# Patient Record
Sex: FEMALE | Race: WHITE | NOT HISPANIC OR LATINO | Employment: OTHER | URBAN - METROPOLITAN AREA
[De-identification: names, ages, dates, MRNs, and addresses within clinical notes are randomized per-mention and may not be internally consistent; named-entity substitution may affect disease eponyms.]

---

## 2017-01-05 ENCOUNTER — TRANSFERRED RECORDS (OUTPATIENT)
Dept: HEALTH INFORMATION MANAGEMENT | Facility: CLINIC | Age: 67
End: 2017-01-05

## 2017-01-17 ENCOUNTER — TELEPHONE (OUTPATIENT)
Dept: OBGYN | Facility: CLINIC | Age: 67
End: 2017-01-17

## 2017-01-17 DIAGNOSIS — Z79.890 HORMONE REPLACEMENT THERAPY (HRT): Primary | ICD-10-CM

## 2017-01-17 RX ORDER — ESTRADIOL 1 MG/1
TABLET ORAL
Qty: 90 TABLET | Refills: 0 | Status: SHIPPED | OUTPATIENT
Start: 2017-01-17 | End: 2017-06-06

## 2017-01-17 NOTE — TELEPHONE ENCOUNTER
Estradiol       Last Written Prescription Date:  2/18/16  Last Fill Quantity: 90,   # refills: 3  Last Office Visit with [facility] primary care provider:  2/18/16  Future Office visit: 4/11/17    Pt due for annual, annual appt scheduled. 3 month supply sent for insurance purposes. Informed pt Rx sent.

## 2017-01-17 NOTE — TELEPHONE ENCOUNTER
Reason for call: Medication   If this is a refill request, has the caller requested the refill from the pharmacy already? No  Will the patient be using a [facility]? No  Name of the pharmacy and phone number for the current request: [facility] in [place] on [address]    Name of the medication requested:Estradial     Phone Number Pt can be reached at: [phone]    Can we leave a detailed message on this number? YES

## 2017-01-19 ENCOUNTER — DOCUMENTATION ONLY (OUTPATIENT)
Dept: OTHER | Facility: CLINIC | Age: 67
End: 2017-01-19

## 2017-01-19 DIAGNOSIS — Z71.89 ADVANCE CARE PLANNING: Primary | Chronic | ICD-10-CM

## 2017-01-23 ENCOUNTER — TELEPHONE (OUTPATIENT)
Dept: OBGYN | Facility: CLINIC | Age: 67
End: 2017-01-23

## 2017-01-23 NOTE — TELEPHONE ENCOUNTER
rec'd letter from [facility] stating the estradiol is no longer being covered, I left a detailed message stating if she wants to continue she will be cash pay for this medication.

## 2017-01-24 ENCOUNTER — TRANSFERRED RECORDS (OUTPATIENT)
Dept: HEALTH INFORMATION MANAGEMENT | Facility: CLINIC | Age: 67
End: 2017-01-24

## 2017-02-06 ENCOUNTER — TRANSFERRED RECORDS (OUTPATIENT)
Dept: HEALTH INFORMATION MANAGEMENT | Facility: CLINIC | Age: 67
End: 2017-02-06

## 2017-04-07 ENCOUNTER — TRANSFERRED RECORDS (OUTPATIENT)
Dept: HEALTH INFORMATION MANAGEMENT | Facility: CLINIC | Age: 67
End: 2017-04-07

## 2017-04-25 ENCOUNTER — TRANSFERRED RECORDS (OUTPATIENT)
Dept: HEALTH INFORMATION MANAGEMENT | Facility: CLINIC | Age: 67
End: 2017-04-25

## 2017-05-16 ENCOUNTER — TELEPHONE (OUTPATIENT)
Dept: FAMILY MEDICINE | Facility: CLINIC | Age: 67
End: 2017-05-16

## 2017-05-16 NOTE — TELEPHONE ENCOUNTER
Left message for pt to return call to clinic - Please [facility] for triage nurse if she returns call back.  PHQ completed on pre assessment survey for OV scheduled 5/19/17.  Nanci Duggan RN

## 2017-05-16 NOTE — TELEPHONE ENCOUNTER
"Called pt on below.    Pt reports \"Things are worse because of my circumstances, there's a whole lot going on. There are times when I think 'I wish I wasn't dealing with this and it was all gone' not that I would do anything.  I would never do anything, and I don't have a plan. I used to be a , and I think it's important to be as honest as possible on these tests.   Plus I'm getting only 4 hours of sleep a night.\"  \"I scheduled this appt this week because I have to do something about something\"  Pt states feels safe at home and in environment, and has no active suicidal thoughts, or plans. Pt plans to see PCP on 5/19, and Neuro on 5/31, and agrees to call and speak with nurse if any worsening thoughts, increase in active suicidal ideation.  Pt was very appreciative of call, and thankful, and was laughing/making jokes at the end of this call.    Nanci Duggan RN    "

## 2017-05-16 NOTE — TELEPHONE ENCOUNTER
PHq-9 response did indicate she has has thoughts of death and suicidal risk.  Can we follow up with her by phone and assess if she might need urgent psychiatric care?    Josue Mejia MD

## 2017-05-16 NOTE — TELEPHONE ENCOUNTER
PHQ-9 SCORE 2/18/2016 5/16/2017   Total Score MyChart - 19 (Moderately severe depression)   Total Score 6 -

## 2017-05-19 ENCOUNTER — OFFICE VISIT (OUTPATIENT)
Dept: FAMILY MEDICINE | Facility: CLINIC | Age: 67
End: 2017-05-19
Payer: COMMERCIAL

## 2017-05-19 VITALS
WEIGHT: 123 LBS | OXYGEN SATURATION: 100 % | TEMPERATURE: 97.7 F | DIASTOLIC BLOOD PRESSURE: 82 MMHG | SYSTOLIC BLOOD PRESSURE: 124 MMHG | HEART RATE: 72 BPM | BODY MASS INDEX: 21 KG/M2 | HEIGHT: 64 IN

## 2017-05-19 DIAGNOSIS — Z11.59 NEED FOR HEPATITIS C SCREENING TEST: ICD-10-CM

## 2017-05-19 DIAGNOSIS — G25.81 RESTLESS LEG SYNDROME: ICD-10-CM

## 2017-05-19 DIAGNOSIS — M32.9 SYSTEMIC LUPUS ERYTHEMATOSUS (H): ICD-10-CM

## 2017-05-19 DIAGNOSIS — G25.81 RESTLESS LEGS SYNDROME: ICD-10-CM

## 2017-05-19 DIAGNOSIS — Z12.31 VISIT FOR SCREENING MAMMOGRAM: ICD-10-CM

## 2017-05-19 DIAGNOSIS — G47.00 INSOMNIA, UNSPECIFIED TYPE: ICD-10-CM

## 2017-05-19 DIAGNOSIS — Z00.00 ROUTINE GENERAL MEDICAL EXAMINATION AT A HEALTH CARE FACILITY: Primary | ICD-10-CM

## 2017-05-19 LAB
ALBUMIN SERPL-MCNC: 3.7 G/DL (ref 3.4–5)
ALP SERPL-CCNC: 65 U/L (ref 40–150)
ALT SERPL W P-5'-P-CCNC: 17 U/L (ref 0–50)
ANION GAP SERPL CALCULATED.3IONS-SCNC: 8 MMOL/L (ref 3–14)
AST SERPL W P-5'-P-CCNC: 12 U/L (ref 0–45)
BASOPHILS # BLD AUTO: 0 10E9/L (ref 0–0.2)
BASOPHILS NFR BLD AUTO: 0.2 %
BILIRUB SERPL-MCNC: 0.3 MG/DL (ref 0.2–1.3)
BUN SERPL-MCNC: 19 MG/DL (ref 7–30)
CALCIUM SERPL-MCNC: 8.8 MG/DL (ref 8.5–10.1)
CHLORIDE SERPL-SCNC: 104 MMOL/L (ref 94–109)
CHOLEST SERPL-MCNC: 277 MG/DL
CO2 SERPL-SCNC: 27 MMOL/L (ref 20–32)
CREAT SERPL-MCNC: 0.89 MG/DL (ref 0.52–1.04)
DIFFERENTIAL METHOD BLD: ABNORMAL
EOSINOPHIL # BLD AUTO: 0 10E9/L (ref 0–0.7)
EOSINOPHIL NFR BLD AUTO: 0.3 %
ERYTHROCYTE [DISTWIDTH] IN BLOOD BY AUTOMATED COUNT: 15.4 % (ref 10–15)
GFR SERPL CREATININE-BSD FRML MDRD: 63 ML/MIN/1.7M2
GLUCOSE SERPL-MCNC: 108 MG/DL (ref 70–99)
HCT VFR BLD AUTO: 39.6 % (ref 35–47)
HCV AB SERPL QL IA: NORMAL
HDLC SERPL-MCNC: 115 MG/DL
HGB BLD-MCNC: 12.5 G/DL (ref 11.7–15.7)
LDLC SERPL CALC-MCNC: 127 MG/DL
LYMPHOCYTES # BLD AUTO: 1 10E9/L (ref 0.8–5.3)
LYMPHOCYTES NFR BLD AUTO: 8 %
MCH RBC QN AUTO: 28.6 PG (ref 26.5–33)
MCHC RBC AUTO-ENTMCNC: 31.6 G/DL (ref 31.5–36.5)
MCV RBC AUTO: 91 FL (ref 78–100)
MONOCYTES # BLD AUTO: 0.7 10E9/L (ref 0–1.3)
MONOCYTES NFR BLD AUTO: 5.8 %
NEUTROPHILS # BLD AUTO: 10.6 10E9/L (ref 1.6–8.3)
NEUTROPHILS NFR BLD AUTO: 85.7 %
NONHDLC SERPL-MCNC: 162 MG/DL
PLATELET # BLD AUTO: 315 10E9/L (ref 150–450)
POTASSIUM SERPL-SCNC: 4.2 MMOL/L (ref 3.4–5.3)
PROT SERPL-MCNC: 7.1 G/DL (ref 6.8–8.8)
RBC # BLD AUTO: 4.37 10E12/L (ref 3.8–5.2)
SODIUM SERPL-SCNC: 139 MMOL/L (ref 133–144)
TRIGL SERPL-MCNC: 177 MG/DL
TSH SERPL DL<=0.005 MIU/L-ACNC: 0.8 MU/L (ref 0.4–4)
WBC # BLD AUTO: 12.3 10E9/L (ref 4–11)

## 2017-05-19 PROCEDURE — 86803 HEPATITIS C AB TEST: CPT | Performed by: INTERNAL MEDICINE

## 2017-05-19 PROCEDURE — 80053 COMPREHEN METABOLIC PANEL: CPT | Performed by: INTERNAL MEDICINE

## 2017-05-19 PROCEDURE — 85025 COMPLETE CBC W/AUTO DIFF WBC: CPT | Performed by: INTERNAL MEDICINE

## 2017-05-19 PROCEDURE — 36415 COLL VENOUS BLD VENIPUNCTURE: CPT | Performed by: INTERNAL MEDICINE

## 2017-05-19 PROCEDURE — 99397 PER PM REEVAL EST PAT 65+ YR: CPT | Performed by: INTERNAL MEDICINE

## 2017-05-19 PROCEDURE — 84443 ASSAY THYROID STIM HORMONE: CPT | Performed by: INTERNAL MEDICINE

## 2017-05-19 PROCEDURE — 99213 OFFICE O/P EST LOW 20 MIN: CPT | Mod: 25 | Performed by: INTERNAL MEDICINE

## 2017-05-19 PROCEDURE — 80061 LIPID PANEL: CPT | Performed by: INTERNAL MEDICINE

## 2017-05-19 RX ORDER — ZOLPIDEM TARTRATE 5 MG/1
5 TABLET ORAL
Qty: 7 TABLET | Refills: 0 | Status: ON HOLD | OUTPATIENT
Start: 2017-05-19 | End: 2018-05-18

## 2017-05-19 RX ORDER — PRAMIPEXOLE DIHYDROCHLORIDE 0.5 MG/1
1.5 TABLET ORAL AT BEDTIME
Status: ON HOLD | COMMUNITY
End: 2018-05-18

## 2017-05-19 RX ORDER — PREDNISONE 5 MG/1
TABLET ORAL
COMMUNITY

## 2017-05-19 RX ORDER — MELOXICAM 15 MG/1
7.5 TABLET ORAL DAILY
COMMUNITY
End: 2020-04-13

## 2017-05-19 RX ORDER — POLYETHYLENE GLYCOL 3350 17 G/17G
1 POWDER, FOR SOLUTION ORAL DAILY
Status: ON HOLD | COMMUNITY
End: 2018-05-18

## 2017-05-19 NOTE — PATIENT INSTRUCTIONS
(Z00.00) Routine general medical examination at a health care facility  (primary encounter diagnosis)  Comment: For routine exam, we will draw labs as ordered, cholesterol, diabetes mellitus check, liver function, renal function, mammogram upcoming.  We will also update vaccination history  Plan: Hepatitis C Screen Reflex to HCV RNA Quant and         Genotype, COMPREHENSIVE METABOLIC PANEL, Lipid         panel reflex to direct LDL, CBC with platelets         and differential            (Z12.31) Visit for screening mammogram  Comment:  [facility] (also performs diagnostic mammogram, ultrasound and biopsy) [phone].    Plan: MA SCREENING DIGITAL BILAT - Future  (s+30)            (Z11.59) Need for hepatitis C screening test  Comment:   Plan:     (G25.81) Restless leg syndrome  Comment: Continue mirapex  Plan: pramipexole (MIRAPEX) 0.5 MG tablet            (G25.81) Restless legs syndrome  Comment: As above - also refer for sleep managment  Plan: SLEEP EVALUATION & MANAGEMENT REFERRAL - ADULT            (M32.9) Systemic lupus erythematosus (H)  Comment: Continue follow up in rheumatology  Plan:            (G47.00) Insomnia, unspecified type  Comment: We will continue trazodone and mirapex and I would recommend a consultation with a sleep specialist and in the interim we will add a low dose of ambien - start with 2.5 mg - to be taken if you wake up and can't fall asleep.  Note that this will possibly make you very sleepy the following day and should not be taken on days/nights in which you anticipate that you will need to be driving or have any high level responsibilities.   Plan: SLEEP EVALUATION & MANAGEMENT REFERRAL - ADULT         zolpidem (AMBIEN) 5 MG tablet

## 2017-05-19 NOTE — MR AVS SNAPSHOT
After Visit Summary   5/19/2017    Yessenia Martines    MRN: [id number]           Patient Information     Date Of Birth          1950        Visit Information        Provider Department      5/19/2017 8:30 AM Josue Mejia MD [facility]        Today's Diagnoses     Routine general medical examination at a health care facility    -  1    Visit for screening mammogram        Need for hepatitis C screening test        Restless leg syndrome        Restless legs syndrome        Systemic lupus erythematosus (H)        Insomnia, unspecified type          Care Instructions    (Z00.00) Routine general medical examination at a health care facility  (primary encounter diagnosis)  Comment: For routine exam, we will draw labs as ordered, cholesterol, diabetes mellitus check, liver function, renal function, mammogram upcoming.  We will also update vaccination history  Plan: Hepatitis C Screen Reflex to HCV RNA Quant and         Genotype, COMPREHENSIVE METABOLIC PANEL, Lipid         panel reflex to direct LDL, CBC with platelets         and differential            (Z12.31) Visit for screening mammogram  Comment:  [facility] (also performs diagnostic mammogram, ultrasound and biopsy) [phone].    Plan: MA SCREENING DIGITAL BILAT - Future  (s+30)            (Z11.59) Need for hepatitis C screening test  Comment:   Plan:     (G25.81) Restless leg syndrome  Comment: Continue mirapex  Plan: pramipexole (MIRAPEX) 0.5 MG tablet            (G25.81) Restless legs syndrome  Comment: As above - also refer for sleep managment  Plan: SLEEP EVALUATION & MANAGEMENT REFERRAL - ADULT            (M32.9) Systemic lupus erythematosus (H)  Comment: Continue follow up in rheumatology  Plan:            (G47.00) Insomnia, unspecified type  Comment: We will continue trazodone and mirapex and I would recommend a consultation with a sleep specialist and in the interim we will add a low  dose of ambien - start with 2.5 mg - to be taken if you wake up and can't fall asleep.  Note that this will possibly make you very sleepy the following day and should not be taken on days/nights in which you anticipate that you will need to be driving or have any high level responsibilities.   Plan: SLEEP EVALUATION & MANAGEMENT REFERRAL - ADULT         zolpidem (AMBIEN) 5 MG tablet            Follow-ups after your visit        Additional Services     SLEEP EVALUATION & MANAGEMENT REFERRAL - ADULT       Please be aware that coverage of these services is subject to the terms and limitations of your health insurance plan.  Call member services at your health plan with any benefit or coverage questions.      Please bring the following to your appointment:    >>   List of current medications   >>   This referral request   >>   Any documents/labs given to you for this referral    Seun Faith)   [phone] (Age 18 and up)                  Your next 10 appointments already scheduled     Jun 06, 2017  8:00 AM CDT   Office Visit with Nicola Ramirez MD   [facility]    [address]  [address]  [place] [id number] [phone]           Bring a current list of meds and any records pertaining to this visit.  For Physicals, please bring immunization records and any forms needing to be filled out.  Please arrive 10 minutes early to complete paperwork.            Jun 06, 2017  8:30 AM CDT   MA SCREENING DIGITAL BILATERAL with WEMA1   [facility]    [address], [address]  [place] [id number] [phone]           Do not use any powder, lotion or deodorant under your arms or on your breast. If you do, we will ask you to remove it before your exam.  Wear comfortable, two-piece clothing.  If you have any allergies, tell your care team.  Bring any previous mammograms from  "other facilities or have them mailed to the breast center.              Future tests that were ordered for you today     Open Future Orders        Priority Expected Expires Ordered    MA SCREENING DIGITAL BILAT - Future  (s+30) Routine  5/19/2018 5/19/2017    SLEEP EVALUATION & MANAGEMENT REFERRAL - ADULT Routine  5/19/2018 5/19/2017            Who to contact     If you have questions or need follow up information about today's clinic visit or your schedule please contact [facility] directly at [phone].  Normal or non-critical lab and imaging results will be communicated to you by [employer], letter or phone within 4 business days after the clinic has received the results. If you do not hear from us within 7 days, please contact the clinic through [employer] or phone. If you have a critical or abnormal lab result, we will notify you by phone as soon as possible.  Submit refill requests through [employer] or call your pharmacy and they will forward the refill request to us. Please allow 3 business days for your refill to be completed.          Additional Information About Your Visit        [employer] Information     [employer] gives you secure access to your electronic health record. If you see a primary care provider, you can also send messages to your care team and make appointments. If you have questions, please call your primary care clinic.  If you do not have a primary care provider, please call [phone] and they will assist you.        Care EveryWhere ID     This is your Care EveryWhere ID. This could be used by other organizations to access your [facility] medical records  [id number]        Your Vitals Were     Pulse Temperature Height Pulse Oximetry Breastfeeding? BMI (Body Mass Index)    72 97.7  F (36.5  C) (Oral) 5' 4\" (1.626 m) 100% No 21.11 kg/m2       Blood Pressure from Last 3 Encounters:   05/19/17 124/82   12/09/16 103/63   11/18/16 123/77    Weight from Last 3 Encounters:   05/19/17 123 lb " (55.8 kg)   12/09/16 114 lb (51.7 kg)   11/18/16 114 lb (51.7 kg)              We Performed the Following     CBC with platelets and differential     COMPREHENSIVE METABOLIC PANEL     Hepatitis C Screen Reflex to HCV RNA Quant and Genotype     Lipid panel reflex to direct LDL     TSH with free T4 reflex          Today's Medication Changes          These changes are accurate as of: 5/19/17  9:22 AM.  If you have any questions, ask your nurse or doctor.               Start taking these medicines.        Dose/Directions    zolpidem 5 MG tablet   Commonly known as:  AMBIEN   Used for:  Insomnia, unspecified type   Started by:  Josue Mejia MD        Dose:  5 mg   Take 1 tablet (5 mg) by mouth nightly as needed for sleep   Quantity:  7 tablet   Refills:  0         These medicines have changed or have updated prescriptions.        Dose/Directions    meloxicam 15 MG tablet   Commonly known as:  MOBIC   This may have changed:  how much to take   Changed by:  Josue Mejia MD        Dose:  7.5 mg   Take 0.5 tablets (7.5 mg) by mouth daily   Refills:  0       MIRAPEX 0.5 MG tablet   This may have changed:  how much to take   Used for:  Restless leg syndrome   Generic drug:  pramipexole   Changed by:  Josue Mejia MD        Dose:  1.5 mg   Take 3 tablets (1.5 mg) by mouth At Bedtime   Refills:  0       predniSONE 5 MG tablet   Commonly known as:  DELTASONE   This may have changed:    - medication strength  - how much to take  - how to take this  - additional instructions   Changed by:  Josue Mejia MD        Taking 5 mg and 1 mg tablet x2 to get a total 7 mg daily   Refills:  0         Stop taking these medicines if you haven't already. Please contact your care team if you have questions.     ACETAMINOPHEN PO   Stopped by:  Josue Mejia MD           LINZESS 145 MCG capsule   Generic drug:  linaclotide   Stopped by:  Josue Mejia MD           oxyCODONE 10 MG  12 hr tablet   Commonly known as:  OxyCONTIN   Stopped by:  Josue Mejia MD           oxyCODONE 5 MG IR tablet   Commonly known as:  ROXICODONE   Stopped by:  Josue Mejia MD           OXYCODONE HCL PO   Stopped by:  Josue Mejia MD                Where to get your medicines      Some of these will need a paper prescription and others can be bought over the counter.  Ask your nurse if you have questions.     Bring a paper prescription for each of these medications     zolpidem 5 MG tablet                Primary Care Provider Office Phone # Fax #    Josue Mejia [phone]       [facility] [address]  [facility] [address]        Thank you!     Thank you for choosing [facility]  for your care. Our goal is always to provide you with excellent care. Hearing back from our patients is one way we can continue to improve our services. Please take a few minutes to complete the written survey that you may receive in the mail after your visit with us. Thank you!             Your Updated Medication List - Protect others around you: Learn how to safely use, store and throw away your medicines at www.disposemymeds.org.          This list is accurate as of: 5/19/17  9:22 AM.  Always use your most recent med list.                   Brand Name Dispense Instructions for use    ARICEPT PO      Take 10 mg by mouth At Bedtime       calcium 600/vitamin D 600-400 MG-UNIT per tablet   Generic drug:  calcium-vitamin D      Take 1 tablet by mouth 2 times daily       CELLCEPT tablet      Take 1 tablet by mouth 2 times daily       CYMBALTA PO      Take 60 mg by mouth 2 times daily       estradiol 1 MG tablet    ESTRACE    90 tablet    TAKE 1 BY MOUTH DAILY       gabapentin 800 MG tablet    NEURONTIN     2 times daily       leucovorin 5 MG tablet    WELLCOVORIN     Take 1 tablet (5 mg) by mouth daily       meloxicam 15 MG tablet    MOBIC     Take  0.5 tablets (7.5 mg) by mouth daily       methotrexate 2.5 MG tablet CHEMO      Take 2 tablets by mouth once a week       MIRALAX powder   Generic drug:  polyethylene glycol      Take 17 g (1 capful) by mouth daily       MIRAPEX 0.5 MG tablet   Generic drug:  pramipexole      Take 3 tablets (1.5 mg) by mouth At Bedtime       NAMENDA PO      Take 10 mg by mouth 2 times daily       omeprazole 40 MG capsule    priLOSEC    90 capsule    TAKE 1 BY MOUTH DAILY       predniSONE 5 MG tablet    DELTASONE     Taking 5 mg and 1 mg tablet x2 to get a total 7 mg daily       ranitidine HCl 300 MG Caps      Take 300 mg by mouth every evening       traZODone 100 MG tablet    DESYREL    180 tablet    TAKE 2 BY MOUTH AT BEDTIME       Vitamin D3 2000 UNITS Tabs      Take 2,000 Int'l Units by mouth daily       VOLTAREN 1 % Gel topical gel   Generic drug:  diclofenac      Place 2 g onto the skin 4 times daily       zolpidem 5 MG tablet    AMBIEN    7 tablet    Take 1 tablet (5 mg) by mouth nightly as needed for sleep

## 2017-05-19 NOTE — PROGRESS NOTES
SUBJECTIVE:                                                            Yessenia Martines is a 67 year old female who presents for Preventive Visit.  Are you in the first 12 months of your Medicare coverage?  No    Physical   Annual:     Getting at least 3 servings of Calcium per day::  Yes    Bi-annual eye exam::  Yes    Dental care twice a year::  Yes    Sleep apnea or symptoms of sleep apnea::  Daytime drowsiness    Diet::  Regular (no restrictions)    Frequency of exercise::  2-3 days/week    Duration of exercise::  30-45 minutes    Taking medications regularly::  Yes    Medication side effects::  Muscle aches and Lightheadedness    Additional concerns today::  YES      COGNITIVE SCREEN  1) Repeat 3 items (Banana, Sunrise, Chair)    2) Clock draw: NORMAL  3) 3 item recall: Recalls 3 objects  Results: 3 items recalled: COGNITIVE IMPAIRMENT LESS LIKELY    Mini-CogTM Copyright S Delia. Licensed by the author for use in [facility] [employer]; reprinted with permission (natalie@[facility]). All rights reserved.        Reviewed and updated as needed this visit by clinical staff         Reviewed and updated as needed this visit by Provider        Social History   Substance Use Topics     Smoking status: Never Smoker     Smokeless tobacco: Never Used     Alcohol use 0.0 oz/week     0 Standard drinks or equivalent per week      Comment: 1 wine/day       patient does nto drink        Today's PHQ-2 Score:   PHQ-2 ( 1999 Pfizer) 5/16/2017   Q1: Little interest or pleasure in doing things -   Q2: Feeling down, depressed or hopeless -   PHQ-2 Score -   Little interest or pleasure in doing things Nearly every day   Feeling down, depressed or hopeless Nearly every day   PHQ-2 Score 6       Do you feel safe in your environment - Yes    Do you have a Health Care Directive?: Yes: Advance Directive has been received and scanned.    Current providers sharing in care for this patient include:   Patient Care Team:  Josue Mejia  MD Alvarado as PCP - General (Internal Medicine)      Hearing impairment: No    Ability to successfully perform activities of daily living: Yes, no assistance needed     Fall risk:  Fallen 2 or more times in the past year?: No  Any fall with injury in the past year?: No    Home safety:       The following health maintenance items are reviewed in Epic and correct as of today:  Health Maintenance   Topic Date Due     HEPATITIS C SCREENING  02/11/1968     CMP Q6 MOS (NO INBASKET)  10/03/2016     MAMMO SCREEN Q2 YR (SYSTEM ASSIGNED)  02/17/2017     FALL RISK ASSESSMENT  02/18/2017     LIPID MONITORING Q1 YEAR( NO INBASKET)  04/19/2017     INFLUENZA VACCINE (SYSTEM ASSIGNED)  09/01/2017     CBC Q1 YR (NO INBASKET)  11/18/2017     PNEUMO 5YR BOOST DUE AFTER AGE 65 (NO IB MSG) (2) 01/21/2020     PNEUMOCOCCAL (2 of 2 - PPSV23) 07/22/2020     TETANUS IMMUNIZATION (SYSTEM ASSIGNED)  11/07/2021     ADVANCE DIRECTIVE PLANNING Q5 YRS (NO INBASKET)  01/19/2022     COLONOSCOPY Q10 YR INBASKET MESSAGE  10/12/2025     DEXA SCAN SCREENING (SYSTEM ASSIGNED)  Completed     Insomnia   Yessenia Martines has not been getting much sleep.  Insomnia has been going on for the past 8-12 months.  She used to sleep fine.  She recognizes that she is working hard.  Depression   Has had concerns for depression due to family health concerns and her own health concerns.  She has seen a therapist in the past and did not feel that this was helpful.  She does have support from friends and Shinto.     ROS:  Constitutional, HEENT, cardiovascular, pulmonary, GI, , musculoskeletal (still with nerve pain in the left leg tingling), neuro, skin, endocrine and psych systems are negative, except as otherwise noted.    Problem list, Medication list, Allergies, and Medical/Social/Surgical histories reviewed in [facility] and updated as appropriate.  OBJECTIVE:                                                            /82 (BP Location: Right arm, Patient  "Position: Chair, Cuff Size: Adult Regular)  Pulse 72  Temp 97.7  F (36.5  C) (Oral)  Ht 5' 4\" (1.626 m)  Wt 123 lb (55.8 kg)  SpO2 100%  Breastfeeding? No  BMI 21.11 kg/m2 Estimated body mass index is 21.11 kg/(m^2) as calculated from the following:    Height as of this encounter: 5' 4\" (1.626 m).    Weight as of this encounter: 123 lb (55.8 kg).  EXAM:   GENERAL: healthy, alert and no distress  EYES: Eyes grossly normal to inspection, PERRL and conjunctivae and sclerae normal  HENT: ear canals and TM's normal, nose and mouth without ulcers or lesions  NECK: no adenopathy, no asymmetry, masses, or scars and thyroid normal to palpation  RESP: lungs clear to auscultation - no rales, rhonchi or wheezes  BREAST: deferred  CV: regular rate and rhythm, normal S1 S2, no S3 or S4, no murmur, click or rub, no peripheral edema and peripheral pulses strong  ABDOMEN: soft, nontender, no hepatosplenomegaly, no masses and bowel sounds normal  MS: no gross musculoskeletal defects noted, no edema  SKIN: no suspicious lesions or rashes  NEURO: normal strength in bilateral lower extremities, gait stable  PSYCH: mentation appears normal, affect normal/bright    ASSESSMENT / PLAN:                                                              Patient Instructions   (Z00.00) Routine general medical examination at a health care facility  (primary encounter diagnosis)  Comment: For routine exam, we will draw labs as ordered, cholesterol, diabetes mellitus check, liver function, renal function, mammogram upcoming.  We will also update vaccination history  Plan: Hepatitis C Screen Reflex to HCV RNA Quant and         Genotype, COMPREHENSIVE METABOLIC PANEL, Lipid         panel reflex to direct LDL, CBC with platelets         and differential            (Z12.31) Visit for screening mammogram  Comment:  [facility] (also performs diagnostic mammogram, ultrasound and biopsy) [phone].    Plan: MA SCREENING DIGITAL " "BILAT - Future  (s+30)            (Z11.59) Need for hepatitis C screening test  Comment:   Plan:     (G25.81) Restless leg syndrome  Comment: Continue mirapex  Plan: pramipexole (MIRAPEX) 0.5 MG tablet            (G25.81) Restless legs syndrome  Comment: As above - also refer for sleep managment  Plan: SLEEP EVALUATION & MANAGEMENT REFERRAL - ADULT            (M32.9) Systemic lupus erythematosus (H)  Comment: Continue follow up in rheumatology  Plan:            (G47.00) Insomnia, unspecified type  Comment: We will continue trazodone and mirapex and I would recommend a consultation with a sleep specialist and in the interim we will add a low dose of ambien - start with 2.5 mg - to be taken if you wake up and can't fall asleep.  Note that this will possibly make you very sleepy the following day and should not be taken on days/nights in which you anticipate that you will need to be driving or have any high level responsibilities.   Plan: SLEEP EVALUATION & MANAGEMENT REFERRAL - ADULT         zolpidem (AMBIEN) 5 MG tablet      End of Life Planning:  Patient currently has an advanced directiveYes    COUNSELING:  Reviewed preventive health counseling, as reflected in patient instructions        Estimated body mass index is 19.57 kg/(m^2) as calculated from the following:    Height as of 12/9/16: 5' 4\" (1.626 m).    Weight as of 12/9/16: 114 lb (51.7 kg).     reports that she has never smoked. She has never used smokeless tobacco.      Appropriate preventive services were discussed with this patient, including applicable screening as appropriate for cardiovascular disease, diabetes, osteopenia/osteoporosis, and glaucoma.  As appropriate for age/gender, discussed screening for colorectal cancer, prostate cancer, breast cancer, and cervical cancer. Checklist reviewing preventive services available has been given to the patient.    Reviewed patients plan of care and provided an AVS. The Basic Care Plan (routine screening as " documented in Health Maintenance) for Yessenia meets the Care Plan requirement. This Care Plan has been established and reviewed with the Patient.    Counseling Resources:  ATP IV Guidelines  Pooled Cohorts Equation Calculator  Breast Cancer Risk Calculator  FRAX Risk Assessment  ICSI Preventive Guidelines  Dietary Guidelines for Americans, 2010  USDA's MyPlate  ASA Prophylaxis  Lung CA Screening    Josue Mejia MD, MD  [facility]

## 2017-05-19 NOTE — NURSING NOTE
"Chief Complaint   Patient presents with     Wellness Visit     Sleep Problem       Initial /82 (BP Location: Right arm, Patient Position: Chair, Cuff Size: Adult Regular)  Pulse 72  Temp 97.7  F (36.5  C) (Oral)  Ht 5' 4\" (1.626 m)  Wt 123 lb (55.8 kg)  SpO2 100%  Breastfeeding? No  BMI 21.11 kg/m2 Estimated body mass index is 21.11 kg/(m^2) as calculated from the following:    Height as of this encounter: 5' 4\" (1.626 m).    Weight as of this encounter: 123 lb (55.8 kg).  Medication Reconciliation: complete     Adriano Dowell, CONCEPCION     "

## 2017-05-21 NOTE — PROGRESS NOTES
Awais Casas,    I had the opportunity to review your recent labs and a summary of your labs reads as follows:    Your complete blood counts show no sign of anemia, normal platelet and slight elevation of white blood cell count and neutrophils which may be emargination reaction from taking prednisone  Your comprehensive metabolic panel showed normal renal function, normal liver function, and slightly elevated fasting blood glucose indicating no evidence of diabetes mellitus.  Your fasting lipid panel show  - normal HDL (good) cholesterol -as your goal is greater than 40  - low LDL (bad) cholesterol as your goal is less than 130  - stable triglyceride levels  Your thyroid function is stable on your current dose of levothyroxine       Sincerely,  Josue Mejia MD

## 2017-05-31 ENCOUNTER — TRANSFERRED RECORDS (OUTPATIENT)
Dept: HEALTH INFORMATION MANAGEMENT | Facility: CLINIC | Age: 67
End: 2017-05-31

## 2017-06-06 ENCOUNTER — RADIANT APPOINTMENT (OUTPATIENT)
Dept: MAMMOGRAPHY | Facility: CLINIC | Age: 67
End: 2017-06-06
Attending: OBSTETRICS & GYNECOLOGY
Payer: COMMERCIAL

## 2017-06-06 ENCOUNTER — OFFICE VISIT (OUTPATIENT)
Dept: OBGYN | Facility: CLINIC | Age: 67
End: 2017-06-06
Attending: OBSTETRICS & GYNECOLOGY
Payer: COMMERCIAL

## 2017-06-06 VITALS
DIASTOLIC BLOOD PRESSURE: 70 MMHG | HEIGHT: 64 IN | BODY MASS INDEX: 21.82 KG/M2 | WEIGHT: 127.8 LBS | HEART RATE: 72 BPM | SYSTOLIC BLOOD PRESSURE: 100 MMHG

## 2017-06-06 DIAGNOSIS — Z79.890 HORMONE REPLACEMENT THERAPY (HRT): ICD-10-CM

## 2017-06-06 DIAGNOSIS — Z12.31 VISIT FOR SCREENING MAMMOGRAM: ICD-10-CM

## 2017-06-06 DIAGNOSIS — Z01.419 ENCOUNTER FOR GYNECOLOGICAL EXAMINATION WITHOUT ABNORMAL FINDING: Primary | ICD-10-CM

## 2017-06-06 PROCEDURE — 99397 PER PM REEVAL EST PAT 65+ YR: CPT | Performed by: OBSTETRICS & GYNECOLOGY

## 2017-06-06 PROCEDURE — G0202 SCR MAMMO BI INCL CAD: HCPCS | Mod: TC

## 2017-06-06 RX ORDER — ESTRADIOL 1 MG/1
TABLET ORAL
Qty: 90 TABLET | Refills: 3 | Status: SHIPPED | OUTPATIENT
Start: 2017-06-06 | End: 2017-09-19

## 2017-06-06 ASSESSMENT — ANXIETY QUESTIONNAIRES
GAD7 TOTAL SCORE: 3
6. BECOMING EASILY ANNOYED OR IRRITABLE: NOT AT ALL
1. FEELING NERVOUS, ANXIOUS, OR ON EDGE: NOT AT ALL
2. NOT BEING ABLE TO STOP OR CONTROL WORRYING: NOT AT ALL
7. FEELING AFRAID AS IF SOMETHING AWFUL MIGHT HAPPEN: NOT AT ALL
5. BEING SO RESTLESS THAT IT IS HARD TO SIT STILL: NOT AT ALL
3. WORRYING TOO MUCH ABOUT DIFFERENT THINGS: NOT AT ALL

## 2017-06-06 ASSESSMENT — PATIENT HEALTH QUESTIONNAIRE - PHQ9: 5. POOR APPETITE OR OVEREATING: NEARLY EVERY DAY

## 2017-06-06 NOTE — MR AVS SNAPSHOT
After Visit Summary   6/6/2017    Yessenia Martines    MRN: [id number]           Patient Information     Date Of Birth          1950        Visit Information        Provider Department      6/6/2017 8:00 AM Nicola Ramirez MD [facility]        Today's Diagnoses     Encounter for gynecological examination without abnormal finding    -  1    Hormone replacement therapy (HRT)           Follow-ups after your visit        Your next 10 appointments already scheduled     Jun 06, 2017  8:30 AM CDT   MA SCREENING DIGITAL BILATERAL with WEMA1   [facility] ([facility])    [address], [address]  [place] [facility] [place] [id number] [phone]           Do not use any powder, lotion or deodorant under your arms or on your breast. If you do, we will ask you to remove it before your exam.  Wear comfortable, two-piece clothing.  If you have any allergies, tell your care team.  Bring any previous mammograms from other facilities or have them mailed to the breast center.            Jun 08, 2017 10:30 AM CDT   New Sleep Patient with Perfecto Desouza MD   [facility] ([facility])    [address]  [address]  [place] [id number] [phone]              Who to contact     If you have questions or need follow up information about today's clinic visit or your schedule please contact [facility] [place] directly at [phone].  Normal or non-critical lab and imaging results will be communicated to you by MyChart, letter or phone within 4 business days after the clinic has received the results. If you do not hear from us within 7 days, please contact the clinic through MyChart or phone. If you have a critical or abnormal lab result, we will notify you by phone as soon as possible.  Submit refill requests through [employer] or call your pharmacy and they will forward the refill request to us.  "Please allow 3 business days for your refill to be completed.          Additional Information About Your Visit        MyChart Information     [employer] gives you secure access to your electronic health record. If you see a primary care provider, you can also send messages to your care team and make appointments. If you have questions, please call your primary care clinic.  If you do not have a primary care provider, please call [phone] and they will assist you.        Care EveryWhere ID     This is your Care EveryWhere ID. This could be used by other organizations to access your [facility] medical records  [id number]        Your Vitals Were     Pulse Height BMI (Body Mass Index)             72 5' 4\" (1.626 m) 21.94 kg/m2          Blood Pressure from Last 3 Encounters:   06/06/17 100/70   05/19/17 124/82   12/09/16 103/63    Weight from Last 3 Encounters:   06/06/17 127 lb 12.8 oz (58 kg)   05/19/17 123 lb (55.8 kg)   12/09/16 114 lb (51.7 kg)              Today, you had the following     No orders found for display         Where to get your medicines      These medications were sent to [facility] - [address] [address]     Phone:  [phone]     estradiol 1 MG tablet          Primary Care Provider Office Phone # Fax #    Josue Mejia [phone]       [facility] [address]  [facility] [address]        Thank you!     Thank you for choosing [facility] [place]  for your care. Our goal is always to provide you with excellent care. Hearing back from our patients is one way we can continue to improve our services. Please take a few minutes to complete the written survey that you may receive in the mail after your visit with us. Thank you!             Your Updated Medication List - Protect others around you: Learn how to safely use, store and throw away your medicines at " www.disposemymeds.org.          This list is accurate as of: 6/6/17  8:29 AM.  Always use your most recent med list.                   Brand Name Dispense Instructions for use    ARICEPT PO      Take 10 mg by mouth At Bedtime       calcium 600/vitamin D 600-400 MG-UNIT per tablet   Generic drug:  calcium-vitamin D      Take 1 tablet by mouth 2 times daily       CELLCEPT tablet      Take 1 tablet by mouth 2 times daily       CYMBALTA PO      Take 60 mg by mouth 2 times daily       estradiol 1 MG tablet    ESTRACE    90 tablet    TAKE 1 BY MOUTH DAILY       gabapentin 800 MG tablet    NEURONTIN     2 times daily       leucovorin 5 MG tablet    WELLCOVORIN     Take 1 tablet (5 mg) by mouth daily       meloxicam 15 MG tablet    MOBIC     Take 0.5 tablets (7.5 mg) by mouth daily       methotrexate 2.5 MG tablet CHEMO      Take 2 tablets by mouth once a week       MIRALAX powder   Generic drug:  polyethylene glycol      Take 17 g (1 capful) by mouth daily       MIRAPEX 0.5 MG tablet   Generic drug:  pramipexole      Take 3 tablets (1.5 mg) by mouth At Bedtime       NAMENDA PO      Take 10 mg by mouth 2 times daily       omeprazole 40 MG capsule    priLOSEC    90 capsule    TAKE 1 BY MOUTH DAILY       predniSONE 5 MG tablet    DELTASONE     Taking 5 mg and 1 mg tablet x2 to get a total 7 mg daily       ranitidine HCl 300 MG Caps      Take 300 mg by mouth every evening       traZODone 100 MG tablet    DESYREL    180 tablet    TAKE 2 BY MOUTH AT BEDTIME       Vitamin D3 2000 UNITS Tabs      Take 2,000 Int'l Units by mouth daily       VOLTAREN 1 % Gel topical gel   Generic drug:  diclofenac      Place 2 g onto the skin 4 times daily       zolpidem 5 MG tablet    AMBIEN    7 tablet    Take 1 tablet (5 mg) by mouth nightly as needed for sleep

## 2017-06-06 NOTE — PROGRESS NOTES
Yessenia is a 67 year old  female who presents for annual exam.     Besides routine health maintenance,  she would like to discuss refill on estradiol.    Do you have a Health Care Directive?: Yes: Patient states has Advance Directive and will bring in a copy to clinic.    Fall risk:   Fallen 2 or more times in the past year?: No  Any fall with injury in the past year?: No    HPI:  The patient's PCP is Josue Mejia MD.    Still taking ERT with no problems. Feels good.  Wondering about continuing.    GYNECOLOGIC HISTORY:  No LMP recorded. Patient has had a hysterectomy..   reports that she has never smoked. She has never used smokeless tobacco.    Patient is sexually active.  STD testing offered?  Declined  Last PHQ-9 score on record=   PHQ-9 SCORE 2017   Total Score MyChart -   Total Score 0     Last GAD7 score on record=   CHE-7 SCORE 2016   Total Score 0 3     Alcohol Score = 2    HEALTH MAINTENANCE:  Cholesterol: 17   Total= 277, Triglycerides=177, LBC=718, ZKH=462, SZJ=788, TSH=0.80 -has done with pcp  Last Mammo: 2/17/15, Result: normal, Next Mammo: today   Pap: (No results found for: PAP ) patient had hysterectomy  DEXA:  16  Colonoscopy:  10/12/15, Result:  normal, Next Colonoscopy: due in     Health maintenance updated:  yes    HISTORY:  Obstetric History       T4      L4     SAB0   TAB0   Ectopic0   Multiple0   Live Births0       # Outcome Date GA Lbr Francis/2nd Weight Sex Delivery Anes PTL Lv   5 Term            4 Term            3 Term            2 Term            1 SAB                 Patient Active Problem List   Diagnosis     Systemic lupus erythematosus (H)     CARDIOVASCULAR SCREENING; LDL GOAL LESS THAN 130     Restless legs syndrome     Peripheral neuropathy, idiopathic     Osteoporosis     Mechanical low back pain     Hemorrhage of cyst of native kidney     Menopausal osteoporosis     Lupus (systemic lupus erythematosus) (H)     MCI  (mild cognitive impairment)     Dysphonia     Globus sensation     Abnormal CT scan, lung     Somatic dysfunction of lumbar region     Bilateral low back pain with sciatica, sciatica laterality unspecified, unspecified chronicity     Somatic dysfunction of sacral region     Sacral pain     Thoracic segment dysfunction     Advance care planning     Past Surgical History:   Procedure Laterality Date     BLEPHAROPLASTY BILATERAL        SECTION      x3     CHOLECYSTECTOMY  2010    feels better. No pathology on gallbladder     EYE SURGERY      Raised eyelids.     HC UGI ENDOSCOPY, SIMPLE EXAM  14     HYSTERECTOMY       KNEE SURGERY       REPAIR TENDON FINGER(S)  2011    Procedure:REPAIR TENDON FINGER(S); RIGHT INDEX FINGER FLEXOR DIGITORUM PROFUNDUS REPAIR, RADIAL DIGITAL NERVE REPAIR ; Surgeon:BRIANA ANTONY; Location:[facility]     SALPINGO-OOPHORECTOMY BILATERAL       SHOULDER SURGERY       TONSILLECTOMY      Child      Social History   Substance Use Topics     Smoking status: Never Smoker     Smokeless tobacco: Never Used     Alcohol use 0.0 oz/week     0 Standard drinks or equivalent per week      Comment: 1 wine/day      Problem (# of Occurrences) Relation (Name,Age of Onset)    C.A.D. (1) Father: cabg, carotid stenosis    Colon Cancer (2) Maternal Grandmother, Maternal Aunt    Hyperlipidemia (3) Father, Sister, Brother    Hypertension (3) Father, Brother, Sister    Neurologic Disorder (1) Mother: ALS? Parkinsons?    OSTEOPOROSIS (3) Mother, Sister, Maternal Aunt            Current Outpatient Prescriptions   Medication Sig     meloxicam (MOBIC) 15 MG tablet Take 0.5 tablets (7.5 mg) by mouth daily     predniSONE (DELTASONE) 5 MG tablet Taking 5 mg and 1 mg tablet x2 to get a total 7 mg daily     pramipexole (MIRAPEX) 0.5 MG tablet Take 3 tablets (1.5 mg) by mouth At Bedtime     polyethylene glycol (MIRALAX) powder Take 17 g (1 capful) by mouth daily     zolpidem (AMBIEN) 5 MG tablet  "Take 1 tablet (5 mg) by mouth nightly as needed for sleep     estradiol (ESTRACE) 1 MG tablet TAKE 1 BY MOUTH DAILY     omeprazole (PRILOSEC) 40 MG capsule TAKE 1 BY MOUTH DAILY     traZODone (DESYREL) 100 MG tablet TAKE 2 BY MOUTH AT BEDTIME     ranitidine HCl 300 MG CAPS Take 300 mg by mouth every evening     diclofenac (VOLTAREN) 1 % GEL Place 2 g onto the skin 4 times daily     gabapentin (NEURONTIN) 800 MG tablet 2 times daily      Donepezil HCl (ARICEPT PO) Take 10 mg by mouth At Bedtime     Memantine HCl (NAMENDA PO) Take 10 mg by mouth 2 times daily     DULoxetine HCl (CYMBALTA PO) Take 60 mg by mouth 2 times daily      leucovorin (WELLCOVORIN) 5 MG tablet Take 1 tablet (5 mg) by mouth daily     methotrexate 2.5 MG tablet Take 2 tablets by mouth once a week     mycophenolate (CELLCEPT) 500 MG tablet Take 1 tablet by mouth 2 times daily     calcium-vitamin D (CALCIUM 600/VITAMIN D) 600-400 MG-UNIT per tablet Take 1 tablet by mouth 2 times daily     Cholecalciferol (VITAMIN D3) 2000 UNITS TABS Take 2,000 Int'l Units by mouth daily     No current facility-administered medications for this visit.        Allergies   Allergen Reactions     Augmentin      C Diff colitis       Past medical, surgical, social and family history were reviewed and updated in EPIC.    ROS:   12 point review of systems negative other than symptoms noted below.  Constitutional: Weight Gain  Musculoskeletal: Joint Pain    EXAM:  /70  Pulse 72  Ht 5' 4\" (1.626 m)  Wt 127 lb 12.8 oz (58 kg)  BMI 21.94 kg/m2   BMI: Body mass index is 21.94 kg/(m^2).    EXAM:  Constitutional: Appearance: Well nourished, well developed alert, in no acute distress  Neck:  Lymph Nodes:  No lymphadenopathy present    Thyroid:  Gland size normal, nontender, no nodules or masses present  on palpation  Chest:  Respiratory Effort:  Breathing unlabored  Cardiovascular:Heart    Auscultation:  Regular rate, normal rhythm, no murmurs present  Breasts: Inspection " of Breasts:  No lymphadenopathy present    Palpation of Breasts and Axillae:  No masses present on palpation, no  breast tenderness    Axillary Lymph Nodes:  No lymphadenopathy present  Gastrointestinal:  Abdominal Examination:  Abdomen nontender to palpation, tone normal without     rigidity or guarding, no masses present, umbilicus without lesions    Liver and speen:  No hepatomegaly present, liver nontender to palpation    Hernias:  No hernias present  Lymphatic: Lymph Nodes:  No other lymphadenopathy present  Skin:  General Inspection:  No rashes present, no lesions present, no areas of  discoloration.    Genitalia and Groin:  No rashes present, no lesions present, no areas of  discoloration, no masses present  Neurologic/Psychiatric:    Mental Status:  Oriented X3     Pelvic Exam:  External Genitalia:     Normal appearance for age, no discharge present, no tenderness present, no inflammatory lesions present, color normal  Vagina:     Normal vaginal vault without central or paravaginal defects, no discharge present, no inflammatory lesions present, no masses present  Bladder:     Nontender to palpation  Urethra:   Urethral Body:  Urethra palpation normal, urethra structural support normal   Urethral Meatus:  No erythema or lesions present  Cervix:     Surgically absent  Uterus:     Surgically absent  Adnexa:     Surgically absent  Perineum:     Perineum within normal limits, no evidence of trauma, no rashes or skin lesions present  Anus:     Anus within normal limits, no hemorrhoids present  Inguinal Lymph Nodes:     No lymphadenopathy present    COUNSELING:   Reviewed preventive health counseling, as reflected in patient instructions       Regular exercise    BMI:  Body mass index is 21.94 kg/(m^2).     reports that she has never smoked. She has never used smokeless tobacco.      ASSESSMENT:  67 year old female with satisfactory annual exam.    ICD-10-CM    1. Encounter for gynecological examination without  abnormal finding Z01.419    2. Hormone replacement therapy (HRT) Z79.890 estradiol (ESTRACE) 1 MG tablet       PLAN:  Discussed [facility] results for both arms of study. ERT was done on Premarin not Estradiol.  Discussed decreased CAD starting in her 50's. Unknown breast cancer risk on Estradiol.  May try to wean down this year to see if need ERT.  Discussed future Vaginal Estrogen if needed.    Nicola Ramirez MD

## 2017-06-07 ASSESSMENT — PATIENT HEALTH QUESTIONNAIRE - PHQ9: SUM OF ALL RESPONSES TO PHQ QUESTIONS 1-9: 0

## 2017-06-07 ASSESSMENT — ANXIETY QUESTIONNAIRES: GAD7 TOTAL SCORE: 3

## 2017-06-08 ENCOUNTER — OFFICE VISIT (OUTPATIENT)
Dept: SLEEP MEDICINE | Facility: CLINIC | Age: 67
End: 2017-06-08
Attending: INTERNAL MEDICINE
Payer: COMMERCIAL

## 2017-06-08 VITALS
HEART RATE: 67 BPM | RESPIRATION RATE: 18 BRPM | SYSTOLIC BLOOD PRESSURE: 126 MMHG | HEIGHT: 64 IN | TEMPERATURE: 98.1 F | DIASTOLIC BLOOD PRESSURE: 83 MMHG | BODY MASS INDEX: 21.85 KG/M2 | WEIGHT: 128 LBS | OXYGEN SATURATION: 97 %

## 2017-06-08 DIAGNOSIS — G25.81 RESTLESS LEGS SYNDROME: ICD-10-CM

## 2017-06-08 DIAGNOSIS — R06.83 SNORING: Primary | ICD-10-CM

## 2017-06-08 DIAGNOSIS — G47.19 EXCESSIVE DAYTIME SLEEPINESS: ICD-10-CM

## 2017-06-08 DIAGNOSIS — G47.00 INSOMNIA, UNSPECIFIED TYPE: ICD-10-CM

## 2017-06-08 PROCEDURE — 99204 OFFICE O/P NEW MOD 45 MIN: CPT | Performed by: INTERNAL MEDICINE

## 2017-06-08 NOTE — NURSING NOTE
"Chief Complaint   Patient presents with     Sleep Problem     Minimal sleep, multiple wake ups - new       Initial /83  Pulse 67  Temp 98.1  F (36.7  C) (Oral)  Resp 18  Ht 1.626 m (5' 4\")  Wt 58.1 kg (128 lb)  SpO2 97%  BMI 21.97 kg/m2 Estimated body mass index is 21.97 kg/(m^2) as calculated from the following:    Height as of this encounter: 1.626 m (5' 4\").    Weight as of this encounter: 58.1 kg (128 lb).  Medication Reconciliation: complete     Neck 31cm  ESS 16    [facility] [place]  [facility] - Specialist      "

## 2017-06-08 NOTE — MR AVS SNAPSHOT
After Visit Summary   6/8/2017    Yessenia Martines    MRN: [id number]           Patient Information     Date Of Birth          1950        Visit Information        Provider Department      6/8/2017 10:30 AM Perfecto Desouza MD [facility]        Today's Diagnoses     Snoring    -  1    Restless legs syndrome        Insomnia, unspecified type        Excessive daytime sleepiness          Care Instructions      Your BMI is Body mass index is 21.97 kg/(m^2).  Weight management is a personal decision.  If you are interested in exploring weight loss strategies, the following discussion covers the approaches that may be successful. Body mass index (BMI) is one way to tell whether you are at a healthy weight, overweight, or obese. It measures your weight in relation to your height.  A BMI of 18.5 to 24.9 is in the healthy range. A person with a BMI of 25 to 29.9 is considered overweight, and someone with a BMI of 30 or greater is considered obese. More than two-thirds of American adults are considered overweight or obese.  Being overweight or obese increases the risk for further weight gain. Excess weight may lead to heart disease and diabetes.  Creating and following plans for healthy eating and physical activity may help you improve your health.  Weight control is part of healthy lifestyle and includes exercise, emotional health, and healthy eating habits. Careful eating habits lifelong are the mainstay of weight control. Though there are significant health benefits from weight loss, long-term weight loss with diet alone may be very difficult to achieve- studies show long-term success with dietary management in less than 10% of people. Attaining a healthy weight may be especially difficult to achieve in those with severe obesity. In some cases, medications, devices and surgical management might be considered.  What can you do?  If you are overweight or obese and are interested in  methods for weight loss, you should discuss this with your provider.     Consider reducing daily calorie intake by 500 calories.     Keep a food journal.     Avoiding skipping meals, consider cutting portions instead.    Diet combined with exercise helps maintain muscle while optimizing fat loss. Strength training is particularly important for building and maintaining muscle mass. Exercise helps reduce stress, increase energy, and improves fitness. Increasing exercise without diet control, however, may not burn enough calories to loose weight.       Start walking three days a week 10-20 minutes at a time    Work towards walking thirty minutes five days a week     Eventually, increase the speed of your walking for 1-2 minutes at time    In addition, we recommend that you review healthy lifestyles and methods for weight loss available through the National Institutes of Health patient information sites:  http://win.niddk.nih.gov/publications/index.htm    And look into health and wellness programs that may be available through your health insurance provider, employer, local community center, or sunshine club.    Weight management plan Patient was referred to their PCP to discuss a diet and exercise plan.              Follow-ups after your visit        Additional Services     SLEEP PSYCHOLOGY REFERRAL       Referral Urgency: Next available    Dr. Alvarado Cervantes is at the following clinics on the following days:  [facility] - [address] [place], [place]        Thursday and Friday (PLEASE CALL [phone] to schedule an appointment).  [facility] [address] [place], [place]       Wednesdays   (PLEASE CALL [phone] to schedule an appointment).     Please be aware that coverage of these services is subject to the terms and limitations of your health insurance plan. Call member services at your health plan with any benefit or coverage questions.     Please bring the following to your  appointment:  >> List of current medications   >> This referral request   >> Any documents/labs given to you for this referral                  Your next 10 appointments already scheduled     Jun 22, 2017  8:30 PM CDT   PSG Split with BED 6 SH SLEEP   [facility])    [address]  [facility] [address]   [phone]            Jul 12, 2017  2:30 PM CDT   Return Sleep Patient with Perfecto Desouza MD   [facility])    [address]  [facility] [address]   [phone]            Aug 01, 2017  8:00 AM CDT   New Sleep Patient with Alvarado Cervantes PsyD   [facility])    [address] [place] [address]   [phone]              Future tests that were ordered for you today     Open Future Orders        Priority Expected Expires Ordered    Comprehensive Sleep Study Routine  12/5/2017 6/8/2017            Who to contact     If you have questions or need follow up information about today's clinic visit or your schedule please contact [facility] directly at [phone].  Normal or non-critical lab and imaging results will be communicated to you by Circalithart, letter or phone within 4 business days after the clinic has received the results. If you do not hear from us within 7 days, please contact the clinic through Circalithart or phone. If you have a critical or abnormal lab result, we will notify you by phone as soon as possible.  Submit refill requests through YouFolio or call your pharmacy and they will forward the refill request to us. Please allow 3 business days for your refill to be completed.          Additional Information About Your Visit        YouFolio Information     YouFolio gives you secure access to your electronic health record. If you see a primary care provider, you can also send messages to your care team and make  "appointments. If you have questions, please call your primary care clinic.  If you do not have a primary care provider, please call [phone] and they will assist you.        Care EveryWhere ID     This is your Care EveryWhere ID. This could be used by other organizations to access your [place] medical records  [id number]        Your Vitals Were     Pulse Temperature Respirations Height Pulse Oximetry BMI (Body Mass Index)    67 98.1  F (36.7  C) (Oral) 18 1.626 m (5' 4\") 97% 21.97 kg/m2       Blood Pressure from Last 3 Encounters:   06/08/17 126/83   06/06/17 100/70   05/19/17 124/82    Weight from Last 3 Encounters:   06/08/17 58.1 kg (128 lb)   06/06/17 58 kg (127 lb 12.8 oz)   05/19/17 55.8 kg (123 lb)              We Performed the Following     SLEEP EVALUATION & MANAGEMENT REFERRAL - ADULT     SLEEP PSYCHOLOGY REFERRAL        Primary Care Provider Office Phone # Fax #    Josue Mejia [phone]       [facility] [address]  [facility] [address]        Thank you!     Thank you for choosing [facility]  for your care. Our goal is always to provide you with excellent care. Hearing back from our patients is one way we can continue to improve our services. Please take a few minutes to complete the written survey that you may receive in the mail after your visit with us. Thank you!             Your Updated Medication List - Protect others around you: Learn how to safely use, store and throw away your medicines at www.disposemymeds.org.          This list is accurate as of: 6/8/17 11:26 AM.  Always use your most recent med list.                   Brand Name Dispense Instructions for use    ARICEPT PO      Take 10 mg by mouth At Bedtime       calcium 600/vitamin D 600-400 MG-UNIT per tablet   Generic drug:  calcium-vitamin D      Take 1 tablet by mouth 2 times daily       CELLCEPT tablet      Take 1 tablet by mouth 2 times daily       CYMBALTA PO "      Take 60 mg by mouth 2 times daily       estradiol 1 MG tablet    ESTRACE    90 tablet    TAKE 1 BY MOUTH DAILY       gabapentin 800 MG tablet    NEURONTIN     2 times daily       leucovorin 5 MG tablet    WELLCOVORIN     Take 1 tablet (5 mg) by mouth daily       meloxicam 15 MG tablet    MOBIC     Take 0.5 tablets (7.5 mg) by mouth daily       methotrexate 2.5 MG tablet CHEMO      Take 2 tablets by mouth once a week       MIRALAX powder   Generic drug:  polyethylene glycol      Take 17 g (1 capful) by mouth daily       MIRAPEX 0.5 MG tablet   Generic drug:  pramipexole      Take 3 tablets (1.5 mg) by mouth At Bedtime       NAMENDA PO      Take 10 mg by mouth 2 times daily       omeprazole 40 MG capsule    priLOSEC    90 capsule    TAKE 1 BY MOUTH DAILY       predniSONE 5 MG tablet    DELTASONE     Taking 5 mg and 1 mg tablet x2 to get a total 7 mg daily       ranitidine HCl 300 MG Caps      Take 300 mg by mouth every evening       traZODone 100 MG tablet    DESYREL    180 tablet    TAKE 2 BY MOUTH AT BEDTIME       Vitamin D3 2000 UNITS Tabs      Take 2,000 Int'l Units by mouth daily       VOLTAREN 1 % Gel topical gel   Generic drug:  diclofenac      Place 2 g onto the skin 4 times daily       zolpidem 5 MG tablet    AMBIEN    7 tablet    Take 1 tablet (5 mg) by mouth nightly as needed for sleep

## 2017-06-09 NOTE — PROGRESS NOTES
SLEEP EVALUATION        DATE OF SERVICE:  06/08/2017        REQUESTING PHYSICIAN:  Josue Mejia MD      REASON FOR CONSULTATION:  Evaluation of insomnia.      CHIEF COMPLAINT:  Inability to stay asleep, nonrestorative sleep.        HISTORY OF PRESENT ILLNESS:  Ms. Yessenia Martines is a 67-year-old female who presents with a chief complaint of difficulty staying asleep and having disrupted, nonrestorative sleep.  The patient reports worsening of her sleep difficulty over the last 1 year. She has had poor sleep for at least 12 years.  The patient has SLE and is on methotrexate and prednisone.  She was prescribed trazodone approximately 12 years ago for better sleep.  She is currently on a dose of 200 mg daily.  With the recent worsening of her insomnia, she has been prescribed zolpidem at a dose of 5 mg at night.  She is also on gabapentin at a dose of 1600 mg daily.      The patient reports multiple psychosocial stresses.  Her  is wheelchair bound.  She has a busy day running errands, but reports having significant sleepiness and tiredness.  She rates her [place] Sleepiness Scale Score at 16/24.  She has previously drifted off her sahara while driving.  She limits her driving to short distances.  She denies any previous history of motor vehicle accidents.  She goes to bed somewhere between 9:00 to 10:00 p.m.  It takes her 30 minutes to fall asleep after taking her trazodone and Ambien.  She wakes up multiple times throughout the night, sometimes every hour.  She reports that her  is a restless sleeper and talks in his sleep.  These are additional reasons for her difficulty in sleep initiation and maintenance.  She reports having an active mind and significant frustration with her inability to stay asleep.  She is usually awakened by 3:00 a.m. and will have difficulty returning to sleep.  She will usually leave the bed at this time and will engage in household chores like cleaning the dishes.      The  patient is reported to have frequent snoring.  The intensity of her snoring varies.  Some nights this is loud, especially when she sleeps on her back.  She sleeps both on her back as well as her sides.  She has occasionally awakened with gasping episodes in her sleep.  She has no knowledge of witnessed apneas.      The patient has a history of restless legs.  She is currently prescribed pramipexole at a dose of 1.5 mg daily.  Her restless leg symptoms start usually at around 7:00 p.m.  She has adequate relief with the use of the dopaminergic agent.  There has been no significant adverse effects.      There is no history of dream enactment behavior or other parasomnias.      PAST MEDICAL HISTORY:   1.  SLE.   2.  Osteoarthritis.   3.  Mild cognitive impairment.   4.  Peripheral neuropathy.      FAMILY HISTORY:  No family history of sleep disorders.      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  There is no history of smoking.  She drinks 1-2 glasses of wine every evening.  She drinks 2-3 cups of coffee daily.      REVIEW OF SYSTEMS:  A 10-point review of systems was negative except for weight gain, sore throat, postnasal drip, headaches, dry cough, nausea, joint pain and swollen joints.      PHYSICAL EXAMINATION:   CONSTITUTIONAL:  Awake, alert, cooperative, in no apparent distress.   EYES:  No icterus, normal conjunctivae, PERRL.   Head: Normocephalic. No masses, lesions, tenderness or abnormalities  Neck: Neck supple. No adenopathy. Thyroid symmetric, normal size,  ENT: ENT exam normal, no neck nodes or sinus tenderness Oropharynx is Mosqueda class III with narrow diameters.   Gastrointestinal: negative  Musculoskeletal: extremities normal- no gross deformities noted, gait normal and normal muscle tone  CARDIOVASCULAR:  Regular S1 and S2.   PULMONARY:  Chest symmetric.  Lungs clear bilaterally.   NEUROLOGIC:  Alert and oriented x3, no focal neurological deficit.  Cranial  nerves are grossly normal.   PSYCHIATRIC:  Mood is euthymic with a congruent affect.  Attention and concentration are normal.      IMPRESSION AND PLAN:     1.  Rule out obstructive sleep apnea.   2.  Chronic psychophysiologic insomnia.   3.  Restless legs syndrome.      Sleep disordered breathing is a possibility for this patient who has snoring, gasping episodes, nonrestorative sleep and daytime sleepiness.  I recommend that we proceed with an overnight sleep study for evaluation of sleep apnea.      The patient has chronic insomnia perpetrated by ongoing psychosocial stressors, conditioned hypervigilance and pain issues.  The patient is currently on 2 hypnotic agents including trazodone 200 mg and zolpidem 5 mg.  She is on other sedating medications including gabapentin at a dose of 800 mg twice a day.  The patient reports having significant daytime sleepiness which has affected her driving.  I have advised her regarding the dangers of drowsy driving and advised her to refrain from driving until her daytime sleepiness and sleep issues are evaluated and improved.  Her sedating medications will need to be reevaluated in the context of her daytime sleepiness.      She has adequate symptom control of restless legs with the current dose of the dopaminergic agent.      I also advised a meeting with our behavioral sleep specialist, Dr. Cervantes for further work on insomnia management through cognitive behavioral therapy.      TREATMENT PLAN:   1.  Split night polysomnogram for assessment of sleep apnea.   2.  Referral to Dr. Cervantes, behavioral sleep specialist.   3.  Follow up after sleep study for further recommendations.      I spent a total of 45 minutes with this patient, with more than 50% spent in counseling.         OVIDIO BREWSTER MD             D: 2017 16:08   T: 2017 08:47   MT: [email or web address]      Name:     ADE RICHMOND   MRN:      [id number]        Account:      [id number]   :      1950            Visit Date:   06/08/2017      Document: [id number]       cc: Josue Mejia MD

## 2017-06-12 ENCOUNTER — TELEPHONE (OUTPATIENT)
Dept: OBGYN | Facility: CLINIC | Age: 67
End: 2017-06-12

## 2017-06-12 NOTE — TELEPHONE ENCOUNTER
Spoke with [facility] [employer]. Completed PA question set that was needed before mail order could be delivered. Approved 5/13/17-6/12/18. Patient will be notified by [employer].

## 2017-06-12 NOTE — TELEPHONE ENCOUNTER
CASE NUMBER [id number]    [facility] Needs additional information for prior authorization.  Patient is Medicare and evelin can only be open so long. It is scheduled to close tomorrow if no information given.

## 2017-07-31 ENCOUNTER — TRANSFERRED RECORDS (OUTPATIENT)
Dept: HEALTH INFORMATION MANAGEMENT | Facility: CLINIC | Age: 67
End: 2017-07-31

## 2017-08-11 DIAGNOSIS — K21.9 GASTROESOPHAGEAL REFLUX DISEASE WITHOUT ESOPHAGITIS: ICD-10-CM

## 2017-08-11 RX ORDER — TRAZODONE HYDROCHLORIDE 100 MG/1
TABLET ORAL
Qty: 180 TABLET | Refills: 1 | Status: SHIPPED | OUTPATIENT
Start: 2017-08-11 | End: 2018-01-04

## 2017-08-11 NOTE — TELEPHONE ENCOUNTER
Prescription approved per [facility] [place].  Jenifer Rowley RN

## 2017-09-19 ENCOUNTER — OFFICE VISIT (OUTPATIENT)
Dept: FAMILY MEDICINE | Facility: CLINIC | Age: 67
End: 2017-09-19
Payer: COMMERCIAL

## 2017-09-19 VITALS
DIASTOLIC BLOOD PRESSURE: 67 MMHG | WEIGHT: 126 LBS | BODY MASS INDEX: 21.51 KG/M2 | HEIGHT: 64 IN | TEMPERATURE: 97.4 F | SYSTOLIC BLOOD PRESSURE: 106 MMHG | OXYGEN SATURATION: 98 % | HEART RATE: 64 BPM

## 2017-09-19 DIAGNOSIS — R50.9 FEVER, UNSPECIFIED: Primary | ICD-10-CM

## 2017-09-19 DIAGNOSIS — M54.50 ACUTE LOW BACK PAIN WITHOUT SCIATICA, UNSPECIFIED BACK PAIN LATERALITY: ICD-10-CM

## 2017-09-19 DIAGNOSIS — M32.9 SYSTEMIC LUPUS ERYTHEMATOSUS, UNSPECIFIED SLE TYPE, UNSPECIFIED ORGAN INVOLVEMENT STATUS (H): ICD-10-CM

## 2017-09-19 LAB
ALBUMIN UR-MCNC: NEGATIVE MG/DL
APPEARANCE UR: CLEAR
BILIRUB UR QL STRIP: NEGATIVE
COLOR UR AUTO: YELLOW
GLUCOSE UR STRIP-MCNC: NEGATIVE MG/DL
HGB UR QL STRIP: ABNORMAL
KETONES UR STRIP-MCNC: NEGATIVE MG/DL
LEUKOCYTE ESTERASE UR QL STRIP: ABNORMAL
NITRATE UR QL: NEGATIVE
NON-SQ EPI CELLS #/AREA URNS LPF: NORMAL /LPF
PH UR STRIP: 7 PH (ref 5–7)
RBC #/AREA URNS AUTO: NORMAL /HPF
SOURCE: ABNORMAL
SP GR UR STRIP: 1.01 (ref 1–1.03)
UROBILINOGEN UR STRIP-ACNC: 0.2 EU/DL (ref 0.2–1)
WBC #/AREA URNS AUTO: NORMAL /HPF

## 2017-09-19 PROCEDURE — 81001 URINALYSIS AUTO W/SCOPE: CPT | Performed by: NURSE PRACTITIONER

## 2017-09-19 PROCEDURE — 99213 OFFICE O/P EST LOW 20 MIN: CPT | Performed by: NURSE PRACTITIONER

## 2017-09-19 ASSESSMENT — ENCOUNTER SYMPTOMS
SORE THROAT: 0
DIZZINESS: 0
NAUSEA: 0
CONSTIPATION: 0
HEADACHES: 0
COUGH: 0
CHILLS: 0
DYSURIA: 0
VOMITING: 0
HEMATURIA: 0
ABDOMINAL PAIN: 0
FREQUENCY: 0
TINGLING: 0
SHORTNESS OF BREATH: 0
FLANK PAIN: 0
FEVER: 1

## 2017-09-19 NOTE — MR AVS SNAPSHOT
After Visit Summary   9/19/2017    Yessenia Martines    MRN: [id number]           Patient Information     Date Of Birth          1950        Visit Information        Provider Department      9/19/2017 2:30 PM Chelsea Castro APRN CNP [facility]        Today's Diagnoses     Fever, unspecified    -  1    Acute low back pain without sciatica, unspecified back pain laterality        Systemic lupus erythematosus, unspecified SLE type, unspecified organ involvement status (H)           Follow-ups after your visit        Who to contact     If you have questions or need follow up information about today's clinic visit or your schedule please contact [facility] directly at [phone].  Normal or non-critical lab and imaging results will be communicated to you by IT'SUGARhart, letter or phone within 4 business days after the clinic has received the results. If you do not hear from us within 7 days, please contact the clinic through IT'SUGARhart or phone. If you have a critical or abnormal lab result, we will notify you by phone as soon as possible.  Submit refill requests through Vputi or call your pharmacy and they will forward the refill request to us. Please allow 3 business days for your refill to be completed.          Additional Information About Your Visit        MyChart Information     Vputi gives you secure access to your electronic health record. If you see a primary care provider, you can also send messages to your care team and make appointments. If you have questions, please call your primary care clinic.  If you do not have a primary care provider, please call [phone] and they will assist you.        Care EveryWhere ID     This is your Care EveryWhere ID. This could be used by other organizations to access your [place] medical records  [id number]        Your Vitals Were     Pulse Temperature Height Pulse Oximetry Breastfeeding? BMI (Body Mass Index)    64  "97.4  F (36.3  C) (Oral) 5' 4\" (1.626 m) 98% No 21.63 kg/m2       Blood Pressure from Last 3 Encounters:   09/19/17 106/67   06/08/17 126/83   06/06/17 100/70    Weight from Last 3 Encounters:   09/19/17 126 lb (57.2 kg)   06/08/17 128 lb (58.1 kg)   06/06/17 127 lb 12.8 oz (58 kg)              We Performed the Following     UA reflex to Microscopic and Culture     Urine Microscopic        Primary Care Provider Office Phone # Fax #    Josue Mejia [phone] [address] [address]        Equal Access to Services     RATNA TATE : Hadii luis abdio Sotu, waaxda luqadaha, qaybta kaalmada adeegyada, dipak barajas . So [facility] [phone].    ATENCIÓN: Si habla español, tiene a armendariz disposición servicios gratuitos de asistencia lingüística. Llame al [phone].    We comply with applicable federal civil rights laws and [place] laws. We do not discriminate on the basis of race, color, national origin, age, disability sex, sexual orientation or gender identity.            Thank you!     Thank you for choosing [facility]  for your care. Our goal is always to provide you with excellent care. Hearing back from our patients is one way we can continue to improve our services. Please take a few minutes to complete the written survey that you may receive in the mail after your visit with us. Thank you!             Your Updated Medication List - Protect others around you: Learn how to safely use, store and throw away your medicines at www.disposemymeds.org.          This list is accurate as of: 9/19/17 11:59 PM.  Always use your most recent med list.                   Brand Name Dispense Instructions for use Diagnosis    ARICEPT PO      Take 10 mg by mouth At Bedtime        calcium 600/vitamin D 600-400 MG-UNIT per tablet   Generic drug:  calcium-vitamin D      Take 1 tablet by mouth 2 times daily        CELLCEPT tablet      500 " mg in AM then 250 mg in PM        CYMBALTA PO      Take 60 mg by mouth 2 times daily        gabapentin 800 MG tablet    NEURONTIN     2 times daily        leucovorin 5 MG tablet    WELLCOVORIN     Take 1 tablet (5 mg) by mouth daily        meloxicam 15 MG tablet    MOBIC     Take 0.5 tablets (7.5 mg) by mouth daily        methotrexate 2.5 MG tablet CHEMO      Take 2 tablets by mouth once a week        MIRALAX powder   Generic drug:  polyethylene glycol      Take 17 g (1 capful) by mouth daily        MIRAPEX 0.5 MG tablet   Generic drug:  pramipexole      Take 3 tablets (1.5 mg) by mouth At Bedtime    Restless leg syndrome       NAMENDA PO      Take 10 mg by mouth 2 times daily        omeprazole 40 MG capsule    priLOSEC    90 capsule    TAKE 1 BY MOUTH DAILY    Primary insomnia       predniSONE 5 MG tablet    DELTASONE     Taking 5 mg and 1 mg tablet x2 to get a total 7 mg daily        ranitidine HCl 300 MG Caps      Take 300 mg by mouth every evening        traZODone 100 MG tablet    DESYREL    180 tablet    TAKE 2 TABLETS AT BEDTIME    Gastroesophageal reflux disease without esophagitis       Vitamin D3 2000 UNITS Tabs      Take 2,000 Int'l Units by mouth daily        VOLTAREN 1 % Gel topical gel   Generic drug:  diclofenac      Place 2 g onto the skin 4 times daily        zolpidem 5 MG tablet    AMBIEN    7 tablet    Take 1 tablet (5 mg) by mouth nightly as needed for sleep    Insomnia, unspecified type

## 2017-09-19 NOTE — PROGRESS NOTES
HPI      SUBJECTIVE:   Yessenia Martines is a 67 year old female who presents to clinic today for the following health issues:    Chief Complaint   Patient presents with     Back Pain     low back pain and fever.  Rheumatologist suggested we check urine        Comes in today with joint pain, fatigue, low grade fevers for two weeks, joint pain and swelling is worse today  Has a history of lupus and her cellcept was reduced 6 weeks ago, stated she has not had a flare in 8 years  Back pain the last few days, has been taking care of grandkids and thinks that is why the back pain  No pain with urination, more urinary urgency over the past two weeks  Loose stools which are normal for her  No vaginal symptoms  Rheumatology sent her here to rule out urinary tract infection. If this is neg, they will likely treat this as a flare    Past Medical History:   Diagnosis Date     Allergic rhinitis      Allergic rhinitis due to pollen      Arthritis      Decreased libido      Depressive disorder      H pylori ulcer      Hoarseness      Immunosuppression (H)      Lupus (systemic lupus erythematosus) (H)     diagnosed 12 yrs ago     MCI (mild cognitive impairment)      Menopausal osteoporosis 2015     S/P cholecystectomy      S/P TIESHA-BSO      Past Surgical History:   Procedure Laterality Date     BLEPHAROPLASTY BILATERAL        SECTION      x3     CHOLECYSTECTOMY      feels better. No pathology on gallbladder     EYE SURGERY      Raised eyelids.     [facility] UGI ENDOSCOPY, SIMPLE EXAM  14     HYSTERECTOMY       KNEE SURGERY       REPAIR TENDON FINGER(S)  2011    Procedure:REPAIR TENDON FINGER(S); RIGHT INDEX FINGER FLEXOR DIGITORUM PROFUNDUS REPAIR, RADIAL DIGITAL NERVE REPAIR ; Surgeon:BRIANA ANTONY; Location:[facility]     SALPINGO-OOPHORECTOMY BILATERAL       SHOULDER SURGERY       TONSILLECTOMY      Child     Social History   Substance Use Topics     Smoking status: Never Smoker      Smokeless tobacco: Never Used     Alcohol use 0.0 oz/week     0 Standard drinks or equivalent per week      Comment: 1 wine/day     Current Outpatient Prescriptions   Medication Sig Dispense Refill     traZODone (DESYREL) 100 MG tablet TAKE 2 TABLETS AT BEDTIME 180 tablet 1     meloxicam (MOBIC) 15 MG tablet Take 0.5 tablets (7.5 mg) by mouth daily       predniSONE (DELTASONE) 5 MG tablet Taking 5 mg and 1 mg tablet x2 to get a total 7 mg daily       pramipexole (MIRAPEX) 0.5 MG tablet Take 3 tablets (1.5 mg) by mouth At Bedtime       polyethylene glycol (MIRALAX) powder Take 17 g (1 capful) by mouth daily       zolpidem (AMBIEN) 5 MG tablet Take 1 tablet (5 mg) by mouth nightly as needed for sleep 7 tablet 0     omeprazole (PRILOSEC) 40 MG capsule TAKE 1 BY MOUTH DAILY 90 capsule 3     ranitidine HCl 300 MG CAPS Take 300 mg by mouth every evening       diclofenac (VOLTAREN) 1 % GEL Place 2 g onto the skin 4 times daily       gabapentin (NEURONTIN) 800 MG tablet 2 times daily   1     Donepezil HCl (ARICEPT PO) Take 10 mg by mouth At Bedtime       Memantine HCl (NAMENDA PO) Take 10 mg by mouth 2 times daily       DULoxetine HCl (CYMBALTA PO) Take 60 mg by mouth 2 times daily        leucovorin (WELLCOVORIN) 5 MG tablet Take 1 tablet (5 mg) by mouth daily       methotrexate 2.5 MG tablet Take 2 tablets by mouth once a week       mycophenolate (CELLCEPT) 500 MG tablet 500 mg in AM then 250 mg in PM       calcium-vitamin D (CALCIUM 600/VITAMIN D) 600-400 MG-UNIT per tablet Take 1 tablet by mouth 2 times daily       Cholecalciferol (VITAMIN D3) 2000 UNITS TABS Take 2,000 Int'l Units by mouth daily       Allergies   Allergen Reactions     Augmentin      C Diff colitis       Reviewed and updated as needed this visit by clinical staff and provider        Review of Systems   Constitutional: Positive for fever and malaise/fatigue. Negative for chills.   HENT: Negative for congestion and sore throat.    Respiratory: Negative  "for cough and shortness of breath.    Cardiovascular: Negative for chest pain.   Gastrointestinal: Negative for abdominal pain, constipation, nausea and vomiting.   Genitourinary: Positive for urgency. Negative for dysuria, flank pain, frequency and hematuria.   Musculoskeletal: Back pain: Low back pain.   Neurological: Negative for dizziness, tingling and headaches.   All other systems reviewed and are negative.        /67 (BP Location: Right arm, Patient Position: Chair, Cuff Size: Adult Regular)  Pulse 64  Temp 97.4  F (36.3  C) (Oral)  Ht 5' 4\" (1.626 m)  Wt 126 lb (57.2 kg)  SpO2 98%  Breastfeeding? No  BMI 21.63 kg/m2    Physical Exam   Constitutional: She is well-developed, well-nourished, and in no distress. Vital signs are normal.   HENT:   Head: Normocephalic.   Eyes: Conjunctivae are normal.   Pulmonary/Chest: Effort normal.   Musculoskeletal: Normal range of motion.   Neurological: She is alert.   Skin: Skin is warm and dry.   Psychiatric: Mood, affect and judgment normal.   Nursing note and vitals reviewed.        Assessment and Plan:       ICD-10-CM    1. Fever, unspecified R50.9    2. Acute low back pain without sciatica, unspecified back pain laterality M54.5    3. Systemic lupus erythematosus, unspecified SLE type, unspecified organ involvement status (H) M32.9        UA negative, will await culture  Use heat and tylenol for back pain  Follow up with rheumatology to discuss medication changes  Return to clinic if symptoms are worse or not improving      Mechelle Fine RN NP Student    Chelsea Castro APRN, CNP  [facility]    "

## 2017-10-04 ENCOUNTER — OFFICE VISIT (OUTPATIENT)
Dept: FAMILY MEDICINE | Facility: CLINIC | Age: 67
End: 2017-10-04
Payer: COMMERCIAL

## 2017-10-04 VITALS
BODY MASS INDEX: 21.85 KG/M2 | WEIGHT: 128 LBS | DIASTOLIC BLOOD PRESSURE: 76 MMHG | TEMPERATURE: 97.4 F | HEIGHT: 64 IN | HEART RATE: 67 BPM | OXYGEN SATURATION: 97 % | RESPIRATION RATE: 16 BRPM | SYSTOLIC BLOOD PRESSURE: 116 MMHG

## 2017-10-04 DIAGNOSIS — H81.10 BENIGN PAROXYSMAL POSITIONAL VERTIGO, UNSPECIFIED LATERALITY: Primary | ICD-10-CM

## 2017-10-04 PROCEDURE — 99213 OFFICE O/P EST LOW 20 MIN: CPT | Performed by: NURSE PRACTITIONER

## 2017-10-04 ASSESSMENT — ENCOUNTER SYMPTOMS
DIZZINESS: 1
VOMITING: 1
ABDOMINAL PAIN: 1
FEVER: 0
NAUSEA: 0
HEADACHES: 0
CHILLS: 0

## 2017-10-04 NOTE — MR AVS SNAPSHOT
"              After Visit Summary   10/4/2017    Yessenia Martines    MRN: [id number]           Patient Information     Date Of Birth          1950        Visit Information        Provider Department      10/4/2017 3:30 PM Chelsea Castro APRN [facility]        Today's Diagnoses     Benign paroxysmal positional vertigo, unspecified laterality    -  1      Care Instructions    Try meclizine 12.5-25 mg 3 times a day for dizziness  Drink plenty of fluids  Get in contact with physical therapy          Follow-ups after your visit        Additional Services     PHYSICAL THERAPY REFERRAL       *This therapy referral will be filtered to a centralized scheduling office at [facility] and the patient will receive a call to schedule an appointment at a [facility] location most convenient for them. *     [facility] provides Physical Therapy evaluation and treatment and many specialty services across the [place] system.  If requesting a specialty program, please choose from the list below.    If you have not heard from the scheduling office within 2 business days, please call [phone] for all locations, with the exception of [place], please call [phone].  Treatment: Evaluation & Treatment  Special Instructions/Modalities:   Special Programs: Balance/Vestibular    Please be aware that coverage of these services is subject to the terms and limitations of your health insurance plan.  Call member services at your health plan with any benefit or coverage questions.      **Note to Provider:  If you are referring outside of [place] for the therapy appointment, please list the name of the location in the \"special instructions\" above, print the referral and give to the patient to schedule the appointment.                  Who to contact     If you have questions or need follow up information about today's clinic visit or your schedule please contact [place] " "[facility] directly at [phone].  Normal or non-critical lab and imaging results will be communicated to you by MyChart, letter or phone within 4 business days after the clinic has received the results. If you do not hear from us within 7 days, please contact the clinic through [facility] or phone. If you have a critical or abnormal lab result, we will notify you by phone as soon as possible.  Submit refill requests through [facility] or call your pharmacy and they will forward the refill request to us. Please allow 3 business days for your refill to be completed.          Additional Information About Your Visit        [employer] Exchange Information     [facility] gives you secure access to your electronic health record. If you see a primary care provider, you can also send messages to your care team and make appointments. If you have questions, please call your primary care clinic.  If you do not have a primary care provider, please call [phone] and they will assist you.        Care EveryWhere ID     This is your Care EveryWhere ID. This could be used by other organizations to access your [place] medical records  [id number]        Your Vitals Were     Pulse Temperature Respirations Height Pulse Oximetry BMI (Body Mass Index)    67 97.4  F (36.3  C) (Oral) 16 5' 4\" (1.626 m) 97% 21.97 kg/m2       Blood Pressure from Last 3 Encounters:   10/04/17 116/76   09/19/17 106/67   06/08/17 126/83    Weight from Last 3 Encounters:   10/04/17 128 lb (58.1 kg)   09/19/17 126 lb (57.2 kg)   06/08/17 128 lb (58.1 kg)              We Performed the Following     PHYSICAL THERAPY REFERRAL        Primary Care Provider Office Phone # Fax #    Josue Mejia [phone] [address] [facility] [address]  [place] [address]        Equal Access to Services     LAI TATE AH: Hadii aad ku hadasho Sodanyali, waaxda luqadaha, qaybta kaalmada adeegyada, dipak knox. So [facility] " [phone].    ATENCIÓN: Si ahmet nicholas, tiene a armendariz disposición servicios gratuitos de asistencia lingüística. Rolo blackwell [phone].    We comply with applicable federal civil rights laws and [place] laws. We do not discriminate on the basis of race, color, national origin, age, disability, sex, sexual orientation, or gender identity.            Thank you!     Thank you for choosing [facility]  for your care. Our goal is always to provide you with excellent care. Hearing back from our patients is one way we can continue to improve our services. Please take a few minutes to complete the written survey that you may receive in the mail after your visit with us. Thank you!             Your Updated Medication List - Protect others around you: Learn how to safely use, store and throw away your medicines at www.disposemymeds.org.          This list is accurate as of: 10/4/17  3:35 PM.  Always use your most recent med list.                   Brand Name Dispense Instructions for use Diagnosis    ARICEPT PO      Take 10 mg by mouth At Bedtime        calcium 600/vitamin D 600-400 MG-UNIT per tablet   Generic drug:  calcium-vitamin D      Take 1 tablet by mouth 2 times daily        CELLCEPT 500 MG tablet      500 mg in AM then 250 mg in PM        CYMBALTA PO      Take 60 mg by mouth 2 times daily        gabapentin 800 MG tablet    NEURONTIN     2 times daily        leucovorin 5 MG tablet    WELLCOVORIN     Take 1 tablet (5 mg) by mouth daily        meloxicam 15 MG tablet    MOBIC     Take 0.5 tablets (7.5 mg) by mouth daily        methotrexate 2.5 MG tablet CHEMO      Take 2 tablets by mouth once a week        MIRALAX powder   Generic drug:  polyethylene glycol      Take 17 g (1 capful) by mouth daily        MIRAPEX 0.5 MG tablet   Generic drug:  pramipexole      Take 3 tablets (1.5 mg) by mouth At Bedtime    Restless leg syndrome       NAMENDA PO      Take 10 mg by mouth 2 times daily        omeprazole 40 MG  capsule    priLOSEC    90 capsule    TAKE 1 BY MOUTH DAILY    Primary insomnia       predniSONE 5 MG tablet    DELTASONE     Taking 5 mg and 1 mg tablet x2 to get a total 7 mg daily        ranitidine HCl 300 MG Caps      Take 300 mg by mouth every evening        traZODone 100 MG tablet    DESYREL    180 tablet    TAKE 2 TABLETS AT BEDTIME    Gastroesophageal reflux disease without esophagitis       Vitamin D3 2000 UNITS Tabs      Take 2,000 Int'l Units by mouth daily        VOLTAREN 1 % Gel topical gel   Generic drug:  diclofenac      Place 2 g onto the skin 4 times daily        zolpidem 5 MG tablet    AMBIEN    7 tablet    Take 1 tablet (5 mg) by mouth nightly as needed for sleep    Insomnia, unspecified type

## 2017-10-04 NOTE — PROGRESS NOTES
HPI      SUBJECTIVE:   Yessenia Martines is a 67 year old female who presents to clinic today for the following health issues:      Dizziness  -Has been [facility] in past    Duration: 1 week    Description   Feeling faint:  YES  Feeling like the surroundings are moving: no   Loss of consciousness or falls: YES-yesterday-Fell on knees     Intensity:  moderate    Accompanying signs and symptoms:   Nausea/vomitting: YES- nausea  Palpitations: no   Weakness in arms or legs: no   Vision or speech changes: no   Ringing in ears (Tinnitus): no   Hearing loss related to dizziness: no   Other (fevers/chills/sweating/dyspnea): no     History (similar episodes/head trauma/previous evaluation/recent bleeding): None    Precipitating or alleviating factors (new meds/chemicals): None  Worse with activity/head movement: no     Therapies tried and outcome: None      Comes in today with complaints that the room is spinning  Fell this morning out of the shower  Gets dizziness a couple times a year and this is similar  unbalanced  A lot of stress recently. 2 weeks ago water damage in the house, is moving out today  Virus with fever 2 weeks ago      Past Medical History:   Diagnosis Date     Allergic rhinitis      Allergic rhinitis due to pollen      Arthritis      Decreased libido      Depressive disorder      H pylori ulcer      Hoarseness      Immunosuppression (H)      Lupus (systemic lupus erythematosus) (H)     diagnosed 12 yrs ago     MCI (mild cognitive impairment)      Menopausal osteoporosis 2015     S/P cholecystectomy      S/P TIESHA-BSO      Past Surgical History:   Procedure Laterality Date     BLEPHAROPLASTY BILATERAL        SECTION      x3     CHOLECYSTECTOMY  2010    feels better. No pathology on gallbladder     EYE SURGERY      Raised eyelids.      UGI ENDOSCOPY, SIMPLE EXAM  14     HYSTERECTOMY       KNEE SURGERY       REPAIR TENDON FINGER(S)  2011     Procedure:REPAIR TENDON FINGER(S); RIGHT INDEX FINGER FLEXOR DIGITORUM PROFUNDUS REPAIR, RADIAL DIGITAL NERVE REPAIR ; Surgeon:BRIANA ANTONY; Location[facility]     SALPINGO-OOPHORECTOMY BILATERAL       SHOULDER SURGERY       TONSILLECTOMY      Child     Social History   Substance Use Topics     Smoking status: Never Smoker     Smokeless tobacco: Never Used     Alcohol use 0.0 oz/week     0 Standard drinks or equivalent per week      Comment: 1 wine/day     Current Outpatient Prescriptions   Medication Sig Dispense Refill     traZODone (DESYREL) 100 MG tablet TAKE 2 TABLETS AT BEDTIME 180 tablet 1     meloxicam (MOBIC) 15 MG tablet Take 0.5 tablets (7.5 mg) by mouth daily       predniSONE (DELTASONE) 5 MG tablet Taking 5 mg and 1 mg tablet x2 to get a total 7 mg daily       pramipexole (MIRAPEX) 0.5 MG tablet Take 3 tablets (1.5 mg) by mouth At Bedtime       polyethylene glycol (MIRALAX) powder Take 17 g (1 capful) by mouth daily       zolpidem (AMBIEN) 5 MG tablet Take 1 tablet (5 mg) by mouth nightly as needed for sleep 7 tablet 0     omeprazole (PRILOSEC) 40 MG capsule TAKE 1 BY MOUTH DAILY 90 capsule 3     ranitidine HCl 300 MG CAPS Take 300 mg by mouth every evening       diclofenac (VOLTAREN) 1 % GEL Place 2 g onto the skin 4 times daily       gabapentin (NEURONTIN) 800 MG tablet 2 times daily   1     Donepezil HCl (ARICEPT PO) Take 10 mg by mouth At Bedtime       Memantine HCl (NAMENDA PO) Take 10 mg by mouth 2 times daily       DULoxetine HCl (CYMBALTA PO) Take 60 mg by mouth 2 times daily        leucovorin (WELLCOVORIN) 5 MG tablet Take 1 tablet (5 mg) by mouth daily       methotrexate 2.5 MG tablet Take 2 tablets by mouth once a week       mycophenolate (CELLCEPT) 500 MG tablet 500 mg in AM then 250 mg in PM       calcium-vitamin D (CALCIUM 600/VITAMIN D) 600-400 MG-UNIT per tablet Take 1 tablet by mouth 2 times daily       Cholecalciferol (VITAMIN D3) 2000 UNITS TABS Take 2,000 Int'l Units by  "mouth daily       Allergies   Allergen Reactions     Augmentin      C Diff colitis       Reviewed and updated as needed this visit by clinical staff and provider      Review of Systems   Constitutional: Negative for chills and fever.   Gastrointestinal: Positive for abdominal pain and vomiting. Negative for nausea.   Neurological: Positive for dizziness. Negative for headaches.   Psychiatric/Behavioral:        Increased stress   All other systems reviewed and are negative.        /76 (BP Location: Right arm, Patient Position: Chair, Cuff Size: Adult Regular)  Pulse 67  Temp 97.4  F (36.3  C) (Oral)  Resp 16  Ht 5' 4\" (1.626 m)  Wt 128 lb (58.1 kg)  SpO2 97%  BMI 21.97 kg/m2      Physical Exam   Constitutional: She is well-developed, well-nourished, and in no distress. Vital signs are normal.   HENT:   Head: Normocephalic.   Eyes: Conjunctivae and EOM are normal.   Neck: Normal range of motion.   Cardiovascular: Normal rate, regular rhythm and normal heart sounds.    Pulmonary/Chest: Effort normal.   Musculoskeletal: Normal range of motion.   Neurological: She is alert.   Skin: Skin is warm and dry.   Psychiatric: Mood, affect and judgment normal.   Nursing note and vitals reviewed.      Assessment and Plan:       ICD-10-CM    1. Benign paroxysmal positional vertigo, unspecified laterality H81.10 PHYSICAL THERAPY REFERRAL       Recurrent vertigo symptoms. Has had thorough workup in the past without positive findings    This could be postviral vertigo considering recent illness   Try meclizine  Referral to PT  Call or return to clinic if symptoms are worse or do not improve      Mechelle Fine RN NP Student    Chelsea Castro APRN, CNP  [facility]        "

## 2017-10-04 NOTE — NURSING NOTE
"Chief Complaint   Patient presents with     Dizziness       Initial /76 (BP Location: Right arm, Patient Position: Chair, Cuff Size: Adult Regular)  Pulse 67  Temp 97.4  F (36.3  C) (Oral)  Resp 16  Ht 5' 4\" (1.626 m)  Wt 128 lb (58.1 kg)  SpO2 97%  BMI 21.97 kg/m2 Estimated body mass index is 21.97 kg/(m^2) as calculated from the following:    Height as of this encounter: 5' 4\" (1.626 m).    Weight as of this encounter: 128 lb (58.1 kg).  Medication Reconciliation: complete   Kely Menchaca CMA (AAMA)      "

## 2017-10-04 NOTE — PATIENT INSTRUCTIONS
Try meclizine 12.5-25 mg 3 times a day for dizziness  Drink plenty of fluids  Get in contact with physical therapy

## 2017-10-06 ENCOUNTER — TRANSFERRED RECORDS (OUTPATIENT)
Dept: HEALTH INFORMATION MANAGEMENT | Facility: CLINIC | Age: 67
End: 2017-10-06

## 2017-10-24 ENCOUNTER — TRANSFERRED RECORDS (OUTPATIENT)
Dept: HEALTH INFORMATION MANAGEMENT | Facility: CLINIC | Age: 67
End: 2017-10-24

## 2017-11-02 ENCOUNTER — TRANSFERRED RECORDS (OUTPATIENT)
Dept: HEALTH INFORMATION MANAGEMENT | Facility: CLINIC | Age: 67
End: 2017-11-02

## 2017-11-09 ENCOUNTER — TRANSFERRED RECORDS (OUTPATIENT)
Dept: HEALTH INFORMATION MANAGEMENT | Facility: CLINIC | Age: 67
End: 2017-11-09

## 2017-11-17 ENCOUNTER — TRANSFERRED RECORDS (OUTPATIENT)
Dept: HEALTH INFORMATION MANAGEMENT | Facility: CLINIC | Age: 67
End: 2017-11-17

## 2017-12-13 ENCOUNTER — TRANSFERRED RECORDS (OUTPATIENT)
Dept: HEALTH INFORMATION MANAGEMENT | Facility: CLINIC | Age: 67
End: 2017-12-13

## 2017-12-15 DIAGNOSIS — F51.01 PRIMARY INSOMNIA: ICD-10-CM

## 2017-12-15 RX ORDER — OMEPRAZOLE 40 MG/1
CAPSULE, DELAYED RELEASE ORAL
Qty: 90 CAPSULE | Refills: 3 | Status: SHIPPED | OUTPATIENT
Start: 2017-12-15 | End: 2019-02-04

## 2017-12-15 NOTE — TELEPHONE ENCOUNTER
Routing refill request to provider for review/approval because:  Failed protocol d/t diagnosis of Osteoporosis     Please review and authorize if appropriate,     Thank you,   Claudia MEDRANO RN       Requested Prescriptions   Pending Prescriptions Disp Refills     omeprazole (PRILOSEC) 40 MG capsule [Pharmacy Med Name: OMEPRAZOLE CAPS 40MG] 90 capsule 3     Sig: TAKE 1 CAPSULE DAILY    PPI Protocol Failed    12/15/2017  9:46 AM       Failed - No diagnosis of osteoporosis on record       Passed - Not on Clopidogrel (unless Pantoprazole ordered)       Passed - Recent or future visit with authorizing provider's specialty    Patient had office visit in the last year or has a visit in the next 30 days with authorizing provider.  See chart review.              Passed - Patient is age 18 or older       Passed - No active pregnacy on record       Passed - No positive pregnancy test in past 12 months

## 2017-12-18 ENCOUNTER — OFFICE VISIT (OUTPATIENT)
Dept: FAMILY MEDICINE | Facility: CLINIC | Age: 67
End: 2017-12-18
Payer: COMMERCIAL

## 2017-12-18 VITALS
WEIGHT: 129.7 LBS | TEMPERATURE: 98 F | HEIGHT: 64 IN | BODY MASS INDEX: 22.14 KG/M2 | HEART RATE: 72 BPM | OXYGEN SATURATION: 98 % | SYSTOLIC BLOOD PRESSURE: 116 MMHG | DIASTOLIC BLOOD PRESSURE: 72 MMHG

## 2017-12-18 DIAGNOSIS — R82.90 NONSPECIFIC FINDING ON EXAMINATION OF URINE: ICD-10-CM

## 2017-12-18 DIAGNOSIS — R25.2 CRAMP OF LIMB: ICD-10-CM

## 2017-12-18 DIAGNOSIS — R73.01 IFG (IMPAIRED FASTING GLUCOSE): ICD-10-CM

## 2017-12-18 DIAGNOSIS — M32.9 SYSTEMIC LUPUS ERYTHEMATOSUS, UNSPECIFIED SLE TYPE, UNSPECIFIED ORGAN INVOLVEMENT STATUS (H): Primary | ICD-10-CM

## 2017-12-18 LAB
ALBUMIN UR-MCNC: NEGATIVE MG/DL
APPEARANCE UR: CLEAR
BACTERIA #/AREA URNS HPF: ABNORMAL /HPF
BASOPHILS # BLD AUTO: 0 10E9/L (ref 0–0.2)
BASOPHILS NFR BLD AUTO: 0.2 %
BILIRUB UR QL STRIP: NEGATIVE
COLOR UR AUTO: YELLOW
DIFFERENTIAL METHOD BLD: NORMAL
EOSINOPHIL # BLD AUTO: 0 10E9/L (ref 0–0.7)
EOSINOPHIL NFR BLD AUTO: 0.2 %
ERYTHROCYTE [DISTWIDTH] IN BLOOD BY AUTOMATED COUNT: 14.5 % (ref 10–15)
GLUCOSE UR STRIP-MCNC: NEGATIVE MG/DL
HBA1C MFR BLD: 5.6 % (ref 4.3–6)
HCT VFR BLD AUTO: 38.3 % (ref 35–47)
HGB BLD-MCNC: 12.2 G/DL (ref 11.7–15.7)
HGB UR QL STRIP: ABNORMAL
KETONES UR STRIP-MCNC: NEGATIVE MG/DL
LEUKOCYTE ESTERASE UR QL STRIP: ABNORMAL
LYMPHOCYTES # BLD AUTO: 1.2 10E9/L (ref 0.8–5.3)
LYMPHOCYTES NFR BLD AUTO: 13.7 %
MCH RBC QN AUTO: 28.3 PG (ref 26.5–33)
MCHC RBC AUTO-ENTMCNC: 31.9 G/DL (ref 31.5–36.5)
MCV RBC AUTO: 89 FL (ref 78–100)
MONOCYTES # BLD AUTO: 0.6 10E9/L (ref 0–1.3)
MONOCYTES NFR BLD AUTO: 6.7 %
NEUTROPHILS # BLD AUTO: 7 10E9/L (ref 1.6–8.3)
NEUTROPHILS NFR BLD AUTO: 79.2 %
NITRATE UR QL: NEGATIVE
NON-SQ EPI CELLS #/AREA URNS LPF: ABNORMAL /LPF
PH UR STRIP: 7 PH (ref 5–7)
PLATELET # BLD AUTO: 312 10E9/L (ref 150–450)
RBC # BLD AUTO: 4.31 10E12/L (ref 3.8–5.2)
RBC #/AREA URNS AUTO: ABNORMAL /HPF
SOURCE: ABNORMAL
SP GR UR STRIP: 1.01 (ref 1–1.03)
UROBILINOGEN UR STRIP-ACNC: 0.2 EU/DL (ref 0.2–1)
WBC # BLD AUTO: 8.8 10E9/L (ref 4–11)
WBC #/AREA URNS AUTO: ABNORMAL /HPF

## 2017-12-18 PROCEDURE — 36415 COLL VENOUS BLD VENIPUNCTURE: CPT | Performed by: INTERNAL MEDICINE

## 2017-12-18 PROCEDURE — 87086 URINE CULTURE/COLONY COUNT: CPT | Performed by: INTERNAL MEDICINE

## 2017-12-18 PROCEDURE — 99214 OFFICE O/P EST MOD 30 MIN: CPT | Performed by: INTERNAL MEDICINE

## 2017-12-18 PROCEDURE — 81001 URINALYSIS AUTO W/SCOPE: CPT | Performed by: INTERNAL MEDICINE

## 2017-12-18 PROCEDURE — 80048 BASIC METABOLIC PNL TOTAL CA: CPT | Performed by: INTERNAL MEDICINE

## 2017-12-18 PROCEDURE — 85025 COMPLETE CBC W/AUTO DIFF WBC: CPT | Performed by: INTERNAL MEDICINE

## 2017-12-18 PROCEDURE — 83036 HEMOGLOBIN GLYCOSYLATED A1C: CPT | Performed by: INTERNAL MEDICINE

## 2017-12-18 PROCEDURE — 83735 ASSAY OF MAGNESIUM: CPT | Performed by: INTERNAL MEDICINE

## 2017-12-18 RX ORDER — CARBIDOPA AND LEVODOPA 25; 100 MG/1; MG/1
1-2 TABLET, EXTENDED RELEASE ORAL AT BEDTIME
Status: ON HOLD | COMMUNITY
End: 2018-05-18

## 2017-12-18 NOTE — NURSING NOTE
"Chief Complaint   Patient presents with     Fatigue      started at beginning of December, extreme fatigue and muscle pains, thought maybe it might be a Lupus flare-up       Initial /72  Pulse 72  Temp 98  F (36.7  C) (Oral)  Ht 5' 4\" (1.626 m)  Wt 129 lb 11.2 oz (58.8 kg)  SpO2 98%  Breastfeeding? No  BMI 22.26 kg/m2 Estimated body mass index is 22.26 kg/(m^2) as calculated from the following:    Height as of this encounter: 5' 4\" (1.626 m).    Weight as of this encounter: 129 lb 11.2 oz (58.8 kg).  Medication Reconciliation: complete     Adriano Dowell CMA     "

## 2017-12-18 NOTE — PROGRESS NOTES
"[facility]  CLINIC PROGRESS NOTE    Subjective:  Fatigue   Yessenia Martines did have an acute onset of extreme fatigue and seemed to occur right after thanksgiving.  She hda muscle aches that were severe.  She did increase her prednisone dose from 7 up to 12 mg for 1 week and then tapered back to 7 mg.  She had no urinary tract infection and no symptoms of diarrhea or constipation.  She may have had a slight tickle in her throat.  No shortness of breath, no cough.  She did have a headache.     Currently she has occasional headache and some joint pains off and on.  Fatigue has returned, but not back to level as it had been two weeks ago.  She is drinking    She also has had cramping in her hands and legs.  She notes that she is drinking a higher than usual amount of water.      Past medical history, medications, allergies, social history, family history reviewed and updated in EPIC as of 12/18/2017 .    ROS  CONSTITUTIONAL: + fatigue, no unexpected change in weight  SKIN: no worrisome rashes, no worrisome moles, no worrisome lesions  EYES: no acute vision problems or changes  ENT: no ear problems, no mouth problems, no throat problems  RESP: no significant cough, no shortness of breath  CV: no chest pain, no palpitations, no new or worsening peripheral edema  GI: no nausea, no vomiting, no constipation, no diarrhea  : no frequency, no dysuria, no hematuria  MS: Diffuse joint pain including hands wrists and knees  PSYCHIATRIC: no changes in mood or affect      Objective:  Vitals  /72  Pulse 72  Temp 98  F (36.7  C) (Oral)  Ht 5' 4\" (1.626 m)  Wt 129 lb 11.2 oz (58.8 kg)  SpO2 98%  Breastfeeding? No  BMI 22.26 kg/m2  GEN: Alert Oriented x3 NAD  HEENT: Atraumatic, normocephalic, neck supple, no thyromegaly, negative cervical adenopathy  TM: TM bilaterally pearly and grey with normal light reflex  CV: RRR no murmurs or rubs  PULM: CTA no wheezes or crackles  ABD: Soft, nontender " nondistended, no hepatosplenomegally  SKIN: No visible skin lesion or ulcerations  EXT: 2+ dorsal pedis pulses, no edema bilateral lower extremities  NEURO: Gait and station deferred, No focal neurologic deficits  PSYCH: Mood good, affect mood congruent    No images are attached to the encounter.    Results for orders placed or performed in visit on 12/18/17 (from the past 24 hour(s))   CBC with platelets and differential   Result Value Ref Range    WBC 8.8 4.0 - 11.0 10e9/L    RBC Count 4.31 3.8 - 5.2 10e12/L    Hemoglobin 12.2 11.7 - 15.7 g/dL    Hematocrit 38.3 35.0 - 47.0 %    MCV 89 78 - 100 fl    MCH 28.3 26.5 - 33.0 pg    MCHC 31.9 31.5 - 36.5 g/dL    RDW 14.5 10.0 - 15.0 %    Platelet Count 312 150 - 450 10e9/L    Diff Method Automated Method     % Neutrophils 79.2 %    % Lymphocytes 13.7 %    % Monocytes 6.7 %    % Eosinophils 0.2 %    % Basophils 0.2 %    Absolute Neutrophil 7.0 1.6 - 8.3 10e9/L    Absolute Lymphocytes 1.2 0.8 - 5.3 10e9/L    Absolute Monocytes 0.6 0.0 - 1.3 10e9/L    Absolute Eosinophils 0.0 0.0 - 0.7 10e9/L    Absolute Basophils 0.0 0.0 - 0.2 10e9/L   Hemoglobin A1c   Result Value Ref Range    Hemoglobin A1C 5.6 4.3 - 6.0 %   UA reflex to Microscopic and Culture   Result Value Ref Range    Color Urine Yellow     Appearance Urine Clear     Glucose Urine Negative NEG^Negative mg/dL    Bilirubin Urine Negative NEG^Negative    Ketones Urine Negative NEG^Negative mg/dL    Specific Gravity Urine 1.015 1.003 - 1.035    Blood Urine Trace (A) NEG^Negative    pH Urine 7.0 5.0 - 7.0 pH    Protein Albumin Urine Negative NEG^Negative mg/dL    Urobilinogen Urine 0.2 0.2 - 1.0 EU/dL    Nitrite Urine Negative NEG^Negative    Leukocyte Esterase Urine Small (A) NEG^Negative    Source Midstream Urine    Urine Microscopic   Result Value Ref Range    WBC Urine 2-5 (A) OTO2^O - 2 /HPF    RBC Urine O - 2 OTO2^O - 2 /HPF    Squamous Epithelial /LPF Urine Few FEW^Few /LPF    Bacteria Urine Few (A) NEG^Negative  /HPF       Assessment/Plan:  Patient Instructions   (M32.9) Systemic lupus erythematosus, unspecified SLE type, unspecified organ involvement status (H)  (primary encounter diagnosis)  Comment: We will check labs today including complete blood counts, basic metabolic panel and urinalysis.  Pending results we will plan to follow up in rheumatology in February  Plan: Basic metabolic panel, CBC with platelets and         differential, UA reflex to Microscopic and         Culture            (R25.2) Cramp of limb  Comment: noted that we will check electrolytes today.  Consider B-complex treatment - over the counter  Plan: Basic metabolic panel, CBC with platelets and         differential, UA reflex to Microscopic and         Culture, Magnesium        \    (R73.01) IFG (impaired fasting glucose)  Comment: check hemoglobin A1c today  Plan: Hemoglobin A1c, Magnesium               Follow up pending improvement    Disclaimer: This note consists of symbols derived from keyboarding, dictation and/or voice recognition software. As a result, there may be errors in the script that have gone undetected. Please consider this when interpreting information found in this chart.    Josue Mejia MD  [phone]

## 2017-12-18 NOTE — PATIENT INSTRUCTIONS
(M32.9) Systemic lupus erythematosus, unspecified SLE type, unspecified organ involvement status (H)  (primary encounter diagnosis)  Comment: We will check labs today including complete blood counts, basic metabolic panel and urinalysis.  Pending results we will plan to follow up in rheumatology in February  Plan: Basic metabolic panel, CBC with platelets and         differential, UA reflex to Microscopic and         Culture            (R25.2) Cramp of limb  Comment: noted that we will check electrolytes today.  Consider B-complex treatment - over the counter  Plan: Basic metabolic panel, CBC with platelets and         differential, UA reflex to Microscopic and         Culture, Magnesium        \    (R73.01) IFG (impaired fasting glucose)  Comment: check hemoglobin A1c today  Plan: Hemoglobin A1c, Magnesium

## 2017-12-18 NOTE — MR AVS SNAPSHOT
After Visit Summary   12/18/2017    Yessenia Martines    MRN: [id number]           Patient Information     Date Of Birth          1950        Visit Information        Provider Department      12/18/2017 3:30 PM Josue Mejia MD [facility]        Today's Diagnoses     Systemic lupus erythematosus, unspecified SLE type, unspecified organ involvement status (H)    -  1    Cramp of limb        IFG (impaired fasting glucose)          Care Instructions    (M32.9) Systemic lupus erythematosus, unspecified SLE type, unspecified organ involvement status (H)  (primary encounter diagnosis)  Comment: We will check labs today including complete blood counts, basic metabolic panel and urinalysis.  Pending results we will plan to follow up in rheumatology in February  Plan: Basic metabolic panel, CBC with platelets and         differential, UA reflex to Microscopic and         Culture            (R25.2) Cramp of limb  Comment: noted that we will check electrolytes today.  Consider B-complex treatment - over the counter  Plan: Basic metabolic panel, CBC with platelets and         differential, UA reflex to Microscopic and         Culture, Magnesium        \    (R73.01) IFG (impaired fasting glucose)  Comment: check hemoglobin A1c today  Plan: Hemoglobin A1c, Magnesium                     Follow-ups after your visit        Who to contact     If you have questions or need follow up information about today's clinic visit or your schedule please contact [facility] directly at [phone].  Normal or non-critical lab and imaging results will be communicated to you by MyChart, letter or phone within 4 business days after the clinic has received the results. If you do not hear from us within 7 days, please contact the clinic through MyChart or phone. If you have a critical or abnormal lab result, we will notify you by phone as soon as possible.  Submit refill requests through  "[employer] or call your pharmacy and they will forward the refill request to us. Please allow 3 business days for your refill to be completed.          Additional Information About Your Visit        MyChart Information     [employer] gives you secure access to your electronic health record. If you see a primary care provider, you can also send messages to your care team and make appointments. If you have questions, please call your primary care clinic.  If you do not have a primary care provider, please call [phone] and they will assist you.        Care EveryWhere ID     This is your Care EveryWhere ID. This could be used by other organizations to access your [place] medical records  [id number]        Your Vitals Were     Pulse Temperature Height Pulse Oximetry Breastfeeding? BMI (Body Mass Index)    72 98  F (36.7  C) (Oral) 5' 4\" (1.626 m) 98% No 22.26 kg/m2       Blood Pressure from Last 3 Encounters:   12/18/17 116/72   10/04/17 116/76   09/19/17 106/67    Weight from Last 3 Encounters:   12/18/17 129 lb 11.2 oz (58.8 kg)   10/04/17 128 lb (58.1 kg)   09/19/17 126 lb (57.2 kg)              We Performed the Following     Basic metabolic panel     CBC with platelets and differential     Hemoglobin A1c     Magnesium     UA reflex to Microscopic and Culture          Today's Medication Changes          These changes are accurate as of: 12/18/17  4:04 PM.  If you have any questions, ask your nurse or doctor.               Stop taking these medicines if you haven't already. Please contact your care team if you have questions.     ARICEPT PO                    Primary Care Provider Office Phone # Fax #    Josue Mejia [phone] [address] [facility] [address]  [place] [address]        Equal Access to Services     RATNA TATE : Linwood Maldonado, huy [place], dipak gonzalez. So [facility] [phone].    ATENCIÓN: Si habla " español, tiene a armendariz disposición servicios gratuitos de asistencia lingüística. Rolo blackwell [phone].    We comply with applicable federal civil rights laws and [place] laws. We do not discriminate on the basis of race, color, national origin, age, disability, sex, sexual orientation, or gender identity.            Thank you!     Thank you for choosing [facility]  for your care. Our goal is always to provide you with excellent care. Hearing back from our patients is one way we can continue to improve our services. Please take a few minutes to complete the written survey that you may receive in the mail after your visit with us. Thank you!             Your Updated Medication List - Protect others around you: Learn how to safely use, store and throw away your medicines at www.disposemymeds.org.          This list is accurate as of: 12/18/17  4:04 PM.  Always use your most recent med list.                   Brand Name Dispense Instructions for use Diagnosis    calcium 600/vitamin D 600-400 MG-UNIT per tablet   Generic drug:  calcium-vitamin D      Take 1 tablet by mouth 2 times daily        carbidopa-levodopa  MG per CR tablet    SINEMET CR     Take 1-2 tablets by mouth At Bedtime        CELLCEPT 500 MG tablet      500 mg in AM then 250 mg in PM        CYMBALTA PO      Take 60 mg by mouth 2 times daily        gabapentin 800 MG tablet    NEURONTIN     2 times daily        leucovorin 5 MG tablet    WELLCOVORIN     Take 1 tablet (5 mg) by mouth daily        meloxicam 15 MG tablet    MOBIC     Take 0.5 tablets (7.5 mg) by mouth daily        methotrexate 2.5 MG tablet CHEMO      Take 4 tablets by mouth once a week        MIRALAX powder   Generic drug:  polyethylene glycol      Take 17 g (1 capful) by mouth daily        MIRAPEX 0.5 MG tablet   Generic drug:  pramipexole      Take 3 tablets (1.5 mg) by mouth At Bedtime    Restless leg syndrome       NAMENDA PO      Take 10 mg by mouth 2 times daily         omeprazole 40 MG capsule    priLOSEC    90 capsule    TAKE 1 CAPSULE DAILY    Primary insomnia       predniSONE 5 MG tablet    DELTASONE     Taking 5 mg and 1 mg tablet x2 to get a total 7 mg daily        ranitidine HCl 300 MG Caps      Take 300 mg by mouth every evening        traZODone 100 MG tablet    DESYREL    180 tablet    TAKE 2 TABLETS AT BEDTIME    Gastroesophageal reflux disease without esophagitis       Vitamin D3 2000 UNITS Tabs      Take 2,000 Int'l Units by mouth daily        VOLTAREN 1 % Gel topical gel   Generic drug:  diclofenac      Place 2 g onto the skin 4 times daily        zolpidem 5 MG tablet    AMBIEN    7 tablet    Take 1 tablet (5 mg) by mouth nightly as needed for sleep    Insomnia, unspecified type

## 2017-12-19 ENCOUNTER — TELEPHONE (OUTPATIENT)
Dept: FAMILY MEDICINE | Facility: CLINIC | Age: 67
End: 2017-12-19

## 2017-12-19 DIAGNOSIS — N28.1 HEMORRHAGE OF CYST OF NATIVE KIDNEY: Primary | ICD-10-CM

## 2017-12-19 DIAGNOSIS — R31.29 MICROSCOPIC HEMATURIA: ICD-10-CM

## 2017-12-19 DIAGNOSIS — N28.89 HEMORRHAGE OF CYST OF NATIVE KIDNEY: Primary | ICD-10-CM

## 2017-12-19 LAB
ANION GAP SERPL CALCULATED.3IONS-SCNC: 8 MMOL/L (ref 3–14)
BUN SERPL-MCNC: 14 MG/DL (ref 7–30)
CALCIUM SERPL-MCNC: 9.3 MG/DL (ref 8.5–10.1)
CHLORIDE SERPL-SCNC: 101 MMOL/L (ref 94–109)
CO2 SERPL-SCNC: 27 MMOL/L (ref 20–32)
CREAT SERPL-MCNC: 0.79 MG/DL (ref 0.52–1.04)
GFR SERPL CREATININE-BSD FRML MDRD: 72 ML/MIN/1.7M2
GLUCOSE SERPL-MCNC: 91 MG/DL (ref 70–99)
MAGNESIUM SERPL-MCNC: 2.7 MG/DL (ref 1.6–2.3)
POTASSIUM SERPL-SCNC: 4 MMOL/L (ref 3.4–5.3)
SODIUM SERPL-SCNC: 136 MMOL/L (ref 133–144)

## 2017-12-19 NOTE — PROGRESS NOTES
Awais Casas,    I have had the opportunity to review your recent results and an interpretation is as follows:  Your urinalysis shows trace blood - I did request an add on urine culture to see if possibly there is a urinary tract infection brewing - A follow up CT of the abdomen is recommended given previous renal cyst and persistent hematuria - [facility] phone #[phone] to schedule  Your basic metabolic panel shows stable renal function and stable magnesium (sligthly elevated)  Your hemoglobin A1c is also stable indicating no evidence of diabetes mellitus    Sincerely,  Josue Mejia MD

## 2017-12-19 NOTE — TELEPHONE ENCOUNTER
I let her know Dr Gray thoughts and she had already read them on [facility].  We will let her know if culture grows any concerns,  She made appt for ct scan  Mine Mak RN- Triage [employer]

## 2017-12-19 NOTE — TELEPHONE ENCOUNTER
Can we call Yessenia Martines and let her know that     Awais Casas,    I have had the opportunity to review your recent results and an interpretation is as follows:  Your urinalysis shows trace blood - I did request an add on urine culture to see if possibly there is a urinary tract infection brewing - A follow up CT of the abdomen is recommended given previous renal cyst and persistent hematuria - [facility] phone #[phone] to schedule  Your basic metabolic panel shows stable renal function and stable magnesium (sligthly elevated)  Your hemoglobin A1c is also stable indicating no evidence of diabetes mellitus    Sincerely,  Josue Mejia MD

## 2017-12-20 LAB
BACTERIA SPEC CULT: NORMAL
SPECIMEN SOURCE: NORMAL

## 2017-12-22 NOTE — PROGRESS NOTES
Awais Casas,    I have had the opportunity to review your recent results and an interpretation is as follows:  Your urine culture did not grow any pathogenic organisms, but I would recommend follow up for abdominal CT to follow up on hematuria [facility] phone #[phone] to schedule.     Sincerely,  Josue Mejia MD

## 2017-12-28 ENCOUNTER — TELEPHONE (OUTPATIENT)
Dept: FAMILY MEDICINE | Facility: CLINIC | Age: 67
End: 2017-12-28

## 2017-12-28 ENCOUNTER — HOSPITAL ENCOUNTER (OUTPATIENT)
Dept: CT IMAGING | Facility: CLINIC | Age: 67
Discharge: HOME | End: 2017-12-28
Attending: INTERNAL MEDICINE | Admitting: INTERNAL MEDICINE
Payer: COMMERCIAL

## 2017-12-28 DIAGNOSIS — N28.89 HEMORRHAGE OF CYST OF NATIVE KIDNEY: ICD-10-CM

## 2017-12-28 DIAGNOSIS — R31.29 MICROSCOPIC HEMATURIA: Primary | ICD-10-CM

## 2017-12-28 DIAGNOSIS — R31.29 MICROSCOPIC HEMATURIA: ICD-10-CM

## 2017-12-28 DIAGNOSIS — N28.1 HEMORRHAGE OF CYST OF NATIVE KIDNEY: ICD-10-CM

## 2017-12-28 PROCEDURE — 74177 CT ABD & PELVIS W/CONTRAST: CPT

## 2017-12-28 PROCEDURE — 25000128 H RX IP 250 OP 636: Performed by: INTERNAL MEDICINE

## 2017-12-28 PROCEDURE — 25000125 ZZHC RX 250: Performed by: INTERNAL MEDICINE

## 2017-12-28 RX ORDER — IOPAMIDOL 755 MG/ML
64 INJECTION, SOLUTION INTRAVASCULAR ONCE
Status: COMPLETED | OUTPATIENT
Start: 2017-12-28 | End: 2017-12-28

## 2017-12-28 RX ADMIN — SODIUM CHLORIDE 58 ML: 9 INJECTION, SOLUTION INTRAVENOUS at 10:03

## 2017-12-28 RX ADMIN — IOPAMIDOL 64 ML: 755 INJECTION, SOLUTION INTRAVENOUS at 10:02

## 2017-12-28 NOTE — PROGRESS NOTES
Awais Casas,    I have had the opportunity to review your recent results and an interpretation is as follows:  Your follow up kidney ultrasound shows that the previously identified cyst is stable and is deemed to be benign as it has not grown in two years.  This is a great finding and no additional follow up of this is needed.  As for the blood in the urine, there was no concerning finding on the CT, but a follow up in urology would be recommend      N: [facility] [place] [phone]   [email or web address]      Sincerely,  Josue Mejia MD

## 2018-01-12 ENCOUNTER — TRANSFERRED RECORDS (OUTPATIENT)
Dept: HEALTH INFORMATION MANAGEMENT | Facility: CLINIC | Age: 68
End: 2018-01-12

## 2018-01-12 NOTE — PROGRESS NOTES
[address] [address]  [phone]  Dept: [phone]    PRE-OP EVALUATION:  Today's date: 1/15/2018    Yessenia Martines (: 1950) presents for pre-operative evaluation assessment as requested by Dr. Carlton.  She requires evaluation and anesthesia risk assessment prior to undergoing surgery/procedure for treatment of Thumb pain .  Proposed procedure: LEFT THUMB CARPOMETACARPAL EXCISIONAL ARTHROPLASTY WITH FACSIA TANIA GRAFT, PARTIAL TRAPEZOID EXCISION, POSSIBLE MINI TIGHTROPE     Date of Surgery/ Procedure: 18  Time of Surgery/ Procedure: 9:30am  Hospital/Surgical Facility: [facility]  Fax number for surgical facility: in [facility]  Primary Physician: Josue Mejia  Type of Anesthesia Anticipated: to be determined    Patient has a Health Care Directive or Living Will:  YES     1. NO - Do you have a history of heart attack, stroke, stent, bypass or surgery on an artery in the head, neck, heart or legs?  2. NO - Do you ever have any pain or discomfort in your chest?  3. NO - Do you have a history of  Heart Failure?  4. NO - Are you troubled by shortness of breath when: walking on the level, up a slight hill or at night?  5. NO - Do you currently have a cold, bronchitis or other respiratory infection?  6. NO - Do you have a cough, shortness of breath or wheezing?  7. NO - Do you sometimes get pains in the calves of your legs when you walk?  8. NO - Do you or anyone in your family have previous history of blood clots?  9. NO - Do you or does anyone in your family have a serious bleeding problem such as prolonged bleeding following surgeries or cuts?  10.YES - Have you ever had problems with anemia or been told to take iron pills? Stable hemoglobin  - not taking iron currently  11. YES - Have you had any abnormal blood loss such as black, tarry or bloody stools, or abnormal vaginal bleeding? - no issues currently  12. YES - Have you ever had a blood  transfusion?  13. NO - Have you or any of your relatives ever had problems with anesthesia?  14. YES - Do you have sleep apnea, excessive snoring or daytime drowsiness? - occasional snoring - no obstructive sleep apnea   15. NO - Do you have any prosthetic heart valves?  16. NO - Do you have prosthetic joints?  17. NO - Is there any chance that you may be pregnant?        HPI:                                                      Brief HPI related to upcoming procedure: Thumb arthroplasty       See problem list for active medical problems.  Problems all longstanding and stable, except as noted/documented.  See ROS for pertinent symptoms related to these conditions.                                                                                                  .    MEDICAL HISTORY:                                                    Patient Active Problem List    Diagnosis Date Noted     Microscopic hematuria 12/28/2017     Priority: Medium     Advance care planning 01/19/2017     Priority: Medium     Advance Care Planning 1/19/2017: Receipt of ACP document:  Received: Health Care Directive which was witnessed or notarized on 11/8/16.  Document previously scanned on 11/23/16.  Validation form completed and sent to be scanned.  Code Status needs to be updated to reflect choices in most recent ACP document.   Confirmed/documented designated decision maker(s).  Added by Joanie Quiroz   Advance Care Planning Liaison             Somatic dysfunction of lumbar region 12/22/2016     Priority: Medium     Bilateral low back pain with sciatica, sciatica laterality unspecified, unspecified chronicity 12/22/2016     Priority: Medium     Somatic dysfunction of sacral region 12/22/2016     Priority: Medium     Sacral pain 12/22/2016     Priority: Medium     Thoracic segment dysfunction 12/22/2016     Priority: Medium     Abnormal CT scan, lung 04/22/2016     Priority: Medium     Dysphonia 04/14/2016     Priority: Medium      Globus sensation 2016     Priority: Medium     Lupus (systemic lupus erythematosus) (H)      Priority: Medium     diagnosed 12 yrs ago       MCI (mild cognitive impairment)      Priority: Medium     Hemorrhage of cyst of native kidney 2015     Priority: Medium     Mechanical low back pain 2015     Priority: Medium     Menopausal osteoporosis 2015     Priority: Medium     Systemic lupus erythematosus (H) 2014     Priority: Medium     Diagnosis 1997  Dr. Hickman  - no renal complications       CARDIOVASCULAR SCREENING; LDL GOAL LESS THAN 130 2014     Priority: Medium     Restless legs syndrome 2014     Priority: Medium     Peripheral neuropathy, idiopathic 2014     Priority: Medium     Emanuel Idelkope       Osteoporosis 2014     Priority: Medium     Had been treated with bisphosphonates for 5 years - 7936-2829  Problem list name updated by automated process. Provider to review        Past Medical History:   Diagnosis Date     Allergic rhinitis      Allergic rhinitis due to pollen      Arthritis      Decreased libido      Depressive disorder      H pylori ulcer      Hoarseness      Immunosuppression (H)      Lupus (systemic lupus erythematosus) (H)     diagnosed 12 yrs ago     MCI (mild cognitive impairment)      Menopausal osteoporosis 2015     S/P cholecystectomy      S/P TIESHA-BSO      Past Surgical History:   Procedure Laterality Date     BLEPHAROPLASTY BILATERAL        SECTION      x3     CHOLECYSTECTOMY  2010    feels better. No pathology on gallbladder     EYE SURGERY      Raised eyelids.      UGI ENDOSCOPY, SIMPLE EXAM  14     HYSTERECTOMY       KNEE SURGERY       REPAIR TENDON FINGER(S)  2011    Procedure:REPAIR TENDON FINGER(S); RIGHT INDEX FINGER FLEXOR DIGITORUM PROFUNDUS REPAIR, RADIAL DIGITAL NERVE REPAIR ; Surgeon:BRIANA ANTONY; Location: [facility]     SALPINGO-OOPHORECTOMY BILATERAL       SHOULDER SURGERY        TONSILLECTOMY      Child     Current Outpatient Prescriptions   Medication Sig Dispense Refill     traZODone (DESYREL) 100 MG tablet TAKE 2 TABLETS AT BEDTIME 180 tablet 3     carbidopa-levodopa (SINEMET CR)  MG per CR tablet Take 1-2 tablets by mouth At Bedtime       omeprazole (PRILOSEC) 40 MG capsule TAKE 1 CAPSULE DAILY 90 capsule 3     meloxicam (MOBIC) 15 MG tablet Take 0.5 tablets (7.5 mg) by mouth daily       predniSONE (DELTASONE) 5 MG tablet Taking 5 mg and 1 mg tablet x2 to get a total 7 mg daily       pramipexole (MIRAPEX) 0.5 MG tablet Take 3 tablets (1.5 mg) by mouth At Bedtime       polyethylene glycol (MIRALAX) powder Take 17 g (1 capful) by mouth daily       zolpidem (AMBIEN) 5 MG tablet Take 1 tablet (5 mg) by mouth nightly as needed for sleep 7 tablet 0     ranitidine HCl 300 MG CAPS Take 300 mg by mouth every evening       diclofenac (VOLTAREN) 1 % GEL Place 2 g onto the skin 4 times daily       gabapentin (NEURONTIN) 800 MG tablet 2 times daily   1     Memantine HCl (NAMENDA PO) Take 10 mg by mouth 2 times daily       DULoxetine HCl (CYMBALTA PO) Take 60 mg by mouth 2 times daily        leucovorin (WELLCOVORIN) 5 MG tablet Take 1 tablet (5 mg) by mouth daily       methotrexate 2.5 MG tablet Take 4 tablets by mouth once a week        mycophenolate (CELLCEPT) 500 MG tablet 500 mg in AM then 250 mg in PM       calcium-vitamin D (CALCIUM 600/VITAMIN D) 600-400 MG-UNIT per tablet Take 1 tablet by mouth 2 times daily       Cholecalciferol (VITAMIN D3) 2000 UNITS TABS Take 2,000 Int'l Units by mouth daily       OTC products: None, except as noted above    Allergies   Allergen Reactions     Augmentin      C Diff colitis      Latex Allergy: NO    Social History   Substance Use Topics     Smoking status: Never Smoker     Smokeless tobacco: Never Used     Alcohol use 0.0 oz/week     0 Standard drinks or equivalent per week      Comment: 1 wine/day     History   Drug Use No       REVIEW OF SYSTEMS:     "                                                Constitutional, neuro, ENT, endocrine, pulmonary, cardiac, gastrointestinal, genitourinary, musculoskeletal, integument and psychiatric systems are negative, except as otherwise noted.      EXAM:                                                    /68 (BP Location: Right arm, Patient Position: Chair, Cuff Size: Adult Regular)  Pulse 75  Temp 97.5  F (36.4  C) (Tympanic)  Ht 5' 0.4\" (1.534 m)  Wt 132 lb 6.4 oz (60.1 kg)  BMI 25.52 kg/m2    GENERAL APPEARANCE: healthy, alert and no distress     EYES: EOMI, PERRL     HENT: ear canals and TM's normal and nose and mouth without ulcers or lesions     NECK: no adenopathy, no asymmetry, masses, or scars and thyroid normal to palpation     RESP: lungs clear to auscultation - no rales, rhonchi or wheezes     CV: regular rates and rhythm, normal S1 S2, no S3 or S4 and no murmur, click or rub     ABDOMEN:  soft, nontender, no HSM or masses and bowel sounds normal     MS: extremities normal- no evidence of acute inflammation in joints     SKIN: no suspicious lesions or rashes     NEURO: Normal strength and tone, sensory exam grossly normal, mentation intact and speech normal     PSYCH: mentation appears normal. and affect normal/bright     LYMPHATICS: No axillary, cervical, or supraclavicular nodes    DIAGNOSTICS:                                                    EKG: appears normal, NSR, normal axis, normal intervals, no acute ST/T changes c/w ischemia, no LVH by voltage criteria, unchanged from previous tracings    Recent Labs   Lab Test  12/18/17   1610  05/19/17   0925   04/03/16   1320   HGB  12.2  12.5   < >  12.7   PLT  312  315   < >  251   INR   --    --    --   0.92   NA  136  139   < >  134   POTASSIUM  4.0  4.2   < >  4.0   CR  0.79  0.89   < >  0.91   A1C  5.6   --    --    --     < > = values in this interval not displayed.         IMPRESSION:                                                    Reason for " surgery/procedure: Thumb arthroplasty  Diagnosis/reason for consult: Pre-operative Evaluation     The proposed surgical procedure is considered LOW risk.    REVISED CARDIAC RISK INDEX  The patient has the following serious cardiovascular risks for perioperative complications such as (MI, PE, VFib and 3  AV Block):  No serious cardiac risks  INTERPRETATION: 0 risks: Class I (very low risk - 0.4% complication rate)    The patient has the following additional risks for perioperative complications:  No identified additional risks    No diagnosis found.    RECOMMENDATIONS:                                                      (Z01.818) Preop general physical exam  (primary encounter diagnosis)  Comment: You are meidcally optimized for your upcoming surgery pending EKG.  Labs from December reviewed and within normal limits.    Plan:     (G25.81) Restless legs syndrome  Comment: Stable - no changes in sinemet dosing  Plan:     (M32.9) Systemic lupus erythematosus, unspecified SLE type, unspecified organ involvement status (H)  Comment: Recommend holding meloxicam for 1 week prior to surgery.   Will speak to Dr. Frost to discuss options for medications and whether you would be recommended to hold methotrexate, cellcept  ADDENDUM 1/16/2018   Rheumatology  recommends to continue Methotrexate,   Cellcept is held for 1 week prior and 1 week following. Can resume if there are no infection  Plan:      Abnormal Chest CT  Comment; Will discuss with patient - last evaluation on record was in May 2016 with instructions to return to pulmonology in 6 months.  We will try to obtain records of this follow up.        Signed Electronically by: Josue Mejia MD, MD    Copy of this evaluation report is provided to requesting physician.    [facility] Preop Guidelines

## 2018-01-12 NOTE — PATIENT INSTRUCTIONS
Before Your Surgery      Call your surgeon if there is any change in your health. This includes signs of a cold or flu (such as a sore throat, runny nose, cough, rash or fever).    Do not smoke, drink alcohol or take over the counter medicine (unless your surgeon or primary care doctor tells you to) for the 24 hours before and after surgery.    If you take prescribed drugs: Follow your doctor s orders about which medicines to take and which to stop until after surgery.    Eating and drinking prior to surgery: follow the instructions from your surgeon    Take a shower or bath the night before surgery. Use the soap your surgeon gave you to gently clean your skin. If you do not have soap from your surgeon, use your regular soap. Do not shave or scrub the surgery site.  Wear clean pajamas and have clean sheets on your bed.     (Z01.818) Preop general physical exam  (primary encounter diagnosis)  Comment: You are meidcally optimized for your upcoming surgery pending EKG.  Labs from December reviewed and within normal limits.    Plan:     (G25.81) Restless legs syndrome  Comment: Stable - no changes in sinemet dosing  Plan:     (M32.9) Systemic lupus erythematosus, unspecified SLE type, unspecified organ involvement status (H)  Comment: Recommend holding meloxicam for 1 week prior to surgery.   Will speak to Dr. Frost to discuss options for medications and whether you would be recommended to hold methotrexate, cellcept  Plan:      Abnormal Chest CT  Comment; Will discuss with patient - last evaluation on record was in May 2016 with instructions to return to pulmonology in 6 months.  We will try to obtain records of this follow up.

## 2018-01-15 ENCOUNTER — MYC MEDICAL ADVICE (OUTPATIENT)
Dept: FAMILY MEDICINE | Facility: CLINIC | Age: 68
End: 2018-01-15

## 2018-01-15 ENCOUNTER — TELEPHONE (OUTPATIENT)
Dept: FAMILY MEDICINE | Facility: CLINIC | Age: 68
End: 2018-01-15

## 2018-01-15 ENCOUNTER — OFFICE VISIT (OUTPATIENT)
Dept: FAMILY MEDICINE | Facility: CLINIC | Age: 68
End: 2018-01-15
Payer: COMMERCIAL

## 2018-01-15 VITALS
DIASTOLIC BLOOD PRESSURE: 68 MMHG | HEART RATE: 75 BPM | WEIGHT: 132.4 LBS | BODY MASS INDEX: 26 KG/M2 | TEMPERATURE: 97.5 F | SYSTOLIC BLOOD PRESSURE: 108 MMHG | HEIGHT: 60 IN

## 2018-01-15 DIAGNOSIS — Z01.818 PREOP GENERAL PHYSICAL EXAM: Primary | ICD-10-CM

## 2018-01-15 DIAGNOSIS — R91.8 ABNORMAL CT SCAN, LUNG: Primary | ICD-10-CM

## 2018-01-15 DIAGNOSIS — M32.9 SYSTEMIC LUPUS ERYTHEMATOSUS, UNSPECIFIED SLE TYPE, UNSPECIFIED ORGAN INVOLVEMENT STATUS (H): ICD-10-CM

## 2018-01-15 DIAGNOSIS — R91.8 ABNORMAL CT SCAN, LUNG: ICD-10-CM

## 2018-01-15 DIAGNOSIS — G25.81 RESTLESS LEGS SYNDROME: ICD-10-CM

## 2018-01-15 PROBLEM — Z86.19 HISTORY OF CLOSTRIDIUM DIFFICILE COLITIS: Status: ACTIVE | Noted: 2018-01-15

## 2018-01-15 PROCEDURE — 93000 ELECTROCARDIOGRAM COMPLETE: CPT | Performed by: INTERNAL MEDICINE

## 2018-01-15 PROCEDURE — 99214 OFFICE O/P EST MOD 30 MIN: CPT | Performed by: INTERNAL MEDICINE

## 2018-01-15 NOTE — MR AVS SNAPSHOT
After Visit Summary   1/15/2018    Yessenia Martines    MRN: [id number]           Patient Information     Date Of Birth          1950        Visit Information        Provider Department      1/15/2018 4:00 PM Josue Mejia MD [facility]        Today's Diagnoses     Preop general physical exam    -  1    Restless legs syndrome        Systemic lupus erythematosus, unspecified SLE type, unspecified organ involvement status (H)          Care Instructions      Before Your Surgery      Call your surgeon if there is any change in your health. This includes signs of a cold or flu (such as a sore throat, runny nose, cough, rash or fever).    Do not smoke, drink alcohol or take over the counter medicine (unless your surgeon or primary care doctor tells you to) for the 24 hours before and after surgery.    If you take prescribed drugs: Follow your doctor s orders about which medicines to take and which to stop until after surgery.    Eating and drinking prior to surgery: follow the instructions from your surgeon    Take a shower or bath the night before surgery. Use the soap your surgeon gave you to gently clean your skin. If you do not have soap from your surgeon, use your regular soap. Do not shave or scrub the surgery site.  Wear clean pajamas and have clean sheets on your bed.     (Z01.818) Preop general physical exam  (primary encounter diagnosis)  Comment: You are meidcally optimized for your upcoming surgery pending EKG.  Labs from December reviewed and within normal limits.    Plan:     (G25.81) Restless legs syndrome  Comment: Stable - no changes in sinemet dosing  Plan:     (M32.9) Systemic lupus erythematosus, unspecified SLE type, unspecified organ involvement status (H)  Comment: Recommend holding meloxicam for 1 week prior to surgery.   Will speak to Dr. Frost to discuss options for medications and whether you would be recommended to hold methotrexate, cellcept  Plan:  "             Follow-ups after your visit        Your next 10 appointments already scheduled     Jan 24, 2018   Procedure with Shahida Carlton MD   [facility] (--)    [address]  [place] [address] [phone]              Who to contact     If you have questions or need follow up information about today's clinic visit or your schedule please contact [facility] directly at [phone].  Normal or non-critical lab and imaging results will be communicated to you by GoGold Resourceshart, letter or phone within 4 business days after the clinic has received the results. If you do not hear from us within 7 days, please contact the clinic through Emulist or phone. If you have a critical or abnormal lab result, we will notify you by phone as soon as possible.  Submit refill requests through Oculus VR or call your pharmacy and they will forward the refill request to us. Please allow 3 business days for your refill to be completed.          Additional Information About Your Visit        Go[employer] Resources[employer]SwiftStack Information     Oculus VR gives you secure access to your electronic health record. If you see a primary care provider, you can also send messages to your care team and make appointments. If you have questions, please call your primary care clinic.  If you do not have a primary care provider, please call [phone] and they will assist you.        Care EveryWhere ID     This is your Care EveryWhere ID. This could be used by other organizations to access your [facility] medical records  [id number]        Your Vitals Were     Pulse Temperature Height BMI (Body Mass Index)          75 97.5  F (36.4  C) (Tympanic) 5' 0.4\" (1.534 m) 25.52 kg/m2         Blood Pressure from Last 3 Encounters:   01/15/18 108/68   12/18/17 116/72   10/04/17 116/76    Weight from Last 3 Encounters:   01/15/18 132 lb 6.4 oz (60.1 kg)   12/18/17 129 lb 11.2 oz (58.8 kg)   10/04/17 128 lb (58.1 kg)            "   We Performed the Following     EKG 12-lead complete w/read - Clinics        Primary Care Provider Office Phone # Fax #    Josue Mejia [phone] [address] [address]        Equal Access to Services     RATNA LEA : Hadii aad ku hadasho Soomaali, waaxda luqadaha, qaybta kaalmada adeegyada, waxay marshallin haylarsn chelsea olsonrobertjana barajas . So [facility] [place] [phone].    ATENCIÓN: Si habla español, tiene a armendariz disposición servicios gratuitos de asistencia lingüística. Llame al [phone].    We comply with applicable federal civil rights laws and [place] laws. We do not discriminate on the basis of race, color, national origin, age, disability, sex, sexual orientation, or gender identity.            Thank you!     Thank you for choosing [facility]  for your care. Our goal is always to provide you with excellent care. Hearing back from our patients is one way we can continue to improve our services. Please take a few minutes to complete the written survey that you may receive in the mail after your visit with us. Thank you!             Your Updated Medication List - Protect others around you: Learn how to safely use, store and throw away your medicines at www.disposemymeds.org.          This list is accurate as of: 1/15/18  4:37 PM.  Always use your most recent med list.                   Brand Name Dispense Instructions for use Diagnosis    calcium 600/vitamin D 600-400 MG-UNIT per tablet   Generic drug:  calcium-vitamin D      Take 1 tablet by mouth 2 times daily        carbidopa-levodopa  MG per CR tablet    SINEMET CR     Take 1-2 tablets by mouth At Bedtime        CELLCEPT 500 MG tablet      500 mg in AM then 250 mg in PM        CYMBALTA PO      Take 60 mg by mouth 2 times daily        gabapentin 800 MG tablet    NEURONTIN     2 times daily        leucovorin 5 MG tablet    WELLCOVORIN     Take 1 tablet (5 mg) by mouth daily        meloxicam 15 MG  tablet    MOBIC     Take 0.5 tablets (7.5 mg) by mouth daily        methotrexate 2.5 MG tablet CHEMO      Take 4 tablets by mouth once a week        MIRALAX powder   Generic drug:  polyethylene glycol      Take 17 g (1 capful) by mouth daily        MIRAPEX 0.5 MG tablet   Generic drug:  pramipexole      Take 3 tablets (1.5 mg) by mouth At Bedtime    Restless leg syndrome       NAMENDA PO      Take 10 mg by mouth 2 times daily        omeprazole 40 MG capsule    priLOSEC    90 capsule    TAKE 1 CAPSULE DAILY    Primary insomnia       predniSONE 5 MG tablet    DELTASONE     Taking 5 mg and 1 mg tablet x2 to get a total 7 mg daily        ranitidine HCl 300 MG Caps      Take 300 mg by mouth every evening        traZODone 100 MG tablet    DESYREL    180 tablet    TAKE 2 TABLETS AT BEDTIME    Gastroesophageal reflux disease without esophagitis       Vitamin D3 2000 UNITS Tabs      Take 2,000 Int'l Units by mouth daily        VOLTAREN 1 % Gel topical gel   Generic drug:  diclofenac      Place 2 g onto the skin 4 times daily        zolpidem 5 MG tablet    AMBIEN    7 tablet    Take 1 tablet (5 mg) by mouth nightly as needed for sleep    Insomnia, unspecified type

## 2018-01-15 NOTE — NURSING NOTE
"Chief Complaint   Patient presents with     Pre-Op Exam        Initial /68 (BP Location: Right arm, Patient Position: Chair, Cuff Size: Adult Regular)  Pulse 75  Temp 97.5  F (36.4  C) (Tympanic)  Ht 5' 0.4\" (1.534 m)  Wt 132 lb 6.4 oz (60.1 kg)  BMI 25.52 kg/m2 Estimated body mass index is 25.52 kg/(m^2) as calculated from the following:    Height as of this encounter: 5' 0.4\" (1.534 m).    Weight as of this encounter: 132 lb 6.4 oz (60.1 kg)..    BP completed using cuff size: regular  MEDICATIONS REVIEWED  SOCIAL AND FAMILY HX REVIEWED  Alejandra Stanford CMA  "

## 2018-01-16 ENCOUNTER — TELEPHONE (OUTPATIENT)
Dept: FAMILY MEDICINE | Facility: CLINIC | Age: 68
End: 2018-01-16

## 2018-01-16 NOTE — TELEPHONE ENCOUNTER
Payal called from Rheumatology  recommends to continue Methotrexate,   Cellcept is held for 1 week prior and 1 week following. Can resume if there are no infection. Sent to Dr. Mejia. For review.  I called patient and informed her. Karyn Quintana, CMA

## 2018-01-16 NOTE — TELEPHONE ENCOUNTER
I called over to [facility] and spoke to Dr. Call's nurse regarding the patient's Cellcept and Methotrexate and if the patient should be stopping these medications prior to her surgery.  Waiting for a call back with instructions.  Alejandra Stanford CMA

## 2018-01-17 ENCOUNTER — HOSPITAL ENCOUNTER (OUTPATIENT)
Dept: CT IMAGING | Facility: CLINIC | Age: 68
Discharge: HOME | End: 2018-01-17
Attending: INTERNAL MEDICINE | Admitting: INTERNAL MEDICINE
Payer: COMMERCIAL

## 2018-01-17 DIAGNOSIS — R91.8 ABNORMAL CT SCAN, LUNG: ICD-10-CM

## 2018-01-17 PROCEDURE — 71250 CT THORAX DX C-: CPT

## 2018-01-20 NOTE — PROGRESS NOTES
Awais Casas,    I have had the opportunity to review your recent results and an interpretation is as follows:  Your follow up CT shows stable granulomatous changes, but no acute concerning and follow up in pulmonology would be warranted to discuss possible treatment for mycobacterium avium as discussed at your last pulmonology office visit     Sincerely,  Josue Mejia MD

## 2018-01-22 NOTE — H&P (VIEW-ONLY)
[address] [address]  [phone]  Dept: [phone]    PRE-OP EVALUATION:  Today's date: 1/15/2018    Yessenia Martines (: 1950) presents for pre-operative evaluation assessment as requested by Dr. Carlton.  She requires evaluation and anesthesia risk assessment prior to undergoing surgery/procedure for treatment of Thumb pain .  Proposed procedure: LEFT THUMB CARPOMETACARPAL EXCISIONAL ARTHROPLASTY WITH FACSIA TANIA GRAFT, PARTIAL TRAPEZOID EXCISION, POSSIBLE MINI TIGHTROPE     Date of Surgery/ Procedure: 18  Time of Surgery/ Procedure: 9:30am  Hospital/Surgical Facility: [facility]  Fax number for surgical facility: in [facility]  Primary Physician: Josue Mejia  Type of Anesthesia Anticipated: to be determined    Patient has a Health Care Directive or Living Will:  YES     1. NO - Do you have a history of heart attack, stroke, stent, bypass or surgery on an artery in the head, neck, heart or legs?  2. NO - Do you ever have any pain or discomfort in your chest?  3. NO - Do you have a history of  Heart Failure?  4. NO - Are you troubled by shortness of breath when: walking on the level, up a slight hill or at night?  5. NO - Do you currently have a cold, bronchitis or other respiratory infection?  6. NO - Do you have a cough, shortness of breath or wheezing?  7. NO - Do you sometimes get pains in the calves of your legs when you walk?  8. NO - Do you or anyone in your family have previous history of blood clots?  9. NO - Do you or does anyone in your family have a serious bleeding problem such as prolonged bleeding following surgeries or cuts?  10.YES - Have you ever had problems with anemia or been told to take iron pills? Stable hemoglobin  - not taking iron currently  11. YES - Have you had any abnormal blood loss such as black, tarry or bloody stools, or abnormal vaginal bleeding? - no issues currently  12. YES - Have you ever had a blood  transfusion?  13. NO - Have you or any of your relatives ever had problems with anesthesia?  14. YES - Do you have sleep apnea, excessive snoring or daytime drowsiness? - occasional snoring - no obstructive sleep apnea   15. NO - Do you have any prosthetic heart valves?  16. NO - Do you have prosthetic joints?  17. NO - Is there any chance that you may be pregnant?        HPI:                                                      Brief HPI related to upcoming procedure: Thumb arthroplasty       See problem list for active medical problems.  Problems all longstanding and stable, except as noted/documented.  See ROS for pertinent symptoms related to these conditions.                                                                                                  .    MEDICAL HISTORY:                                                    Patient Active Problem List    Diagnosis Date Noted     Microscopic hematuria 12/28/2017     Priority: Medium     Advance care planning 01/19/2017     Priority: Medium     Advance Care Planning 1/19/2017: Receipt of ACP document:  Received: Health Care Directive which was witnessed or notarized on 11/8/16.  Document previously scanned on 11/23/16.  Validation form completed and sent to be scanned.  Code Status needs to be updated to reflect choices in most recent ACP document.   Confirmed/documented designated decision maker(s).  Added by Joanie Quiroz   Advance Care Planning Liaison             Somatic dysfunction of lumbar region 12/22/2016     Priority: Medium     Bilateral low back pain with sciatica, sciatica laterality unspecified, unspecified chronicity 12/22/2016     Priority: Medium     Somatic dysfunction of sacral region 12/22/2016     Priority: Medium     Sacral pain 12/22/2016     Priority: Medium     Thoracic segment dysfunction 12/22/2016     Priority: Medium     Abnormal CT scan, lung 04/22/2016     Priority: Medium     Dysphonia 04/14/2016     Priority: Medium      Globus sensation 2016     Priority: Medium     Lupus (systemic lupus erythematosus) (H)      Priority: Medium     diagnosed 12 yrs ago       MCI (mild cognitive impairment)      Priority: Medium     Hemorrhage of cyst of native kidney 2015     Priority: Medium     Mechanical low back pain 2015     Priority: Medium     Menopausal osteoporosis 2015     Priority: Medium     Systemic lupus erythematosus (H) 2014     Priority: Medium     Diagnosis 1997  Dr. Hickman  - no renal complications       CARDIOVASCULAR SCREENING; LDL GOAL LESS THAN 130 2014     Priority: Medium     Restless legs syndrome 2014     Priority: Medium     Peripheral neuropathy, idiopathic 2014     Priority: Medium     Emanuel Idelkope       Osteoporosis 2014     Priority: Medium     Had been treated with bisphosphonates for 5 years - [id number]  Problem list name updated by automated process. Provider to review        Past Medical History:   Diagnosis Date     Allergic rhinitis      Allergic rhinitis due to pollen      Arthritis      Decreased libido      Depressive disorder      H pylori ulcer      Hoarseness      Immunosuppression (H)      Lupus (systemic lupus erythematosus) (H)     diagnosed 12 yrs ago     MCI (mild cognitive impairment)      Menopausal osteoporosis 2015     S/P cholecystectomy      S/P TIESHA-BSO      Past Surgical History:   Procedure Laterality Date     BLEPHAROPLASTY BILATERAL        SECTION      x3     CHOLECYSTECTOMY  2010    feels better. No pathology on gallbladder     EYE SURGERY      Raised eyelids.      UGI ENDOSCOPY, SIMPLE EXAM  14     HYSTERECTOMY       KNEE SURGERY       REPAIR TENDON FINGER(S)  2011    Procedure:REPAIR TENDON FINGER(S); RIGHT INDEX FINGER FLEXOR DIGITORUM PROFUNDUS REPAIR, RADIAL DIGITAL NERVE REPAIR ; Surgeon:BRIANA ANTONY; Location: [facility]     SALPINGO-OOPHORECTOMY BILATERAL       SHOULDER SURGERY        TONSILLECTOMY      Child     Current Outpatient Prescriptions   Medication Sig Dispense Refill     traZODone (DESYREL) 100 MG tablet TAKE 2 TABLETS AT BEDTIME 180 tablet 3     carbidopa-levodopa (SINEMET CR)  MG per CR tablet Take 1-2 tablets by mouth At Bedtime       omeprazole (PRILOSEC) 40 MG capsule TAKE 1 CAPSULE DAILY 90 capsule 3     meloxicam (MOBIC) 15 MG tablet Take 0.5 tablets (7.5 mg) by mouth daily       predniSONE (DELTASONE) 5 MG tablet Taking 5 mg and 1 mg tablet x2 to get a total 7 mg daily       pramipexole (MIRAPEX) 0.5 MG tablet Take 3 tablets (1.5 mg) by mouth At Bedtime       polyethylene glycol (MIRALAX) powder Take 17 g (1 capful) by mouth daily       zolpidem (AMBIEN) 5 MG tablet Take 1 tablet (5 mg) by mouth nightly as needed for sleep 7 tablet 0     ranitidine HCl 300 MG CAPS Take 300 mg by mouth every evening       diclofenac (VOLTAREN) 1 % GEL Place 2 g onto the skin 4 times daily       gabapentin (NEURONTIN) 800 MG tablet 2 times daily   1     Memantine HCl (NAMENDA PO) Take 10 mg by mouth 2 times daily       DULoxetine HCl (CYMBALTA PO) Take 60 mg by mouth 2 times daily        leucovorin (WELLCOVORIN) 5 MG tablet Take 1 tablet (5 mg) by mouth daily       methotrexate 2.5 MG tablet Take 4 tablets by mouth once a week        mycophenolate (CELLCEPT) 500 MG tablet 500 mg in AM then 250 mg in PM       calcium-vitamin D (CALCIUM 600/VITAMIN D) 600-400 MG-UNIT per tablet Take 1 tablet by mouth 2 times daily       Cholecalciferol (VITAMIN D3) 2000 UNITS TABS Take 2,000 Int'l Units by mouth daily       OTC products: None, except as noted above    Allergies   Allergen Reactions     Augmentin      C Diff colitis      Latex Allergy: NO    Social History   Substance Use Topics     Smoking status: Never Smoker     Smokeless tobacco: Never Used     Alcohol use 0.0 oz/week     0 Standard drinks or equivalent per week      Comment: 1 wine/day     History   Drug Use No       REVIEW OF SYSTEMS:     "                                                Constitutional, neuro, ENT, endocrine, pulmonary, cardiac, gastrointestinal, genitourinary, musculoskeletal, integument and psychiatric systems are negative, except as otherwise noted.      EXAM:                                                    /68 (BP Location: Right arm, Patient Position: Chair, Cuff Size: Adult Regular)  Pulse 75  Temp 97.5  F (36.4  C) (Tympanic)  Ht 5' 0.4\" (1.534 m)  Wt 132 lb 6.4 oz (60.1 kg)  BMI 25.52 kg/m2    GENERAL APPEARANCE: healthy, alert and no distress     EYES: EOMI, PERRL     HENT: ear canals and TM's normal and nose and mouth without ulcers or lesions     NECK: no adenopathy, no asymmetry, masses, or scars and thyroid normal to palpation     RESP: lungs clear to auscultation - no rales, rhonchi or wheezes     CV: regular rates and rhythm, normal S1 S2, no S3 or S4 and no murmur, click or rub     ABDOMEN:  soft, nontender, no HSM or masses and bowel sounds normal     MS: extremities normal- no evidence of acute inflammation in joints     SKIN: no suspicious lesions or rashes     NEURO: Normal strength and tone, sensory exam grossly normal, mentation intact and speech normal     PSYCH: mentation appears normal. and affect normal/bright     LYMPHATICS: No axillary, cervical, or supraclavicular nodes    DIAGNOSTICS:                                                    EKG: appears normal, NSR, normal axis, normal intervals, no acute ST/T changes c/w ischemia, no LVH by voltage criteria, unchanged from previous tracings    Recent Labs   Lab Test  12/18/17   1610  05/19/17   0925   04/03/16   1320   HGB  12.2  12.5   < >  12.7   PLT  312  315   < >  251   INR   --    --    --   0.92   NA  136  139   < >  134   POTASSIUM  4.0  4.2   < >  4.0   CR  0.79  0.89   < >  0.91   A1C  5.6   --    --    --     < > = values in this interval not displayed.         IMPRESSION:                                                    Reason for " surgery/procedure: Thumb arthroplasty  Diagnosis/reason for consult: Pre-operative Evaluation     The proposed surgical procedure is considered LOW risk.    REVISED CARDIAC RISK INDEX  The patient has the following serious cardiovascular risks for perioperative complications such as (MI, PE, VFib and 3  AV Block):  No serious cardiac risks  INTERPRETATION: 0 risks: Class I (very low risk - 0.4% complication rate)    The patient has the following additional risks for perioperative complications:  No identified additional risks    No diagnosis found.    RECOMMENDATIONS:                                                      (Z01.818) Preop general physical exam  (primary encounter diagnosis)  Comment: You are meidcally optimized for your upcoming surgery pending EKG.  Labs from December reviewed and within normal limits.    Plan:     (G25.81) Restless legs syndrome  Comment: Stable - no changes in sinemet dosing  Plan:     (M32.9) Systemic lupus erythematosus, unspecified SLE type, unspecified organ involvement status (H)  Comment: Recommend holding meloxicam for 1 week prior to surgery.   Will speak to Dr. Frost to discuss options for medications and whether you would be recommended to hold methotrexate, cellcept  ADDENDUM 1/16/2018   Rheumatology  recommends to continue Methotrexate,   Cellcept is held for 1 week prior and 1 week following. Can resume if there are no infection  Plan:      Abnormal Chest CT  Comment; Will discuss with patient - last evaluation on record was in May 2016 with instructions to return to pulmonology in 6 months.  We will try to obtain records of this follow up.        Signed Electronically by: Josue Mejia MD, MD    Copy of this evaluation report is provided to requesting physician.    [facility] Preop Guidelines

## 2018-01-24 ENCOUNTER — HOSPITAL ENCOUNTER (OUTPATIENT)
Facility: CLINIC | Age: 68
Discharge: HOME | End: 2018-01-24
Attending: ORTHOPAEDIC SURGERY | Admitting: ORTHOPAEDIC SURGERY
Payer: COMMERCIAL

## 2018-01-24 ENCOUNTER — ANESTHESIA EVENT (OUTPATIENT)
Dept: SURGERY | Facility: CLINIC | Age: 68
End: 2018-01-24
Payer: COMMERCIAL

## 2018-01-24 ENCOUNTER — ANESTHESIA (OUTPATIENT)
Dept: SURGERY | Facility: CLINIC | Age: 68
End: 2018-01-24
Payer: COMMERCIAL

## 2018-01-24 ENCOUNTER — SURGERY (OUTPATIENT)
Age: 68
End: 2018-01-24

## 2018-01-24 VITALS
WEIGHT: 129 LBS | OXYGEN SATURATION: 94 % | DIASTOLIC BLOOD PRESSURE: 60 MMHG | HEART RATE: 89 BPM | BODY MASS INDEX: 24.86 KG/M2 | TEMPERATURE: 98.3 F | RESPIRATION RATE: 16 BRPM | SYSTOLIC BLOOD PRESSURE: 94 MMHG

## 2018-01-24 DIAGNOSIS — M18.12 PRIMARY OSTEOARTHRITIS OF FIRST CARPOMETACARPAL JOINT OF LEFT HAND: Primary | ICD-10-CM

## 2018-01-24 PROCEDURE — C1762 CONN TISS, HUMAN(INC FASCIA): HCPCS | Performed by: ORTHOPAEDIC SURGERY

## 2018-01-24 PROCEDURE — 37000008 ZZH ANESTHESIA TECHNICAL FEE, 1ST 30 MIN: Performed by: ORTHOPAEDIC SURGERY

## 2018-01-24 PROCEDURE — 25000132 ZZH RX MED GY IP 250 OP 250 PS 637: Performed by: ORTHOPAEDIC SURGERY

## 2018-01-24 PROCEDURE — 36000058 ZZH SURGERY LEVEL 3 EA 15 ADDTL MIN: Performed by: ORTHOPAEDIC SURGERY

## 2018-01-24 PROCEDURE — 71000013 ZZH RECOVERY PHASE 1 LEVEL 1 EA ADDTL HR: Performed by: ORTHOPAEDIC SURGERY

## 2018-01-24 PROCEDURE — 25000125 ZZHC RX 250: Performed by: NURSE ANESTHETIST, CERTIFIED REGISTERED

## 2018-01-24 PROCEDURE — 27210794 ZZH OR GENERAL SUPPLY STERILE: Performed by: ORTHOPAEDIC SURGERY

## 2018-01-24 PROCEDURE — 25000125 ZZHC RX 250: Performed by: ORTHOPAEDIC SURGERY

## 2018-01-24 PROCEDURE — 25000128 H RX IP 250 OP 636: Performed by: NURSE ANESTHETIST, CERTIFIED REGISTERED

## 2018-01-24 PROCEDURE — 40000170 ZZH STATISTIC PRE-PROCEDURE ASSESSMENT II: Performed by: ORTHOPAEDIC SURGERY

## 2018-01-24 PROCEDURE — 36000056 ZZH SURGERY LEVEL 3 1ST 30 MIN: Performed by: ORTHOPAEDIC SURGERY

## 2018-01-24 PROCEDURE — 71000012 ZZH RECOVERY PHASE 1 LEVEL 1 FIRST HR: Performed by: ORTHOPAEDIC SURGERY

## 2018-01-24 PROCEDURE — 71000027 ZZH RECOVERY PHASE 2 EACH 15 MINS: Performed by: ORTHOPAEDIC SURGERY

## 2018-01-24 PROCEDURE — 25000125 ZZHC RX 250: Performed by: PHYSICIAN ASSISTANT

## 2018-01-24 PROCEDURE — 27210995 ZZH RX 272: Performed by: ORTHOPAEDIC SURGERY

## 2018-01-24 PROCEDURE — 25000128 H RX IP 250 OP 636: Performed by: ANESTHESIOLOGY

## 2018-01-24 PROCEDURE — 37000009 ZZH ANESTHESIA TECHNICAL FEE, EACH ADDTL 15 MIN: Performed by: ORTHOPAEDIC SURGERY

## 2018-01-24 DEVICE — GRAFT FASCIA LATA MEDIUM: Type: IMPLANTABLE DEVICE | Site: HAND | Status: FUNCTIONAL

## 2018-01-24 RX ORDER — HYDROXYZINE HYDROCHLORIDE 25 MG/1
TABLET, FILM COATED ORAL
Qty: 40 TABLET | Refills: 1 | Status: ON HOLD | OUTPATIENT
Start: 2018-01-24 | End: 2018-05-18

## 2018-01-24 RX ORDER — HYDROMORPHONE HYDROCHLORIDE 1 MG/ML
.3-.5 INJECTION, SOLUTION INTRAMUSCULAR; INTRAVENOUS; SUBCUTANEOUS EVERY 10 MIN PRN
Status: DISCONTINUED | OUTPATIENT
Start: 2018-01-24 | End: 2018-01-24 | Stop reason: HOSPADM

## 2018-01-24 RX ORDER — SODIUM CHLORIDE, SODIUM LACTATE, POTASSIUM CHLORIDE, CALCIUM CHLORIDE 600; 310; 30; 20 MG/100ML; MG/100ML; MG/100ML; MG/100ML
INJECTION, SOLUTION INTRAVENOUS CONTINUOUS PRN
Status: DISCONTINUED | OUTPATIENT
Start: 2018-01-24 | End: 2018-01-24

## 2018-01-24 RX ORDER — EPHEDRINE SULFATE 50 MG/ML
INJECTION, SOLUTION INTRAMUSCULAR; INTRAVENOUS; SUBCUTANEOUS PRN
Status: DISCONTINUED | OUTPATIENT
Start: 2018-01-24 | End: 2018-01-24

## 2018-01-24 RX ORDER — CLINDAMYCIN PHOSPHATE 900 MG/50ML
900 INJECTION, SOLUTION INTRAVENOUS
Status: COMPLETED | OUTPATIENT
Start: 2018-01-24 | End: 2018-01-24

## 2018-01-24 RX ORDER — ONDANSETRON 2 MG/ML
4 INJECTION INTRAMUSCULAR; INTRAVENOUS EVERY 30 MIN PRN
Status: DISCONTINUED | OUTPATIENT
Start: 2018-01-24 | End: 2018-01-24 | Stop reason: HOSPADM

## 2018-01-24 RX ORDER — NALOXONE HYDROCHLORIDE 0.4 MG/ML
.1-.4 INJECTION, SOLUTION INTRAMUSCULAR; INTRAVENOUS; SUBCUTANEOUS
Status: DISCONTINUED | OUTPATIENT
Start: 2018-01-24 | End: 2018-01-24 | Stop reason: HOSPADM

## 2018-01-24 RX ORDER — PROPOFOL 10 MG/ML
INJECTION, EMULSION INTRAVENOUS PRN
Status: DISCONTINUED | OUTPATIENT
Start: 2018-01-24 | End: 2018-01-24

## 2018-01-24 RX ORDER — ONDANSETRON 4 MG/1
4 TABLET, ORALLY DISINTEGRATING ORAL EVERY 30 MIN PRN
Status: DISCONTINUED | OUTPATIENT
Start: 2018-01-24 | End: 2018-01-24 | Stop reason: HOSPADM

## 2018-01-24 RX ORDER — SODIUM CHLORIDE, SODIUM LACTATE, POTASSIUM CHLORIDE, CALCIUM CHLORIDE 600; 310; 30; 20 MG/100ML; MG/100ML; MG/100ML; MG/100ML
INJECTION, SOLUTION INTRAVENOUS CONTINUOUS
Status: DISCONTINUED | OUTPATIENT
Start: 2018-01-24 | End: 2018-01-24 | Stop reason: HOSPADM

## 2018-01-24 RX ORDER — OXYCODONE AND ACETAMINOPHEN 5; 325 MG/1; MG/1
1 TABLET ORAL ONCE
Status: COMPLETED | OUTPATIENT
Start: 2018-01-24 | End: 2018-01-24

## 2018-01-24 RX ORDER — FENTANYL CITRATE 50 UG/ML
25-50 INJECTION, SOLUTION INTRAMUSCULAR; INTRAVENOUS EVERY 5 MIN PRN
Status: DISCONTINUED | OUTPATIENT
Start: 2018-01-24 | End: 2018-01-24 | Stop reason: HOSPADM

## 2018-01-24 RX ORDER — OXYCODONE AND ACETAMINOPHEN 5; 325 MG/1; MG/1
TABLET ORAL
Qty: 50 TABLET | Refills: 0 | Status: ON HOLD | OUTPATIENT
Start: 2018-01-24 | End: 2018-05-18

## 2018-01-24 RX ORDER — MEPERIDINE HYDROCHLORIDE 25 MG/ML
12.5 INJECTION INTRAMUSCULAR; INTRAVENOUS; SUBCUTANEOUS
Status: DISCONTINUED | OUTPATIENT
Start: 2018-01-24 | End: 2018-01-24 | Stop reason: HOSPADM

## 2018-01-24 RX ORDER — BUPIVACAINE HYDROCHLORIDE AND EPINEPHRINE 5; 5 MG/ML; UG/ML
INJECTION, SOLUTION EPIDURAL; INTRACAUDAL; PERINEURAL
Status: DISCONTINUED
Start: 2018-01-24 | End: 2018-01-24 | Stop reason: HOSPADM

## 2018-01-24 RX ORDER — DEXAMETHASONE SODIUM PHOSPHATE 4 MG/ML
INJECTION, SOLUTION INTRA-ARTICULAR; INTRALESIONAL; INTRAMUSCULAR; INTRAVENOUS; SOFT TISSUE PRN
Status: DISCONTINUED | OUTPATIENT
Start: 2018-01-24 | End: 2018-01-24

## 2018-01-24 RX ORDER — BUPIVACAINE HYDROCHLORIDE AND EPINEPHRINE 5; 5 MG/ML; UG/ML
INJECTION, SOLUTION PERINEURAL PRN
Status: DISCONTINUED | OUTPATIENT
Start: 2018-01-24 | End: 2018-01-24 | Stop reason: HOSPADM

## 2018-01-24 RX ORDER — PHYSOSTIGMINE SALICYLATE 1 MG/ML
1.2 INJECTION INTRAVENOUS
Status: DISCONTINUED | OUTPATIENT
Start: 2018-01-24 | End: 2018-01-24 | Stop reason: HOSPADM

## 2018-01-24 RX ORDER — FENTANYL CITRATE 50 UG/ML
25-50 INJECTION, SOLUTION INTRAMUSCULAR; INTRAVENOUS
Status: DISCONTINUED | OUTPATIENT
Start: 2018-01-24 | End: 2018-01-24 | Stop reason: HOSPADM

## 2018-01-24 RX ORDER — FENTANYL CITRATE 50 UG/ML
INJECTION, SOLUTION INTRAMUSCULAR; INTRAVENOUS PRN
Status: DISCONTINUED | OUTPATIENT
Start: 2018-01-24 | End: 2018-01-24

## 2018-01-24 RX ORDER — MAGNESIUM HYDROXIDE 1200 MG/15ML
LIQUID ORAL PRN
Status: DISCONTINUED | OUTPATIENT
Start: 2018-01-24 | End: 2018-01-24 | Stop reason: HOSPADM

## 2018-01-24 RX ORDER — PROPOFOL 10 MG/ML
INJECTION, EMULSION INTRAVENOUS CONTINUOUS PRN
Status: DISCONTINUED | OUTPATIENT
Start: 2018-01-24 | End: 2018-01-24

## 2018-01-24 RX ORDER — ONDANSETRON 2 MG/ML
INJECTION INTRAMUSCULAR; INTRAVENOUS PRN
Status: DISCONTINUED | OUTPATIENT
Start: 2018-01-24 | End: 2018-01-24

## 2018-01-24 RX ORDER — LIDOCAINE HYDROCHLORIDE 20 MG/ML
INJECTION, SOLUTION INFILTRATION; PERINEURAL PRN
Status: DISCONTINUED | OUTPATIENT
Start: 2018-01-24 | End: 2018-01-24

## 2018-01-24 RX ORDER — CLINDAMYCIN PHOSPHATE 900 MG/50ML
900 INJECTION, SOLUTION INTRAVENOUS SEE ADMIN INSTRUCTIONS
Status: DISCONTINUED | OUTPATIENT
Start: 2018-01-24 | End: 2018-01-24 | Stop reason: HOSPADM

## 2018-01-24 RX ADMIN — Medication 5 MG: at 11:48

## 2018-01-24 RX ADMIN — PHENYLEPHRINE HYDROCHLORIDE 100 MCG: 10 INJECTION INTRAVENOUS at 12:21

## 2018-01-24 RX ADMIN — SODIUM CHLORIDE, POTASSIUM CHLORIDE, SODIUM LACTATE AND CALCIUM CHLORIDE: 600; 310; 30; 20 INJECTION, SOLUTION INTRAVENOUS at 11:38

## 2018-01-24 RX ADMIN — CLINDAMYCIN PHOSPHATE 900 MG: 18 INJECTION, SOLUTION INTRAVENOUS at 11:54

## 2018-01-24 RX ADMIN — PHENYLEPHRINE HYDROCHLORIDE 100 MCG: 10 INJECTION INTRAVENOUS at 11:48

## 2018-01-24 RX ADMIN — Medication 5 MG: at 11:54

## 2018-01-24 RX ADMIN — ROPIVACAINE HYDROCHLORIDE 40 ML: 5 INJECTION, SOLUTION EPIDURAL; INFILTRATION; PERINEURAL at 11:06

## 2018-01-24 RX ADMIN — MIDAZOLAM 2 MG: 1 INJECTION INTRAMUSCULAR; INTRAVENOUS at 11:06

## 2018-01-24 RX ADMIN — PHENYLEPHRINE HYDROCHLORIDE 100 MCG: 10 INJECTION INTRAVENOUS at 12:35

## 2018-01-24 RX ADMIN — PHENYLEPHRINE HYDROCHLORIDE 100 MCG: 10 INJECTION INTRAVENOUS at 12:01

## 2018-01-24 RX ADMIN — BUPIVACAINE HYDROCHLORIDE AND EPINEPHRINE BITARTRATE 6 ML: 5; .005 INJECTION, SOLUTION PERINEURAL at 12:53

## 2018-01-24 RX ADMIN — PHENYLEPHRINE HYDROCHLORIDE 100 MCG: 10 INJECTION INTRAVENOUS at 11:54

## 2018-01-24 RX ADMIN — ONDANSETRON 4 MG: 2 INJECTION INTRAMUSCULAR; INTRAVENOUS at 12:53

## 2018-01-24 RX ADMIN — LIDOCAINE HYDROCHLORIDE 40 MG: 20 INJECTION, SOLUTION INFILTRATION; PERINEURAL at 11:42

## 2018-01-24 RX ADMIN — SODIUM CHLORIDE 200 ML: 900 IRRIGANT IRRIGATION at 12:45

## 2018-01-24 RX ADMIN — SODIUM CHLORIDE, POTASSIUM CHLORIDE, SODIUM LACTATE AND CALCIUM CHLORIDE: 600; 310; 30; 20 INJECTION, SOLUTION INTRAVENOUS at 11:39

## 2018-01-24 RX ADMIN — FENTANYL CITRATE 50 MCG: 50 INJECTION, SOLUTION INTRAMUSCULAR; INTRAVENOUS at 11:42

## 2018-01-24 RX ADMIN — SODIUM CHLORIDE, POTASSIUM CHLORIDE, SODIUM LACTATE AND CALCIUM CHLORIDE: 600; 310; 30; 20 INJECTION, SOLUTION INTRAVENOUS at 12:53

## 2018-01-24 RX ADMIN — DEXAMETHASONE SODIUM PHOSPHATE 4 MG: 4 INJECTION, SOLUTION INTRA-ARTICULAR; INTRALESIONAL; INTRAMUSCULAR; INTRAVENOUS; SOFT TISSUE at 11:47

## 2018-01-24 RX ADMIN — OXYCODONE HYDROCHLORIDE AND ACETAMINOPHEN 1 TABLET: 5; 325 TABLET ORAL at 13:58

## 2018-01-24 RX ADMIN — PHENYLEPHRINE HYDROCHLORIDE 100 MCG: 10 INJECTION INTRAVENOUS at 12:07

## 2018-01-24 RX ADMIN — MIDAZOLAM 2 MG: 1 INJECTION INTRAMUSCULAR; INTRAVENOUS at 11:38

## 2018-01-24 RX ADMIN — PHENYLEPHRINE HYDROCHLORIDE 100 MCG: 10 INJECTION INTRAVENOUS at 12:28

## 2018-01-24 RX ADMIN — PROPOFOL 200 MG: 10 INJECTION, EMULSION INTRAVENOUS at 11:42

## 2018-01-24 RX ADMIN — PROPOFOL 125 MCG/KG/MIN: 10 INJECTION, EMULSION INTRAVENOUS at 11:42

## 2018-01-24 NOTE — DISCHARGE INSTRUCTIONS
Same Day Surgery Discharge Instructions for  Sedation and General Anesthesia       It's not unusual to feel dizzy, light-headed or faint for up to 24 hours after surgery or while taking pain medication.  If you have these symptoms: sit for a few minutes before standing and have someone assist you when you get up to walk or use the bathroom.      You should rest and relax for the next 24 hours. We recommend you make arrangements to have an adult stay with you for at least 24 hours after your discharge.  Avoid hazardous and strenuous activity.      DO NOT DRIVE any vehicle or operate mechanical equipment for 24 hours following the end of your surgery.  Even though you may feel normal, your reactions may be affected by the medication you have received.      Do not drink alcoholic beverages for 24 hours following surgery.       Slowly progress to your regular diet as you feel able. It's not unusual to feel nauseated and/or vomit after receiving anesthesia.  If you develop these symptoms, drink clear liquids (apple juice, ginger ale, broth, 7-up, etc. ) until you feel better.  If your nausea and vomiting persists for 24 hours, please notify your surgeon.        All narcotic pain medications, along with inactivity and anesthesia, can cause constipation. Drinking plenty of liquids and increasing fiber intake will help.      For any questions of a medical nature, call your surgeon.      Do not make important decisions for 24 hours.      If you had general anesthesia, you may have a sore throat for a couple of days related to the breathing tube used during surgery.  You may use Cepacol lozenges to help with this discomfort.  If it worsens or if you develop a fever, contact your surgeon.       If you feel your pain is not well managed with the pain medications prescribed by your surgeon, please contact your surgeon's office to let them know so they can address your concerns.         [facility]      DISCHARGE  "INSTRUCTIONS FOLLOWING   REGIONAL BLOCK ANESTHESIA      Numbness or lack of feeling in the arm/leg that was operated may last up to 24 hours.  The average time is usually 10-15 hours.  You may not be able to lift or move the arm or leg where the operation was by itself during that time.  Long-acting local anesthetic medicines were used to give you long-lasting pain relief.    Wear a sling until your arm is completely \"awake\"    Avoid bumping your arm, leg or foot while it is numb    Avoid extremes of hot or cold while it is numb    Remain quiet and restful the day of surgery.  Resume normal activities gradually over the next day or so as advise by your surgeon.    Do not drive or operate  Any machinery until your extremity is full  \"awake\"        You will have a tingling and prickly sensation in your arm/leg as the feeling begins to return; you can also expect some discomfort. The amount of discomfort is unpredictable, but if you have more pain that can be controlled with the pain medication you received, you should contact your surgeon.  Start to take your pain pills as soon as you start to feel any discomfort or pain.  We strongly recommend starting your pain medication at bedtime if you haven't already done so.  This is in the event that the block wears off while you are asleep.      **If you have questions or concerns about your procedure,  call Dr. Carlton at [phone]**                        "

## 2018-01-24 NOTE — BRIEF OP NOTE
[facility] Brief Operative Note    Pre-operative diagnosis: ARTHRITIS    Post-operative diagnosis CMC and STT arthritis   Procedure: Procedure(s):  LEFT THUMB CARPOMETACARPAL EXCISIONAL ARTHROPLASTY WITH FACSIA TANIA GRAFT, PARTIAL TRAPEZOID EXCISION - Wound Class: I-Clean   Surgeon(s): Surgeon(s) and Role:     * Shahida Carlton MD - Primary     * Denisa Palomo PA-C - Assisting   Estimated blood loss: * No values recorded between 1/24/2018 11:57 AM and 1/24/2018  1:06 PM *    Specimens: * No specimens in log *   Findings: As above

## 2018-01-24 NOTE — ANESTHESIA POSTPROCEDURE EVALUATION
Patient: Yessenia Martines    Procedure(s):  LEFT THUMB CARPOMETACARPAL EXCISIONAL ARTHROPLASTY WITH FACSIA TANIA GRAFT, PARTIAL TRAPEZOID EXCISION - Wound Class: I-Clean    Diagnosis:ARTHRITIS   Diagnosis Additional Information: No value filed.    Anesthesia Type:  General, Periph. Nerve Block for postop pain    Note:  Anesthesia Post Evaluation    Patient location during evaluation: PACU  Patient participation: Able to fully participate in evaluation  Level of consciousness: awake  Pain management: adequate  Airway patency: patent  Cardiovascular status: acceptable  Respiratory status: acceptable  Hydration status: acceptable  PONV: none     Anesthetic complications: None          Last vitals:  Vitals:    01/24/18 0923   Resp: 16   Temp: 35.6  C (96  F)   SpO2: 100%         Electronically Signed By: Jakub Bustillos MD  January 24, 2018  1:18 PM

## 2018-01-24 NOTE — IP AVS SNAPSHOT
MRN:[id number]                      After Visit Summary   1/24/2018    Yessenia Martines    MRN: [id number]           Thank you!     Thank you for choosing [facility] for your care. Our goal is always to provide you with excellent care. Hearing back from our patients is one way we can continue to improve our services. Please take a few minutes to complete the written survey that you may receive in the mail after you visit with us. Thank you!        Patient Information     Date Of Birth          1950        About your hospital stay     You were admitted on:  January 24, 2018 You last received care in the:  [facility]    You were discharged on:  January 24, 2018       Who to Call     For medical emergencies, please call 911.  For non-urgent questions about your medical care, please call your primary care provider or clinic, [phone]  For questions related to your surgery, please call your surgery clinic        Attending Provider     Provider Shahida Adams MD Orthopedics       Primary Care Provider Office Phone # Fax #    Josue Mejia [phone]      After Care Instructions     Discharge Instructions       CMC Excisional Arthroplasty Post-Operative Instructions  [facility]  Cathy Carlton M.D.    PAIN    You have been given medicine to numb the surgical site. As this medicine wears off, you can take the pain pills prescribed for you. Keep your arm elevated above your heart for the first 24 to 48 hours following surgery to reduce swelling and pain. If your hand swells and is very painful, you can remove the Ace wrap, cut the gauze along the little finger to loosen the bandage, and rewrap the gauze with the Ace wrap. Your fingers will swell on your third post-operative day. This is normal. Elevate and move your fingers, and the swelling will resolve.    BANDAGE    Leave your bandage on and keep  it clean and dry until you are seen in the clinic. If you take a shower, cover it with plastic wrap or a plastic bag.    SLING    If you receive a sling for your arm before you leave the hospital, you can wear it until your nerve block has lost its effect. After that, you should avoid using the sling, otherwise, you can develop neck and shoulder stiffness and discomfort.    ACTIVITIES    After your surgery, begin moving your fingers, but do not move your thumb. You may use your hand for light activities and driving while wearing the bandage. You may also return to work for light duty. Do not do any heavy lifting, pushing, or pulling with your hand.    FOLLOW-UP    You will need to come back for a checkup 10 to 14 days after your surgery. Call [phone] as soon as possible to make your appointment to see Denisa Palomo PA-C. You can be seen at [facility] ([facility] [place] [address] [place] [address], 2nd floor, [place]) on Tuesday or Thursday. Denisa Palomo, my physician assistant, will examine your incision and take out your stitches. After that, you will need to wear the removable splint you will receive in the clinic. Your next appointment will be 4 weeks later with Dr. Carlton.    Call Denisa Palomo at [phone] between 8:30 a.m. and 5:00 p.m. if you have:   A fever (101 F or higher)  Redness, swelling, or draining at the surgical site  Nausea, vomiting, or a rash from your medicine(s)  Any questions, problems, or concerns    For emergencies after 5:00 p.m., call the on-call physician at [phone].                  Further instructions from your care team       Same Day Surgery Discharge Instructions for  Sedation and General Anesthesia       It's not unusual to feel dizzy, light-headed or faint for up to 24 hours after surgery or while taking pain medication.  If you have these symptoms: sit for a few minutes before standing and have someone assist you when you get up to walk or use the bathroom.      You should  rest and relax for the next 24 hours. We recommend you make arrangements to have an adult stay with you for at least 24 hours after your discharge.  Avoid hazardous and strenuous activity.      DO NOT DRIVE any vehicle or operate mechanical equipment for 24 hours following the end of your surgery.  Even though you may feel normal, your reactions may be affected by the medication you have received.      Do not drink alcoholic beverages for 24 hours following surgery.       Slowly progress to your regular diet as you feel able. It's not unusual to feel nauseated and/or vomit after receiving anesthesia.  If you develop these symptoms, drink clear liquids (apple juice, ginger ale, broth, 7-up, etc. ) until you feel better.  If your nausea and vomiting persists for 24 hours, please notify your surgeon.        All narcotic pain medications, along with inactivity and anesthesia, can cause constipation. Drinking plenty of liquids and increasing fiber intake will help.      For any questions of a medical nature, call your surgeon.      Do not make important decisions for 24 hours.      If you had general anesthesia, you may have a sore throat for a couple of days related to the breathing tube used during surgery.  You may use Cepacol lozenges to help with this discomfort.  If it worsens or if you develop a fever, contact your surgeon.       If you feel your pain is not well managed with the pain medications prescribed by your surgeon, please contact your surgeon's office to let them know so they can address your concerns.         [facility]      DISCHARGE INSTRUCTIONS FOLLOWING   REGIONAL BLOCK ANESTHESIA      Numbness or lack of feeling in the arm/leg that was operated may last up to 24 hours.  The average time is usually 10-15 hours.  You may not be able to lift or move the arm or leg where the operation was by itself during that time.  Long-acting local anesthetic medicines were used to give you long-lasting  "pain relief.    Wear a sling until your arm is completely \"awake\"    Avoid bumping your arm, leg or foot while it is numb    Avoid extremes of hot or cold while it is numb    Remain quiet and restful the day of surgery.  Resume normal activities gradually over the next day or so as advise by your surgeon.    Do not drive or operate  Any machinery until your extremity is full  \"awake\"        You will have a tingling and prickly sensation in your arm/leg as the feeling begins to return; you can also expect some discomfort. The amount of discomfort is unpredictable, but if you have more pain that can be controlled with the pain medication you received, you should contact your surgeon.  Start to take your pain pills as soon as you start to feel any discomfort or pain.  We strongly recommend starting your pain medication at bedtime if you haven't already done so.  This is in the event that the block wears off while you are asleep.      **If you have questions or concerns about your procedure,  call Dr. Carlton at [phone]**                          Pending Results     No orders found from 1/22/2018 to 1/25/2018.            Admission Information     Date & Time Provider Department Dept. Phone    1/24/2018 Shahida Carlton MD [facility] [phone]      Your Vitals Were     Blood Pressure Pulse Temperature Respirations Weight Pulse Oximetry    105/49 89 97.7  F (36.5  C) (Temporal) 14 58.5 kg (129 lb) 92%    BMI (Body Mass Index)                   24.86 kg/m2           [email or web address] Information     [employer] gives you secure access to your electronic health record. If you see a primary care provider, you can also send messages to your care team and make appointments. If you have questions, please call your primary care clinic.  If you do not have a primary care provider, please call [phone] and they will assist you.        Care EveryWhere ID     This is your Care EveryWhere ID. This " could be used by other organizations to access your [facility] medical records  [id number]        Equal Access to Services     LAI TATE : Hadii luis Maldonado, huy madison, sulemayash bonetelloprince kam, dipak olsonrobertjana knox. So [facility] [phone].    ATENCIÓN: Si habla español, tiene a armendariz disposición servicios gratuitos de asistencia lingüística. Llame al [phone].    We comply with applicable federal civil rights laws and [place] laws. We do not discriminate on the basis of race, color, national origin, age, disability, sex, sexual orientation, or gender identity.               Review of your medicines      START taking        Dose / Directions    hydrOXYzine 25 MG tablet   Commonly known as:  ATARAX   Used for:  Primary osteoarthritis of first carpometacarpal joint of left hand        Take 1 25-mg tablet every 6 hours as needed for muscle relaxation and to supplement your pain medication.   Quantity:  40 tablet   Refills:  1       oxyCODONE-acetaminophen 5-325 MG per tablet   Commonly known as:  PERCOCET   Used for:  Primary osteoarthritis of first carpometacarpal joint of left hand   Notes to Patient:  ONE TABLET TAKEN @ 1:38PM        Take 1-2 tablets every 4-6 hours for pain if needed.   Quantity:  50 tablet   Refills:  0         CONTINUE these medicines which have NOT CHANGED        Dose / Directions    calcium 600/vitamin D 600-400 MG-UNIT per tablet   Generic drug:  calcium-vitamin D        Dose:  1 tablet   Take 1 tablet by mouth 2 times daily   Refills:  0       carbidopa-levodopa  MG per CR tablet   Commonly known as:  SINEMET CR        Dose:  1-2 tablet   Take 1-2 tablets by mouth At Bedtime   Refills:  0       CELLCEPT 500 MG tablet        500 mg in AM then 250 mg in PM   Refills:  0       CYMBALTA PO        Dose:  60 mg   Take 60 mg by mouth 2 times daily   Refills:  0       gabapentin 800 MG tablet   Commonly known as:  NEURONTIN        2 times daily    Refills:  1       leucovorin 5 MG tablet   Commonly known as:  WELLCOVORIN        Dose:  5 mg   Take 1 tablet (5 mg) by mouth daily   Refills:  0       LINZESS PO        Dose:  72 mcg   Take 72 mcg by mouth every other day   Refills:  0       meloxicam 15 MG tablet   Commonly known as:  MOBIC        Dose:  7.5 mg   Take 0.5 tablets (7.5 mg) by mouth daily   Refills:  0       methotrexate 2.5 MG tablet CHEMO        Dose:  4 tablet   Take 4 tablets by mouth once a week   Refills:  0       MIRALAX powder   Generic drug:  polyethylene glycol        Dose:  1 capful   Take 17 g (1 capful) by mouth daily   Refills:  0       MIRAPEX 0.5 MG tablet   Used for:  Restless leg syndrome   Generic drug:  pramipexole        Dose:  1.5 mg   Take 3 tablets (1.5 mg) by mouth At Bedtime   Refills:  0       NAMENDA PO        Dose:  10 mg   Take 10 mg by mouth 2 times daily   Refills:  0       omeprazole 40 MG capsule   Commonly known as:  priLOSEC   Used for:  Primary insomnia        TAKE 1 CAPSULE DAILY   Quantity:  90 capsule   Refills:  3       predniSONE 5 MG tablet   Commonly known as:  DELTASONE        Taking 5 mg and 1 mg tablet x2 to get a total 7 mg daily   Refills:  0       ranitidine HCl 300 MG Caps        Dose:  300 mg   Take 300 mg by mouth every evening   Refills:  0       traZODone 100 MG tablet   Commonly known as:  DESYREL   Used for:  Gastroesophageal reflux disease without esophagitis        TAKE 2 TABLETS AT BEDTIME   Quantity:  180 tablet   Refills:  3       Vitamin D3 2000 UNITS Tabs        Dose:  2000 Int'l Units   Take 2,000 Int'l Units by mouth daily   Refills:  0       VOLTAREN 1 % Gel topical gel   Generic drug:  diclofenac        Dose:  2 g   Place 2 g onto the skin 4 times daily   Refills:  0       zolpidem 5 MG tablet   Commonly known as:  AMBIEN   Used for:  Insomnia, unspecified type        Dose:  5 mg   Take 1 tablet (5 mg) by mouth nightly as needed for sleep   Quantity:  7 tablet   Refills:  0             Where to get your medicines      These medications were sent to [facility] [place], [place] - [address]  [address], [place] [address]     Phone:  [phone]     hydrOXYzine 25 MG tablet         Some of these will need a paper prescription and others can be bought over the counter. Ask your nurse if you have questions.     Bring a paper prescription for each of these medications     oxyCODONE-acetaminophen 5-325 MG per tablet                Protect others around you: Learn how to safely use, store and throw away your medicines at www.disposemymeds.org.        Information about OPIOIDS     PRESCRIPTION OPIOIDS: WHAT YOU NEED TO KNOW    Prescription opioids can be used to help relieve moderate to severe pain and are often prescribed following a surgery or injury, or for certain health conditions. These medications can be an important part of treatment but also come with serious risks. It is important to work with your health care provider to make sure you are getting the safest, most effective care.    WHAT ARE THE RISKS AND SIDE EFFECTS OF OPIOID USE?  Prescription opioids carry serious risks of addiction and overdose, especially with prolonged use. An opioid overdose, often marked by slowed breathing can cause sudden death. The use of prescription opioids can have a number of side effects as well, even when taken as directed:      Tolerance - meaning you might need to take more of a medication for the same pain relief    Physical dependence - meaning you have symptoms of withdrawal when a medication is stopped    Increased sensitivity to pain    Constipation    Nausea, vomiting, and dry mouth    Sleepiness and dizziness    Confusion    Depression    Low levels of testosterone that can result in lower sex drive, energy, and strength    Itching and sweating    RISKS ARE GREATER WITH:    History of drug misuse, substance use disorder, or overdose    Mental health conditions (such  as depression or anxiety)    Sleep apnea    Older age (65 years or older)    Pregnancy    Avoid alcohol while taking prescription opioids.   Also, unless specifically advised by your health care provider, medications to avoid include:    Benzodiazepines (such as Xanax or Valium)    Muscle relaxants (such as Soma or Flexeril)    Hypnotics (such as Ambien or Lunesta)    Other prescription opioids    KNOW YOUR OPTIONS:  Talk to your health care provider about ways to manage your pain that do not involve prescription opioids. Some of these options may actually work better and have fewer risks and side effects:    Pain relievers such as acetaminophen, ibuprofen, and naproxen    Some medications that are also used for depression or seizures    Physical therapy and exercise    Cognitive behavioral therapy, a psychological, goal-directed approach, in which patients learn how to modify physical, behavioral, and emotional triggers of pain and stress    IF YOU ARE PRESCRIBED OPIOIDS FOR PAIN:    Never take opioids in greater amounts or more often than prescribed    Follow up with your primary health care provider and work together to create a plan on how to manage your pain.    Talk about ways to help manage your pain that do not involve prescription opioids    Talk about all concerns and side effects    Help prevent misuse and abuse    Never sell or share prescription opioids    Never use another person's prescription opioids    Store prescription opioids in a secure place and out of reach of others (this may include visitors, children, friends, and family)    Visit www.cdc.gov/drugoverdose to learn about risks of opioid abuse and overdose    If you believe you may be struggling with addiction, tell your health care provider and ask for guidance or call [facility] National Helpline at 3-493-374-HELP    LEARN MORE / www.cdc.gov/drugoverdose/prescribing/guideline.html    Safely dispose of unused prescription opioids: Find your  local drug take-back programs and more information about the importance of safe disposal at www.doseofreality.[place].gov             Medication List: This is a list of all your medications and when to take them. Check marks below indicate your daily home schedule. Keep this list as a reference.      Medications           Morning Afternoon Evening Bedtime As Needed    calcium 600/vitamin D 600-400 MG-UNIT per tablet   Take 1 tablet by mouth 2 times daily   Generic drug:  calcium-vitamin D                                carbidopa-levodopa  MG per CR tablet   Commonly known as:  SINEMET CR   Take 1-2 tablets by mouth At Bedtime                                CELLCEPT 500 MG tablet   500 mg in AM then 250 mg in PM                                CYMBALTA PO   Take 60 mg by mouth 2 times daily                                gabapentin 800 MG tablet   Commonly known as:  NEURONTIN   2 times daily                                hydrOXYzine 25 MG tablet   Commonly known as:  ATARAX   Take 1 25-mg tablet every 6 hours as needed for muscle relaxation and to supplement your pain medication.                                leucovorin 5 MG tablet   Commonly known as:  WELLCOVORIN   Take 1 tablet (5 mg) by mouth daily                                LINZESS PO   Take 72 mcg by mouth every other day                                meloxicam 15 MG tablet   Commonly known as:  MOBIC   Take 0.5 tablets (7.5 mg) by mouth daily                                methotrexate 2.5 MG tablet CHEMO   Take 4 tablets by mouth once a week                                MIRALAX powder   Take 17 g (1 capful) by mouth daily   Generic drug:  polyethylene glycol                                MIRAPEX 0.5 MG tablet   Take 3 tablets (1.5 mg) by mouth At Bedtime   Generic drug:  pramipexole                                NAMENDA PO   Take 10 mg by mouth 2 times daily                                omeprazole 40 MG capsule   Commonly known as:  priLOSEC    TAKE 1 CAPSULE DAILY                                oxyCODONE-acetaminophen 5-325 MG per tablet   Commonly known as:  PERCOCET   Take 1-2 tablets every 4-6 hours for pain if needed.   Last time this was given:  1 tablet on 1/24/2018  1:58 PM   Notes to Patient:  ONE TABLET TAKEN @ 1:38PM                                predniSONE 5 MG tablet   Commonly known as:  DELTASONE   Taking 5 mg and 1 mg tablet x2 to get a total 7 mg daily                                ranitidine HCl 300 MG Caps   Take 300 mg by mouth every evening                                traZODone 100 MG tablet   Commonly known as:  DESYREL   TAKE 2 TABLETS AT BEDTIME                                Vitamin D3 2000 UNITS Tabs   Take 2,000 Int'l Units by mouth daily                                VOLTAREN 1 % Gel topical gel   Place 2 g onto the skin 4 times daily   Generic drug:  diclofenac                                zolpidem 5 MG tablet   Commonly known as:  AMBIEN   Take 1 tablet (5 mg) by mouth nightly as needed for sleep

## 2018-01-24 NOTE — ANESTHESIA PROCEDURE NOTES
Peripheral nerve/Neuraxial procedure note : Brachial plexus    Procedure Documentation    .    Procedure:  left  Brachial plexus.       .  .       Assessment/Narrative  .  .  . Comments:  Axillary Block for post-op analgesia:  Betadine prep:  25guage 5/8 inch needle: 40cc. 0.5% Ropivicaine with 1/200,000 Epinephrine: No paresthesia's with injection, and no heme. Liane-arterial approach.

## 2018-01-24 NOTE — OP NOTE
SURGEON: YANCY ANTONY M.D.   ASSISTANT: DOM REED PA-C.     PREOPERATIVE DIAGNOSIS   Left CMC and STT arthritis.     POSTOPERATIVE DIAGNOSIS   same.     OPERATION  Left CMC excisional arthroplasty with a fascia hemanth graft and an APL suspension sling. Left partial trapezoid excision    ANESTHESIA   Regional/ General    TOURNIQUET TIME   52 minutes.     INFORMED CONSENT  Obtained and signed prior to entering the operating room.    SURGICAL SITE SIGNED  Performed and confirmed prior to incision.    TIME OUT  Performed prior to incision in the operating room.    PROPHYLACTIC ANTIBIOTICS  Ancef given prior to inflation of the tourniquet.    DVT INDICATIONS  SCD's were not applied.    PROCEDURE AND FINDINGS   The patient was then brought to the operating room and the arm was prepped and draped in a normal standard manner. A time-out was performed confirming the surgical site and then antibiotics had been given before the tourniquet was inflated.  The arm was exsanguinated and the tourniquet was inflated to 150 mm above systolic pressure.    A longitudinal incision was made over the CMC joint.  This was carried down through the subcutaneous tissue and the superficial branch of the radial nerve was retracted dorsally.  The plane between the APL (abductor pollicis longus) and the EPB was divided longitudinally.  Those tendons were retracted out of the way.  The capsule over the trapezium was divided longitudinally protecting the radial artery branch to the scaphoid.  The trapezium was removed piecemeal with a rongeur.  A cuff of bone was left attached to the thumb abductor pollicis brevis to reduce atrophy in that muscle.  The FCR was protected.  The capsular joint area was then irrigated out copiously.  An APL tendon slip was identified dorsally and resected back into the first dorsal compartment, which was released.  An APL suspension sling was created with the APL tendon woven directly through the FCR.  It was  looped back around itself with the EPB and sewn onto itself with 3-0 Ethibond suture to secure it.  This suspended the thumb nicely, kept the patient from having an abduction deformity at the base of the MP joint.  Then fascia hemanth graft, which had been soaked and cleaned, was prepared.  A 1 cm wide strip was cut into the graft leaving it attached distally.  A curved snap was then placed beneath the FCR tendon and grabbed this 1-cm strip underneath the FCR.  The remainder of the graft was folded over the top of this strip, rolled onto itself, and it was sewn several times with a 3-0 Ethibond in order to make sure that the graft would not unravel.  The 3-0 Ethibond was attached to the sling at the FCR sheath and tied onto itself.  The graft was sewn into the CMC joint where the APL sling was sewn onto itself and the thumb was brought through a range of motion to make sure that there was adequate space and no impingement.  The patient s thumb had excellent range of motion without tension.  The capsule was closed with 3-0 undyed Vicryl and 3-0 Ethibond.  The first dorsal compartment was closed with 3-0 undyed Vicryl.      Attention was turned to the STT joint, where arthritic changes were noted at the joint. An osteotome was used to remove the proximal 3 to 4 mm of the trapezoid. Bone wax was applied to the bleeding bone and gel foam was placed in the space.    Marcaine with epinephrine was injected.  The tourniquet was let down at the end of the case and all bleeding was controlled.  The skin over the CMC joint was closed with the 4-0 Monocryl.

## 2018-01-24 NOTE — ANESTHESIA CARE TRANSFER NOTE
Patient: Yessenia Martines    Procedure(s):  LEFT THUMB CARPOMETACARPAL EXCISIONAL ARTHROPLASTY WITH FACSIA TANIA GRAFT, PARTIAL TRAPEZOID EXCISION - Wound Class: I-Clean    Diagnosis: ARTHRITIS   Diagnosis Additional Information: No value filed.    Anesthesia Type:   General, Periph. Nerve Block for postop pain     Note:  Airway :Face Mask  Patient transferred to:PACU  Comments: Transferred to PACU, spontaneous respirations, 10L oxygen via facemask.  All monitors and alarms on and functioning, VSS.  Patient awake, comfortable.  Report to PACU RN.Handoff Report: Identifed the Patient, Identified the Reponsible Provider, Reviewed the pertinent medical history, Discussed the surgical course, Reviewed Intra-OP anesthesia mangement and issues during anesthesia, Set expectations for post-procedure period and Allowed opportunity for questions and acknowledgement of understanding      Vitals: (Last set prior to Anesthesia Care Transfer)    CRNA VITALS  1/24/2018 1236 - 1/24/2018 1311      1/24/2018             Resp Rate (set): 10                Electronically Signed By: JAKY Boogie CRNA  January 24, 2018  1:11 PM

## 2018-01-24 NOTE — ANESTHESIA PREPROCEDURE EVALUATION
Anesthesia Evaluation     . Pt has had prior anesthetic. Type: General           ROS/MED HX    ENT/Pulmonary:       Neurologic:       Cardiovascular:         METS/Exercise Tolerance:     Hematologic:     (+) Other Hematologic Disorder-LUPUS      Musculoskeletal:   (+) arthritis, , , -       GI/Hepatic:     (+) GERD cholecystitis/cholelithiasis,       Renal/Genitourinary:         Endo:     (+) Chronic steroid usage for Arthritis .      Psychiatric:     (+) psychiatric history depression      Infectious Disease:         Malignancy:         Other:                                    Anesthesia Plan      History & Physical Review  History and physical reviewed and following examination; no interval change.    ASA Status:  3 .        Plan for General and Periph. Nerve Block for postop pain with Intravenous induction. Maintenance will be TIVA.    PONV prophylaxis:  Ondansetron (or other 5HT-3) and Dexamethasone or Solumedrol  Propofol, Zofran, and decadron      Postoperative Care  Postoperative pain management:  IV analgesics and Oral pain medications.      Consents  Anesthetic plan, risks, benefits and alternatives discussed with:  Patient..                          .

## 2018-01-24 NOTE — IP AVS SNAPSHOT
[facility]    [address]    [place] [address]    Phone:  [phone]    Fax:  [phone]                                       After Visit Summary   1/24/2018    Yessenia Martines    MRN: [id number]           After Visit Summary Signature Page     I have received my discharge instructions, and my questions have been answered. I have discussed any challenges I see with this plan with the nurse or doctor.    ..........................................................................................................................................  Patient/Patient Representative Signature      ..........................................................................................................................................  Patient Representative Print Name and Relationship to Patient    ..................................................               ................................................  Date                                            Time    ..........................................................................................................................................  Reviewed by Signature/Title    ...................................................              ..............................................  Date                                                            Time

## 2018-02-06 ENCOUNTER — TRANSFERRED RECORDS (OUTPATIENT)
Dept: HEALTH INFORMATION MANAGEMENT | Facility: CLINIC | Age: 68
End: 2018-02-06

## 2018-02-08 ENCOUNTER — MYC MEDICAL ADVICE (OUTPATIENT)
Dept: FAMILY MEDICINE | Facility: CLINIC | Age: 68
End: 2018-02-08

## 2018-02-08 DIAGNOSIS — R06.83 SNORING: Primary | ICD-10-CM

## 2018-02-08 NOTE — TELEPHONE ENCOUNTER
Dr Mejia please see [employer] message below. Patient wanting a sleep referral to Dr Moore at [facility]. Pending referral. Thanks    Merry Hernandez  Referral Coordinator

## 2018-02-12 ENCOUNTER — APPOINTMENT (OUTPATIENT)
Age: 68
Setting detail: DERMATOLOGY
End: 2018-03-20

## 2018-02-12 DIAGNOSIS — L82.0 INFLAMED SEBORRHEIC KERATOSIS: ICD-10-CM

## 2018-02-12 DIAGNOSIS — L57.0 ACTINIC KERATOSIS: ICD-10-CM

## 2018-02-12 DIAGNOSIS — Z71.89 OTHER SPECIFIED COUNSELING: ICD-10-CM

## 2018-02-12 DIAGNOSIS — L91.8 OTHER HYPERTROPHIC DISORDERS OF THE SKIN: ICD-10-CM

## 2018-02-12 DIAGNOSIS — L90.5 SCAR CONDITIONS AND FIBROSIS OF SKIN: ICD-10-CM

## 2018-02-12 DIAGNOSIS — D22 MELANOCYTIC NEVI: ICD-10-CM

## 2018-02-12 DIAGNOSIS — D18.0 HEMANGIOMA: ICD-10-CM

## 2018-02-12 DIAGNOSIS — L81.4 OTHER MELANIN HYPERPIGMENTATION: ICD-10-CM

## 2018-02-12 DIAGNOSIS — D84.9 IMMUNODEFICIENCY, UNSPECIFIED: ICD-10-CM

## 2018-02-12 DIAGNOSIS — L82.1 OTHER SEBORRHEIC KERATOSIS: ICD-10-CM

## 2018-02-12 PROBLEM — D18.01 HEMANGIOMA OF SKIN AND SUBCUTANEOUS TISSUE: Status: ACTIVE | Noted: 2018-02-12

## 2018-02-12 PROBLEM — D22.5 MELANOCYTIC NEVI OF TRUNK: Status: ACTIVE | Noted: 2018-02-12

## 2018-02-12 PROCEDURE — OTHER SUNSCREEN RECOMMENDATIONS: OTHER

## 2018-02-12 PROCEDURE — OTHER SKIN TAG REMOVAL MULTI: OTHER

## 2018-02-12 PROCEDURE — 17000 DESTRUCT PREMALG LESION: CPT | Mod: 59

## 2018-02-12 PROCEDURE — OTHER LIQUID NITROGEN: OTHER

## 2018-02-12 PROCEDURE — 99203 OFFICE O/P NEW LOW 30 MIN: CPT | Mod: 25

## 2018-02-12 PROCEDURE — OTHER DIAGNOSIS COMMENT: OTHER

## 2018-02-12 PROCEDURE — 11200 RMVL SKIN TAGS UP TO&INC 15: CPT | Mod: 59

## 2018-02-12 PROCEDURE — OTHER COUNSELING: OTHER

## 2018-02-12 PROCEDURE — 17110 DESTRUCT B9 LESION 1-14: CPT

## 2018-02-12 PROCEDURE — OTHER MIPS QUALITY: OTHER

## 2018-02-12 ASSESSMENT — LOCATION SIMPLE DESCRIPTION DERM
LOCATION SIMPLE: RIGHT BREAST
LOCATION SIMPLE: POSTERIOR SCALP
LOCATION SIMPLE: LEFT AXILLARY VAULT
LOCATION SIMPLE: ABDOMEN
LOCATION SIMPLE: RIGHT AXILLARY VAULT
LOCATION SIMPLE: LOWER BACK
LOCATION SIMPLE: RIGHT UPPER BACK
LOCATION SIMPLE: RIGHT ZYGOMA
LOCATION SIMPLE: LEFT FOREHEAD
LOCATION SIMPLE: RIGHT SHOULDER
LOCATION SIMPLE: LEFT LOWER BACK
LOCATION SIMPLE: RIGHT FOREHEAD
LOCATION SIMPLE: UPPER BACK
LOCATION SIMPLE: RIGHT HAND

## 2018-02-12 ASSESSMENT — LOCATION DETAILED DESCRIPTION DERM
LOCATION DETAILED: RIGHT ANTERIOR SHOULDER
LOCATION DETAILED: RIGHT AXILLARY TAIL OF BREAST
LOCATION DETAILED: LEFT AXILLARY VAULT
LOCATION DETAILED: LEFT MEDIAL FOREHEAD
LOCATION DETAILED: SUPERIOR LUMBAR SPINE
LOCATION DETAILED: RIGHT AXILLARY VAULT
LOCATION DETAILED: RIGHT ULNAR DORSAL HAND
LOCATION DETAILED: SUPERIOR THORACIC SPINE
LOCATION DETAILED: RIGHT INFERIOR UPPER BACK
LOCATION DETAILED: LEFT SUPERIOR MEDIAL MIDBACK
LOCATION DETAILED: RIGHT MEDIAL UPPER BACK
LOCATION DETAILED: RIGHT CENTRAL ZYGOMA
LOCATION DETAILED: RIGHT INFERIOR FOREHEAD
LOCATION DETAILED: PERIUMBILICAL SKIN
LOCATION DETAILED: POSTERIOR MID-PARIETAL SCALP

## 2018-02-12 ASSESSMENT — LOCATION ZONE DERM
LOCATION ZONE: AXILLAE
LOCATION ZONE: FACE
LOCATION ZONE: SCALP
LOCATION ZONE: HAND
LOCATION ZONE: ARM
LOCATION ZONE: TRUNK

## 2018-02-12 NOTE — PROCEDURE: MIPS QUALITY
Quality 130: Documentation Of Current Medications In The Medical Record: Current Medications Documented
Quality 131: Pain Assessment And Follow-Up: Pain assessment using a standardized tool is documented as negative, no follow-up plan required
Detail Level: Detailed
Quality 431: Preventive Care And Screening: Unhealthy Alcohol Use - Screening: Patient screened for unhealthy alcohol use using a single question and scores less than 2 times per year
Quality 110: Preventive Care And Screening: Influenza Immunization: Influenza Immunization previously received during influenza season
Quality 226: Preventive Care And Screening: Tobacco Use: Screening And Cessation Intervention: Patient screened for tobacco and never smoked

## 2018-02-12 NOTE — HPI: SKIN CHECK
Additional History: She has multiple pigmented lesions distributed throughout the body that she would like checked today. These are asymptomatic, mild in severity, present for years and have not been treated.  Specifically, she has a pink rough spot on her central forehead and one on her right temple which have been present for at least 1 year. These lesions will occasionally itch and the one on her Anabaptism will come and go. She denies bleeding, drainage, ulcerations to areas. Additional History: She has multiple pigmented lesions distributed throughout the body that she would like checked today. These are asymptomatic, mild in severity, present for years and have not been treated.  Specifically, she has a pink rough spot on her central forehead and one on her right temple which have been present for at least 1 year. These lesions will occasionally itch and the one on her Restorationist will come and go. She denies bleeding, drainage, ulcerations to areas.

## 2018-02-12 NOTE — PROCEDURE: SKIN TAG REMOVAL MULTI
Anesthesia Volume In Cc: 0.4
Anesthesia Type: 1% lidocaine without epinephrine
Consent: Written consent obtained and the risks of skin tag removal was reviewed with the patient including but not limited to bleeding, pigmentary change, infection, pain, and remote possibility of scarring.
Hemostasis: Electrocautery
Detail Level: Simple
Medical Necessity Information: It is in your best interest to select a reason for this procedure from the list below. All of these items fulfill various CMS LCD requirements except the new and changing color options.
Include Z78.9 (Other Specified Conditions Influencing Health Status) As An Associated Diagnosis?: No
Medical Necessity Clause: This procedure was medically necessary because the lesions that were treated were:
Add Associated Diagnoses If Applicable When Selecting Medical Necessity: Yes
Total Number Of Lesions Treated: 2

## 2018-02-12 NOTE — PROCEDURE: DIAGNOSIS COMMENT
Comment: Cryotherapy performed by Adilene Baugh DNP, APRN
Comment: Cryotherapy performed by Adilene Baugh DNP, APRN
Detail Level: Simple
Comment: Destruction performed by Adilene Baugh, ARACELI, APRN

## 2018-02-12 NOTE — PROCEDURE: LIQUID NITROGEN
Post-Care Instructions: I reviewed with the patient in detail post-care instructions. Patient is to wear sunprotection, and avoid picking at any of the treated lesions. Pt may apply Vaseline to crusted or scabbing areas.
Duration Of Freeze Thaw-Cycle (Seconds): 10
Render Post-Care Instructions In Note?: yes
Detail Level: Detailed
Number Of Freeze-Thaw Cycles: 1 freeze-thaw cycle
Duration Of Freeze Thaw-Cycle (Seconds): 15
Total Number Of Lesions Treated: 6
Medical Necessity Information: It is in your best interest to select a reason for this procedure from the list below. All of these items fulfill various CMS LCD requirements except the new and changing color options.
Add 52 Modifier (Optional): no
Consent: The patient's verbal consent was obtained including but not limited to risks of crusting, scabbing, blistering, scarring, darker or lighter pigmentary change, recurrence, incomplete removal and infection.
Consent: The patient's consent was obtained including but not limited to risks of crusting, scabbing, blistering, scarring, darker or lighter pigmentary change, recurrence, incomplete removal and infection.
Medical Necessity Clause: This procedure was medically necessary because the lesions that were treated were:

## 2018-02-12 NOTE — PROCEDURE: COUNSELING
Detail Level: Simple
Detail Level: Generalized
Patient Specific Counseling (Will Not Stick From Patient To Patient): Discussed the increased risk of skin cancer 2nd/2 chronic iatrogenic immunosuppression and the need for photoprotection and routine skin surveillance.
Detail Level: Detailed

## 2018-02-12 NOTE — PROCEDURE: SUNSCREEN RECOMMENDATIONS
General Sunscreen Counseling: I recommended a broad spectrum (UVA/B blocking) sunscreen with a SPF of 30 or higher.  I explained that SPF 30 sunscreens block approximately 97 percent of the sun's harmful ultraviolet rays.  Sunscreens should be applied at least 15 minutes prior to expected sun exposure and then every 2 hours after that as long as sun exposure continues. If swimming or exercising, sunscreen should be reapplied every 45 minutes to an hour after getting wet or sweating.  One ounce, or the equivalent of a shot glass full of sunscreen, is adequate to protect the skin not covered by a bathing suit. I also recommended a lip balm with a SPF 30 sunscreen as well. Sun protective clothing can be used in lieu of sunscreen, but must be worn the entire time you are exposed to the sun's rays.  Such clothing is woven of fibers with a chemical structure that absorbs or reflects ultraviolet radiation.  The best hats for sun protection have a full 360 degree brim.  Baseball style caps do not protect the ears or the back and sides of the neck and are inadequate for effective sun protection.
Products Recommended: The following products were discussed:\\nAnthelios - La Roche Poussey\\nCoppertone Sport\\nNeutrogenia sunscreens (many to choose from)\\nIntellishade - Sierra (tinted)\\nDaily SPF 33 Protectant - Wes Pedraza (non-tinted)
Detail Level: Detailed

## 2018-02-19 ENCOUNTER — TRANSFERRED RECORDS (OUTPATIENT)
Dept: HEALTH INFORMATION MANAGEMENT | Facility: CLINIC | Age: 68
End: 2018-02-19

## 2018-03-01 ENCOUNTER — TRANSFERRED RECORDS (OUTPATIENT)
Dept: HEALTH INFORMATION MANAGEMENT | Facility: CLINIC | Age: 68
End: 2018-03-01

## 2018-03-28 ENCOUNTER — HOSPITAL ENCOUNTER (OUTPATIENT)
Dept: LAB | Facility: CLINIC | Age: 68
Discharge: HOME | End: 2018-03-28
Attending: PSYCHIATRY & NEUROLOGY | Admitting: PSYCHIATRY & NEUROLOGY
Payer: COMMERCIAL

## 2018-03-28 ENCOUNTER — OFFICE VISIT (OUTPATIENT)
Dept: SLEEP MEDICINE | Facility: CLINIC | Age: 68
End: 2018-03-28
Attending: INTERNAL MEDICINE
Payer: COMMERCIAL

## 2018-03-28 VITALS
WEIGHT: 130 LBS | OXYGEN SATURATION: 99 % | BODY MASS INDEX: 22.2 KG/M2 | HEART RATE: 76 BPM | HEIGHT: 64 IN | DIASTOLIC BLOOD PRESSURE: 99 MMHG | SYSTOLIC BLOOD PRESSURE: 143 MMHG | RESPIRATION RATE: 14 BRPM

## 2018-03-28 DIAGNOSIS — E61.1 IRON DEFICIENCY: ICD-10-CM

## 2018-03-28 DIAGNOSIS — G47.33 OSA (OBSTRUCTIVE SLEEP APNEA): Primary | ICD-10-CM

## 2018-03-28 DIAGNOSIS — R06.83 SNORING: ICD-10-CM

## 2018-03-28 DIAGNOSIS — F51.04 PSYCHOPHYSIOLOGICAL INSOMNIA: ICD-10-CM

## 2018-03-28 LAB — FERRITIN SERPL-MCNC: 10 NG/ML (ref 8–252)

## 2018-03-28 PROCEDURE — 82728 ASSAY OF FERRITIN: CPT | Performed by: PSYCHIATRY & NEUROLOGY

## 2018-03-28 PROCEDURE — 36415 COLL VENOUS BLD VENIPUNCTURE: CPT | Performed by: PSYCHIATRY & NEUROLOGY

## 2018-03-28 PROCEDURE — 99215 OFFICE O/P EST HI 40 MIN: CPT | Performed by: PSYCHIATRY & NEUROLOGY

## 2018-03-28 NOTE — NURSING NOTE
"Chief Complaint   Patient presents with     Sleep Problem     difficulty sleeping       Initial BP (!) 143/99  Pulse 76  Resp 14  Ht 1.626 m (5' 4\")  Wt 59 kg (130 lb)  SpO2 99%  BMI 22.31 kg/m2 Estimated body mass index is 22.31 kg/(m^2) as calculated from the following:    Height as of this encounter: 1.626 m (5' 4\").    Weight as of this encounter: 59 kg (130 lb).  Medication Reconciliation: complete   Neck 36cm  ESS 12  .Blanca Jimenez MA          "
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
## 2018-03-28 NOTE — MR AVS SNAPSHOT
After Visit Summary   3/28/2018    Yessenia Martines    MRN: [id number]           Patient Information     Date Of Birth          1950        Visit Information        Provider Department      3/28/2018 8:00 AM Alvarado Moore MD [facility]        Today's Diagnoses     KATHRINE (obstructive sleep apnea)    -  1    Snoring        Iron deficiency        Psychophysiological insomnia          Care Instructions      Your BMI is Body mass index is 22.31 kg/(m^2).  Weight management is a personal decision.  If you are interested in exploring weight loss strategies, the following discussion covers the approaches that may be successful. Body mass index (BMI) is one way to tell whether you are at a healthy weight, overweight, or obese. It measures your weight in relation to your height.  A BMI of 18.5 to 24.9 is in the healthy range. A person with a BMI of 25 to 29.9 is considered overweight, and someone with a BMI of 30 or greater is considered obese. More than two-thirds of American adults are considered overweight or obese.  Being overweight or obese increases the risk for further weight gain. Excess weight may lead to heart disease and diabetes.  Creating and following plans for healthy eating and physical activity may help you improve your health.  Weight control is part of healthy lifestyle and includes exercise, emotional health, and healthy eating habits. Careful eating habits lifelong are the mainstay of weight control. Though there are significant health benefits from weight loss, long-term weight loss with diet alone may be very difficult to achieve- studies show long-term success with dietary management in less than 10% of people. Attaining a healthy weight may be especially difficult to achieve in those with severe obesity. In some cases, medications, devices and surgical management might be considered.  What can you do?  If you are overweight or obese and are interested  in methods for weight loss, you should discuss this with your provider.     Consider reducing daily calorie intake by 500 calories.     Keep a food journal.     Avoiding skipping meals, consider cutting portions instead.    Diet combined with exercise helps maintain muscle while optimizing fat loss. Strength training is particularly important for building and maintaining muscle mass. Exercise helps reduce stress, increase energy, and improves fitness. Increasing exercise without diet control, however, may not burn enough calories to loose weight.       Start walking three days a week 10-20 minutes at a time    Work towards walking thirty minutes five days a week     Eventually, increase the speed of your walking for 1-2 minutes at time    In addition, we recommend that you review healthy lifestyles and methods for weight loss available through the National Institutes of Health patient information sites:  http://win.niddk.nih.gov/publications/index.htm    And look into health and wellness programs that may be available through your health insurance provider, employer, local community center, or sunshine club.                  Follow-ups after your visit        Additional Services     SLEEP PSYCHOLOGY REFERRAL       Referral Urgency: Next available    Dr. Alvarado Cervantes is at the following clinics on the following days:  [address]        Thursday and Friday (PLEASE CALL [phone] to schedule an appointment).  [facility] [address] [place]       Wednesdays   (PLEASE CALL [phone] to schedule an appointment).     Please be aware that coverage of these services is subject to the terms and limitations of your health insurance plan. Call member services at your health plan with any benefit or coverage questions.     Please bring the following to your appointment:  >> List of current medications   >> This referral request   >> Any documents/labs  given to you for this referral                  Your next 10 appointments already scheduled     Apr 24, 2018  8:30 PM CDT   PSG Split with BED 2 SH SLEEP   [facility]    [address]  [facility] [address] [phone]            Jun 12, 2018  2:00 PM CDT   New Sleep Patient with Alvarado Cervantes PsyD   [facility]    [address]  [facility] [address] [phone]            Jun 13, 2018  9:00 AM CDT   Return Sleep Patient with Alvarado Moore MD   [facility]    [address] [address] [phone]              Future tests that were ordered for you today     Open Future Orders        Priority Expected Expires Ordered    Comprehensive Sleep Study Routine  9/24/2018 3/28/2018    Ferritin Routine  5/27/2018 3/28/2018            Who to contact     If you have questions or need follow up information about today's clinic visit or your schedule please contact [facility] directly at [phone].  Normal or non-critical lab and imaging results will be communicated to you by MyChart, letter or phone within 4 business days after the clinic has received the results. If you do not hear from us within 7 days, please contact the clinic through [employer] or phone. If you have a critical or abnormal lab result, we will notify you by phone as soon as possible.  Submit refill requests through [employer] or call your pharmacy and they will forward the refill request to us. Please allow 3 business days for your refill to be completed.          Additional Information About Your Visit        [employer] Information     [employer] gives you secure access to your electronic health record. If you see a primary care provider, you can also send messages to your care team and make appointments. If  "you have questions, please call your primary care clinic.  If you do not have a primary care provider, please call [phone] and they will assist you.        Care EveryWhere ID     This is your Care EveryWhere ID. This could be used by other organizations to access your [facility] medical records  [id number]        Your Vitals Were     Pulse Respirations Height Pulse Oximetry BMI (Body Mass Index)       76 14 1.626 m (5' 4\") 99% 22.31 kg/m2        Blood Pressure from Last 3 Encounters:   03/28/18 (!) 143/99   01/24/18 94/60   01/15/18 108/68    Weight from Last 3 Encounters:   03/28/18 59 kg (130 lb)   01/24/18 58.5 kg (129 lb)   01/15/18 60.1 kg (132 lb 6.4 oz)              We Performed the Following     SLEEP EVALUATION & MANAGEMENT REFERRAL - ADULT -[facility] [phone]  (Age 18 and up) (Schedule with Dr Moore)     SLEEP PSYCHOLOGY REFERRAL        Primary Care Provider Office Phone # Fax #    Josue Mejia [phone] [address]  [facility] [address]        Equal Access to Services     LAI TATE AH: Hadii aad ku hadasho Soomaali, waaxda luqadaha, qaybta kaalmada adeegyada, waxay marshallin haylarsn chelsea barajas . So [facility] [phone].    ATENCIÓN: Si habla español, tiene a armendariz disposición servicios gratuitos de asistencia lingüística. Llame al [phone].    We comply with applicable federal civil rights laws and [place] laws. We do not discriminate on the basis of race, color, national origin, age, disability, sex, sexual orientation, or gender identity.            Thank you!     Thank you for choosing [facility]  for your care. Our goal is always to provide you with excellent care. Hearing back from our patients is one way we can continue to improve our services. Please take a few minutes to complete the written survey that you may receive in the mail after your visit with us. Thank you!             Your Updated " Medication List - Protect others around you: Learn how to safely use, store and throw away your medicines at www.disposemymeds.org.          This list is accurate as of 3/28/18  9:46 AM.  Always use your most recent med list.                   Brand Name Dispense Instructions for use Diagnosis    calcium 600/vitamin D 600-400 MG-UNIT per tablet   Generic drug:  calcium-vitamin D      Take 1 tablet by mouth 2 times daily        carbidopa-levodopa  MG per CR tablet    SINEMET CR     Take 1-2 tablets by mouth At Bedtime        CELLCEPT 500 MG tablet      500 mg in AM then 250 mg in PM        CYMBALTA PO      Take 60 mg by mouth 2 times daily        gabapentin 800 MG tablet    NEURONTIN     2 times daily        hydrOXYzine 25 MG tablet    ATARAX    40 tablet    Take 1 25-mg tablet every 6 hours as needed for muscle relaxation and to supplement your pain medication.    Primary osteoarthritis of first carpometacarpal joint of left hand       leucovorin 5 MG tablet    WELLCOVORIN     Take 1 tablet (5 mg) by mouth daily        LINZESS PO      Take 72 mcg by mouth every other day        meloxicam 15 MG tablet    MOBIC     Take 0.5 tablets (7.5 mg) by mouth daily        methotrexate 2.5 MG tablet CHEMO      Take 4 tablets by mouth once a week        MIRALAX powder   Generic drug:  polyethylene glycol      Take 17 g (1 capful) by mouth daily        MIRAPEX 0.5 MG tablet   Generic drug:  pramipexole      Take 3 tablets (1.5 mg) by mouth At Bedtime    Restless leg syndrome       NAMENDA PO      Take 10 mg by mouth 2 times daily        omeprazole 40 MG capsule    priLOSEC    90 capsule    TAKE 1 CAPSULE DAILY    Primary insomnia       oxyCODONE-acetaminophen 5-325 MG per tablet    PERCOCET    50 tablet    Take 1-2 tablets every 4-6 hours for pain if needed.    Primary osteoarthritis of first carpometacarpal joint of left hand       predniSONE 5 MG tablet    DELTASONE     Taking 5 mg and 1 mg tablet x2 to get a total 7 mg  daily        ranitidine HCl 300 MG Caps      Take 300 mg by mouth every evening        traZODone 100 MG tablet    DESYREL    180 tablet    TAKE 2 TABLETS AT BEDTIME    Gastroesophageal reflux disease without esophagitis       Vitamin D3 2000 UNITS Tabs      Take 2,000 Int'l Units by mouth daily        VOLTAREN 1 % Gel topical gel   Generic drug:  diclofenac      Place 2 g onto the skin 4 times daily        zolpidem 5 MG tablet    AMBIEN    7 tablet    Take 1 tablet (5 mg) by mouth nightly as needed for sleep    Insomnia, unspecified type

## 2018-04-23 ENCOUNTER — TRANSFERRED RECORDS (OUTPATIENT)
Dept: HEALTH INFORMATION MANAGEMENT | Facility: CLINIC | Age: 68
End: 2018-04-23

## 2018-04-23 DIAGNOSIS — Z79.890 POST-MENOPAUSE ON HRT (HORMONE REPLACEMENT THERAPY): Primary | ICD-10-CM

## 2018-04-23 NOTE — TELEPHONE ENCOUNTER
6/6/17 Annual. Pt is requesting to go back on her Estrace 1 mg. Pt is having night sweats and hot flashes. Pt states she is not feeling as good off as she did when taking. Routing to Dr. Ramirez. OK to send?         6/6/17 PLAN:  Discussed [facility] results for both arms of study. ERT was done on Premarin not Estradiol.  Discussed decreased CAD starting in her 50's. Unknown breast cancer risk on Estradiol.  May try to wean down this year to see if need ERT.  Discussed future Vaginal Estrogen if needed.

## 2018-04-23 NOTE — TELEPHONE ENCOUNTER
Would like to refill rx for:    Estrogen    Pharmacy: [facility] [place]   [address]    Annual scheduled.

## 2018-04-24 RX ORDER — ESTRADIOL 1 MG/1
1 TABLET ORAL DAILY
Qty: 30 TABLET | Refills: 1 | Status: SHIPPED | OUTPATIENT
Start: 2018-04-24 | End: 2018-06-12

## 2018-04-26 ENCOUNTER — TRANSFERRED RECORDS (OUTPATIENT)
Dept: HEALTH INFORMATION MANAGEMENT | Facility: CLINIC | Age: 68
End: 2018-04-26

## 2018-05-04 ENCOUNTER — TRANSFERRED RECORDS (OUTPATIENT)
Dept: HEALTH INFORMATION MANAGEMENT | Facility: CLINIC | Age: 68
End: 2018-05-04

## 2018-05-08 ENCOUNTER — TRANSFERRED RECORDS (OUTPATIENT)
Dept: HEALTH INFORMATION MANAGEMENT | Facility: CLINIC | Age: 68
End: 2018-05-08

## 2018-05-09 ENCOUNTER — THERAPY VISIT (OUTPATIENT)
Dept: SLEEP MEDICINE | Facility: CLINIC | Age: 68
End: 2018-05-09
Payer: COMMERCIAL

## 2018-05-09 DIAGNOSIS — G47.33 OSA (OBSTRUCTIVE SLEEP APNEA): ICD-10-CM

## 2018-05-09 PROCEDURE — 95810 POLYSOM 6/> YRS 4/> PARAM: CPT | Performed by: PSYCHIATRY & NEUROLOGY

## 2018-05-09 NOTE — MR AVS SNAPSHOT
After Visit Summary   5/9/2018    Yessenia Martines    MRN: [id number]           Patient Information     Date Of Birth          1950        Visit Information        Provider Department      5/9/2018 8:30 PM BED 6  S[facility]EP [facility] [facility]        Today's Diagnoses     KATHRINE (obstructive sleep apnea)           Follow-ups after your visit        Your next 10 appointments already scheduled     May 18, 2018   Procedure with Shahida Carlton MD   [facility] (--)    [address]  [facility] [address] [phone]            Jun 12, 2018  8:40 AM CDT   PHYSICAL with Nicola Ramirez MD   [facility] ([facility])    [address]  [address]  [place] [address] [phone]            Jun 12, 2018  2:00 PM CDT   New Sleep Patient with Alvarado Cervantes PsyD   [facility] ([facility])    [address]  [address]  [facility] [address] [phone]            Jun 13, 2018  9:00 AM CDT   Return Sleep Patient with Alvarado Moore MD   [facility] ([facility])    [address]  [address]  [facility] [id number] [phone]              Who to contact     If you have questions or need follow up information about today's clinic visit or your schedule please contact [facility] directly at [phone].  Normal or non-critical lab and imaging results will be communicated to you by MyChart, letter or phone within 4 business days after the clinic has received the results. If you do not hear from us within 7 days, please contact the clinic through [employer] or phone. If you have a critical or abnormal lab result, we will notify you by phone as soon as possible.  Submit refill requests through [address] or call your pharmacy and they will forward the refill request to us. Please  allow 3 business days for your refill to be completed.          Additional Information About Your Visit        MyChart Information     [employer] gives you secure access to your electronic health record. If you see a primary care provider, you can also send messages to your care team and make appointments. If you have questions, please call your primary care clinic.  If you do not have a primary care provider, please call [phone] and they will assist you.        Care EveryWhere ID     This is your Care EveryWhere ID. This could be used by other organizations to access your [place] medical records  [id number]         Blood Pressure from Last 3 Encounters:   03/28/18 (!) 143/99   01/24/18 94/60   01/15/18 108/68    Weight from Last 3 Encounters:   03/28/18 59 kg (130 lb)   01/24/18 58.5 kg (129 lb)   01/15/18 60.1 kg (132 lb 6.4 oz)              We Performed the Following     Comprehensive Sleep Study        Primary Care Provider Office Phone # Fax #    Jsoue Mejia [phone] [address]  [facility] [address]        Equal Access to Services     [facility]: Hadii aad ku haddrewo Sotu, waaxda luqadaha, qaybta kaalmada adeindu, dipak barajas . So [facility] [phone].    ATENCIÓN: Si habla español, tiene a armendariz disposición servicios gratuitos de asistencia lingüística. Llame al [phone].    We comply with applicable federal civil rights laws and [place] laws. We do not discriminate on the basis of race, color, national origin, age, disability, sex, sexual orientation, or gender identity.            Thank you!     Thank you for choosing [facility]  for your care. Our goal is always to provide you with excellent care. Hearing back from our patients is one way we can continue to improve our services. Please take a few minutes to complete the written survey that you may receive in the mail after your visit with us. Thank  you!             Your Updated Medication List - Protect others around you: Learn how to safely use, store and throw away your medicines at www.disposemymeds.org.          This list is accurate as of 5/9/18 11:59 PM.  Always use your most recent med list.                   Brand Name Dispense Instructions for use Diagnosis    calcium 600/vitamin D 600-400 MG-UNIT per tablet   Generic drug:  calcium-vitamin D      Take 1 tablet by mouth 2 times daily        carbidopa-levodopa  MG per CR tablet    SINEMET CR     Take 1-2 tablets by mouth At Bedtime        CELLCEPT 500 MG tablet      500 mg in AM then 250 mg in PM        CYMBALTA PO      Take 60 mg by mouth 2 times daily        estradiol 1 MG tablet    ESTRACE    30 tablet    Take 1 tablet (1 mg) by mouth daily    Post-menopause on HRT (hormone replacement therapy)       gabapentin 800 MG tablet    NEURONTIN     2 times daily        hydrOXYzine 25 MG tablet    ATARAX    40 tablet    Take 1 25-mg tablet every 6 hours as needed for muscle relaxation and to supplement your pain medication.    Primary osteoarthritis of first carpometacarpal joint of left hand       leucovorin 5 MG tablet    WELLCOVORIN     Take 1 tablet (5 mg) by mouth daily        LINZESS PO      Take 72 mcg by mouth every other day        meloxicam 15 MG tablet    MOBIC     Take 0.5 tablets (7.5 mg) by mouth daily        methotrexate 2.5 MG tablet CHEMO      Take 4 tablets by mouth once a week        MIRALAX powder   Generic drug:  polyethylene glycol      Take 17 g (1 capful) by mouth daily        MIRAPEX 0.5 MG tablet   Generic drug:  pramipexole      Take 3 tablets (1.5 mg) by mouth At Bedtime    Restless leg syndrome       NAMENDA PO      Take 10 mg by mouth 2 times daily        omeprazole 40 MG capsule    priLOSEC    90 capsule    TAKE 1 CAPSULE DAILY    Primary insomnia       oxyCODONE-acetaminophen 5-325 MG per tablet    PERCOCET    50 tablet    Take 1-2 tablets every 4-6 hours for pain if  needed.    Primary osteoarthritis of first carpometacarpal joint of left hand       predniSONE 5 MG tablet    DELTASONE     Taking 5 mg and 1 mg tablet x2 to get a total 7 mg daily        ranitidine HCl 300 MG Caps      Take 300 mg by mouth every evening        traZODone 100 MG tablet    DESYREL    180 tablet    TAKE 2 TABLETS AT BEDTIME    Gastroesophageal reflux disease without esophagitis       Vitamin D3 2000 units Tabs      Take 2,000 Int'l Units by mouth daily        VOLTAREN 1 % Gel topical gel   Generic drug:  diclofenac      Place 2 g onto the skin 4 times daily        zolpidem 5 MG tablet    AMBIEN    7 tablet    Take 1 tablet (5 mg) by mouth nightly as needed for sleep    Insomnia, unspecified type

## 2018-05-10 LAB — SLPCOMP: NORMAL

## 2018-05-10 NOTE — PROCEDURES
" SLEEP STUDY INTERPRETATION  DIAGNOSTIC POLYSOMNOGRAPHY REPORT      Patient: ADE RICHMOND  YOB: 1950  Study Date: 5/9/2018  MRN: [id number]  Referring Provider: MD Mejia Brian  Ordering Provider: MD Moore Michael    Indications for Polysomnography: The patient is a 68 y old Female who is 5' 4\" and weighs 130.0 lbs. Her BMI is 22.5, [place] sleepiness scale 12.0 and neck circumference is 36.0 cm. Relevant medical history includes snoring, non restorative sleep, opioid use. A diagnostic polysomnogram was performed to evaluate for sleep apnea.    Polysomnogram Data: A full night polysomnogram recorded the standard physiologic parameters including EEG, EOG, EMG, ECG, nasal and oral airflow. Respiratory parameters of chest and abdominal movements were recorded with respiratory inductance plethysmography. Oxygen saturation was recorded by pulse oximetry. Hypopnea scoring rule used: 1B 4%.    Sleep Architecture:   The total recording time of the polysomnogram was 414.4 minutes. The total sleep time was 384.0 minutes. Sleep latency was 2.2 minutes. REM latency was 326.5 minutes. Arousal index was 14.2 arousals per hour. Sleep efficiency was 92.7%. Wake after sleep onset was 18.5 minutes. The patient spent 9.2% of total sleep time in Stage N1, 51.8% in Stage N2, 22.4% in Stage N3, and 16.5% in REM. Time in REM supine was - minutes.    Respiration: Mild sleep disordered breathing best characterized as mixed (combination of both obstructive and central phenomena).  Of note the patient did not have REM supine during the study.      Events ? The polysomnogram revealed a presence of 10 obstructive, 24 central, and 1 mixed apneas resulting in an apnea index of 5.5 events per hour. There were 17 obstructive hypopneas and 3 central hypopneas resulting in an obstructive hypopnea index of 2.7 and central hypopnea index of 0.5 events per hour. The combined apnea/hypopnea index was 8.6 events per hour " (central apnea/hypopnea index was 4.2 events per hour). The REM AHI was 15.1 events per hour. The supine AHI was 11.0 events per hour. The RERA index was 0.9 events per hour.  The RDI was 9.5 events per hour.    Snoring - was reported as moderate.    Respiratory rate and pattern - was notable for normal respiratory rate and pattern.    Sustained Sleep Associated Hypoventilation - Transcutaneous carbon dioxide monitoring was not used.    Sleep Associated Hypoxemia - (Greater than 5 minutes O2 sat at or below 88%) was not present. Baseline oxygen saturation was 92.8%. Lowest oxygen saturation was 86.0%. Time spent less than or equal to 88% was 0.5 minutes. Time spent less than or equal to 89% was 1.6 minutes.    Movement Activity: Mild increase in transient REM motor activity.     Periodic Limb Activity - There were 4 PLMs during the entire study. The PLM index was 0.6 movements per hour. The PLM Arousal Index was - per hour.    REM EMG Activity - Excessive transient muscle activity was present.    Nocturnal Behavior - Abnormal sleep related behaviors were not noted.    Bruxism - None apparent.    Cardiac Summary: Sinus  The average pulse rate was 53.8 bpm. The minimum pulse rate was 42.8 bpm while the maximum pulse rate was 87.7 bpm.      Assessment:     Mild sleep disordered breathing best characterized as mixed (combination of both obstructive and central phenomena).  Of note the patient did not have REM supine during the study.      Mild increase in transient REM motor activity.  Recommendations:    If this degree of sleep disordered breathing is considered clinically relevant recommend first addressing the patients obstructive component of mixed apneas as the improved sleep consolidation often also corrects the central component as well.    o Treatment options include:   - Dental Appliance  - Auto CPAP  - Upper Airway Surgery  o Decreasing opioids, if appropriate may also decrease central apneas.     Advice  regarding the risks of drowsy driving.    Suggest optimizing sleep schedule and avoiding sleep deprivation.    Increased REM motor tone can be seen in the setting of antidepressant medications. Recommend screening for dream enactment if present consider a diagnosis of RBD.     Diagnostic Code(s): G47.33, G47.9      Alvarado Moore MD 5-

## 2018-05-17 ENCOUNTER — ANESTHESIA EVENT (OUTPATIENT)
Dept: SURGERY | Facility: CLINIC | Age: 68
End: 2018-05-17
Payer: COMMERCIAL

## 2018-05-17 NOTE — ANESTHESIA PREPROCEDURE EVALUATION
Anesthesia Evaluation     . Pt has had prior anesthetic.     No history of anesthetic complications          ROS/MED HX    ENT/Pulmonary: Comment: Dysphonia, globus sensation      Neurologic: Comment: RLS    (+)neuropathy     Cardiovascular:  - neg cardiovascular ROS       METS/Exercise Tolerance:     Hematologic:         Musculoskeletal: Comment: SLE  (+) arthritis, , , -       GI/Hepatic:     (+) GERD Asymptomatic on medication,       Renal/Genitourinary:      (-) renal disease   Endo:         Psychiatric:     (+) psychiatric history depression      Infectious Disease:         Malignancy:         Other:    (+) H/O Chronic Pain,                   Physical Exam  Normal systems: cardiovascular and dental    Airway   Mallampati: I  TM distance: >3 FB  Neck ROM: full    Dental     Cardiovascular   Rhythm and rate: regular and normal      Pulmonary    breath sounds clear to auscultation                    Anesthesia Plan      History & Physical Review  History and physical reviewed and following examination; no interval change.    ASA Status:  2 .    NPO Status:  > 8 hours    Plan for General, LMA and Periph. Nerve Block for postop pain with Propofol induction. Maintenance will be TIVA.    PONV prophylaxis:  Ondansetron (or other 5HT-3) and Dexamethasone or Solumedrol       Postoperative Care  Postoperative pain management:  IV analgesics, Oral pain medications and Peripheral nerve block (Single Shot).      Consents  Anesthetic plan, risks, benefits and alternatives discussed with:  Patient..                          .

## 2018-05-18 ENCOUNTER — APPOINTMENT (OUTPATIENT)
Dept: GENERAL RADIOLOGY | Facility: CLINIC | Age: 68
End: 2018-05-18
Attending: ORTHOPAEDIC SURGERY
Payer: COMMERCIAL

## 2018-05-18 ENCOUNTER — HOSPITAL ENCOUNTER (OUTPATIENT)
Facility: CLINIC | Age: 68
Discharge: HOME | End: 2018-05-18
Attending: ORTHOPAEDIC SURGERY | Admitting: ORTHOPAEDIC SURGERY
Payer: COMMERCIAL

## 2018-05-18 ENCOUNTER — ANESTHESIA (OUTPATIENT)
Dept: SURGERY | Facility: CLINIC | Age: 68
End: 2018-05-18
Payer: COMMERCIAL

## 2018-05-18 ENCOUNTER — SURGERY (OUTPATIENT)
Age: 68
End: 2018-05-18

## 2018-05-18 VITALS
WEIGHT: 134.7 LBS | TEMPERATURE: 97 F | RESPIRATION RATE: 16 BRPM | SYSTOLIC BLOOD PRESSURE: 113 MMHG | HEIGHT: 64 IN | OXYGEN SATURATION: 93 % | BODY MASS INDEX: 23 KG/M2 | DIASTOLIC BLOOD PRESSURE: 76 MMHG

## 2018-05-18 DIAGNOSIS — G89.18 ACUTE POST-OPERATIVE PAIN: Primary | ICD-10-CM

## 2018-05-18 PROCEDURE — 25000125 ZZHC RX 250: Performed by: ORTHOPAEDIC SURGERY

## 2018-05-18 PROCEDURE — 37000008 ZZH ANESTHESIA TECHNICAL FEE, 1ST 30 MIN: Performed by: ORTHOPAEDIC SURGERY

## 2018-05-18 PROCEDURE — 71000027 ZZH RECOVERY PHASE 2 EACH 15 MINS: Performed by: ORTHOPAEDIC SURGERY

## 2018-05-18 PROCEDURE — 40000277 XR SURGERY CARM FLUORO LESS THAN 5 MIN W STILLS

## 2018-05-18 PROCEDURE — 36000058 ZZH SURGERY LEVEL 3 EA 15 ADDTL MIN: Performed by: ORTHOPAEDIC SURGERY

## 2018-05-18 PROCEDURE — 25000125 ZZHC RX 250: Performed by: NURSE ANESTHETIST, CERTIFIED REGISTERED

## 2018-05-18 PROCEDURE — 71000012 ZZH RECOVERY PHASE 1 LEVEL 1 FIRST HR: Performed by: ORTHOPAEDIC SURGERY

## 2018-05-18 PROCEDURE — 25000125 ZZHC RX 250: Performed by: PHYSICIAN ASSISTANT

## 2018-05-18 PROCEDURE — C1713 ANCHOR/SCREW BN/BN,TIS/BN: HCPCS | Performed by: ORTHOPAEDIC SURGERY

## 2018-05-18 PROCEDURE — 36000060 ZZH SURGERY LEVEL 3 W FLUORO 1ST 30 MIN: Performed by: ORTHOPAEDIC SURGERY

## 2018-05-18 PROCEDURE — 40000170 ZZH STATISTIC PRE-PROCEDURE ASSESSMENT II: Performed by: ORTHOPAEDIC SURGERY

## 2018-05-18 PROCEDURE — 25000128 H RX IP 250 OP 636: Performed by: NURSE ANESTHETIST, CERTIFIED REGISTERED

## 2018-05-18 PROCEDURE — 37000009 ZZH ANESTHESIA TECHNICAL FEE, EACH ADDTL 15 MIN: Performed by: ORTHOPAEDIC SURGERY

## 2018-05-18 PROCEDURE — 27210794 ZZH OR GENERAL SUPPLY STERILE: Performed by: ORTHOPAEDIC SURGERY

## 2018-05-18 PROCEDURE — 25000128 H RX IP 250 OP 636: Performed by: ANESTHESIOLOGY

## 2018-05-18 DEVICE — IMP STAPLE MMI EASYCLIP SI FOREFOOT 10X15X13MM EZB10-15-13: Type: IMPLANTABLE DEVICE | Site: THUMB | Status: FUNCTIONAL

## 2018-05-18 DEVICE — IMP WIRE KIRSCHNER 0.045X4" 78.2020: Type: IMPLANTABLE DEVICE | Site: THUMB | Status: FUNCTIONAL

## 2018-05-18 DEVICE — IMP WIRE KIRSCHNER 0.062X4" 78.2030: Type: IMPLANTABLE DEVICE | Site: THUMB | Status: FUNCTIONAL

## 2018-05-18 DEVICE — IMPLANTABLE DEVICE: Type: IMPLANTABLE DEVICE | Site: THUMB | Status: FUNCTIONAL

## 2018-05-18 RX ORDER — CLINDAMYCIN PHOSPHATE 900 MG/50ML
900 INJECTION, SOLUTION INTRAVENOUS SEE ADMIN INSTRUCTIONS
Status: DISCONTINUED | OUTPATIENT
Start: 2018-05-18 | End: 2018-05-18 | Stop reason: HOSPADM

## 2018-05-18 RX ORDER — MEPERIDINE HYDROCHLORIDE 25 MG/ML
12.5 INJECTION INTRAMUSCULAR; INTRAVENOUS; SUBCUTANEOUS
Status: DISCONTINUED | OUTPATIENT
Start: 2018-05-18 | End: 2018-05-18 | Stop reason: HOSPADM

## 2018-05-18 RX ORDER — NALOXONE HYDROCHLORIDE 0.4 MG/ML
.1-.4 INJECTION, SOLUTION INTRAMUSCULAR; INTRAVENOUS; SUBCUTANEOUS
Status: DISCONTINUED | OUTPATIENT
Start: 2018-05-18 | End: 2018-05-18 | Stop reason: HOSPADM

## 2018-05-18 RX ORDER — FENTANYL CITRATE 50 UG/ML
25-50 INJECTION, SOLUTION INTRAMUSCULAR; INTRAVENOUS EVERY 5 MIN PRN
Status: DISCONTINUED | OUTPATIENT
Start: 2018-05-18 | End: 2018-05-18 | Stop reason: HOSPADM

## 2018-05-18 RX ORDER — SODIUM CHLORIDE, SODIUM LACTATE, POTASSIUM CHLORIDE, CALCIUM CHLORIDE 600; 310; 30; 20 MG/100ML; MG/100ML; MG/100ML; MG/100ML
INJECTION, SOLUTION INTRAVENOUS CONTINUOUS
Status: DISCONTINUED | OUTPATIENT
Start: 2018-05-18 | End: 2018-05-18 | Stop reason: HOSPADM

## 2018-05-18 RX ORDER — FENTANYL CITRATE 50 UG/ML
INJECTION, SOLUTION INTRAMUSCULAR; INTRAVENOUS PRN
Status: DISCONTINUED | OUTPATIENT
Start: 2018-05-18 | End: 2018-05-18

## 2018-05-18 RX ORDER — PROPOFOL 10 MG/ML
INJECTION, EMULSION INTRAVENOUS CONTINUOUS PRN
Status: DISCONTINUED | OUTPATIENT
Start: 2018-05-18 | End: 2018-05-18

## 2018-05-18 RX ORDER — SODIUM CHLORIDE, SODIUM LACTATE, POTASSIUM CHLORIDE, CALCIUM CHLORIDE 600; 310; 30; 20 MG/100ML; MG/100ML; MG/100ML; MG/100ML
INJECTION, SOLUTION INTRAVENOUS CONTINUOUS PRN
Status: DISCONTINUED | OUTPATIENT
Start: 2018-05-18 | End: 2018-05-18

## 2018-05-18 RX ORDER — HYDROMORPHONE HYDROCHLORIDE 1 MG/ML
.3-.5 INJECTION, SOLUTION INTRAMUSCULAR; INTRAVENOUS; SUBCUTANEOUS EVERY 10 MIN PRN
Status: DISCONTINUED | OUTPATIENT
Start: 2018-05-18 | End: 2018-05-18 | Stop reason: HOSPADM

## 2018-05-18 RX ORDER — BUPIVACAINE HYDROCHLORIDE AND EPINEPHRINE 5; 5 MG/ML; UG/ML
INJECTION, SOLUTION PERINEURAL PRN
Status: DISCONTINUED | OUTPATIENT
Start: 2018-05-18 | End: 2018-05-18 | Stop reason: HOSPADM

## 2018-05-18 RX ORDER — DEXAMETHASONE SODIUM PHOSPHATE 4 MG/ML
INJECTION, SOLUTION INTRA-ARTICULAR; INTRALESIONAL; INTRAMUSCULAR; INTRAVENOUS; SOFT TISSUE PRN
Status: DISCONTINUED | OUTPATIENT
Start: 2018-05-18 | End: 2018-05-18

## 2018-05-18 RX ORDER — LIDOCAINE HYDROCHLORIDE 10 MG/ML
INJECTION, SOLUTION INFILTRATION; PERINEURAL
Status: DISCONTINUED
Start: 2018-05-18 | End: 2018-05-18 | Stop reason: HOSPADM

## 2018-05-18 RX ORDER — PROPOFOL 10 MG/ML
INJECTION, EMULSION INTRAVENOUS PRN
Status: DISCONTINUED | OUTPATIENT
Start: 2018-05-18 | End: 2018-05-18

## 2018-05-18 RX ORDER — ONDANSETRON 2 MG/ML
4 INJECTION INTRAMUSCULAR; INTRAVENOUS EVERY 30 MIN PRN
Status: DISCONTINUED | OUTPATIENT
Start: 2018-05-18 | End: 2018-05-18 | Stop reason: HOSPADM

## 2018-05-18 RX ORDER — OXYCODONE AND ACETAMINOPHEN 10; 325 MG/1; MG/1
1-2 TABLET ORAL EVERY 4 HOURS PRN
Qty: 60 TABLET | Refills: 0 | Status: SHIPPED | OUTPATIENT
Start: 2018-05-18 | End: 2018-06-12 | Stop reason: DRUGHIGH

## 2018-05-18 RX ORDER — ONDANSETRON 2 MG/ML
INJECTION INTRAMUSCULAR; INTRAVENOUS PRN
Status: DISCONTINUED | OUTPATIENT
Start: 2018-05-18 | End: 2018-05-18

## 2018-05-18 RX ORDER — EPINEPHRINE 1 MG/ML
INJECTION, SOLUTION INTRAMUSCULAR; SUBCUTANEOUS
Status: DISCONTINUED
Start: 2018-05-18 | End: 2018-05-18 | Stop reason: HOSPADM

## 2018-05-18 RX ORDER — ONDANSETRON 4 MG/1
4 TABLET, ORALLY DISINTEGRATING ORAL EVERY 30 MIN PRN
Status: DISCONTINUED | OUTPATIENT
Start: 2018-05-18 | End: 2018-05-18 | Stop reason: HOSPADM

## 2018-05-18 RX ORDER — CLINDAMYCIN PHOSPHATE 900 MG/50ML
900 INJECTION, SOLUTION INTRAVENOUS
Status: DISCONTINUED | OUTPATIENT
Start: 2018-05-18 | End: 2018-05-18 | Stop reason: HOSPADM

## 2018-05-18 RX ORDER — BUPIVACAINE HYDROCHLORIDE 5 MG/ML
INJECTION, SOLUTION EPIDURAL; INTRACAUDAL
Status: DISCONTINUED
Start: 2018-05-18 | End: 2018-05-18 | Stop reason: HOSPADM

## 2018-05-18 RX ORDER — FENTANYL CITRATE 50 UG/ML
25-50 INJECTION, SOLUTION INTRAMUSCULAR; INTRAVENOUS
Status: DISCONTINUED | OUTPATIENT
Start: 2018-05-18 | End: 2018-05-18 | Stop reason: HOSPADM

## 2018-05-18 RX ADMIN — MIDAZOLAM 2 MG: 1 INJECTION INTRAMUSCULAR; INTRAVENOUS at 06:45

## 2018-05-18 RX ADMIN — BUPIVACAINE HYDROCHLORIDE AND EPINEPHRINE BITARTRATE 8 ML: 5; .005 INJECTION, SOLUTION PERINEURAL at 09:16

## 2018-05-18 RX ADMIN — FENTANYL CITRATE 25 MCG: 50 INJECTION, SOLUTION INTRAMUSCULAR; INTRAVENOUS at 08:54

## 2018-05-18 RX ADMIN — FENTANYL CITRATE 50 MCG: 50 INJECTION, SOLUTION INTRAMUSCULAR; INTRAVENOUS at 07:35

## 2018-05-18 RX ADMIN — PROPOFOL 100 MCG/KG/MIN: 10 INJECTION, EMULSION INTRAVENOUS at 07:35

## 2018-05-18 RX ADMIN — ONDANSETRON 4 MG: 2 INJECTION INTRAMUSCULAR; INTRAVENOUS at 09:10

## 2018-05-18 RX ADMIN — DEXAMETHASONE SODIUM PHOSPHATE 4 MG: 4 INJECTION, SOLUTION INTRA-ARTICULAR; INTRALESIONAL; INTRAMUSCULAR; INTRAVENOUS; SOFT TISSUE at 09:06

## 2018-05-18 RX ADMIN — CLINDAMYCIN PHOSPHATE 900 MG: 18 INJECTION, SOLUTION INTRAVENOUS at 07:15

## 2018-05-18 RX ADMIN — ROPIVACAINE HYDROCHLORIDE 30 ML: 5 INJECTION, SOLUTION EPIDURAL; INFILTRATION; PERINEURAL at 06:57

## 2018-05-18 RX ADMIN — PROPOFOL 175 MG: 10 INJECTION, EMULSION INTRAVENOUS at 07:35

## 2018-05-18 RX ADMIN — FENTANYL CITRATE 25 MCG: 50 INJECTION, SOLUTION INTRAMUSCULAR; INTRAVENOUS at 09:24

## 2018-05-18 RX ADMIN — SODIUM CHLORIDE, POTASSIUM CHLORIDE, SODIUM LACTATE AND CALCIUM CHLORIDE: 600; 310; 30; 20 INJECTION, SOLUTION INTRAVENOUS at 06:50

## 2018-05-18 NOTE — IP AVS SNAPSHOT
[facility]    [address]    [place] [address]    Phone:  [phone]    Fax:  [phone]                                       After Visit Summary   5/18/2018    Yessenia Martines    MRN: [id number]           After Visit Summary Signature Page     I have received my discharge instructions, and my questions have been answered. I have discussed any challenges I see with this plan with the nurse or doctor.    ..........................................................................................................................................  Patient/Patient Representative Signature      ..........................................................................................................................................  Patient Representative Print Name and Relationship to Patient    ..................................................               ................................................  Date                                            Time    ..........................................................................................................................................  Reviewed by Signature/Title    ...................................................              ..............................................  Date                                                            Time

## 2018-05-18 NOTE — ANESTHESIA CARE TRANSFER NOTE
Patient: Yessenia Martines    Procedure(s):  LEFT THUMB CARPALMETACARPAL WITH MINI TIGHTROPE, LEFT METAPHALANGEAL FUSION WITH RADIAL BONE GRAFT AND EASY CLIP STAPLE. - Wound Class: I-Clean   - Wound Class: I-Clean      Diagnosis: SCATHEL METACARPAL IMPINGEMENT ; METAPHALANGEAL PINCH WITH COLAPSE   Diagnosis Additional Information: No value filed.    Anesthesia Type:   General, LMA, Periph. Nerve Block for postop pain     Note:  Airway :Face Mask  Patient transferred to:PACU  Comments: 938:  To par responsive, dentition unchanged from pre-op.Handoff Report: Identifed the Patient, Identified the Reponsible Provider, Reviewed the pertinent medical history, Discussed the surgical course, Reviewed Intra-OP anesthesia mangement and issues during anesthesia, Set expectations for post-procedure period and Allowed opportunity for questions and acknowledgement of understanding      Vitals: (Last set prior to Anesthesia Care Transfer)    CRNA VITALS  5/18/2018 0907 - 5/18/2018 0937      5/18/2018             Resp Rate (set): 10                Electronically Signed By: JAKY Gabriel CRNA  May 18, 2018  9:37 AM

## 2018-05-18 NOTE — DISCHARGE INSTRUCTIONS
Same Day Surgery Discharge Instructions for  Sedation and General Anesthesia       It's not unusual to feel dizzy, light-headed or faint for up to 24 hours after surgery or while taking pain medication.  If you have these symptoms: sit for a few minutes before standing and have someone assist you when you get up to walk or use the bathroom.      You should rest and relax for the next 24 hours. We recommend you make arrangements to have an adult stay with you for at least 24 hours after your discharge.  Avoid hazardous and strenuous activity.      DO NOT DRIVE any vehicle or operate mechanical equipment for 24 hours following the end of your surgery.  Even though you may feel normal, your reactions may be affected by the medication you have received.      Do not drink alcoholic beverages for 24 hours following surgery.       Slowly progress to your regular diet as you feel able. It's not unusual to feel nauseated and/or vomit after receiving anesthesia.  If you develop these symptoms, drink clear liquids (apple juice, ginger ale, broth, 7-up, etc. ) until you feel better.  If your nausea and vomiting persists for 24 hours, please notify your surgeon.        All narcotic pain medications, along with inactivity and anesthesia, can cause constipation. Drinking plenty of liquids and increasing fiber intake will help.      For any questions of a medical nature, call your surgeon.      Do not make important decisions for 24 hours.      If you had general anesthesia, you may have a sore throat for a couple of days related to the breathing tube used during surgery.  You may use Cepacol lozenges to help with this discomfort.  If it worsens or if you develop a fever, contact your surgeon.       If you feel your pain is not well managed with the pain medications prescribed by your surgeon, please contact your surgeon's office to let them know so they can address your concerns.             [facility]  DISCHARGE  "INSTRUCTIONS FOLLOWING   REGIONAL BLOCK ANESTHESIA      Numbness or lack of feeling in the arm that was operated may last up to 24 hours.  The average time is usually 10-15 hours.  You may not be able to lift or move the arm where the operation was by itself during that time.  Long-acting local anesthetic medicines were used to give you long-lasting pain relief.    Wear a sling until your arm is completely \"awake\"    Avoid bumping your arm while it is numb    Avoid extremes of hot or cold while it is numb    Remain quiet and restful the day of surgery.  Resume normal activities gradually over the next day or so as advise by your surgeon.    Do not drive or operate  Any machinery until your extremity is full  \"awake\"        You will have a tingling and prickly sensation in your arm as the feeling begins to return; you can also expect some discomfort. The amount of discomfort is unpredictable, but if you have more pain that can be controlled with the pain medication you received, you should contact your surgeon.  Start to take your pain pills as soon as you start to feel any discomfort or pain.  We strongly recommend starting your pain medication at bedtime if you haven't already done so.  This is in the event that the block wears off while you are asleep.                          **If you have questions or concerns about your procedure,  call Dr. Carlton at [phone]**      "

## 2018-05-18 NOTE — OR NURSING
PNDS met, po per I&O sheet. Pt Yessenia Martines dressed, up in recliner and transported to Phase 2.

## 2018-05-18 NOTE — OR NURSING
Dc instructions reviewed.  Questions answered.  Script and DC papers given to spouse.  Spouse paid with cash for the DC scripts and $2 was placed in the envelope and sealed.

## 2018-05-18 NOTE — ANESTHESIA POSTPROCEDURE EVALUATION
Patient: Yessenia Martines    Procedure(s):  LEFT THUMB CARPALMETACARPAL WITH MINI TIGHTROPE, LEFT METAPHALANGEAL FUSION WITH RADIAL BONE GRAFT AND EASY CLIP STAPLE. - Wound Class: I-Clean   - Wound Class: I-Clean      Diagnosis:SCATHEL METACARPAL IMPINGEMENT ; METAPHALANGEAL PINCH WITH COLAPSE   Diagnosis Additional Information: No value filed.    Anesthesia Type:  General, LMA, Periph. Nerve Block for postop pain    Note:  Anesthesia Post Evaluation    Patient location during evaluation: PACU  Patient participation: Able to fully participate in evaluation  Level of consciousness: awake  Pain management: adequate  Airway patency: patent  Cardiovascular status: acceptable  Respiratory status: acceptable  Hydration status: acceptable  PONV: none     Anesthetic complications: None          Last vitals:  Vitals:    05/18/18 1000 05/18/18 1015 05/18/18 1030   BP: 121/73 110/73 124/80   Resp: 12 12 12   Temp:   36.1  C (97  F)   SpO2: 96% 93% 92%         Electronically Signed By: Deo Aiken MD  May 18, 2018  10:53 AM

## 2018-05-18 NOTE — ANESTHESIA PROCEDURE NOTES
Procedures  Left axillarty block for pain relief at surgeon's request;  After Versed 2mg IV for sedation and with pulse oximeter in place; 22GA; 30cc 1/2% Ropivicaine; 1;200,000 EPI, ultrasound guidance, no paresthesias

## 2018-05-18 NOTE — BRIEF OP NOTE
[facility] Brief Operative Note    Pre-operative diagnosis: SCAPHOID METACARPAL IMPINGEMENT ; METAPHALANGEAL JOINT INSTABILITY   Post-operative diagnosis SAME   Procedure: Procedure(s):  LEFT THUMB CARPALMETACARPAL WITH MINI TIGHTROPE, LEFT METAPHALANGEAL FUSION WITH RADIAL BONE GRAFT AND EASY CLIP STAPLE. - Wound Class: I-Clean   - Wound Class: I-Clean     Surgeon(s): Surgeon(s) and Role:     * Shahida Carlton MD - Primary     * Denisa Palomo PA-C - Assisting   Estimated blood loss: * No values recorded between 5/18/2018  7:50 AM and 5/18/2018  9:23 AM *    Specimens: * No specimens in log *   Findings: AS ABOVE

## 2018-05-18 NOTE — IP AVS SNAPSHOT
MRN:[id number]                      After Visit Summary   5/18/2018    Yessenia Martines    MRN: [id number]           Thank you!     Thank you for choosing [facility] for your care. Our goal is always to provide you with excellent care. Hearing back from our patients is one way we can continue to improve our services. Please take a few minutes to complete the written survey that you may receive in the mail after you visit with us. Thank you!        Patient Information     Date Of Birth          1950        About your hospital stay     You were admitted on:  May 18, 2018 You last received care in the:  [facility]    You were discharged on:  May 18, 2018       Who to Call     For medical emergencies, please call 911.  For non-urgent questions about your medical care, please call your primary care provider or clinic, [phone]  For questions related to your surgery, please call your surgery clinic        Attending Provider     Provider Shahida Adams MD Orthopedics       Primary Care Provider Office Phone # Fax #    Josue Mejia [phone]      After Care Instructions     Discharge Instructions       Post-Operative Instructions (Regional Block)  [facility] [place] [facility]  Cathy Carlton M.D.    PAIN  Prior to surgery, you received a nerve block to keep you comfortable during your procedure and to reduce your initial post-operative pain. Typically, the nerve block lasts for 12 to 18 hours. The nerve block will feel as though it s wearing off gradually. However, the pain can come on very suddenly and intensely. Be prepared for this by regularly taking the pain medication that was prescribed for you once you start to feel the sensation return to your arm.  Keep your arm elevated above your heart (above your head is even better) for the first 24 to 48 hours following surgery to reduce the swelling and pain. If  your hand swells and is very painful, you can remove the Ace bandage, cut the gauze along the little finger to loosen the bandage, and then rewrap the Ace bandage.  You might notice an increase in the swelling of your fingers on your third post-operative day. This is normal. The swelling and bruising seep out from under the surgical dressing, becoming trapped in your fingers. Adjust the Ace wrap as described above. Elevate your arm and move your fingers, and the swelling and bruising will resolve.    BANDAGE  Leave your bandage on and keep it clean and dry until you are seen in the clinic for a follow-up visit. If you take a shower, cover it with plastic wrap or a plastic bag.    SLING  If you receive a sling for your arm, you can wear it until you get home. After that, you should avoid using the sling, otherwise, you can develop neck and shoulder stiffness and discomfort.    ACTIVITIES  After surgery, begin moving your fingers gently. You may use your hand for light activities and driving while wearing the bandage. You may also return to work for light duty. Do not do any heavy lifting, pushing, or pulling with your hand.     FOLLOW-UP  You will need to come back for a checkup 10 to 14 days after your surgery. Call [phone] as soon as possible to make your appointment to see CARLA Moise. You can be seen at [facility] ([facility]. [address] [place] [address], 2nd floor) on Tuesday or Thursday. Denisa Palomo, my physician assistant, will examine your incision and take out your stitches.    Call Denisa Palomo at [phone] between 8:30 a.m. and 5:00 p.m. Monday through Friday if you have:  A fever (101 F or higher)  Redness, swelling, or draining at the surgical site  Nausea, vomiting, or a rash from your medicine(s)  Any questions, problems, or concerns    For emergencies after 5:00 p.m. and on weekends, call the on-call physician at [phone].Review outpatient procedure discharge instructions with  patient as directed by Provider                  Your next 10 appointments already scheduled     Jun 12, 2018  8:40 AM CDT   PHYSICAL with Nicola Ramirez MD   [facility] [place] ([facility])    [address]  [place] [address]   [phone]            Jun 12, 2018  2:00 PM CDT   New Sleep Patient with Alvarado Cervantes PsyD   [facility] ([facility])    [address]  [place] [address] [phone]            Jun 13, 2018  9:00 AM CDT   Return Sleep Patient with Alvarado Moore MD   [facility] ([facility])    [address]  [place] [address] [phone]              Further instructions from your care team         Same Day Surgery Discharge Instructions for  Sedation and General Anesthesia       It's not unusual to feel dizzy, light-headed or faint for up to 24 hours after surgery or while taking pain medication.  If you have these symptoms: sit for a few minutes before standing and have someone assist you when you get up to walk or use the bathroom.      You should rest and relax for the next 24 hours. We recommend you make arrangements to have an adult stay with you for at least 24 hours after your discharge.  Avoid hazardous and strenuous activity.      DO NOT DRIVE any vehicle or operate mechanical equipment for 24 hours following the end of your surgery.  Even though you may feel normal, your reactions may be affected by the medication you have received.      Do not drink alcoholic beverages for 24 hours following surgery.       Slowly progress to your regular diet as you feel able. It's not unusual to feel nauseated and/or vomit after receiving anesthesia.  If you develop these symptoms, drink clear liquids (apple juice, ginger ale, broth, 7-up, etc. ) until you feel better.  If your nausea and vomiting persists  "for 24 hours, please notify your surgeon.        All narcotic pain medications, along with inactivity and anesthesia, can cause constipation. Drinking plenty of liquids and increasing fiber intake will help.      For any questions of a medical nature, call your surgeon.      Do not make important decisions for 24 hours.      If you had general anesthesia, you may have a sore throat for a couple of days related to the breathing tube used during surgery.  You may use Cepacol lozenges to help with this discomfort.  If it worsens or if you develop a fever, contact your surgeon.       If you feel your pain is not well managed with the pain medications prescribed by your surgeon, please contact your surgeon's office to let them know so they can address your concerns.             [facility]  DISCHARGE INSTRUCTIONS FOLLOWING   REGIONAL BLOCK ANESTHESIA      Numbness or lack of feeling in the arm that was operated may last up to 24 hours.  The average time is usually 10-15 hours.  You may not be able to lift or move the arm where the operation was by itself during that time.  Long-acting local anesthetic medicines were used to give you long-lasting pain relief.    Wear a sling until your arm is completely \"awake\"    Avoid bumping your arm while it is numb    Avoid extremes of hot or cold while it is numb    Remain quiet and restful the day of surgery.  Resume normal activities gradually over the next day or so as advise by your surgeon.    Do not drive or operate  Any machinery until your extremity is full  \"awake\"        You will have a tingling and prickly sensation in your arm as the feeling begins to return; you can also expect some discomfort. The amount of discomfort is unpredictable, but if you have more pain that can be controlled with the pain medication you received, you should contact your surgeon.  Start to take your pain pills as soon as you start to feel any discomfort or pain.  We strongly " "recommend starting your pain medication at bedtime if you haven't already done so.  This is in the event that the block wears off while you are asleep.                          **If you have questions or concerns about your procedure,  call Dr. Carlton at [phone]**        Pending Results     No orders found from 5/16/2018 to 5/19/2018.            Admission Information     Date & Time Provider Department Dept. Phone    5/18/2018 Shahida Carlton MD [facility] Same Day Surgery [phone]      Your Vitals Were     Blood Pressure Temperature Respirations Height Weight Pulse Oximetry    124/80 97  F (36.1  C) (Temporal) 12 1.626 m (5' 4\") 61.1 kg (134 lb 11.2 oz) 92%    BMI (Body Mass Index)                   23.12 kg/m2           MyChart Information     [facility] gives you secure access to your electronic health record. If you see a primary care provider, you can also send messages to your care team and make appointments. If you have questions, please call your primary care clinic.  If you do not have a primary care provider, please call [phone] and they will assist you.        Care EveryWhere ID     This is your Care EveryWhere ID. This could be used by other organizations to access your [place] medical records  [id number]        Equal Access to Services     LAI TATE AH: Hadii luis urbano Sotu, waaxda luqadaha, qaybta kaalmada adeegyada, dipak knox. So [facility] [phone].    ATENCIÓN: Si habla español, tiene a armendariz disposición servicios gratuitos de asistencia lingüística. Llame al [phone].    We comply with applicable federal civil rights laws and [place] laws. We do not discriminate on the basis of race, color, national origin, age, disability, sex, sexual orientation, or gender identity.               Review of your medicines      START taking        Dose / Directions    oxyCODONE-acetaminophen  MG per tablet   Commonly known as:  " PERCOCET   Used for:  Acute post-operative pain        Dose:  1-2 tablet   Take 1-2 tablets by mouth every 4 hours as needed for pain (moderate to severe)   Quantity:  60 tablet   Refills:  0         CONTINUE these medicines which have NOT CHANGED        Dose / Directions    calcium 600/vitamin D 600-400 MG-UNIT per tablet   Generic drug:  calcium-vitamin D        Dose:  1 tablet   Take 1 tablet by mouth 2 times daily   Refills:  0       CELLCEPT 500 MG tablet        500 mg in AM then 250 mg in PM   Refills:  0       CYMBALTA PO        Dose:  60 mg   Take 60 mg by mouth 2 times daily   Refills:  0       estradiol 1 MG tablet   Commonly known as:  ESTRACE   Used for:  Post-menopause on HRT (hormone replacement therapy)        Dose:  1 mg   Take 1 tablet (1 mg) by mouth daily   Quantity:  30 tablet   Refills:  1       gabapentin 800 MG tablet   Commonly known as:  NEURONTIN        2 times daily   Refills:  1       leucovorin 5 MG tablet   Commonly known as:  WELLCOVORIN        Dose:  5 mg   Take 1 tablet (5 mg) by mouth daily   Refills:  0       meloxicam 15 MG tablet   Commonly known as:  MOBIC        Dose:  7.5 mg   Take 0.5 tablets (7.5 mg) by mouth daily   Refills:  0       methotrexate 2.5 MG tablet CHEMO        Dose:  4 tablet   Take 4 tablets by mouth once a week   Refills:  0       NORTRIPYTLINE HCL PO        Dose:  40 mg   Take 40 mg by mouth daily   Refills:  0       omeprazole 40 MG capsule   Commonly known as:  priLOSEC   Used for:  Primary insomnia        TAKE 1 CAPSULE DAILY   Quantity:  90 capsule   Refills:  3       predniSONE 5 MG tablet   Commonly known as:  DELTASONE        Taking 5 mg and 1 mg tablet x2 to get a total 7 mg daily   Refills:  0       ranitidine HCl 300 MG Caps        Dose:  300 mg   Take 300 mg by mouth every evening   Refills:  0       traZODone 100 MG tablet   Commonly known as:  DESYREL   Used for:  Gastroesophageal reflux disease without esophagitis        TAKE 2 TABLETS AT  BEDTIME   Quantity:  180 tablet   Refills:  3       Vitamin D3 2000 units Tabs        Dose:  2000 Int'l Units   Take 2,000 Int'l Units by mouth daily   Refills:  0       VOLTAREN 1 % Gel topical gel   Generic drug:  diclofenac        Dose:  2 g   Place 2 g onto the skin 4 times daily   Refills:  0            Where to get your medicines      Some of these will need a paper prescription and others can be bought over the counter. Ask your nurse if you have questions.     Bring a paper prescription for each of these medications     oxyCODONE-acetaminophen  MG per tablet                Protect others around you: Learn how to safely use, store and throw away your medicines at www.disposemymeds.org.        Information about OPIOIDS     PRESCRIPTION OPIOIDS: WHAT YOU NEED TO KNOW   You have a prescription for an opioid (narcotic) pain medicine. Opioids can cause addiction. If you have a history of chemical dependency of any type, you are at a higher risk of becoming addicted to opioids. Only take this medicine after all other options have been tried. Take it for as short a time and as few doses as possible.     Do not:    Drive. If you drive while taking these medicines, you could be arrested for driving under the influence (DUI).    Operate heavy machinery    Do any other dangerous activities while taking these medicines.     Drink any alcohol while taking these medicines.      Take with any other medicines that contain acetaminophen. Read all labels carefully. Look for the word  acetaminophen  or  Tylenol.  Ask your pharmacist if you have questions or are unsure.    Store your pills in a secure place, locked if possible. We will not replace any lost or stolen medicine. If you don t finish your medicine, please throw away (dispose) as directed by your pharmacist. The [place] [employer]ution [employer] has more information about safe disposal:  https://www.pca.[facility].[place].us/living-green/managing-unwanted-medications    All opioids tend to cause constipation. Drink plenty of water and eat foods that have a lot of fiber, such as fruits, vegetables, prune juice, apple juice and high-fiber cereal. Take a laxative (Miralax, milk of magnesia, Colace, Senna) if you don t move your bowels at least every other day.              Medication List: This is a list of all your medications and when to take them. Check marks below indicate your daily home schedule. Keep this list as a reference.      Medications           Morning Afternoon Evening Bedtime As Needed    calcium 600/vitamin D 600-400 MG-UNIT per tablet   Take 1 tablet by mouth 2 times daily   Generic drug:  calcium-vitamin D                                CELLCEPT 500 MG tablet   500 mg in AM then 250 mg in PM                                CYMBALTA PO   Take 60 mg by mouth 2 times daily                                estradiol 1 MG tablet   Commonly known as:  ESTRACE   Take 1 tablet (1 mg) by mouth daily                                gabapentin 800 MG tablet   Commonly known as:  NEURONTIN   2 times daily                                leucovorin 5 MG tablet   Commonly known as:  WELLCOVORIN   Take 1 tablet (5 mg) by mouth daily                                meloxicam 15 MG tablet   Commonly known as:  MOBIC   Take 0.5 tablets (7.5 mg) by mouth daily                                methotrexate 2.5 MG tablet CHEMO   Take 4 tablets by mouth once a week                                NORTRIPYTLINE HCL PO   Take 40 mg by mouth daily                                omeprazole 40 MG capsule   Commonly known as:  priLOSEC   TAKE 1 CAPSULE DAILY                                oxyCODONE-acetaminophen  MG per tablet   Commonly known as:  PERCOCET   Take 1-2 tablets by mouth every 4 hours as needed for pain (moderate to severe)                                predniSONE 5 MG tablet   Commonly known as:  DELTASONE    Taking 5 mg and 1 mg tablet x2 to get a total 7 mg daily                                ranitidine HCl 300 MG Caps   Take 300 mg by mouth every evening                                traZODone 100 MG tablet   Commonly known as:  DESYREL   TAKE 2 TABLETS AT BEDTIME                                Vitamin D3 2000 units Tabs   Take 2,000 Int'l Units by mouth daily                                VOLTAREN 1 % Gel topical gel   Place 2 g onto the skin 4 times daily   Generic drug:  diclofenac

## 2018-05-18 NOTE — OP NOTE
SURGEON: YANCY ANTONY M.D.   ASSISTANT: DOM REED PA-C.     PREOPERATIVE DIAGNOSIS   Left MP instability. ScaphometacarpaL IMPINGEMENT    POSTOPERATIVE DIAGNOSIS   same    OPERATION   Left thumb MP fusion with autologous bone graft, (61370).   Left thumb CMC suspensionplasty with mini tightrope.     ANESTHESIA   Regional./ General    TOURNIQUET TIME   72 minutes.     INFORMED CONSENT  Obtained and signed prior to entering the operating room.    SURGICAL SITE SIGNED  Performed and confirmed prior to incision.    TIME OUT  Performed prior to incision in the operating room.    PROPHYLACTIC ANTIBIOTICS  Ancef given prior to inflation of the tourniquet.    DVT INDICATIONS  SCD's were not applied.    PROCEDURE AND FINDINGS   The patient was then brought to the operating room and the arm was prepped and draped in a normal standard manner. A time-out was performed confirming the surgical site and then antibiotics had been given before the tourniquet was inflated.  The arm was exsanguinated and the tourniquet was inflated to 150 mm above systolic pressure.    A longitudinal incision was made over the MP joint of the thumb.  This was carried down through the subcutaneous tissue.  The extensor mechanism was divided between the EPL and the EPB and the capsule was divided longitudinally. A #15 blade was used to elevate the soft tissue including the collateral ligaments off the bone.  Two spring retractors were placed at the joint.  A rongeur was used to remove the articular cartilage off the metacarpal head down to good bleeding subchondral bone.  Next, a 0.045 wire was used to perforate the articular cartilage of the proximal phalanx and then a rongeur and curette were used to remove the articular cartilage and subchondral bone.  K-wire was retrograded through the proximal phalanx and another one through the metacarpal head.  The MP joint was held in a neutral position and the K wires were advanced across the joint  to hold it.  A mini C-arm was used to confirm the position of the fusion.  Bone graft, which was harvested, was placed into the joint and then attention was turned to the radial side.  With the two wires across the joint stabilizing it, the soft tissues were brought volarly as far as possible.  I drilled for the first staple tong in the proximal phalanx on the radial side.  A peg was placed there and then I chose either a size 10 or 12 width to bridge the joint adequately and drill the second site.  The Nitinol EasyClip staple was placed and tapped all the way down.  X-rays were taken and confirmed the position of the staple as well as the position of the finger.  I then went to the other side of the table, retracted the soft tissue, and drilled for the second EasyClip staple.  A peg was placed there and a size #10 or #12 was chosen making sure that I bridged the joint as well as the other staple on the ulnar side.  The staple was placed and tapped all the way down.  The capsule was closed with 3-0 undyed Vicryl.  The extensor mechanism was closed with 3-0 undyed Vicryl. Marcaine with epinephrine was injected, and the tourniquet was let down at the end of the case and all bleeding was controlled. The skin was closed with 4-0 Monocryl.  Sterile dressing was applied and the patient was transferred to the recovery room in stable condition.            CMC  Mini tightrope  For the tightrope, a K wire from the mini tightrope set was drilled across the base of the thumb metacarpal and angled to cross the proximal shaft of the index metacarpal. This was done with the assistance of x-ray to insure correct positioning of the wire. Then a 1-cm incision was made on the dorsal aspect of the hand where the K wire was palpated under the skin. The fascia was spread with a tenotomy and the index extensor tendon was retracted while the K wire was advanced until the graduated portion of the wire was visualized on the ulnar aspect of  the index metacarpal. A 1-cm ellipse of skin was incised around the K wire at the base of the thumb metacarpal and the fascial and muscle layers were spread with a tenotomy. One end of the fiber wire was threaded through the loop on the K wire and pulled through the thumb and index metacarpals until the metal button on the fiber wire was just outside the incision on the thumb. The thenar muscle was retracted and the fiber wire was pulled to secure the button at the base of the thumb metacarpal. The positioning of the button was confirmed on x-ray to insure that no soft tissue was trapped between the button and the bone. Then a second metal button was threaded onto the two ends of the fiber wire protruding from the index metacarpal. The soft tissues were retracted and the button was snugged up against the index metacarpal. Again, the positioning of the button was confirmed on x-ray. Longitudinal traction was applied to the thumb to bring the base of the thumb metacarpal in line with the base of the index metacarpal. This was confirmed on x-ray. While maintaining traction on the thumb, four surgeon's knots were thrown to secure the button against the index meatcarpal. Axial loading of the thumb verified that it was stable and there was no proximal migration of the metacarpal. The thumb was brought through full ROM to ensure the fiber wire was not too tight. The fascial layer over the fiber wire knot was closed with a 3-0 undyed Vicryl. Marcaine with epi injected was into both incisions. The tourniquet was let down. All bleeders were cauterized, and the skin was closed with a 4-0 Monocryl. A sterile dressing was applied and the patient was transferred to recovery in stable condition.    Cc: my office

## 2018-05-29 ENCOUNTER — TRANSFERRED RECORDS (OUTPATIENT)
Dept: HEALTH INFORMATION MANAGEMENT | Facility: CLINIC | Age: 68
End: 2018-05-29

## 2018-05-29 DIAGNOSIS — Z79.890 POST-MENOPAUSE ON HRT (HORMONE REPLACEMENT THERAPY): ICD-10-CM

## 2018-05-29 RX ORDER — ESTRADIOL 1 MG/1
TABLET ORAL
Qty: 30 TABLET | Refills: 0 | OUTPATIENT
Start: 2018-05-29

## 2018-05-29 NOTE — TELEPHONE ENCOUNTER
"Requested Prescriptions   Pending Prescriptions Disp Refills     estradiol (ESTRACE) 1 MG tablet [Pharmacy Med Name: ESTRADIOL 1MG TABLETS] 30 tablet 0     Sig: TAKE 1 TABLET(1 MG) BY MOUTH DAILY   Last Written Prescription Date:  4/24/18  Last Fill Quantity: 30,  # refills: 1   Last office visit: 6/6/2017 with prescribing provider:  Dr. Ramirez   Future Office Visit:   Next 5 appointments (look out 90 days)     Jun 12, 2018  8:40 AM CDT   PHYSICAL with Nicola Ramirez MD   [facility]    [address] [place] [address]   [phone]                   Hormone Replacement Therapy Passed    5/29/2018 10:39 AM       Passed - Blood pressure under 140/90 in past 12 months    BP Readings from Last 3 Encounters:   05/18/18 113/76   03/28/18 (!) 143/99   01/24/18 94/60                Passed - Recent (12 mo) or future (30 days) visit within the authorizing provider's specialty    Patient had office visit in the last 12 months or has a visit in the next 30 days with authorizing provider or within the authorizing provider's specialty.  See \"Patient Info\" tab in inbasket, or \"Choose Columns\" in Meds & Orders section of the refill encounter.           Passed - Patient has mammogram in past 2 years on file if age 50-75       Passed - Patient is 18 years of age or older       Passed - No active pregnancy on record       Passed - No positive pregnancy test on record in past 12 months      Pt has appointment 6/6/18. Refill denied.   "

## 2018-06-11 NOTE — PROGRESS NOTES
[address] [address]  [phone]  Dept: [phone]    PRE-OP EVALUATION:  Today's date: 2018    Yessenia Martines (: 1950) presents for pre-operative evaluation assessment as requested by Dr. Viraj Mcneil.  She requires evaluation and anesthesia risk assessment prior to undergoing surgery/procedure for treatment of lumbar stenosis with neurogenic claudication .    Proposed Surgery/Procedure:  Decompression Foramioitomy L4 L5   Date of Surgery/ Procedure: 2018  Time of Surgery/ Procedure:   Hospital/Surgical Facility: [facility]   Fax number for surgical facility: [phone]  Primary Physician: Josue Mejia  Type of Anesthesia Anticipated: General    Patient has a Health Care Directive or Living Will:  YES scnned into Epic     1. NO - Do you have a history of heart attack, stroke, stent, bypass or surgery on an artery in the head, neck, heart or legs?  2. NO - Do you ever have any pain or discomfort in your chest?  3. NO - Do you have a history of  Heart Failure?  4. NO - Are you troubled by shortness of breath when: walking on the level, up a slight hill or at night?  5. NO - Do you currently have a cold, bronchitis or other respiratory infection?  6. NO - Do you have a cough, shortness of breath or wheezing?  7. NO - Do you sometimes get pains in the calves of your legs when you walk?  8. NO - Do you or anyone in your family have previous history of blood clots?  9. NO - Do you or does anyone in your family have a serious bleeding problem such as prolonged bleeding following surgeries or cuts?  10. YES - HAVE YOU EVER HAD PROBLEMS WITH ANEMIA OR BEEN TOLD TO TAKE IRON PILLS?  Anemic when she was a teenager and also is currently on iron supplement  11. NO - Have you had any abnormal blood loss such as black, tarry or bloody stools, or abnormal vaginal bleeding?  12. YES - HAVE YOU EVER HAD A BLOOD TRANSFUSION? With childbirth    13. NO - Have you or any of your relatives ever had problems with anesthesia?  14. YES - DO YOU HAVE SLEEP APNEA, EXCESSIVE SNORING OR DAYTIME DROWSINESS? Just had sleep study. Unsure of results yet.   15. NO - Do you have any prosthetic heart valves?  16. YES - DO YOU HAVE PROSTHETIC JOINTS? Left CMC. (last surgery 3 weeks ago)   17. NO - Is there any chance that you may be pregnant?      HPI:     HPI related to upcoming procedure: Had surgery 3 weeks ago for left CMC joint replacement revision. Surgery went well.     Now going for back surgery next week. Had fusion 2 years ago. Now has LBP with L radiculopathy.       See problem list for active medical problems.  Problems all longstanding and stable, except as noted/documented.  See ROS for pertinent symptoms related to these conditions.                                                                                                                                                          .    MEDICAL HISTORY:     Patient Active Problem List    Diagnosis Date Noted     History of Clostridium difficile colitis 01/15/2018     Priority: Medium     Microscopic hematuria 12/28/2017     Priority: Medium     Advance care planning 01/19/2017     Priority: Medium     Advance Care Planning 1/19/2017: Receipt of ACP document:  Received: Health Care Directive which was witnessed or notarized on 11/8/16.  Document previously scanned on 11/23/16.  Validation form completed and sent to be scanned.  Code Status needs to be updated to reflect choices in most recent ACP document.   Confirmed/documented designated decision maker(s).  Added by Joanie Quiroz   Advance Care Planning Liaison             Somatic dysfunction of lumbar region 12/22/2016     Priority: Medium     Bilateral low back pain with sciatica, sciatica laterality unspecified, unspecified chronicity 12/22/2016     Priority: Medium     Somatic dysfunction of sacral region 12/22/2016     Priority: Medium      Sacral pain 12/22/2016     Priority: Medium     Thoracic segment dysfunction 12/22/2016     Priority: Medium     Abnormal CT scan, lung 04/22/2016     Priority: Medium     Dysphonia 04/14/2016     Priority: Medium     Globus sensation 04/14/2016     Priority: Medium     Lupus (systemic lupus erythematosus) (H)      Priority: Medium     diagnosed 12 yrs ago       MCI (mild cognitive impairment)      Priority: Medium     Hemorrhage of cyst of native kidney 12/02/2015     Priority: Medium     Mechanical low back pain 04/14/2015     Priority: Medium     Menopausal osteoporosis 02/17/2015     Priority: Medium     Systemic lupus erythematosus (H) 03/03/2014     Priority: Medium     Diagnosis 1997  Dr. Hickman  - no renal complications       CARDIOVASCULAR SCREENING; LDL GOAL LESS THAN 130 03/03/2014     Priority: Medium     Restless legs syndrome 03/03/2014     Priority: Medium     Peripheral neuropathy, idiopathic 03/03/2014     Priority: Medium     Emanuel Idelkope       Osteoporosis 03/03/2014     Priority: Medium     Had been treated with bisphosphonates for 5 years - [id number]  Problem list name updated by automated process. Provider to review        Past Medical History:   Diagnosis Date     Allergic rhinitis      Allergic rhinitis due to pollen      Arthritis      Decreased libido      Depressive disorder      Esophageal motility disorder      H pylori ulcer      Hoarseness      Immunosuppression (H)      Lupus (systemic lupus erythematosus) (H)     diagnosed 12 yrs ago     MCI (mild cognitive impairment)      Menopausal osteoporosis 02/17/2015     Movement disorder      Neuropathy     feet and hands     S/P cholecystectomy      S/P [id number]      Past Surgical History:   Procedure Laterality Date     ARTHRODESIS FINGER(S) Left 5/18/2018    Procedure: ARTHRODESIS FINGER(S);;  Surgeon: Shahida Carlton MD;  Location: [facility]     ARTHROPLASTY CARPOMETACARPAL (THUMB JOINT) Left 5/18/2018    Procedure: ARTHROPLASTY  CARPOMETACARPAL (THUMB JOINT);  LEFT THUMB CARPALMETACARPAL WITH MINI TIGHTROPE, LEFT METAPHALANGEAL FUSION WITH RADIAL BONE GRAFT AND EASY CLIP STAPLE.;  Surgeon: Briana Carlton MD;  Location: [facility]     ARTHROPLASTY CARPOMETACARPAL (THUMB JOINT), ARTHRODESIS, COMBINED Left 2018    Procedure: COMBINED ARTHROPLASTY CARPOMETACARPAL (THUMB JOINT), ARTHRODESIS;  LEFT THUMB CARPOMETACARPAL EXCISIONAL ARTHROPLASTY WITH FACSIA TANIA GRAFT, PARTIAL TRAPEZOID EXCISION;  Surgeon: Briana Carlton MD;  Location: [facility]     BACK SURGERY      L2-L4 or L5 fusion     BLEPHAROPLASTY BILATERAL        SECTION      x3     CHOLECYSTECTOMY      feels better. No pathology on gallbladder     EYE SURGERY      Raised eyelids.     GRAFT BONE TO FINGER Left 2018    Procedure: GRAFT BONE TO FINGER;;  Surgeon: Briana Carlton MD;  Location: [facility]     HC UGI ENDOSCOPY, SIMPLE EXAM  14     HYSTERECTOMY       KNEE SURGERY      bilateral arthroscopy     ORTHOPEDIC SURGERY      right thumb tendon surgery     REPAIR TENDON FINGER(S)  2011    Procedure:REPAIR TENDON FINGER(S); RIGHT INDEX FINGER FLEXOR DIGITORUM PROFUNDUS REPAIR, RADIAL DIGITAL NERVE REPAIR ; Surgeon:BRIANA CARLTON; Location:[facility]     SALPINGO-OOPHORECTOMY BILATERAL       SHOULDER SURGERY Right     arthroscopy     TONSILLECTOMY      Child     Current Outpatient Prescriptions   Medication Sig Dispense Refill     calcium-vitamin D (CALCIUM 600/VITAMIN D) 600-400 MG-UNIT per tablet Take 1 tablet by mouth 2 times daily       Cholecalciferol (VITAMIN D3) 2000 UNITS TABS Take 2,000 Int'l Units by mouth daily       diclofenac (VOLTAREN) 1 % GEL Place 2 g onto the skin 4 times daily       DULoxetine HCl (CYMBALTA PO) Take 60 mg by mouth 2 times daily        estradiol (ESTRACE) 1 MG tablet Take 1 tablet (1 mg) by mouth daily 30 tablet 1     gabapentin (NEURONTIN) 800 MG tablet 2 times daily   1      "leucovorin (WELLCOVORIN) 5 MG tablet Take 1 tablet (5 mg) by mouth daily       meloxicam (MOBIC) 15 MG tablet Take 0.5 tablets (7.5 mg) by mouth daily       methotrexate 2.5 MG tablet Take 4 tablets by mouth once a week        mycophenolate (CELLCEPT) 500 MG tablet 500 mg in AM then 250 mg in PM       Nortriptyline HCl (NORTRIPYTLINE HCL PO) Take 40 mg by mouth daily       omeprazole (PRILOSEC) 40 MG capsule TAKE 1 CAPSULE DAILY 90 capsule 3     oxyCODONE-acetaminophen (PERCOCET)  MG per tablet Take 1-2 tablets by mouth every 4 hours as needed for pain (moderate to severe) 60 tablet 0     predniSONE (DELTASONE) 5 MG tablet Taking 5 mg and 1 mg tablet x2 to get a total 7 mg daily       ranitidine HCl 300 MG CAPS Take 300 mg by mouth every evening       traZODone (DESYREL) 100 MG tablet TAKE 2 TABLETS AT BEDTIME 180 tablet 3     OTC products: None, except as noted above    Allergies   Allergen Reactions     Augmentin      C Diff colitis     Sudafed [Pseudoephedrine]      Jittery, pacing     Latex Rash      Latex Allergy: YES: Precautions to take: Reacts to adhesives     Social History   Substance Use Topics     Smoking status: Never Smoker     Smokeless tobacco: Never Used     Alcohol use 0.0 oz/week     0 Standard drinks or equivalent per week      Comment: 1 wine/day     History   Drug Use No       REVIEW OF SYSTEMS:   Constitutional, neuro, ENT, endocrine, pulmonary, cardiac, gastrointestinal, genitourinary, musculoskeletal, integument and psychiatric systems are negative, except as otherwise noted.    EXAM:   /77 (Cuff Size: Adult Regular)  Pulse 74  Temp 97.2  F (36.2  C) (Tympanic)  Ht 5' 3.5\" (1.613 m)  Wt 128 lb (58.1 kg)  SpO2 100%  Breastfeeding? No  BMI 22.32 kg/m2    GENERAL APPEARANCE: healthy, alert and no distress     EYES: EOMI, PERRL     HENT: mouth without ulcers or lesions     NECK: no adenopathy, no asymmetry, masses, or scars and thyroid normal to palpation     RESP: lungs clear " to auscultation - no rales, rhonchi or wheezes     CV: regular rates and rhythm, normal S1 S2, no S3 or S4 and no murmur, click or rub     MS: extremities normal- no gross deformities noted, no evidence of inflammation in joints, FROM in all extremities.     SKIN: no suspicious lesions or rashes     NEURO: Normal strength and tone, sensory exam grossly normal, mentation intact and speech normal     PSYCH: mentation appears normal. and affect normal/bright     LYMPHATICS: No cervical adenopathy    DIAGNOSTICS:   EKG: Not indicated due to non-vascular surgery and last ekg on 1/15/18 (within 30 days for CAD history or last year for cardiac risk factors)    Recent Labs   Lab Test  12/18/17   1610  05/19/17   0925   04/03/16   1320   HGB  12.2  12.5   < >  12.7   PLT  312  315   < >  251   INR   --    --    --   0.92   NA  136  139   < >  134   POTASSIUM  4.0  4.2   < >  4.0   CR  0.79  0.89   < >  0.91   A1C  5.6   --    --    --     < > = values in this interval not displayed.        IMPRESSION:   Reason for surgery/procedure: Lumbar stenosis with neurogenic claudication  Diagnosis/reason for consult: medical preop    The proposed surgical procedure is considered INTERMEDIATE risk.    REVISED CARDIAC RISK INDEX  The patient has the following serious cardiovascular risks for perioperative complications such as (MI, PE, VFib and 3  AV Block):  No serious cardiac risks  INTERPRETATION: 0 risks: Class I (very low risk - 0.4% complication rate)    The patient has the following additional risks for perioperative complications:  No identified additional risks      ICD-10-CM    1. Preop general physical exam Z01.818    2. Spinal stenosis, lumbar region, with neurogenic claudication M48.062    3. Systemic lupus erythematosus, unspecified SLE type, unspecified organ involvement status (H) M32.9        RECOMMENDATIONS:       Obstructive Sleep Apnea (or suspected sleep apnea)  Hospital staff are advised to monitor for sleep  related oxygen desaturations due to suspicion of KATHRINE      --Patient is to take all scheduled medications on the day of surgery EXCEPT for modifications listed below.    Chronic Corticosteroid Use  Stress dose steroids are indicated due to chronic steroid use in last 3 months (e.g. >3 weeks of predisone 20 mg or daily prednisone 5 mg)  Moderate procedure:   Hydrocortisone 50-75 mg IV on day of surgery.  Taper to baseline preoperative dose over the subsequent 1-2 days.  To be at discretion of perioperative team.       Preoperative Joint replacement and Rheumatologic DMARD Use  The pending procedure is an intervertebral disc arthroplasty or a total joint arthroplasty at the hip (SALLY), knee (TKA), ankle shoulder, wrist or elbow.    --Mycophenolate: Stop for 1-7 days before and 7 days after the procedure  --Patient to contact rheumatologist for DMARD instructions  --She will likely continue methotrexate as this has not been stopped for prior surgeries.     Stopped cellcept and mobic yesterday, 6/11.       APPROVAL GIVEN to proceed with proposed procedure, without further diagnostic evaluation       Signed Electronically by: JAKY Nunez CNP    Copy of this evaluation report is provided to requesting physician.    [facility] Preop Guidelines    Revised Cardiac Risk Index

## 2018-06-12 ENCOUNTER — OFFICE VISIT (OUTPATIENT)
Dept: OBGYN | Facility: CLINIC | Age: 68
End: 2018-06-12
Payer: COMMERCIAL

## 2018-06-12 ENCOUNTER — OFFICE VISIT (OUTPATIENT)
Dept: SLEEP MEDICINE | Facility: CLINIC | Age: 68
End: 2018-06-12
Payer: COMMERCIAL

## 2018-06-12 ENCOUNTER — OFFICE VISIT (OUTPATIENT)
Dept: FAMILY MEDICINE | Facility: CLINIC | Age: 68
End: 2018-06-12
Payer: COMMERCIAL

## 2018-06-12 VITALS
SYSTOLIC BLOOD PRESSURE: 122 MMHG | BODY MASS INDEX: 21.85 KG/M2 | OXYGEN SATURATION: 93 % | WEIGHT: 128 LBS | HEART RATE: 82 BPM | HEIGHT: 64 IN | DIASTOLIC BLOOD PRESSURE: 77 MMHG | RESPIRATION RATE: 16 BRPM

## 2018-06-12 VITALS
OXYGEN SATURATION: 100 % | BODY MASS INDEX: 21.85 KG/M2 | SYSTOLIC BLOOD PRESSURE: 114 MMHG | DIASTOLIC BLOOD PRESSURE: 77 MMHG | WEIGHT: 128 LBS | HEIGHT: 64 IN | HEART RATE: 74 BPM | TEMPERATURE: 97.2 F

## 2018-06-12 VITALS
BODY MASS INDEX: 21.99 KG/M2 | HEART RATE: 80 BPM | DIASTOLIC BLOOD PRESSURE: 72 MMHG | SYSTOLIC BLOOD PRESSURE: 106 MMHG | WEIGHT: 128.8 LBS | HEIGHT: 64 IN

## 2018-06-12 DIAGNOSIS — M48.062 SPINAL STENOSIS, LUMBAR REGION, WITH NEUROGENIC CLAUDICATION: ICD-10-CM

## 2018-06-12 DIAGNOSIS — Z79.890 POST-MENOPAUSE ON HRT (HORMONE REPLACEMENT THERAPY): ICD-10-CM

## 2018-06-12 DIAGNOSIS — M32.9 SYSTEMIC LUPUS ERYTHEMATOSUS, UNSPECIFIED SLE TYPE, UNSPECIFIED ORGAN INVOLVEMENT STATUS (H): ICD-10-CM

## 2018-06-12 DIAGNOSIS — Z01.419 ENCOUNTER FOR GYNECOLOGICAL EXAMINATION WITHOUT ABNORMAL FINDING: Primary | ICD-10-CM

## 2018-06-12 DIAGNOSIS — Z01.818 PREOP GENERAL PHYSICAL EXAM: Primary | ICD-10-CM

## 2018-06-12 DIAGNOSIS — F51.04 CHRONIC INSOMNIA: Primary | ICD-10-CM

## 2018-06-12 PROCEDURE — 99215 OFFICE O/P EST HI 40 MIN: CPT | Performed by: NURSE PRACTITIONER

## 2018-06-12 PROCEDURE — 99397 PER PM REEVAL EST PAT 65+ YR: CPT | Performed by: OBSTETRICS & GYNECOLOGY

## 2018-06-12 PROCEDURE — 90791 PSYCH DIAGNOSTIC EVALUATION: CPT | Performed by: PSYCHOLOGIST

## 2018-06-12 RX ORDER — ESTRADIOL 1 MG/1
0.5 TABLET ORAL DAILY
Qty: 90 TABLET | Refills: 3 | Status: SHIPPED | OUTPATIENT
Start: 2018-06-12 | End: 2019-09-30

## 2018-06-12 RX ORDER — OXYCODONE AND ACETAMINOPHEN 5; 325 MG/1; MG/1
1-2 TABLET ORAL DAILY PRN
COMMUNITY
End: 2018-09-04

## 2018-06-12 RX ORDER — CETIRIZINE HYDROCHLORIDE 10 MG/1
10 TABLET ORAL
COMMUNITY
Start: 2012-01-22

## 2018-06-12 ASSESSMENT — ANXIETY QUESTIONNAIRES
7. FEELING AFRAID AS IF SOMETHING AWFUL MIGHT HAPPEN: NOT AT ALL
IF YOU CHECKED OFF ANY PROBLEMS ON THIS QUESTIONNAIRE, HOW DIFFICULT HAVE THESE PROBLEMS MADE IT FOR YOU TO DO YOUR WORK, TAKE CARE OF THINGS AT HOME, OR GET ALONG WITH OTHER PEOPLE: NOT DIFFICULT AT ALL
2. NOT BEING ABLE TO STOP OR CONTROL WORRYING: NOT AT ALL
6. BECOMING EASILY ANNOYED OR IRRITABLE: NOT AT ALL
3. WORRYING TOO MUCH ABOUT DIFFERENT THINGS: NOT AT ALL
5. BEING SO RESTLESS THAT IT IS HARD TO SIT STILL: NOT AT ALL
3. WORRYING TOO MUCH ABOUT DIFFERENT THINGS: NOT AT ALL
2. NOT BEING ABLE TO STOP OR CONTROL WORRYING: NOT AT ALL
GAD7 TOTAL SCORE: 5
1. FEELING NERVOUS, ANXIOUS, OR ON EDGE: NOT AT ALL
GAD7 TOTAL SCORE: 0
5. BEING SO RESTLESS THAT IT IS HARD TO SIT STILL: NOT AT ALL
1. FEELING NERVOUS, ANXIOUS, OR ON EDGE: MORE THAN HALF THE DAYS
6. BECOMING EASILY ANNOYED OR IRRITABLE: SEVERAL DAYS
7. FEELING AFRAID AS IF SOMETHING AWFUL MIGHT HAPPEN: NOT AT ALL

## 2018-06-12 ASSESSMENT — PATIENT HEALTH QUESTIONNAIRE - PHQ9
5. POOR APPETITE OR OVEREATING: MORE THAN HALF THE DAYS
5. POOR APPETITE OR OVEREATING: NOT AT ALL

## 2018-06-12 NOTE — PROGRESS NOTES
Yessenia is a 68 year old  female who presents for annual exam.     Besides routine health maintenance,  she would like to discuss back surgery : preop today.    Do you have a Health Care Directive?: Yes: Advance Directive has been received and scanned.    Fall risk:   Fallen 2 or more times in the past year?: No  Any fall with injury in the past year?: No    HPI:  The patient's PCP is  Josue Mejia MD,.    Having another back surgery. Will have preop with PCP.  No menopausal symptoms on ERT. Some breast tenderness on 1mg. Has not tried 0.5mg. Hx of stopping ERT with terrible symptoms.  Bladder good.  Hx of elevated Cholesterol but HDL is >100.  Has not had mammogram yet this year.    GYNECOLOGIC HISTORY:  No LMP recorded. Patient has had a hysterectomy..   reports that she has never smoked. She has never used smokeless tobacco.    Patient is sexually active.  STD testing offered?  Declined  Last PHQ-9 score on record=   PHQ-9 SCORE 2018   Total Score MyChart -   Total Score 5     Last GAD7 score on record=   CHE-7 SCORE 2016   Total Score 0 3 0     Alcohol Score = 3    HEALTH MAINTENANCE:  Cholesterol: (  Cholesterol   Date Value Ref Range Status   2017 277 (H) <200 mg/dL Final     Comment:     Desirable:       <200 mg/dl   2016 290 (H) <200 mg/dL Final     Comment:     Desirable:       <200 mg/dl    2017   Total= 277, Triglycerides=177, WJE=846, EJD=679, CJZ=058, TSH=0.80    Last Mammo: one year ago, Result: normal, Next Mammo: will schedule   Pap: (No results found for: PAP ) : off pap schedule  DEXA:  2016  Colonoscopy:  10/12/2015, Result:  normal, Next Colonoscopy: 10 years ().    Health maintenance updated:  yes    HISTORY:  Obstetric History       T4      L4     SAB1   TAB0   Ectopic0   Multiple0   Live Births0       # Outcome Date GA Lbr Francis/2nd Weight Sex Delivery Anes PTL Lv   5 Term            4 Term            3  Term            2 Term            1 SAB                 Patient Active Problem List   Diagnosis     Systemic lupus erythematosus (H)     CARDIOVASCULAR SCREENING; LDL GOAL LESS THAN 130     Restless legs syndrome     Peripheral neuropathy, idiopathic     Osteoporosis     Mechanical low back pain     Hemorrhage of cyst of native kidney     Menopausal osteoporosis     Lupus (systemic lupus erythematosus) (H)     MCI (mild cognitive impairment)     Dysphonia     Globus sensation     Abnormal CT scan, lung     Somatic dysfunction of lumbar region     Bilateral low back pain with sciatica, sciatica laterality unspecified, unspecified chronicity     Somatic dysfunction of sacral region     Sacral pain     Thoracic segment dysfunction     Advance care planning     Microscopic hematuria     History of Clostridium difficile colitis     Past Surgical History:   Procedure Laterality Date     ARTHRODESIS FINGER(S) Left 2018    Procedure: ARTHRODESIS FINGER(S);;  Surgeon: Shahida Carlton MD;  Location: [facility]     ARTHROPLASTY CARPOMETACARPAL (THUMB JOINT) Left 2018    Procedure: ARTHROPLASTY CARPOMETACARPAL (THUMB JOINT);  LEFT THUMB CARPALMETACARPAL WITH MINI TIGHTROPE, LEFT METAPHALANGEAL FUSION WITH RADIAL BONE GRAFT AND EASY CLIP STAPLE.;  Surgeon: Shahida Carlton MD;  Location: [facility]     ARTHROPLASTY CARPOMETACARPAL (THUMB JOINT), ARTHRODESIS, COMBINED Left 2018    Procedure: COMBINED ARTHROPLASTY CARPOMETACARPAL (THUMB JOINT), ARTHRODESIS;  LEFT THUMB CARPOMETACARPAL EXCISIONAL ARTHROPLASTY WITH FACSIA TANIA GRAFT, PARTIAL TRAPEZOID EXCISION;  Surgeon: Shahida Carlton MD;  Location: [facility]     BACK SURGERY      L2-L4 or L5 fusion     BLEPHAROPLASTY BILATERAL        SECTION      x3     CHOLECYSTECTOMY      feels better. No pathology on gallbladder     EYE SURGERY      Raised eyelids.     GRAFT BONE TO FINGER Left 2018    Procedure: GRAFT BONE TO  FINGER;;  Surgeon: Briana Carlton MD;  Location: [facility] UGI ENDOSCOPY, SIMPLE EXAM  04/02/14     HYSTERECTOMY       KNEE SURGERY      bilateral arthroscopy     ORTHOPEDIC SURGERY      right thumb tendon surgery     REPAIR TENDON FINGER(S)  11/9/2011    Procedure:REPAIR TENDON FINGER(S); RIGHT INDEX FINGER FLEXOR DIGITORUM PROFUNDUS REPAIR, RADIAL DIGITAL NERVE REPAIR ; Surgeon:BRIANA CARLTON; Location:[facility]     SALPINGO-OOPHORECTOMY BILATERAL       SHOULDER SURGERY Right     arthroscopy     TONSILLECTOMY      Child      Social History   Substance Use Topics     Smoking status: Never Smoker     Smokeless tobacco: Never Used     Alcohol use 0.0 oz/week     0 Standard drinks or equivalent per week      Comment: 1 wine/day      Problem (# of Occurrences) Relation (Name,Age of Onset)    C.A.D. (1) Father: cabg, carotid stenosis    Colon Cancer (2) Maternal Grandmother, Maternal Aunt    Hyperlipidemia (3) Father, Sister, Brother    Hypertension (3) Father, Brother, Sister    Neurologic Disorder (1) Mother: ALS? Parkinsons?    OSTEOPOROSIS (3) Mother, Sister, Maternal Aunt            Current Outpatient Prescriptions   Medication Sig     calcium-vitamin D (CALCIUM 600/VITAMIN D) 600-400 MG-UNIT per tablet Take 1 tablet by mouth 2 times daily     cetirizine (ZYRTEC) 10 MG tablet 10 mg by Oral or J tube route     Cholecalciferol (VITAMIN D3) 2000 UNITS TABS Take 2,000 Int'l Units by mouth daily     diclofenac (VOLTAREN) 1 % GEL Place 2 g onto the skin 4 times daily     DULoxetine HCl (CYMBALTA PO) Take 60 mg by mouth 2 times daily      estradiol (ESTRACE) 1 MG tablet Take 0.5 tablets (0.5 mg) by mouth daily     leucovorin (WELLCOVORIN) 5 MG tablet Take 1 tablet (5 mg) by mouth daily     meloxicam (MOBIC) 15 MG tablet Take 0.5 tablets (7.5 mg) by mouth daily     methotrexate 2.5 MG tablet Take 4 tablets by mouth once a week      mycophenolate (CELLCEPT) 500 MG tablet 500 mg in AM then 250 mg in  "PM     Nortriptyline HCl (NORTRIPYTLINE HCL PO) Take 40 mg by mouth daily     omeprazole (PRILOSEC) 40 MG capsule TAKE 1 CAPSULE DAILY     oxyCODONE-acetaminophen (PERCOCET)  MG per tablet Take 1-2 tablets by mouth every 4 hours as needed for pain (moderate to severe)     predniSONE (DELTASONE) 5 MG tablet Taking 5 mg and 1 mg tablet x2 to get a total 7 mg daily     ranitidine HCl 300 MG CAPS Take 300 mg by mouth every evening     traZODone (DESYREL) 100 MG tablet TAKE 2 TABLETS AT BEDTIME     [DISCONTINUED] estradiol (ESTRACE) 1 MG tablet Take 1 tablet (1 mg) by mouth daily     No current facility-administered medications for this visit.        Allergies   Allergen Reactions     Augmentin      C Diff colitis     Cefdinir Other (See Comments)     Sudafed [Pseudoephedrine]      Jittery, pacing     Tizanidine Other (See Comments)     Latex Rash       Past medical, surgical, social and family history were reviewed and updated in EPIC.    ROS:   12 point review of systems negative other than symptoms noted below.  Breast: Tenderness  Musculoskeletal: Joint Pain  Endocrine: Decreased Libido  Psychiatric: Depression    EXAM:  /72  Pulse 80  Ht 5' 3.5\" (1.613 m)  Wt 128 lb 12.8 oz (58.4 kg)  BMI 22.46 kg/m2   BMI: Body mass index is 22.46 kg/(m^2).    EXAM:  Constitutional: Appearance: Well nourished, well developed alert, in no acute distress  Neck:  Lymph Nodes:  No lymphadenopathy present    Thyroid:  Gland size normal, nontender, no nodules or masses present  on palpation  Chest:  Respiratory Effort:  Breathing unlabored  Cardiovascular:Heart    Auscultation:  Regular rate, normal rhythm, no murmurs present  Breasts: Inspection of Breasts:  No lymphadenopathy present., Palpation of Breasts and Axillae:  No masses present on palpation, no breast tenderness., Axillary Lymph Nodes:  No lymphadenopathy present. and No nodularity, asymmetry or nipple discharge bilaterally.  Gastrointestinal:  Abdominal " Examination:  Abdomen nontender to palpation, tone normal without     rigidity or guarding, no masses present, umbilicus without lesions    Liver and speen:  No hepatomegaly present, liver nontender to palpation    Hernias:  No hernias present  Lymphatic: Lymph Nodes:  No other lymphadenopathy present  Skin:  General Inspection:  No rashes present, no lesions present, no areas of  discoloration.    Genitalia and Groin:  No rashes present, no lesions present, no areas of  discoloration, no masses present  Neurologic/Psychiatric:    Mental Status:  Oriented X3     Pelvic Exam:  External Genitalia:     Normal appearance for age, no discharge present, no tenderness present, no inflammatory lesions present, color normal  Vagina:     Normal vaginal vault without central or paravaginal defects, no discharge present, no inflammatory lesions present, no masses present  Bladder:     Nontender to palpation  Urethra:   Urethral Body:  Urethra palpation normal, urethra structural support normal   Urethral Meatus:  No erythema or lesions present  Cervix:     Surgically absent  Uterus:     Surgically absent  Adnexa:     Surgically absent  Perineum:     Perineum within normal limits, no evidence of trauma, no rashes or skin lesions present  Anus:     Anus within normal limits, no hemorrhoids present  Inguinal Lymph Nodes:     No lymphadenopathy present    COUNSELING:   Reviewed preventive health counseling, as reflected in patient instructions       Regular exercise    BMI:  Body mass index is 22.46 kg/(m^2).     reports that she has never smoked. She has never used smokeless tobacco.      ASSESSMENT:  68 year old female with satisfactory annual exam.    ICD-10-CM    1. Encounter for gynecological examination without abnormal finding Z01.419    2. Post-menopause on HRT (hormone replacement therapy) Z79.890 estradiol (ESTRACE) 1 MG tablet       PLAN:  Will decrease ERT to 0.5mg and see how she feels. Discussed [facility] data. There is   data using Estradiol that shows decreased breast cancer as well as that seen with Premarin.  Will get Sh[facility]Rx at  Outside pharmacy due to cost.  Should have lipids redone this year.  Needs mammogram    Nicola Ramirez MD

## 2018-06-12 NOTE — PATIENT INSTRUCTIONS
You are reporting that since starting Nortriptyline that your sleep patterns have changed and you are sleeping more each night, up to 8.5 hours. You are reporting that you now fall asleep within 10 minutes and are awaking only 10-20 minutes in the middle of the night.    You continue to feel tired.    Followup with Dr. Alvarado Moore about the results of your sleep study.    Recommendations -    Bright ambient room lighting in the evenings as you did not feel the bright light box was good for your Lupus.   Avoid reclining in evening or laying on couch while watching TV to avoid inadvertent napping.    Make sure you get up at the same time every day and you can keep your current new sleep schedule from 11 PM - 8 AM based off your recent sleep logs.    Avoid daytime napping (which you already do).     Your BMI is Body mass index is 22.32 kg/(m^2).  Weight management is a personal decision.  If you are interested in exploring weight loss strategies, the following discussion covers the approaches that may be successful. Body mass index (BMI) is one way to tell whether you are at a healthy weight, overweight, or obese. It measures your weight in relation to your height.  A BMI of 18.5 to 24.9 is in the healthy range. A person with a BMI of 25 to 29.9 is considered overweight, and someone with a BMI of 30 or greater is considered obese. More than two-thirds of [place] adults are considered overweight or obese.  Being overweight or obese increases the risk for further weight gain. Excess weight may lead to heart disease and diabetes.  Creating and following plans for healthy eating and physical activity may help you improve your health.  Weight control is part of healthy lifestyle and includes exercise, emotional health, and healthy eating habits. Careful eating habits lifelong are the mainstay of weight control. Though there are significant health benefits from weight loss, long-term weight loss with diet alone may be  very difficult to achieve- studies show long-term success with dietary management in less than 10% of people. Attaining a healthy weight may be especially difficult to achieve in those with severe obesity. In some cases, medications, devices and surgical management might be considered.  What can you do?  If you are overweight or obese and are interested in methods for weight loss, you should discuss this with your provider.     Consider reducing daily calorie intake by 500 calories.     Keep a food journal.     Avoiding skipping meals, consider cutting portions instead.    Diet combined with exercise helps maintain muscle while optimizing fat loss. Strength training is particularly important for building and maintaining muscle mass. Exercise helps reduce stress, increase energy, and improves fitness. Increasing exercise without diet control, however, may not burn enough calories to loose weight.       Start walking three days a week 10-20 minutes at a time    Work towards walking thirty minutes five days a week     Eventually, increase the speed of your walking for 1-2 minutes at time    In addition, we recommend that you review healthy lifestyles and methods for weight loss available through the National Institutes of Health patient information sites:  http://win.niddk.nih.gov/publications/index.htm    And look into health and wellness programs that may be available through your health insurance provider, employer, local community center, or sunshine club.

## 2018-06-12 NOTE — MR AVS SNAPSHOT
After Visit Summary   6/12/2018    Yessenia Martines    MRN: [id number]           Patient Information     Date Of Birth          1950        Visit Information        Provider Department      6/12/2018 10:00 AM Chelsea Castro APRN [facility]        Today's Diagnoses     Preop general physical exam    -  1    Spinal stenosis, lumbar region, with neurogenic claudication        Systemic lupus erythematosus, unspecified SLE type, unspecified organ involvement status (H)          Care Instructions      Before Your Surgery      Call your surgeon if there is any change in your health. This includes signs of a cold or flu (such as a sore throat, runny nose, cough, rash or fever).    Do not smoke, drink alcohol or take over the counter medicine (unless your surgeon or primary care doctor tells you to) for the 24 hours before and after surgery.    If you take prescribed drugs: Follow your doctor s orders about which medicines to take and which to stop until after surgery.    Eating and drinking prior to surgery: follow the instructions from your surgeon    Take a shower or bath the night before surgery. Use the soap your surgeon gave you to gently clean your skin. If you do not have soap from your surgeon, use your regular soap. Do not shave or scrub the surgery site.  Wear clean pajamas and have clean sheets on your bed.           Follow-ups after your visit        Your next 10 appointments already scheduled     Jun 12, 2018  2:00 PM CDT   New Sleep Patient with Alvarado Cervantes PsyD   [facility] ([facility])    [address] [address] [phone]            Jun 13, 2018  9:00 AM CDT   Return Sleep Patient with Alvarado Moore MD   [facility] ([facility])    [address] [address] [phone]              Who to contact      "If you have questions or need follow up information about today's clinic visit or your schedule please contact [facility] directly at [phone].  Normal or non-critical lab and imaging results will be communicated to you by MyChart, letter or phone within 4 business days after the clinic has received the results. If you do not hear from us within 7 days, please contact the clinic through [employer] or phone. If you have a critical or abnormal lab result, we will notify you by phone as soon as possible.  Submit refill requests through [employer] or call your pharmacy and they will forward the refill request to us. Please allow 3 business days for your refill to be completed.          Additional Information About Your Visit        [employer] Information     [employer] gives you secure access to your electronic health record. If you see a primary care provider, you can also send messages to your care team and make appointments. If you have questions, please call your primary care clinic.  If you do not have a primary care provider, please call [phone] and they will assist you.        Care EveryWhere ID     This is your Care EveryWhere ID. This could be used by other organizations to access your [facility] medical records  [id number]        Your Vitals Were     Pulse Temperature Height Pulse Oximetry Breastfeeding? BMI (Body Mass Index)    74 97.2  F (36.2  C) (Tympanic) 5' 3.5\" (1.613 m) 100% No 22.32 kg/m2       Blood Pressure from Last 3 Encounters:   06/12/18 114/77   06/12/18 106/72   05/18/18 113/76    Weight from Last 3 Encounters:   06/12/18 128 lb (58.1 kg)   06/12/18 128 lb 12.8 oz (58.4 kg)   05/18/18 134 lb 11.2 oz (61.1 kg)              We Performed the Following     PAF COMPLETED          Today's Medication Changes          These changes are accurate as of 6/12/18  1:52 PM.  If you have any questions, ask your nurse or doctor.               These medicines have changed or have updated " prescriptions.        Dose/Directions    estradiol 1 MG tablet   Commonly known as:  ESTRACE   This may have changed:  how much to take   Used for:  Post-menopause on HRT (hormone replacement therapy)   Changed by:  Nicola Ramirez MD        Dose:  0.5 mg   Take 0.5 tablets (0.5 mg) by mouth daily   Quantity:  90 tablet   Refills:  3       oxyCODONE-acetaminophen 5-325 MG per tablet   Commonly known as:  PERCOCET   This may have changed:  Another medication with the same name was removed. Continue taking this medication, and follow the directions you see here.   Changed by:  Chelsea Castro APRN CNP        Dose:  1-2 tablet   Take 1-2 tablets by mouth daily as needed for pain   Refills:  0            Where to get your medicines      These medications were sent to [facility] - [address]  [address] [facility] [address]     Phone:  [phone]     estradiol 1 MG tablet               Information about OPIOIDS     PRESCRIPTION OPIOIDS: WHAT YOU NEED TO KNOW   We gave you an opioid (narcotic) pain medicine. It is important to manage your pain, but opioids are not always the best choice. You should first try all the other options your care team gave you. Take this medicine for as short a time (and as few doses) as possible.     These medicines have risks:    DO NOT drive when on new or higher doses of pain medicine. These medicines can affect your alertness and reaction times, and you could be arrested for driving under the influence (DUI). If you need to use opioids long-term, talk to your care team about driving.    DO NOT operate heave machinery    DO NOT do any other dangerous activities while taking these medicines.     DO NOT drink any alcohol while taking these medicines.      If the opioid prescribed includes acetaminophen, DO NOT take with any other medicines that contain acetaminophen. Read all labels carefully. Look for the word   acetaminophen  or  Tylenol.  Ask your pharmacist if you have questions or are unsure.    You can get addicted to pain medicines, especially if you have a history of addiction (chemical, alcohol or substance dependence). Talk to your care team about ways to reduce this risk.    Store your pills in a secure place, locked if possible. We will not replace any lost or stolen medicine. If you don t finish your medicine, please throw away (dispose) as directed by your pharmacist. The [place] Pollution Control Agency has more information about safe disposal: https://www.pca.[facility].[place].us/living-green/managing-unwanted-medications.     All opioids tend to cause constipation. Drink plenty of water and eat foods that have a lot of fiber, such as fruits, vegetables, prune juice, apple juice and high-fiber cereal. Take a laxative (Miralax, milk of magnesia, Colace, Senna) if you don t move your bowels at least every other day.          Primary Care Provider Office Phone # Fax #    Josue Mejia [phone] [address] [address]        Equal Access to Services     [facility] : Hadii luis abdio Sotu, waaxda lufaith, qaybta kaaldanielle kam, dipak barajas . So [facility] [phone].    ATENCIÓN: Si habla español, tiene a armendariz disposición servicios gratuitos de asistencia lingüística. Rolo al [phone].    We comply with applicable federal civil rights laws and [place] laws. We do not discriminate on the basis of race, color, national origin, age, disability, sex, sexual orientation, or gender identity.            Thank you!     Thank you for choosing [facility]  for your care. Our goal is always to provide you with excellent care. Hearing back from our patients is one way we can continue to improve our services. Please take a few minutes to complete the written survey that you may receive in the mail after your visit with us.  Thank you!             Your Updated Medication List - Protect others around you: Learn how to safely use, store and throw away your medicines at www.disposemymeds.org.          This list is accurate as of 6/12/18  1:52 PM.  Always use your most recent med list.                   Brand Name Dispense Instructions for use Diagnosis    calcium 600/vitamin D 600-400 MG-UNIT per tablet   Generic drug:  calcium-vitamin D      Take 1 tablet by mouth 2 times daily        CELLCEPT 500 MG tablet      500 mg in AM then 250 mg in PM        cetirizine 10 MG tablet    zyrTEC     10 mg by Oral or J tube route        CYMBALTA PO      Take 60 mg by mouth 2 times daily        estradiol 1 MG tablet    ESTRACE    90 tablet    Take 0.5 tablets (0.5 mg) by mouth daily    Post-menopause on HRT (hormone replacement therapy)       leucovorin 5 MG tablet    WELLCOVORIN     Take 1 tablet (5 mg) by mouth daily        meloxicam 15 MG tablet    MOBIC     Take 0.5 tablets (7.5 mg) by mouth daily        methotrexate 2.5 MG tablet CHEMO      Take 4 tablets by mouth once a week        NORTRIPYTLINE HCL PO      Take 40 mg by mouth daily        omeprazole 40 MG capsule    priLOSEC    90 capsule    TAKE 1 CAPSULE DAILY    Primary insomnia       oxyCODONE-acetaminophen 5-325 MG per tablet    PERCOCET     Take 1-2 tablets by mouth daily as needed for pain        predniSONE 5 MG tablet    DELTASONE     Taking 5 mg and 1 mg tablet x2 to get a total 7 mg daily        ranitidine HCl 300 MG Caps      Take 300 mg by mouth every evening        traZODone 100 MG tablet    DESYREL    180 tablet    TAKE 2 TABLETS AT BEDTIME    Gastroesophageal reflux disease without esophagitis       Vitamin D3 2000 units Tabs      Take 2,000 Int'l Units by mouth daily        VOLTAREN 1 % Gel topical gel   Generic drug:  diclofenac      Place 2 g onto the skin 4 times daily

## 2018-06-12 NOTE — MR AVS SNAPSHOT
After Visit Summary   6/12/2018    Yessenia Martines    MRN: [id number]           Patient Information     Date Of Birth          1950        Visit Information        Provider Department      6/12/2018 2:00 PM Alvarado Cervantes PsyD [facility] Instructions    You are reporting that since starting Nortriptyline that your sleep patterns have changed and you are sleeping more each night, up to 8.5 hours. You are reporting that you now fall asleep within 10 minutes and are awaking only 10-20 minutes in the middle of the night.    You continue to feel tired.    Followup with Dr. Alvarado Moore about the results of your sleep study.    Recommendations -    Bright ambient room lighting in the evenings as you did not feel the bright light box was good for your Lupus.   Avoid reclining in evening or laying on couch while watching TV to avoid inadvertent napping.    Make sure you get up at the same time every day and you can keep your current new sleep schedule from 11 PM - 8 AM based off your recent sleep logs.    Avoid daytime napping (which you already do).     Your BMI is Body mass index is 22.32 kg/(m^2).  Weight management is a personal decision.  If you are interested in exploring weight loss strategies, the following discussion covers the approaches that may be successful. Body mass index (BMI) is one way to tell whether you are at a healthy weight, overweight, or obese. It measures your weight in relation to your height.  A BMI of 18.5 to 24.9 is in the healthy range. A person with a BMI of 25 to 29.9 is considered overweight, and someone with a BMI of 30 or greater is considered obese. More than two-thirds of American adults are considered overweight or obese.  Being overweight or obese increases the risk for further weight gain. Excess weight may lead to heart disease and diabetes.  Creating and following plans for healthy eating and physical activity may help  you improve your health.  Weight control is part of healthy lifestyle and includes exercise, emotional health, and healthy eating habits. Careful eating habits lifelong are the mainstay of weight control. Though there are significant health benefits from weight loss, long-term weight loss with diet alone may be very difficult to achieve- studies show long-term success with dietary management in less than 10% of people. Attaining a healthy weight may be especially difficult to achieve in those with severe obesity. In some cases, medications, devices and surgical management might be considered.  What can you do?  If you are overweight or obese and are interested in methods for weight loss, you should discuss this with your provider.     Consider reducing daily calorie intake by 500 calories.     Keep a food journal.     Avoiding skipping meals, consider cutting portions instead.    Diet combined with exercise helps maintain muscle while optimizing fat loss. Strength training is particularly important for building and maintaining muscle mass. Exercise helps reduce stress, increase energy, and improves fitness. Increasing exercise without diet control, however, may not burn enough calories to loose weight.       Start walking three days a week 10-20 minutes at a time    Work towards walking thirty minutes five days a week     Eventually, increase the speed of your walking for 1-2 minutes at time    In addition, we recommend that you review healthy lifestyles and methods for weight loss available through the National Institutes of Health patient information sites:  http://win.niddk.nih.gov/publications/index.htm    And look into health and wellness programs that may be available through your health insurance provider, employer, local community center, or sunshine club.                Follow-ups after your visit        Follow-up notes from your care team     Return for Insomnia Follow up as needed..      Your next 10  "appointments already scheduled     Jun 13, 2018  9:00 AM CDT   Return Sleep Patient with Alvarado Moore MD   [facility] ([facility])    [address] [address] [phone]              Who to contact     If you have questions or need follow up information about today's clinic visit or your schedule please contact [facility] directly at [phone].  Normal or non-critical lab and imaging results will be communicated to you by [employer], letter or phone within 4 business days after the clinic has received the results. If you do not hear from us within 7 days, please contact the clinic through [employer] or phone. If you have a critical or abnormal lab result, we will notify you by phone as soon as possible.  Submit refill requests through [employer] or call your pharmacy and they will forward the refill request to us. Please allow 3 business days for your refill to be completed.          Additional Information About Your Visit        [employer] Information     [employer] gives you secure access to your electronic health record. If you see a primary care provider, you can also send messages to your care team and make appointments. If you have questions, please call your primary care clinic.  If you do not have a primary care provider, please call [phone] and they will assist you.        Care EveryWhere ID     This is your Care EveryWhere ID. This could be used by other organizations to access your [facility] medical records  [id number]        Your Vitals Were     Pulse Respirations Height Pulse Oximetry BMI (Body Mass Index)       82 16 1.613 m (5' 3.5\") 93% 22.32 kg/m2        Blood Pressure from Last 3 Encounters:   06/12/18 122/77   06/12/18 114/77   06/12/18 106/72    Weight from Last 3 Encounters:   06/12/18 58.1 kg (128 lb)   06/12/18 58.1 kg (128 lb)   06/12/18 58.4 kg (128 lb 12.8 oz)              Today, you had the " following     No orders found for display         Today's Medication Changes          These changes are accurate as of 6/12/18  2:41 PM.  If you have any questions, ask your nurse or doctor.               These medicines have changed or have updated prescriptions.        Dose/Directions    estradiol 1 MG tablet   Commonly known as:  ESTRACE   This may have changed:  how much to take   Used for:  Post-menopause on HRT (hormone replacement therapy)   Changed by:  Nicola Ramirez MD        Dose:  0.5 mg   Take 0.5 tablets (0.5 mg) by mouth daily   Quantity:  90 tablet   Refills:  3       oxyCODONE-acetaminophen 5-325 MG per tablet   Commonly known as:  PERCOCET   This may have changed:  Another medication with the same name was removed. Continue taking this medication, and follow the directions you see here.   Changed by:  Chelsea Castro APRN CNP        Dose:  1-2 tablet   Take 1-2 tablets by mouth daily as needed for pain   Refills:  0            Where to get your medicines      These medications were sent to [facility] [address] [address]     Phone:  [phone]     estradiol 1 MG tablet                Primary Care Provider Office Phone # Fax #    Josue Alvarado Mejia [phone] [address] [address]        Equal Access to Services     LAI TATE AH: Hadii luis ku hadasho Soomaali, waaxda luqadaha, qaybta kaalmada adeegyada, dipak almaraz hayagustin barajas . So [facility] [phone].    ATENCIÓN: Si habla español, tiene a armendariz disposición servicios gratuitos de asistencia lingüística. Rolo al [phone].    We comply with applicable federal civil rights laws and [place] laws. We do not discriminate on the basis of race, color, national origin, age, disability, sex, sexual orientation, or gender identity.            Thank you!     Thank you for choosing [facility]  for your care. Our goal is always to provide you with excellent care. Hearing back from our patients is one way we can continue to improve our services. Please take a few minutes to complete the written survey that you may receive in the mail after your visit with us. Thank you!             Your Updated Medication List - Protect others around you: Learn how to safely use, store and throw away your medicines at www.disposemymeds.org.          This list is accurate as of 6/12/18  2:41 PM.  Always use your most recent med list.                   Brand Name Dispense Instructions for use Diagnosis    calcium 600/vitamin D 600-400 MG-UNIT per tablet   Generic drug:  calcium-vitamin D      Take 1 tablet by mouth 2 times daily        CELLCEPT 500 MG tablet      500 mg in AM then 250 mg in PM        cetirizine 10 MG tablet    zyrTEC     10 mg by Oral or J tube route        CYMBALTA PO      Take 60 mg by mouth 2 times daily        estradiol 1 MG tablet    ESTRACE    90 tablet    Take 0.5 tablets (0.5 mg) by mouth daily    Post-menopause on HRT (hormone replacement therapy)       leucovorin 5 MG tablet    WELLCOVORIN     Take 1 tablet (5 mg) by mouth daily        meloxicam 15 MG tablet    MOBIC     Take 0.5 tablets (7.5 mg) by mouth daily        methotrexate 2.5 MG tablet CHEMO      Take 4 tablets by mouth once a week        NORTRIPYTLINE HCL PO      Take 40 mg by mouth daily        omeprazole 40 MG capsule    priLOSEC    90 capsule    TAKE 1 CAPSULE DAILY    Primary insomnia       oxyCODONE-acetaminophen 5-325 MG per tablet    PERCOCET     Take 1-2 tablets by mouth daily as needed for pain        predniSONE 5 MG tablet    DELTASONE     Taking 5 mg and 1 mg tablet x2 to get a total 7 mg daily        ranitidine HCl 300 MG Caps      Take 300 mg by mouth every evening        traZODone 100 MG tablet    DESYREL    180 tablet    TAKE 2 TABLETS AT BEDTIME    Gastroesophageal reflux disease without esophagitis        Vitamin D3 2000 units Tabs      Take 2,000 Int'l Units by mouth daily        VOLTAREN 1 % Gel topical gel   Generic drug:  diclofenac      Place 2 g onto the skin 4 times daily

## 2018-06-12 NOTE — MR AVS SNAPSHOT
After Visit Summary   6/12/2018    Yessenia Martines    MRN: [id number]           Patient Information     Date Of Birth          1950        Visit Information        Provider Department      6/12/2018 8:40 AM Nicola Ramirez MD [facility]        Today's Diagnoses     Encounter for gynecological examination without abnormal finding    -  1    Post-menopause on HRT (hormone replacement therapy)           Follow-ups after your visit        Your next 10 appointments already scheduled     Jun 12, 2018 10:00 AM CDT   Pre[facility] JAKY [facility]z C[facility]    [address] [facility] [address]   [phone]            Jun 12, 2018  2:00 PM CDT   New Sleep Patient with Alvarado Cervantes PsyD   [facility] ([facility])    [address] [address]   [phone]            Jun 13, 2018  9:00 AM CDT   Return Sleep Patient with Alvarado Moore MD   [facility] ([facility])    [address] [address]   [phone]              Who to contact     If you have questions or need follow up information about today's clinic visit or your schedule please contact [facility] [place] directly at [phone].  Normal or non-critical lab and imaging results will be communicated to you by MyChart, letter or phone within 4 business days after the clinic has received the results. If you do not hear from us within 7 days, please contact the clinic through MyChart or phone. If you have a critical or abnormal lab result, we will notify you by phone as soon as possible.  Submit refill requests through [employer] or call your pharmacy and they will forward the refill request to us. Please allow 3 business days for your refill to be completed.          Additional Information  "About Your Visit        [email or web address] Information     [employer] gives you secure access to your electronic health record. If you see a primary care provider, you can also send messages to your care team and make appointments. If you have questions, please call your primary care clinic.  If you do not have a primary care provider, please call [phone] and they will assist you.        Care EveryWhere ID     This is your Care EveryWhere ID. This could be used by other organizations to access your [place] medical records  [id number]        Your Vitals Were     Pulse Height BMI (Body Mass Index)             80 5' 3.5\" (1.613 m) 22.46 kg/m2          Blood Pressure from Last 3 Encounters:   06/12/18 106/72   05/18/18 113/76   03/28/18 (!) 143/99    Weight from Last 3 Encounters:   06/12/18 128 lb 12.8 oz (58.4 kg)   05/18/18 134 lb 11.2 oz (61.1 kg)   03/28/18 130 lb (59 kg)              Today, you had the following     No orders found for display         Today's Medication Changes          These changes are accurate as of 6/12/18  9:11 AM.  If you have any questions, ask your nurse or doctor.               These medicines have changed or have updated prescriptions.        Dose/Directions    estradiol 1 MG tablet   Commonly known as:  ESTRACE   This may have changed:  how much to take   Used for:  Post-menopause on HRT (hormone replacement therapy)   Changed by:  Nicola Ramirez MD        Dose:  0.5 mg   Take 0.5 tablets (0.5 mg) by mouth daily   Quantity:  90 tablet   Refills:  3            Where to get your medicines      These medications were sent to [facility] [address] [address]     Phone:  [phone]     estradiol 1 MG tablet                Primary Care Provider Office Phone # Fax #    Josue Mejia [phone] [address] [address]        Equal Access to Services     [facility] " GAAR : Hadii aad lexx yolie Maldonado, waaxda luqadaha, qaybta katelloda negin, waxkathy rufina hayagustin olsonrobertjana barajas . So [facility] [phone].    ATENCIÓN: Si treverla yu, tiene a armendariz disposición servicios gratuitos de asistencia lingüística. Llame al [phone].    We comply with applicable federal civil rights laws and [place] laws. We do not discriminate on the basis of race, color, national origin, age, disability, sex, sexual orientation, or gender identity.            Thank you!     Thank you for choosing [facility] [place]  for your care. Our goal is always to provide you with excellent care. Hearing back from our patients is one way we can continue to improve our services. Please take a few minutes to complete the written survey that you may receive in the mail after your visit with us. Thank you!             Your Updated Medication List - Protect others around you: Learn how to safely use, store and throw away your medicines at www.disposemymeds.org.          This list is accurate as of 6/12/18  9:11 AM.  Always use your most recent med list.                   Brand Name Dispense Instructions for use Diagnosis    calcium 600/vitamin D 600-400 MG-UNIT per tablet   Generic drug:  calcium-vitamin D      Take 1 tablet by mouth 2 times daily        CELLCEPT 500 MG tablet      500 mg in AM then 250 mg in PM        cetirizine 10 MG tablet    zyrTEC     10 mg by Oral or J tube route        CYMBALTA PO      Take 60 mg by mouth 2 times daily        estradiol 1 MG tablet    ESTRACE    90 tablet    Take 0.5 tablets (0.5 mg) by mouth daily    Post-menopause on HRT (hormone replacement therapy)       leucovorin 5 MG tablet    WELLCOVORIN     Take 1 tablet (5 mg) by mouth daily        meloxicam 15 MG tablet    MOBIC     Take 0.5 tablets (7.5 mg) by mouth daily        methotrexate 2.5 MG tablet CHEMO      Take 4 tablets by mouth once a week        NORTRIPYTLINE HCL PO      Take 40 mg by mouth daily         omeprazole 40 MG capsule    priLOSEC    90 capsule    TAKE 1 CAPSULE DAILY    Primary insomnia       oxyCODONE-acetaminophen  MG per tablet    PERCOCET    60 tablet    Take 1-2 tablets by mouth every 4 hours as needed for pain (moderate to severe)    Acute post-operative pain       predniSONE 5 MG tablet    DELTASONE     Taking 5 mg and 1 mg tablet x2 to get a total 7 mg daily        ranitidine HCl 300 MG Caps      Take 300 mg by mouth every evening        traZODone 100 MG tablet    DESYREL    180 tablet    TAKE 2 TABLETS AT BEDTIME    Gastroesophageal reflux disease without esophagitis       Vitamin D3 2000 units Tabs      Take 2,000 Int'l Units by mouth daily        VOLTAREN 1 % Gel topical gel   Generic drug:  diclofenac      Place 2 g onto the skin 4 times daily

## 2018-06-13 ENCOUNTER — OFFICE VISIT (OUTPATIENT)
Dept: SLEEP MEDICINE | Facility: CLINIC | Age: 68
End: 2018-06-13
Payer: COMMERCIAL

## 2018-06-13 VITALS
BODY MASS INDEX: 21.85 KG/M2 | HEIGHT: 64 IN | RESPIRATION RATE: 16 BRPM | HEART RATE: 74 BPM | OXYGEN SATURATION: 96 % | DIASTOLIC BLOOD PRESSURE: 77 MMHG | WEIGHT: 128 LBS | SYSTOLIC BLOOD PRESSURE: 120 MMHG

## 2018-06-13 DIAGNOSIS — F51.04 PSYCHOPHYSIOLOGICAL INSOMNIA: Primary | ICD-10-CM

## 2018-06-13 PROCEDURE — 99214 OFFICE O/P EST MOD 30 MIN: CPT | Performed by: PSYCHIATRY & NEUROLOGY

## 2018-06-13 ASSESSMENT — PATIENT HEALTH QUESTIONNAIRE - PHQ9
SUM OF ALL RESPONSES TO PHQ QUESTIONS 1-9: 5
SUM OF ALL RESPONSES TO PHQ QUESTIONS 1-9: 9

## 2018-06-13 ASSESSMENT — ANXIETY QUESTIONNAIRES
GAD7 TOTAL SCORE: 0
GAD7 TOTAL SCORE: 5

## 2018-06-13 NOTE — PATIENT INSTRUCTIONS

## 2018-06-13 NOTE — MR AVS SNAPSHOT
After Visit Summary   6/13/2018    Yessenia Martines    MRN: [id number]           Patient Information     Date Of Birth          1950        Visit Information        Provider Department      6/13/2018 9:00 AM Alvarado Moore MD [facility]        Today's Diagnoses     Psychophysiological insomnia    -  1      Care Instructions      Your BMI is Body mass index is 22.32 kg/(m^2).  Weight management is a personal decision.  If you are interested in exploring weight loss strategies, the following discussion covers the approaches that may be successful. Body mass index (BMI) is one way to tell whether you are at a healthy weight, overweight, or obese. It measures your weight in relation to your height.  A BMI of 18.5 to 24.9 is in the healthy range. A person with a BMI of 25 to 29.9 is considered overweight, and someone with a BMI of 30 or greater is considered obese. More than two-thirds of American adults are considered overweight or obese.  Being overweight or obese increases the risk for further weight gain. Excess weight may lead to heart disease and diabetes.  Creating and following plans for healthy eating and physical activity may help you improve your health.  Weight control is part of healthy lifestyle and includes exercise, emotional health, and healthy eating habits. Careful eating habits lifelong are the mainstay of weight control. Though there are significant health benefits from weight loss, long-term weight loss with diet alone may be very difficult to achieve- studies show long-term success with dietary management in less than 10% of people. Attaining a healthy weight may be especially difficult to achieve in those with severe obesity. In some cases, medications, devices and surgical management might be considered.  What can you do?  If you are overweight or obese and are interested in methods for weight loss, you should discuss this with your provider.      Consider reducing daily calorie intake by 500 calories.     Keep a food journal.     Avoiding skipping meals, consider cutting portions instead.    Diet combined with exercise helps maintain muscle while optimizing fat loss. Strength training is particularly important for building and maintaining muscle mass. Exercise helps reduce stress, increase energy, and improves fitness. Increasing exercise without diet control, however, may not burn enough calories to loose weight.       Start walking three days a week 10-20 minutes at a time    Work towards walking thirty minutes five days a week     Eventually, increase the speed of your walking for 1-2 minutes at time    In addition, we recommend that you review healthy lifestyles and methods for weight loss available through the National Institutes of Health patient information sites:  http://win.niddk.nih.gov/publications/index.htm    And look into health and wellness programs that may be available through your health insurance provider, employer, local community center, or sunshine club.                Follow-ups after your visit        Additional Services     SLEEP PSYCHOLOGY REFERRAL       CBT-Insomnia for sleep onset insomnia.     Referral Urgency: Next available        Dr. Alvarado Cervantes is at the following clinics on the following days:  [address]        Thursday and Friday (PLEASE CALL [phone] to schedule an appointment).  [facility] [address] [place], [place]       Wednesdays   (PLEASE CALL [phone] to schedule an appointment).     Please be aware that coverage of these services is subject to the terms and limitations of your health insurance plan. Call member services at your health plan with any benefit or coverage questions.     Please bring the following to your appointment:  >> List of current medications   >> This referral request   >> Any documents/labs given to you for this referral   "                Who to contact     If you have questions or need follow up information about today's clinic visit or your schedule please contact [facility] [place] directly at [phone].  Normal or non-critical lab and imaging results will be communicated to you by MyChart, letter or phone within 4 business days after the clinic has received the results. If you do not hear from us within 7 days, please contact the clinic through MyChart or phone. If you have a critical or abnormal lab result, we will notify you by phone as soon as possible.  Submit refill requests through [employer] or call your pharmacy and they will forward the refill request to us. Please allow 3 business days for your refill to be completed.          Additional Information About Your Visit        [employer] Information     [employer] gives you secure access to your electronic health record. If you see a primary care provider, you can also send messages to your care team and make appointments. If you have questions, please call your primary care clinic.  If you do not have a primary care provider, please call [phone] and they will assist you.        Care EveryWhere ID     This is your Care EveryWhere ID. This could be used by other organizations to access your [place] medical records  [id number]        Your Vitals Were     Pulse Respirations Height Pulse Oximetry BMI (Body Mass Index)       74 16 1.613 m (5' 3.5\") 96% 22.32 kg/m2        Blood Pressure from Last 3 Encounters:   06/13/18 120/77   06/12/18 122/77   06/12/18 114/77    Weight from Last 3 Encounters:   06/13/18 58.1 kg (128 lb)   06/12/18 58.1 kg (128 lb)   06/12/18 58.1 kg (128 lb)              We Performed the Following     SLEEP PSYCHOLOGY REFERRAL        Primary Care Provider Office Phone # Fax #    Josue Mejia [phone] [address]  [place] [address]        Equal Access to Services     LAI TATE AH: Linwood urbano " Erica, isidrada lubrennanadaha, qamarilyta kaserge kam, dipak marshallin hayaan cindycornelius whitneygrady laMadisonagustin lisette. So [facility] [phone].    ATENCIÓN: Si ahmet nicholas, tiene a armendariz disposición servicios gratuitos de asistencia lingüística. Rolo al [phone].    We comply with applicable federal civil rights laws and [place] laws. We do not discriminate on the basis of race, color, national origin, age, disability, sex, sexual orientation, or gender identity.            Thank you!     Thank you for choosing [facility] [place]  for your care. Our goal is always to provide you with excellent care. Hearing back from our patients is one way we can continue to improve our services. Please take a few minutes to complete the written survey that you may receive in the mail after your visit with us. Thank you!             Your Updated Medication List - Protect others around you: Learn how to safely use, store and throw away your medicines at www.disposemymeds.org.          This list is accurate as of 6/13/18  9:48 AM.  Always use your most recent med list.                   Brand Name Dispense Instructions for use Diagnosis    calcium 600/vitamin D 600-400 MG-UNIT per tablet   Generic drug:  calcium-vitamin D      Take 1 tablet by mouth 2 times daily        CELLCEPT 500 MG tablet      500 mg in AM then 250 mg in PM        cetirizine 10 MG tablet    zyrTEC     10 mg by Oral or J tube route        CYMBALTA PO      Take 60 mg by mouth 2 times daily        estradiol 1 MG tablet    ESTRACE    90 tablet    Take 0.5 tablets (0.5 mg) by mouth daily    Post-menopause on HRT (hormone replacement therapy)       leucovorin 5 MG tablet    WELLCOVORIN     Take 1 tablet (5 mg) by mouth daily        meloxicam 15 MG tablet    MOBIC     Take 0.5 tablets (7.5 mg) by mouth daily        methotrexate 2.5 MG tablet CHEMO      Take 4 tablets by mouth once a week        NORTRIPYTLINE HCL PO      Take 40 mg by mouth daily        omeprazole 40 MG capsule     priLOSEC    90 capsule    TAKE 1 CAPSULE DAILY    Primary insomnia       oxyCODONE-acetaminophen 5-325 MG per tablet    PERCOCET     Take 1-2 tablets by mouth daily as needed for pain        predniSONE 5 MG tablet    DELTASONE     Taking 5 mg and 1 mg tablet x2 to get a total 7 mg daily        ranitidine HCl 300 MG Caps      Take 300 mg by mouth every evening        traZODone 100 MG tablet    DESYREL    180 tablet    TAKE 2 TABLETS AT BEDTIME    Gastroesophageal reflux disease without esophagitis       Vitamin D3 2000 units Tabs      Take 2,000 Int'l Units by mouth daily        VOLTAREN 1 % Gel topical gel   Generic drug:  diclofenac      Place 2 g onto the skin 4 times daily

## 2018-06-13 NOTE — PROGRESS NOTES
Visit Date:   06/13/2018      FOLLOWUP NOTE      Ms. Martines is a 68-year-old female with history of nonrestorative sleep. During our last visit, it appeared the  had an advanced circadian rhythm.  We discussed strategies of using light box therapy in the evening. She did not care for the light box therapy because of photo toxicity related to her lupus, but I am happy to note that her sleep is substantially better.  I suspect this is related at least in part to evening sunlight in the summer.  Regardless, she indicates she goes to bed now, falls asleep without difficulty, and wakes up in the morning feeling refreshed.  She visited with my colleague, Dr. Alvarado Cervantes, but considering how well she is doing right now, no further cognitive behavioral interventions are warranted.      In regards to her overnight sleep study, she did have mild sleep disordered breathing best characterized as mixed. We did discuss possible strategies for this including positive airway pressure therapy, mandibular repositioning appliance, upper airway surgery or just positional therapy and considering that her  is already going to be sleeping with the head of the bed elevated, she would like to give that a try as well and if the opportunity arises they can get a mattress that the head of the bed elevates.      If this is not effective, if she is still waking up feeling tired or sleepy, has more trouble with snoring or any cardiovascular concerns, I would be happy to re-evaluate her.      Of note, we also discussed her elevated REM motor tone during sleep.  However  she does not have any significant history of dream enactment behavior either according to her or to her  and thus, we will not give her a diagnosis of REM sleep behavior disorder at this time. If those  symptoms were to arise, I would like her to come back and see me.      The patient understands the plan.  It is a great privilege being asked to participate in  her care.      Twenty-five minutes were spent with the patient today, greater than 50% of the time in counseling and coordination of care.         NGUYEN KEITH MD             D: 2018   T: 2018   MT: DEEPA      Name:     ADE RICHMOND   MRN:      [id number]        Account:      [id number]   :      1950           Visit Date:   2018      Document: [id number]

## 2018-06-13 NOTE — NURSING NOTE
"Chief Complaint   Patient presents with     CPAP Follow Up       Initial /77  Pulse 74  Resp 16  Ht 1.613 m (5' 3.5\")  Wt 58.1 kg (128 lb)  SpO2 96%  BMI 22.32 kg/m2 Estimated body mass index is 22.32 kg/(m^2) as calculated from the following:    Height as of this encounter: 1.613 m (5' 3.5\").    Weight as of this encounter: 58.1 kg (128 lb).    Medication Reconciliation: complete     ESS 6  Myrna Gonsalez        "

## 2018-06-14 NOTE — PROGRESS NOTES
SLEEP MEDICINE CONSULTATION  Sleep Psychology    Name: Yessenia Martines MRN# [id number]   Age: 68 year old YOB: 1950     Date of Consultation: Jun 12, 2018  Consultation is requested by: Alvarado Moore MD  [address] [address]  Primary care provider: Josue Mejia    Reason for Sleep Consultation     Yessenia Martines is a 68 year old female seen today for a behavioral sleep medicine consultation because of insomnia associated with restless legs.      Assessment and Plan     Sleep Diagnoses/Recommendations:    1. Chronic insomnia  Patient history of insomnia appears to be multifactor, associated with motor restlessness/restless legs and evidence for an advanced sleep phase tendency.  Patient indicates that recommendations previously made  for use of bright light box in the evenings were not acceptable to her because she felt it had a negative impact on her lupus.  We discussed instead ensuring that she remains relatively active in the evening in bright ambient light.  Patient indicates that she is now sleeping approximately 8 hours a night, 3 hours improvement with remarkable decrease in terminal insomnia.. She attributes this to restarting nortriptyline.  We discussed maintaining a consistent sleep window 8.5 hours between 11:30 PM-8 AM.  She was advised to avoid inadvertent napping in the later evening.          2. Mild Sleep Disordered Breathing     Recent sleep study completed on 5/9/2018 indicated mild sleep disordered breathing with both obstructive and mixed events.  Patient will follow up with Dr. Alvarado Moore for review of sleep study and treatment if indicated  See patient instructions for initial treatment recommendations and behavioral sleep plan.    Summary Counseling:      Yessenia was provided information about the pathophysiology of insomnia and psychophysiological factors contributing to the onset and maintenance of insomnia  associated with restless legs and mild sleep disordered breathing.  Treatment option were discussed including component of cognitive-behavioral therapy for insomnia. The benefits and potential early side effects of treatment including increased daytime sleepiness were discussed. She was advised to seek medical advise and consultation around use of or changes to prescription sleep medication, Patient was counseled on the importance of avoiding driving if drowsy.        Follow-up:   as needed     History of Present Sleep Complaint     Yessenia Martines is a 68 year old year old female who reports a history of restless legs that has responded to pramipexole and carbidopa/levodopa extended release.  There has been concerned about augmentation of these agents.    She also reports history of chronic insomnia characterized by frequent inadvertent napping in the evening, sleep onset at around 11 PM early morning awakening approximately 3-5AM.  Her typical sleep pattern has been to listen to M[employer] on the radial at patient now indicates that she wakes up about 9:15 PM and fall asleep fairly quickly.  She then would wake after several hours of sleep and had difficulty falling back to sleep.  After starting nortriptyline recently she notes that she now wakes up at 8 AM in the morning.  She is also adjusted her bedtime to 11:15 PM which she states also helps.  She was advised to try using a bright light box but felt that she did not tolerate it.  She indicates that it worsened her lupus symptoms.  1-2 times a night and is able to fall back to sleep relatively quickly based on her sleep diaries.  Sleep latency is between 5-10 minutes, wake after sleep onset over the last 2 weeks has been approximately 10 minutes.  She napped 2 days out of the last 14 for less than 30 minutes.    Patient reports that she is quite active during the day.  She has a bed partner-her .    In addition to starting nortriptyline, she continues to  "take trazodone at bedtime 200 mg.    Previous Sleep Studies:    Sleep study completed on 5/9/2018 indicated a combined apnea hypotony index of 8.6 events per hour, central apnea index of 4.2 events per hour.  REM AHI was 15.1 events per hour.  Supine AHI was 11 events per hour.      Sleep Environment:    Yessenia reports her bedroom is quiet, comfortable and dark. The patient does have a regular bed partner and she  does not have pets in the bedroom.       Substance Use:    Tobacco: None   Caffeine: 0/1 occasional.   Alcohol: Occasional  Recreational Drugs: None      Screening          [place] Sleepiness Scale  Total score - [place]: 6 .    SUMMER Total Score: 8       PHQ-9 SCORE 6/12/2018   Total Score MyChart -   Total Score 9       CHE-7 SCORE 6/6/2017 6/12/2018 6/12/2018   Total Score 3 0 5       Patient Activation Score   No flowsheet data found.      Vitals     /77  Pulse 82  Resp 16  Ht 1.613 m (5' 3.5\")  Wt 58.1 kg (128 lb)  SpO2 93%  BMI 22.32 kg/m2     Medical History     Past Medical History:   Diagnosis Date     Allergic rhinitis      Allergic rhinitis due to pollen      Arthritis      Decreased libido      Depressive disorder      Esophageal motility disorder      H pylori ulcer      Hoarseness      Immunosuppression (H)      Lupus (systemic lupus erythematosus) (H)     diagnosed 12 yrs ago     MCI (mild cognitive impairment)      Menopausal osteoporosis 02/17/2015     Movement disorder      Neuropathy     feet and hands     S/P cholecystectomy      S/P TIESHA-BSO        Current Outpatient Prescriptions   Medication Sig Dispense Refill     calcium-vitamin D (CALCIUM 600/VITAMIN D) 600-400 MG-UNIT per tablet Take 1 tablet by mouth 2 times daily       cetirizine (ZYRTEC) 10 MG tablet 10 mg by Oral or J tube route       Cholecalciferol (VITAMIN D3) 2000 UNITS TABS Take 2,000 Int'l Units by mouth daily       diclofenac (VOLTAREN) 1 % GEL Place 2 g onto the skin 4 times daily       DULoxetine HCl " (CYMBALTA PO) Take 60 mg by mouth 2 times daily        estradiol (ESTRACE) 1 MG tablet Take 0.5 tablets (0.5 mg) by mouth daily 90 tablet 3     leucovorin (WELLCOVORIN) 5 MG tablet Take 1 tablet (5 mg) by mouth daily       meloxicam (MOBIC) 15 MG tablet Take 0.5 tablets (7.5 mg) by mouth daily       methotrexate 2.5 MG tablet Take 4 tablets by mouth once a week        mycophenolate (CELLCEPT) 500 MG tablet 500 mg in AM then 250 mg in PM       Nortriptyline HCl (NORTRIPYTLINE HCL PO) Take 40 mg by mouth daily       omeprazole (PRILOSEC) 40 MG capsule TAKE 1 CAPSULE DAILY 90 capsule 3     oxyCODONE-acetaminophen (PERCOCET) 5-325 MG per tablet Take 1-2 tablets by mouth daily as needed for pain       predniSONE (DELTASONE) 5 MG tablet Taking 5 mg and 1 mg tablet x2 to get a total 7 mg daily       ranitidine HCl 300 MG CAPS Take 300 mg by mouth every evening       traZODone (DESYREL) 100 MG tablet TAKE 2 TABLETS AT BEDTIME 180 tablet 3       Past Surgical History:   Procedure Laterality Date     ARTHRODESIS FINGER(S) Left 2018    Procedure: ARTHRODESIS FINGER(S);;  Surgeon: Shahida Carlton MD;  Location: [facility]     ARTHROPLASTY CARPOMETACARPAL (THUMB JOINT) Left 2018    Procedure: ARTHROPLASTY CARPOMETACARPAL (THUMB JOINT);  LEFT THUMB CARPALMETACARPAL WITH MINI TIGHTROPE, LEFT METAPHALANGEAL FUSION WITH RADIAL BONE GRAFT AND EASY CLIP STAPLE.;  Surgeon: Shahida Carlton MD;  Location: [facility]     ARTHROPLASTY CARPOMETACARPAL (THUMB JOINT), ARTHRODESIS, COMBINED Left 2018    Procedure: COMBINED ARTHROPLASTY CARPOMETACARPAL (THUMB JOINT), ARTHRODESIS;  LEFT THUMB CARPOMETACARPAL EXCISIONAL ARTHROPLASTY WITH FACSIA TANIA GRAFT, PARTIAL TRAPEZOID EXCISION;  Surgeon: Shahida Carlton MD;  Location: [facility]     BACK SURGERY      L2-L4 or L5 fusion     BLEPHAROPLASTY BILATERAL        SECTION      x3     CHOLECYSTECTOMY      feels better. No pathology on gallbladder      EYE SURGERY  2011    Raised eyelids.     GRAFT BONE TO FINGER Left 5/18/2018    Procedure: GRAFT BONE TO FINGER;;  Surgeon: Briana Carlton MD;  Location: [facility], SIMPLE EXAM  04/02/14     HYSTERECTOMY       KNEE SURGERY      bilateral arthroscopy     ORTHOPEDIC SURGERY      right thumb tendon surgery     REPAIR TENDON FINGER(S)  11/9/2011    Procedure:REPAIR TENDON FINGER(S); RIGHT INDEX FINGER FLEXOR DIGITORUM PROFUNDUS REPAIR, RADIAL DIGITAL NERVE REPAIR ; Surgeon:BRIANA CARLTON; Location:[facility]     SALPINGO-OOPHORECTOMY BILATERAL       SHOULDER SURGERY Right     arthroscopy     TONSILLECTOMY      Child          Allergies   Allergen Reactions     Augmentin Other (See Comments)     C Diff colitis     Cefdinir Other (See Comments)     Many years ago, does not recall what reaction was     Sudafed [Pseudoephedrine]      Jittery, pacing     Tizanidine Other (See Comments)     Many years ago, does not recall what reaction was     Latex Rash         Psychiatric History     Prior Psychiatric Diagnoses: none   Psychiatric Hospitalizations: none   Use of Psychotropics none      Chemical Use     Prior Chemical Dependency Treatment: none         Family History     Family History   Problem Relation Age of Onset     C.A.D. Father      cabg, carotid stenosis     Hyperlipidemia Father      Hypertension Father      Neurologic Disorder Mother      ALS? Parkinsons?     OSTEOPOROSIS Mother      Hypertension Brother      Colon Cancer Maternal Grandmother      Colon Cancer Maternal Aunt      Hyperlipidemia Sister      Hyperlipidemia Brother      OSTEOPOROSIS Sister      OSTEOPOROSIS Maternal Aunt      Hypertension Sister        Sleep Disorders: None reported    Social History     Social History     Social History     Marital status:      Spouse name: N/A     Number of children: 4     Years of education: N/A     Social History Main Topics     Smoking status: Never Smoker     Smokeless  tobacco: Never Used     Alcohol use 0.0 oz/week     0 Standard drinks or equivalent per week      Comment: 1 wine/day     Drug use: No     Sexual activity: Yes     Partners: Male     Birth control/ protection: Female Surgical, Post-menopausal      Comment: hysterectomy     Other Topics Concern     Not on file     Social History Narrative    , 4 children    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  The patient was cooperative throughout the evaluation with no signs of hallucinations, delusions, loosening of associations or other thought disturbance.  Mood was stable and within normal limits.  Affect was congruent with mood. Insight and judgement we intact.  Memory was intact for recent and remote elements.  There was no report of suicidal ideation, intention or plan. Attention and concentration were within normal.      Time Spent: 50 minutes    Copy:   Josue Mejia MD  [address]  [place] [address]    Alvarado Cervantes PsyD, LP, [facility]  Certified, Behavioral Sleep Medicine  [facility] -  [place] and [place]

## 2018-06-26 ENCOUNTER — TRANSFERRED RECORDS (OUTPATIENT)
Dept: HEALTH INFORMATION MANAGEMENT | Facility: CLINIC | Age: 68
End: 2018-06-26

## 2018-08-07 ENCOUNTER — TRANSFERRED RECORDS (OUTPATIENT)
Dept: HEALTH INFORMATION MANAGEMENT | Facility: CLINIC | Age: 68
End: 2018-08-07

## 2018-08-09 ENCOUNTER — TRANSFERRED RECORDS (OUTPATIENT)
Dept: HEALTH INFORMATION MANAGEMENT | Facility: CLINIC | Age: 68
End: 2018-08-09

## 2018-08-12 ENCOUNTER — HEALTH MAINTENANCE LETTER (OUTPATIENT)
Age: 68
End: 2018-08-12

## 2018-08-13 ENCOUNTER — TRANSFERRED RECORDS (OUTPATIENT)
Dept: HEALTH INFORMATION MANAGEMENT | Facility: CLINIC | Age: 68
End: 2018-08-13

## 2018-09-04 ENCOUNTER — OFFICE VISIT (OUTPATIENT)
Dept: FAMILY MEDICINE | Facility: CLINIC | Age: 68
End: 2018-09-04
Payer: COMMERCIAL

## 2018-09-04 ENCOUNTER — MYC MEDICAL ADVICE (OUTPATIENT)
Dept: FAMILY MEDICINE | Facility: CLINIC | Age: 68
End: 2018-09-04

## 2018-09-04 VITALS
HEIGHT: 64 IN | DIASTOLIC BLOOD PRESSURE: 75 MMHG | BODY MASS INDEX: 22.2 KG/M2 | WEIGHT: 130 LBS | HEART RATE: 83 BPM | TEMPERATURE: 97.7 F | SYSTOLIC BLOOD PRESSURE: 122 MMHG | OXYGEN SATURATION: 99 %

## 2018-09-04 DIAGNOSIS — Z01.818 PREOP GENERAL PHYSICAL EXAM: Primary | ICD-10-CM

## 2018-09-04 DIAGNOSIS — G25.81 RESTLESS LEGS SYNDROME: ICD-10-CM

## 2018-09-04 DIAGNOSIS — Z13.6 CARDIOVASCULAR SCREENING; LDL GOAL LESS THAN 130: ICD-10-CM

## 2018-09-04 DIAGNOSIS — M54.40 BILATERAL LOW BACK PAIN WITH SCIATICA, SCIATICA LATERALITY UNSPECIFIED, UNSPECIFIED CHRONICITY: ICD-10-CM

## 2018-09-04 DIAGNOSIS — M32.9 SYSTEMIC LUPUS ERYTHEMATOSUS, UNSPECIFIED SLE TYPE, UNSPECIFIED ORGAN INVOLVEMENT STATUS (H): ICD-10-CM

## 2018-09-04 LAB
ERYTHROCYTE [DISTWIDTH] IN BLOOD BY AUTOMATED COUNT: 12.9 % (ref 10–15)
HCT VFR BLD AUTO: 41.6 % (ref 35–47)
HGB BLD-MCNC: 13.9 G/DL (ref 11.7–15.7)
MCH RBC QN AUTO: 31.7 PG (ref 26.5–33)
MCHC RBC AUTO-ENTMCNC: 33.4 G/DL (ref 31.5–36.5)
MCV RBC AUTO: 95 FL (ref 78–100)
PLATELET # BLD AUTO: 289 10E9/L (ref 150–450)
RBC # BLD AUTO: 4.39 10E12/L (ref 3.8–5.2)
WBC # BLD AUTO: 10.2 10E9/L (ref 4–11)

## 2018-09-04 PROCEDURE — 85027 COMPLETE CBC AUTOMATED: CPT | Performed by: INTERNAL MEDICINE

## 2018-09-04 PROCEDURE — 80053 COMPREHEN METABOLIC PANEL: CPT | Performed by: INTERNAL MEDICINE

## 2018-09-04 PROCEDURE — 99215 OFFICE O/P EST HI 40 MIN: CPT | Performed by: INTERNAL MEDICINE

## 2018-09-04 PROCEDURE — 93000 ELECTROCARDIOGRAM COMPLETE: CPT | Performed by: INTERNAL MEDICINE

## 2018-09-04 PROCEDURE — 36415 COLL VENOUS BLD VENIPUNCTURE: CPT | Performed by: INTERNAL MEDICINE

## 2018-09-04 PROCEDURE — 80061 LIPID PANEL: CPT | Performed by: INTERNAL MEDICINE

## 2018-09-04 RX ORDER — OXYCODONE AND ACETAMINOPHEN 5; 325 MG/1; MG/1
1-2 TABLET ORAL DAILY PRN
Qty: 30 TABLET | Refills: 0 | Status: SHIPPED | OUTPATIENT
Start: 2018-09-04 | End: 2018-09-17

## 2018-09-04 RX ORDER — PYRIDOSTIGMINE BROMIDE 60 MG/1
120 TABLET ORAL DAILY
COMMUNITY
Start: 2018-08-13 | End: 2019-09-30

## 2018-09-04 NOTE — PROGRESS NOTES
[facility]  [address] [facility] [address]  [phone]  Dept: [phone]    PRE-OP EVALUATION:  Today's date: 2018    Yessenia Martines (: 1950) presents for pre-operative evaluation assessment as requested by Dr. Viraj Mcneil.  She requires evaluation and anesthesia risk assessment prior to undergoing surgery/procedure for treatment of Back Pain.    Proposed Surgery/ Procedure: Foraminotomy, TLIF, PSF with Inst  Date of Surgery/ Procedure: 2018  Time of Surgery/ Procedure: 1:30 PM  Hospital/Surgical Facility: [facility]  Fax number for surgical facility: [phone]  Primary Physician: Josue Mejia  Type of Anesthesia Anticipated: General    Patient has a Health Care Directive or Living Will:  YES on file     1. NO - Do you have a history of heart attack, stroke, stent, bypass or surgery on an artery in the head, neck, heart or legs?  2. NO - Do you ever have any pain or discomfort in your chest?  3. NO - Do you have a history of  Heart Failure?  4. NO - Are you troubled by shortness of breath when: walking on the level, up a slight hill or at night?  5. NO - Do you currently have a cold, bronchitis or other respiratory infection?  6. NO - Do you have a cough, shortness of breath or wheezing?  7. NO - Do you sometimes get pains in the calves of your legs when you walk?  8. NO - Do you or anyone in your family have previous history of blood clots?  9. NO - Do you or does anyone in your family have a serious bleeding problem such as prolonged bleeding following surgeries or cuts?  10. YES - HAVE YOU EVER HAD PROBLEMS WITH ANEMIA OR BEEN TOLD TO TAKE IRON PILLS? Past hx   11. NO - Have you had any abnormal blood loss such as black, tarry or bloody stools, or abnormal vaginal bleeding?  12. NO - Have you ever had a blood transfusion?  13. NO - Have you or any of your relatives ever had problems with anesthesia?  14. NO - Do you have sleep apnea,  excessive snoring or daytime drowsiness?  15. NO - Do you have any prosthetic heart valves?  16. NO - DO YOU HAVE PROSTHETIC JOINTS? Does have bilateral thumb joints replaced with cadaver tendons/ligaments.  17. NO - Is there any chance that you may be pregnant?      HPI:     HPI related to upcoming procedure: Lumbar spine fusion      See problem list for active medical problems.  Problems all longstanding and stable, except as noted/documented.  See ROS for pertinent symptoms related to these conditions.                                                                                                                                                          .    MEDICAL HISTORY:     Patient Active Problem List    Diagnosis Date Noted     History of Clostridium difficile colitis 01/15/2018     Priority: Medium     Microscopic hematuria 12/28/2017     Priority: Medium     Advance care planning 01/19/2017     Priority: Medium     Advance Care Planning 1/19/2017: Receipt of ACP document:  Received: Health Care Directive which was witnessed or notarized on 11/8/16.  Document previously scanned on 11/23/16.  Validation form completed and sent to be scanned.  Code Status needs to be updated to reflect choices in most recent ACP document.   Confirmed/documented designated decision maker(s).  Added by Joanie Quiroz   Advance Care Planning Liaison             Somatic dysfunction of lumbar region 12/22/2016     Priority: Medium     Bilateral low back pain with sciatica, sciatica laterality unspecified, unspecified chronicity 12/22/2016     Priority: Medium     Somatic dysfunction of sacral region 12/22/2016     Priority: Medium     Sacral pain 12/22/2016     Priority: Medium     Thoracic segment dysfunction 12/22/2016     Priority: Medium     Abnormal CT scan, lung 04/22/2016     Priority: Medium     Dysphonia 04/14/2016     Priority: Medium     Globus sensation 04/14/2016     Priority: Medium     Lupus (systemic lupus  erythematosus) (H)      Priority: Medium     diagnosed 12 yrs ago       MCI (mild cognitive impairment)      Priority: Medium     Hemorrhage of cyst of native kidney 12/02/2015     Priority: Medium     Mechanical low back pain 04/14/2015     Priority: Medium     Menopausal osteoporosis 02/17/2015     Priority: Medium     Systemic lupus erythematosus (H) 03/03/2014     Priority: Medium     Diagnosis 1997  Dr. Hickman  - no renal complications       CARDIOVASCULAR SCREENING; LDL GOAL LESS THAN 130 03/03/2014     Priority: Medium     Restless legs syndrome 03/03/2014     Priority: Medium     Peripheral neuropathy, idiopathic 03/03/2014     Priority: Medium     Emanuel Idelkope       Osteoporosis 03/03/2014     Priority: Medium     Had been treated with bisphosphonates for 5 years - 1207-0747  Problem list name updated by automated process. Provider to review        Past Medical History:   Diagnosis Date     Allergic rhinitis      Allergic rhinitis due to pollen      Arthritis      Decreased libido      Depressive disorder      Esophageal motility disorder      H pylori ulcer      Hoarseness      Immunosuppression (H)      Lupus (systemic lupus erythematosus) (H)     diagnosed 12 yrs ago     MCI (mild cognitive impairment)      Menopausal osteoporosis 02/17/2015     Movement disorder      Neuropathy     feet and hands     S/P cholecystectomy      S/P TIESHA-BSO      Past Surgical History:   Procedure Laterality Date     ARTHRODESIS FINGER(S) Left 5/18/2018    Procedure: ARTHRODESIS FINGER(S);;  Surgeon: Shahida Carlton MD;  Location: [facility]     ARTHROPLASTY CARPOMETACARPAL (THUMB JOINT) Left 5/18/2018    Procedure: ARTHROPLASTY CARPOMETACARPAL (THUMB JOINT);  LEFT THUMB CARPALMETACARPAL WITH MINI TIGHTROPE, LEFT METAPHALANGEAL FUSION WITH RADIAL BONE GRAFT AND EASY CLIP STAPLE.;  Surgeon: Shahida Carlton MD;  Location: [facility]     ARTHROPLASTY CARPOMETACARPAL (THUMB JOINT), ARTHRODESIS, COMBINED Left  2018    Procedure: COMBINED ARTHROPLASTY CARPOMETACARPAL (THUMB JOINT), ARTHRODESIS;  LEFT THUMB CARPOMETACARPAL EXCISIONAL ARTHROPLASTY WITH FACSIA TANIA GRAFT, PARTIAL TRAPEZOID EXCISION;  Surgeon: Briana Carlton MD;  Location: [facility]     BACK SURGERY      L2-L4 or L5 fusion     BLEPHAROPLASTY BILATERAL        SECTION      x3     CHOLECYSTECTOMY      feels better. No pathology on gallbladder     EYE SURGERY      Raised eyelids.     GRAFT BONE TO FINGER Left 2018    Procedure: GRAFT BONE TO FINGER;;  Surgeon: Briana Carlton MD;  Location: [facility] UGI ENDOSCOPY, SIMPLE EXAM  14     HYSTERECTOMY       KNEE SURGERY      bilateral arthroscopy     ORTHOPEDIC SURGERY      right thumb tendon surgery     REPAIR TENDON FINGER(S)  2011    Procedure:REPAIR TENDON FINGER(S); RIGHT INDEX FINGER FLEXOR DIGITORUM PROFUNDUS REPAIR, RADIAL DIGITAL NERVE REPAIR ; Surgeon:BRIANA CARLTON; Location:[facility]     SALPINGO-OOPHORECTOMY BILATERAL       SHOULDER SURGERY Right     arthroscopy     TONSILLECTOMY      Child     Current Outpatient Prescriptions   Medication Sig Dispense Refill     calcium-vitamin D (CALCIUM 600/VITAMIN D) 600-400 MG-UNIT per tablet Take 1 tablet by mouth 2 times daily       cetirizine (ZYRTEC) 10 MG tablet 10 mg by Oral or J tube route       Cholecalciferol (VITAMIN D3) 2000 UNITS TABS Take 2,000 Int'l Units by mouth daily       diclofenac (VOLTAREN) 1 % GEL Place 2 g onto the skin 4 times daily       DULoxetine HCl (CYMBALTA PO) Take 60 mg by mouth 2 times daily        estradiol (ESTRACE) 1 MG tablet Take 0.5 tablets (0.5 mg) by mouth daily 90 tablet 3     leucovorin (WELLCOVORIN) 5 MG tablet Take 1 tablet (5 mg) by mouth daily       meloxicam (MOBIC) 15 MG tablet Take 0.5 tablets (7.5 mg) by mouth daily       methotrexate 2.5 MG tablet Take 4 tablets by mouth once a week        mycophenolate (CELLCEPT) 500 MG tablet 500 mg in AM then  "250 mg in PM       Nortriptyline HCl (NORTRIPYTLINE HCL PO) Take 40 mg by mouth daily       omeprazole (PRILOSEC) 40 MG capsule TAKE 1 CAPSULE DAILY 90 capsule 3     oxyCODONE-acetaminophen (PERCOCET) 5-325 MG per tablet Take 1-2 tablets by mouth daily as needed for pain       predniSONE (DELTASONE) 5 MG tablet Taking 5 mg and 1 mg tablet x2 to get a total 7 mg daily       pyridostigmine (MESTINON) 60 MG tablet Take 120 mg by mouth daily       ranitidine HCl 300 MG CAPS Take 300 mg by mouth every evening       traZODone (DESYREL) 100 MG tablet TAKE 2 TABLETS AT BEDTIME 180 tablet 3     OTC products: NSAIDS    Allergies   Allergen Reactions     Augmentin Other (See Comments)     C Diff colitis     Cefdinir Other (See Comments)     Many years ago, does not recall what reaction was     Sudafed [Pseudoephedrine]      Jittery, pacing     Tizanidine Other (See Comments)     Many years ago, does not recall what reaction was     Latex Rash      Latex Allergy: YES: Precautions to take: Avoid latex    Social History   Substance Use Topics     Smoking status: Never Smoker     Smokeless tobacco: Never Used     Alcohol use 0.0 oz/week     0 Standard drinks or equivalent per week      Comment: 1 wine/day     History   Drug Use No       REVIEW OF SYSTEMS:   Constitutional, HEENT, cardiovascular, pulmonary, gi and gu systems are negative, except as otherwise noted.    EXAM:   /75 (BP Location: Right arm, Cuff Size: Adult Regular)  Pulse 83  Temp 97.7  F (36.5  C) (Tympanic)  Ht 5' 3.5\" (1.613 m)  Wt 130 lb (59 kg)  SpO2 99%  Breastfeeding? No  BMI 22.67 kg/m2    GENERAL APPEARANCE: healthy, alert and no distress     EYES: EOMI, PERRL     HENT: ear canals and TM's normal and nose and mouth without ulcers or lesions     NECK: no adenopathy, no asymmetry, masses, or scars and thyroid normal to palpation     RESP: lungs clear to auscultation - no rales, rhonchi or wheezes     CV: regular rates and rhythm, normal S1 S2, no " S3 or S4 and no murmur, click or rub     ABDOMEN:  soft, nontender, no HSM or masses and bowel sounds normal     MS: extremities normal- no gross deformities noted, no evidence of inflammation in joints, FROM in all extremities.     SKIN: no suspicious lesions or rashes     NEURO: Normal strength and tone, sensory exam grossly normal, mentation intact and speech normal     PSYCH: mentation appears normal. and affect normal/bright     LYMPHATICS: No cervical adenopathy    DIAGNOSTICS:     EKG: appears normal, NSR, normal axis, normal intervals, no acute ST/T changes c/w ischemia, no LVH by voltage criteria, unchanged from previous tracings  Labs Resulted Today:   Results for orders placed or performed in visit on 09/04/18   CBC with platelets   Result Value Ref Range    WBC 10.2 4.0 - 11.0 10e9/L    RBC Count 4.39 3.8 - 5.2 10e12/L    Hemoglobin 13.9 11.7 - 15.7 g/dL    Hematocrit 41.6 35.0 - 47.0 %    MCV 95 78 - 100 fl    MCH 31.7 26.5 - 33.0 pg    MCHC 33.4 31.5 - 36.5 g/dL    RDW 12.9 10.0 - 15.0 %    Platelet Count 289 150 - 450 10e9/L     Labs Drawn and in Process:   Unresulted Labs Ordered in the Past 30 Days of this Admission     Date and Time Order Name Status Description    9/4/2018 1202 LIPID REFLEX TO DIRECT LDL PANEL In process     9/4/2018 1202 COMPREHENSIVE METABOLIC PANEL In process           Recent Labs   Lab Test  12/18/17   1610  05/19/17   0925   04/03/16   1320   HGB  12.2  12.5   < >  12.7   PLT  312  315   < >  251   INR   --    --    --   0.92   NA  136  139   < >  134   POTASSIUM  4.0  4.2   < >  4.0   CR  0.79  0.89   < >  0.91   A1C  5.6   --    --    --     < > = values in this interval not displayed.        IMPRESSION:   Reason for surgery/procedure: degenerative joint disease lumbar spine  Diagnosis/reason for consult: Pre-operative Evaluation    The proposed surgical procedure is considered INTERMEDIATE risk.    REVISED CARDIAC RISK INDEX  The patient has the following serious  cardiovascular risks for perioperative complications such as (MI, PE, VFib and 3  AV Block):  No serious cardiac risks  INTERPRETATION: 0 risks: Class I (very low risk - 0.4% complication rate)    The patient has the following additional risks for perioperative complications:  No identified additional risks      ICD-10-CM    1. Preop general physical exam Z01.818        RECOMMENDATIONS:   (Z01.818) Preop general physical exam  (primary encounter diagnosis)  Comment: You are medically optimized for your upcoming upcoming surgery.  Per prevoius discussion in rheumatology regarding holding methotrexate - typically methotrexate is held 1 weeks prior to and 1 weeks after surgery (note that last dose was given Monday - August 27. Cellcept will be held starting now.  We will plan to hold Meloxicam as well starting 1 week prior to surgery.  Resume Cellcept as per discussion between orthopedics and rheumatology  Plan: CBC with platelets, Basic metabolic panel, EKG         12-lead complete w/read - Clinics       (G25.81) Restless legs syndrome  Comment: Continue nortriptyline   Plan:      (M32.9) Systemic lupus erythematosus (H)  Comment: As above - Continue current dose of prednisone 7 mg daily  Plan:      (F51.01) Primary insomnia  Comment: Continue trazodone 200 mg at night during perioperative period  Plan:     Esophageal dysmotility  Comment; Pyridostigmine has been resumed this past July - I will message our pharmacist to discuss if this medication would need to be tapered prior to surgery.  Note that this is an essential medication for swallowing         Signed Electronically by: Josue Mejia MD, MD    Copy of this evaluation report is provided to requesting physician.    [facility] Preop Guidelines    Revised Cardiac Risk Index

## 2018-09-04 NOTE — PATIENT INSTRUCTIONS
Before Your Surgery      Call your surgeon if there is any change in your health. This includes signs of a cold or flu (such as a sore throat, runny nose, cough, rash or fever).    Do not smoke, drink alcohol or take over the counter medicine (unless your surgeon or primary care doctor tells you to) for the 24 hours before and after surgery.    If you take prescribed drugs: Follow your doctor s orders about which medicines to take and which to stop until after surgery.    Eating and drinking prior to surgery: follow the instructions from your surgeon    Take a shower or bath the night before surgery. Use the soap your surgeon gave you to gently clean your skin. If you do not have soap from your surgeon, use your regular soap. Do not shave or scrub the surgery site.  Wear clean pajamas and have clean sheets on your bed.     (Z01.818) Preop general physical exam  (primary encounter diagnosis)  Comment: You are medically optimized for your upcoming upcoming surgery.  Per prevoius discussion in rheumatology regarding holding methotrexate - typically methotrexate is held 1 weeks prior to and 1 weeks after surgery (note that last dose was given Monday - August 27. Cellcept will be held starting now.  We will plan to hold Meloxicam as well starting 1 week prior to surgery.  Resume Cellcept as per discussion between orthopedics and rheumatology  Plan: CBC with platelets, Basic metabolic panel, EKG         12-lead complete w/read - Clinics       (G25.81) Restless legs syndrome  Comment: Continue nortriptyline   Plan:      (M32.9) Systemic lupus erythematosus (H)  Comment: As above - Continue current dose of prednisone 7 mg daily  Plan:      (F51.01) Primary insomnia  Comment: Continue trazodone 200 mg at night during perioperative period  Plan:     Esophageal dysmotility  Comment; Pyridostigmine has been resumed this past July - I will message our pharmacist to discuss if this medication would need to be tapered prior to  surgery.  Note that this is an essential medication for swallowing

## 2018-09-04 NOTE — MR AVS SNAPSHOT
After Visit Summary   9/4/2018    Yessenia Martines    MRN: [id number]           Patient Information     Date Of Birth          1950        Visit Information        Provider Department      9/4/2018 11:30 AM Josue Mejia MD [facility]        Today's Diagnoses     Preop general physical exam    -  1    Systemic lupus erythematosus, unspecified SLE type, unspecified organ involvement status (H)        Restless legs syndrome        Bilateral low back pain with sciatica, sciatica laterality unspecified, unspecified chronicity        CARDIOVASCULAR SCREENING; LDL GOAL LESS THAN 130          Care Instructions      Before Your Surgery      Call your surgeon if there is any change in your health. This includes signs of a cold or flu (such as a sore throat, runny nose, cough, rash or fever).    Do not smoke, drink alcohol or take over the counter medicine (unless your surgeon or primary care doctor tells you to) for the 24 hours before and after surgery.    If you take prescribed drugs: Follow your doctor s orders about which medicines to take and which to stop until after surgery.    Eating and drinking prior to surgery: follow the instructions from your surgeon    Take a shower or bath the night before surgery. Use the soap your surgeon gave you to gently clean your skin. If you do not have soap from your surgeon, use your regular soap. Do not shave or scrub the surgery site.  Wear clean pajamas and have clean sheets on your bed.     (Z01.818) Preop general physical exam  (primary encounter diagnosis)  Comment: You are medically optimized for your upcoming upcoming surgery.  Per prevoius discussion in rheumatology regarding holding methotrexate - typically methotrexate is held 1 weeks prior to and 1 weeks after surgery (note that last dose was given Monday - August 27. Cellcept will be held starting now.  We will plan to hold Meloxicam as well starting 1 week prior to surgery.   Resume Cellcept as per discussion between orthopedics and rheumatology  Plan: CBC with platelets, Basic metabolic panel, EKG         12-lead complete w/read - Clinics       (G25.81) Restless legs syndrome  Comment: Continue nortriptyline   Plan:      (M32.9) Systemic lupus erythematosus (H)  Comment: As above - Continue current dose of prednisone 7 mg daily  Plan:      (F51.01) Primary insomnia  Comment: Continue trazodone 200 mg at night during perioperative period  Plan:     Esophageal dysmotility  Comment; Pyridostigmine has been resumed this past July - I will message our pharmacist to discuss if this medication would need to be tapered prior to surgery.  Note that this is an essential medication for swallowing          Follow-ups after your visit        Who to contact     If you have questions or need follow up information about today's clinic visit or your schedule please contact [facility] directly at [phone].  Normal or non-critical lab and imaging results will be communicated to you by MyChart, letter or phone within 4 business days after the clinic has received the results. If you do not hear from us within 7 days, please contact the clinic through [employer] or phone. If you have a critical or abnormal lab result, we will notify you by phone as soon as possible.  Submit refill requests through [employer] or call your pharmacy and they will forward the refill request to us. Please allow 3 business days for your refill to be completed.          Additional Information About Your Visit        [employer] Information     [employer] gives you secure access to your electronic health record. If you see a primary care provider, you can also send messages to your care team and make appointments. If you have questions, please call your primary care clinic.  If you do not have a primary care provider, please call [phone] and they will assist you.        Care EveryWhere ID     This is your Care EveryWhere  "ID. This could be used by other organizations to access your [facility] medical records  [id number]        Your Vitals Were     Pulse Temperature Height Pulse Oximetry Breastfeeding? BMI (Body Mass Index)    83 97.7  F (36.5  C) (Tympanic) 5' 3.5\" (1.613 m) 99% No 22.67 kg/m2       Blood Pressure from Last 3 Encounters:   09/04/18 122/75   06/13/18 120/77   06/12/18 122/77    Weight from Last 3 Encounters:   09/04/18 130 lb (59 kg)   06/13/18 128 lb (58.1 kg)   06/12/18 128 lb (58.1 kg)              We Performed the Following     CBC with platelets     COMPREHENSIVE METABOLIC PANEL     EKG 12-lead complete w/read - Clinics     Lipid panel reflex to direct LDL Fasting          Where to get your medicines      Some of these will need a paper prescription and others can be bought over the counter.  Ask your nurse if you have questions.     Bring a paper prescription for each of these medications     oxyCODONE-acetaminophen 5-325 MG per tablet         Information about OPIOIDS     PRESCRIPTION OPIOIDS: WHAT YOU NEED TO KNOW   We gave you an opioid (narcotic) pain medicine. It is important to manage your pain, but opioids are not always the best choice. You should first try all the other options your care team gave you. Take this medicine for as short a time (and as few doses) as possible.    Some activities can increase your pain, such as bandage changes or therapy sessions. It may help to take your pain medicine 30 to 60 minutes before these activities. Reduce your stress by getting enough sleep, working on hobbies you enjoy and practicing relaxation or meditation. Talk to your care team about ways to manage your pain beyond prescription opioids.    These medicines have risks:    DO NOT drive when on new or higher doses of pain medicine. These medicines can affect your alertness and reaction times, and you could be arrested for driving under the influence (DUI). If you need to use opioids long-term, talk to your care " team about driving.    DO NOT operate heavy machinery    DO NOT do any other dangerous activities while taking these medicines.    DO NOT drink any alcohol while taking these medicines.     If the opioid prescribed includes acetaminophen, DO NOT take with any other medicines that contain acetaminophen. Read all labels carefully. Look for the word  acetaminophen  or  Tylenol.  Ask your pharmacist if you have questions or are unsure.    You can get addicted to pain medicines, especially if you have a history of addiction (chemical, alcohol or substance dependence). Talk to your care team about ways to reduce this risk.    All opioids tend to cause constipation. Drink plenty of water and eat foods that have a lot of fiber, such as fruits, vegetables, prune juice, apple juice and high-fiber cereal. Take a laxative (Miralax, milk of magnesia, Colace, Senna) if you don t move your bowels at least every other day. Other side effects include upset stomach, sleepiness, dizziness, throwing up, tolerance (needing more of the medicine to have the same effect), physical dependence and slowed breathing.    Store your pills in a secure place, locked if possible. We will not replace any lost or stolen medicine. If you don t finish your medicine, please throw away (dispose) as directed by your pharmacist. The [place] [employer]ution [employer] has more information about safe disposal: [email or web address]         Primary Care Provider Office Phone # Fax #    Josue Mejia [phone] [address] [address]        Equal Access to Services     LAI TATE : Hadii aad ku hadasho Soomaali, waaxda luqadaha, qaybta kaalmada adeegyada, dipak barajas . So [facility] [phone].    ATENCIÓN: Si habla español, tiene a armendariz disposición servicios gratuitos de asistencia lingüística. Llame al [phone].    We comply with  applicable federal civil rights laws and [place] laws. We do not discriminate on the basis of race, color, national origin, age, disability, sex, sexual orientation, or gender identity.            Thank you!     Thank you for choosing [facility]  for your care. Our goal is always to provide you with excellent care. Hearing back from our patients is one way we can continue to improve our services. Please take a few minutes to complete the written survey that you may receive in the mail after your visit with us. Thank you!             Your Updated Medication List - Protect others around you: Learn how to safely use, store and throw away your medicines at www.disposemymeds.org.          This list is accurate as of 9/4/18 12:02 PM.  Always use your most recent med list.                   Brand Name Dispense Instructions for use Diagnosis    calcium 600/vitamin D 600-400 MG-UNIT per tablet   Generic drug:  calcium carbonate 600 mg-vitamin D 400 units      Take 1 tablet by mouth 2 times daily        CELLCEPT 500 MG tablet      500 mg in AM then 250 mg in PM        cetirizine 10 MG tablet    zyrTEC     10 mg by Oral or J tube route        CYMBALTA PO      Take 60 mg by mouth 2 times daily        estradiol 1 MG tablet    ESTRACE    90 tablet    Take 0.5 tablets (0.5 mg) by mouth daily    Post-menopause on HRT (hormone replacement therapy)       leucovorin 5 MG tablet    WELLCOVORIN     Take 1 tablet (5 mg) by mouth daily        meloxicam 15 MG tablet    MOBIC     Take 0.5 tablets (7.5 mg) by mouth daily        methotrexate 2.5 MG tablet CHEMO      Take 4 tablets by mouth once a week        NORTRIPYTLINE HCL PO      Take 40 mg by mouth daily        omeprazole 40 MG capsule    priLOSEC    90 capsule    TAKE 1 CAPSULE DAILY    Primary insomnia       oxyCODONE-acetaminophen 5-325 MG per tablet    PERCOCET    30 tablet    Take 1-2 tablets by mouth daily as needed for pain    Bilateral low back pain with sciatica,  sciatica laterality unspecified, unspecified chronicity       predniSONE 5 MG tablet    DELTASONE     Taking 5 mg and 1 mg tablet x2 to get a total 7 mg daily        pyridostigmine 60 MG tablet    MESTINON     Take 120 mg by mouth daily        ranitidine HCl 300 MG Caps      Take 300 mg by mouth every evening        traZODone 100 MG tablet    DESYREL    180 tablet    TAKE 2 TABLETS AT BEDTIME    Gastroesophageal reflux disease without esophagitis       Vitamin D3 2000 units Tabs      Take 2,000 Int'l Units by mouth daily        VOLTAREN 1 % Gel topical gel   Generic drug:  diclofenac      Place 2 g onto the skin 4 times daily

## 2018-09-05 LAB
ALBUMIN SERPL-MCNC: 4 G/DL (ref 3.4–5)
ALP SERPL-CCNC: 149 U/L (ref 40–150)
ALT SERPL W P-5'-P-CCNC: 19 U/L (ref 0–50)
ANION GAP SERPL CALCULATED.3IONS-SCNC: 8 MMOL/L (ref 3–14)
AST SERPL W P-5'-P-CCNC: 18 U/L (ref 0–45)
BILIRUB SERPL-MCNC: 0.4 MG/DL (ref 0.2–1.3)
BUN SERPL-MCNC: 10 MG/DL (ref 7–30)
CALCIUM SERPL-MCNC: 8.8 MG/DL (ref 8.5–10.1)
CHLORIDE SERPL-SCNC: 101 MMOL/L (ref 94–109)
CHOLEST SERPL-MCNC: 229 MG/DL
CO2 SERPL-SCNC: 26 MMOL/L (ref 20–32)
CREAT SERPL-MCNC: 0.76 MG/DL (ref 0.52–1.04)
GFR SERPL CREATININE-BSD FRML MDRD: 76 ML/MIN/1.7M2
GLUCOSE SERPL-MCNC: 95 MG/DL (ref 70–99)
HDLC SERPL-MCNC: 66 MG/DL
LDLC SERPL CALC-MCNC: 110 MG/DL
NONHDLC SERPL-MCNC: 163 MG/DL
POTASSIUM SERPL-SCNC: 4.2 MMOL/L (ref 3.4–5.3)
PROT SERPL-MCNC: 6.6 G/DL (ref 6.8–8.8)
SODIUM SERPL-SCNC: 135 MMOL/L (ref 133–144)
TRIGL SERPL-MCNC: 264 MG/DL

## 2018-09-05 NOTE — PROGRESS NOTES
Awais Casas,    I had the opportunity to review your recent labs and a summary of your labs reads as follows:    Your complete blood counts show no sign of anemia, normal white blood cell count and platelets.  Your comprehensive metabolic panel showed normal renal function, normal liver function, and normal fasting blood glucose indicating no evidence of diabetes mellitus.  Your fasting lipid panel show  - normal HDL (good) cholesterol -as your goal is greater than 40  - low LDL (bad) cholesterol as your goal is less than 130  - stable triglyceride levels    Overall, your labs are stable for surgery    Sincerely,  Josue Mejia MD

## 2018-09-07 NOTE — TELEPHONE ENCOUNTER
I spoke to Yessenia Martines and she will taper off of the Mestinon prior to her surgery    Josue Mejia MD

## 2018-09-13 ENCOUNTER — TELEPHONE (OUTPATIENT)
Dept: FAMILY MEDICINE | Facility: CLINIC | Age: 68
End: 2018-09-13

## 2018-09-13 NOTE — TELEPHONE ENCOUNTER
Reason for Call:  Other Request for Pre Op    Detailed comments: [facility] needs pre op information, labs and EKG faxed to them from 9/4 pre op exam.    Fax # [phone]      Call taken on 9/13/2018 at 10:12 AM by Blanca Craven  .

## 2018-09-16 ENCOUNTER — MYC MEDICAL ADVICE (OUTPATIENT)
Dept: FAMILY MEDICINE | Facility: CLINIC | Age: 68
End: 2018-09-16

## 2018-09-16 DIAGNOSIS — G89.4 CHRONIC PAIN SYNDROME: ICD-10-CM

## 2018-09-16 DIAGNOSIS — M54.40 BILATERAL LOW BACK PAIN WITH SCIATICA, SCIATICA LATERALITY UNSPECIFIED, UNSPECIFIED CHRONICITY: ICD-10-CM

## 2018-09-17 NOTE — TELEPHONE ENCOUNTER
Routing refill request to provider for review/approval because: Percocet  Drug not on the [facility] refill protocol       Added Chronic Pain Syndrome to patient's Problem List.   Patient does not have a Controlled Substance Agreement on file.  Recommended.    Per  review, patient has been receiving Rx pain medications from different providers.     Please review.     Dania LOAIZA RN,BSN

## 2018-09-17 NOTE — TELEPHONE ENCOUNTER
Controlled Substance Refill Request for Oxycodone-acetaminophen 5-325 mg    Problem List Complete:  No     PROVIDER TO CONSIDER COMPLETION OF PROBLEM LIST AND OVERVIEW/CONTROLLED SUBSTANCE AGREEMENT    Last Written Prescription Date:  09/04/18  Last Fill Quantity: 30 tablets,   # refills: 0    [place] [facility] [place]: 09/04/18    Future Office visit:     Controlled substance agreement on file: No.     Processing:  Patient will  in clinic   checked in past 3 months?  No, route to RN    Patient has been needing more per day because of pain.

## 2018-09-18 RX ORDER — OXYCODONE AND ACETAMINOPHEN 5; 325 MG/1; MG/1
1-2 TABLET ORAL DAILY PRN
Qty: 30 TABLET | Refills: 0 | Status: SHIPPED | OUTPATIENT
Start: 2018-09-18 | End: 2019-09-30

## 2018-09-19 ENCOUNTER — TRANSFERRED RECORDS (OUTPATIENT)
Dept: HEALTH INFORMATION MANAGEMENT | Facility: CLINIC | Age: 68
End: 2018-09-19

## 2018-10-05 ENCOUNTER — TRANSFERRED RECORDS (OUTPATIENT)
Dept: HEALTH INFORMATION MANAGEMENT | Facility: CLINIC | Age: 68
End: 2018-10-05

## 2018-11-02 ENCOUNTER — TRANSFERRED RECORDS (OUTPATIENT)
Dept: HEALTH INFORMATION MANAGEMENT | Facility: CLINIC | Age: 68
End: 2018-11-02

## 2018-11-06 ENCOUNTER — TRANSFERRED RECORDS (OUTPATIENT)
Dept: HEALTH INFORMATION MANAGEMENT | Facility: CLINIC | Age: 68
End: 2018-11-06

## 2018-12-14 ENCOUNTER — APPOINTMENT (OUTPATIENT)
Age: 68
Setting detail: DERMATOLOGY
End: 2018-12-31

## 2018-12-14 DIAGNOSIS — D84.9 IMMUNODEFICIENCY, UNSPECIFIED: ICD-10-CM

## 2018-12-14 DIAGNOSIS — L98.8 OTHER SPECIFIED DISORDERS OF THE SKIN AND SUBCUTANEOUS TISSUE: ICD-10-CM

## 2018-12-14 DIAGNOSIS — L57.0 ACTINIC KERATOSIS: ICD-10-CM

## 2018-12-14 PROCEDURE — OTHER LIQUID NITROGEN: OTHER

## 2018-12-14 PROCEDURE — OTHER COUNSELING: OTHER

## 2018-12-14 PROCEDURE — OTHER DEFER: OTHER

## 2018-12-14 PROCEDURE — 17000 DESTRUCT PREMALG LESION: CPT

## 2018-12-14 PROCEDURE — OTHER MIPS QUALITY: OTHER

## 2018-12-14 PROCEDURE — OTHER DIAGNOSIS COMMENT: OTHER

## 2018-12-14 PROCEDURE — 99213 OFFICE O/P EST LOW 20 MIN: CPT | Mod: 25

## 2018-12-14 ASSESSMENT — LOCATION DETAILED DESCRIPTION DERM
LOCATION DETAILED: LEFT INFERIOR MEDIAL FOREHEAD
LOCATION DETAILED: GLABELLA

## 2018-12-14 ASSESSMENT — LOCATION SIMPLE DESCRIPTION DERM
LOCATION SIMPLE: GLABELLA
LOCATION SIMPLE: LEFT FOREHEAD

## 2018-12-14 ASSESSMENT — LOCATION ZONE DERM: LOCATION ZONE: FACE

## 2018-12-14 NOTE — PROCEDURE: MIPS QUALITY
Detail Level: Detailed
Quality 131: Pain Assessment And Follow-Up: Pain assessment using a standardized tool is documented as negative, no follow-up plan required
Quality 431: Preventive Care And Screening: Unhealthy Alcohol Use - Screening: Patient screened for unhealthy alcohol use using a single question and scores less than 2 times per year
Quality 226: Preventive Care And Screening: Tobacco Use: Screening And Cessation Intervention: Patient screened for tobacco and never smoked
Quality 110: Preventive Care And Screening: Influenza Immunization: Influenza Immunization previously received during influenza season
Quality 130: Documentation Of Current Medications In The Medical Record: Current Medications Documented

## 2018-12-14 NOTE — PROCEDURE: COUNSELING
Detail Level: Detailed
Patient Specific Counseling (Will Not Stick From Patient To Patient): On MTX for SLE

## 2018-12-14 NOTE — PROCEDURE: LIQUID NITROGEN
Number Of Freeze-Thaw Cycles: 1 freeze-thaw cycle
Detail Level: Detailed
Consent: The patient's consent was obtained including but not limited to risks of crusting, scabbing, blistering, scarring, darker or lighter pigmentary change, recurrence, incomplete removal and infection.
Post-Care Instructions: I reviewed with the patient in detail post-care instructions. Patient is to wear sunprotection, and avoid picking at any of the treated lesions. Pt may apply Vaseline to crusted or scabbing areas.
Duration Of Freeze Thaw-Cycle (Seconds): 15
Render Post-Care Instructions In Note?: yes

## 2019-01-07 ENCOUNTER — TRANSFERRED RECORDS (OUTPATIENT)
Dept: HEALTH INFORMATION MANAGEMENT | Facility: CLINIC | Age: 69
End: 2019-01-07

## 2019-02-04 DIAGNOSIS — F51.01 PRIMARY INSOMNIA: ICD-10-CM

## 2019-02-04 NOTE — TELEPHONE ENCOUNTER
"omeprazole (PRILOSEC) 40 MG DR capsule    Last Written Prescription Date:  12/15/2017  Last Fill Quantity: 90,  # refills: 3   Last office visit: 9/4/2018 with prescribing provider:  Roberto  Future Office Visit:  Unknown     Requested Prescriptions   Pending Prescriptions Disp Refills     omeprazole (PRILOSEC) 40 MG DR capsule [Pharmacy Med Name: OMEPRAZOLE DR CAPS 40MG] 90 capsule 3     Sig: TAKE 1 CAPSULE DAILY    PPI Protocol Failed - 2/4/2019  1:20 PM       Failed - No diagnosis of osteoporosis on record       Passed - Not on Clopidogrel (unless Pantoprazole ordered)       Passed - Recent (12 mo) or future (30 days) visit within the authorizing provider's specialty    Patient had office visit in the last 12 months or has a visit in the next 30 days with authorizing provider or within the authorizing provider's specialty.  See \"Patient Info\" tab in inbasket, or \"Choose Columns\" in Meds & Orders section of the refill encounter.             Passed - Medication is active on med list       Passed - Patient is age 18 or older       Passed - No active pregnacy on record       Passed - No positive pregnancy test in past 12 months          "

## 2019-02-05 RX ORDER — OMEPRAZOLE 40 MG/1
CAPSULE, DELAYED RELEASE ORAL
Qty: 90 CAPSULE | Refills: 3 | Status: SHIPPED | OUTPATIENT
Start: 2019-02-05 | End: 2023-03-07

## 2019-02-05 NOTE — TELEPHONE ENCOUNTER
Routing refill request to provider for review/approval because:  Osteoporosis on med list.  Please authorize if appropriate.  Thanks,  Adela Lehman RN

## 2019-02-14 ENCOUNTER — TRANSFERRED RECORDS (OUTPATIENT)
Dept: HEALTH INFORMATION MANAGEMENT | Facility: CLINIC | Age: 69
End: 2019-02-14

## 2019-02-18 ENCOUNTER — TRANSFERRED RECORDS (OUTPATIENT)
Dept: HEALTH INFORMATION MANAGEMENT | Facility: CLINIC | Age: 69
End: 2019-02-18

## 2019-02-25 ENCOUNTER — TRANSFERRED RECORDS (OUTPATIENT)
Dept: HEALTH INFORMATION MANAGEMENT | Facility: CLINIC | Age: 69
End: 2019-02-25

## 2019-03-01 ENCOUNTER — TRANSFERRED RECORDS (OUTPATIENT)
Dept: HEALTH INFORMATION MANAGEMENT | Facility: CLINIC | Age: 69
End: 2019-03-01

## 2019-04-04 ENCOUNTER — TRANSFERRED RECORDS (OUTPATIENT)
Dept: HEALTH INFORMATION MANAGEMENT | Facility: CLINIC | Age: 69
End: 2019-04-04

## 2019-04-08 ENCOUNTER — APPOINTMENT (OUTPATIENT)
Age: 69
Setting detail: DERMATOLOGY
End: 2019-04-08

## 2019-04-08 DIAGNOSIS — D18.0 HEMANGIOMA: ICD-10-CM

## 2019-04-08 DIAGNOSIS — L82.1 OTHER SEBORRHEIC KERATOSIS: ICD-10-CM

## 2019-04-08 DIAGNOSIS — L81.4 OTHER MELANIN HYPERPIGMENTATION: ICD-10-CM

## 2019-04-08 DIAGNOSIS — D22 MELANOCYTIC NEVI: ICD-10-CM

## 2019-04-08 DIAGNOSIS — Z87.2 PERSONAL HISTORY OF DISEASES OF THE SKIN AND SUBCUTANEOUS TISSUE: ICD-10-CM

## 2019-04-08 PROBLEM — D18.01 HEMANGIOMA OF SKIN AND SUBCUTANEOUS TISSUE: Status: ACTIVE | Noted: 2019-04-08

## 2019-04-08 PROBLEM — D22.5 MELANOCYTIC NEVI OF TRUNK: Status: ACTIVE | Noted: 2019-04-08

## 2019-04-08 PROBLEM — D48.5 NEOPLASM OF UNCERTAIN BEHAVIOR OF SKIN: Status: ACTIVE | Noted: 2019-04-08

## 2019-04-08 PROCEDURE — OTHER DIAGNOSIS COMMENT: OTHER

## 2019-04-08 PROCEDURE — 11102 TANGNTL BX SKIN SINGLE LES: CPT

## 2019-04-08 PROCEDURE — OTHER BIOPSY BY SHAVE METHOD: OTHER

## 2019-04-08 PROCEDURE — 99214 OFFICE O/P EST MOD 30 MIN: CPT | Mod: 25

## 2019-04-08 PROCEDURE — OTHER MIPS QUALITY: OTHER

## 2019-04-08 PROCEDURE — OTHER COUNSELING: OTHER

## 2019-04-08 ASSESSMENT — LOCATION SIMPLE DESCRIPTION DERM
LOCATION SIMPLE: RIGHT NOSE
LOCATION SIMPLE: LEFT FOREHEAD
LOCATION SIMPLE: UPPER BACK
LOCATION SIMPLE: RIGHT UPPER BACK
LOCATION SIMPLE: RIGHT LOWER BACK

## 2019-04-08 ASSESSMENT — LOCATION DETAILED DESCRIPTION DERM
LOCATION DETAILED: RIGHT INFERIOR UPPER BACK
LOCATION DETAILED: RIGHT INFERIOR MEDIAL MIDBACK
LOCATION DETAILED: LEFT INFERIOR MEDIAL FOREHEAD
LOCATION DETAILED: RIGHT SUPERIOR MEDIAL MIDBACK
LOCATION DETAILED: INFERIOR THORACIC SPINE
LOCATION DETAILED: RIGHT NASAL SIDEWALL

## 2019-04-08 ASSESSMENT — LOCATION ZONE DERM
LOCATION ZONE: TRUNK
LOCATION ZONE: NOSE
LOCATION ZONE: FACE

## 2019-04-08 NOTE — PROCEDURE: MIPS QUALITY
Quality 130: Documentation Of Current Medications In The Medical Record: Current Medications Documented
Detail Level: Detailed
Quality 110: Preventive Care And Screening: Influenza Immunization: Influenza Immunization previously received during influenza season
Quality 265: Biopsy Follow-Up: Biopsy results reviewed, communicated, tracked, and documented
Quality 431: Preventive Care And Screening: Unhealthy Alcohol Use - Screening: Patient screened for unhealthy alcohol use using a single question and scores less than 2 times per year
Quality 131: Pain Assessment And Follow-Up: Pain assessment using a standardized tool is documented as negative, no follow-up plan required
Quality 226: Preventive Care And Screening: Tobacco Use: Screening And Cessation Intervention: Patient screened for tobacco and never smoked

## 2019-04-08 NOTE — PROCEDURE: BIOPSY BY SHAVE METHOD
Bill 64762 For Specimen Handling/Conveyance To Laboratory?: no
Anesthesia Type: 1% lidocaine with epinephrine and a 1:10 solution of 8.4% sodium bicarbonate
Render Post-Care Instructions In Note?: yes
Path Notes (To The Dermatopathologist): Benign nevus versus pigmented BCC
Curettage Text: The wound bed was treated with curettage after the biopsy was performed.
Type Of Destruction Used: Electrodesiccation
Depth Of Biopsy: dermis
Hemostasis: Drysol
Detail Level: Detailed
Dressing: Band-Aid
Wound Care: Petrolatum
Cryotherapy Text: The wound bed was treated with cryotherapy after the biopsy was performed.
Biopsy Type: H and E
Billing Type: Third-Party Bill
Silver Nitrate Text: The wound bed was treated with silver nitrate after the biopsy was performed.
Size Of Lesion In Cm: 0.7
X Size Of Lesion In Cm: 0.4
Post-Care Instructions: I verbally reviewed with the patient in detail post-care instructions. Patient is to keep the biopsy site dry overnight, and then apply H2O2, Vaseline and a bandage daily until healed.
Body Location Override (Optional - Billing Will Still Be Based On Selected Body Map Location If Applicable): Right Distal Nasal Sidewall
Biopsy Method: double edge Personna blade
Anesthesia Volume In Cc (Will Not Render If 0): 0.3
Consent: Verbal consent was obtained and risks were reviewed including but not limited to scarring, infection, bleeding, scabbing, incomplete removal, nerve damage and allergy to anesthesia.
Electrodesiccation Text: The wound bed was treated with electrodesiccation after the biopsy was performed.
Electrodesiccation And Curettage Text: The wound bed was treated with electrodesiccation and curettage after the biopsy was performed.
Additional Anesthesia Volume In Cc (Will Not Render If 0): 0
Notification Instructions: Patient will be notified of biopsy results. However, patient instructed to call the office if not contacted within 2 weeks.

## 2019-04-08 NOTE — PROCEDURE: COUNSELING
Detail Level: Generalized
Detail Level: Detailed
Patient Specific Counseling (Will Not Stick From Patient To Patient): No evidence of residual/recurrent AK at the treatment site.  Will observe at future skin checks.

## 2019-04-30 ENCOUNTER — OFFICE VISIT (OUTPATIENT)
Dept: URGENT CARE | Facility: URGENT CARE | Age: 69
End: 2019-04-30
Payer: COMMERCIAL

## 2019-04-30 VITALS
TEMPERATURE: 98 F | HEART RATE: 58 BPM | OXYGEN SATURATION: 98 % | DIASTOLIC BLOOD PRESSURE: 75 MMHG | SYSTOLIC BLOOD PRESSURE: 140 MMHG

## 2019-04-30 DIAGNOSIS — R07.0 THROAT PAIN: ICD-10-CM

## 2019-04-30 DIAGNOSIS — J20.9 ACUTE BRONCHITIS, UNSPECIFIED ORGANISM: Primary | ICD-10-CM

## 2019-04-30 LAB
DEPRECATED S PYO AG THROAT QL EIA: NORMAL
SPECIMEN SOURCE: NORMAL

## 2019-04-30 PROCEDURE — 87081 CULTURE SCREEN ONLY: CPT | Performed by: INTERNAL MEDICINE

## 2019-04-30 PROCEDURE — 87880 STREP A ASSAY W/OPTIC: CPT | Performed by: INTERNAL MEDICINE

## 2019-04-30 PROCEDURE — 99213 OFFICE O/P EST LOW 20 MIN: CPT | Performed by: INTERNAL MEDICINE

## 2019-04-30 RX ORDER — DOXYCYCLINE 100 MG/1
100 TABLET ORAL 2 TIMES DAILY
Qty: 20 TABLET | Refills: 0 | Status: SHIPPED | OUTPATIENT
Start: 2019-04-30 | End: 2019-09-30

## 2019-04-30 ASSESSMENT — ENCOUNTER SYMPTOMS
SORE THROAT: 1
NAUSEA: 0
HEMOPTYSIS: 0
SPUTUM PRODUCTION: 0
MYALGIAS: 0
SHORTNESS OF BREATH: 0
ABDOMINAL PAIN: 0
COUGH: 1
DIARRHEA: 0
SINUS PAIN: 0
CHILLS: 0
VOMITING: 0
FEVER: 0

## 2019-04-30 NOTE — PATIENT INSTRUCTIONS
Follow guidelines for viral bronchitis care.    May take prescribed antibiotic (doxycycline) if you develop fever, worsening cough, increasing weakness.    Seek medical attention if your symptoms get worse despite antibiotic treatment.      Patient Education     Viral Bronchitis (Adult)    You have a viral bronchitis. Bronchitis is inflammation and swelling of the lining of the lungs. This is often caused by an infection. Symptoms include a dry, hacking cough that is worse at night. The cough may bring up yellow-green mucus. You may also feel short of breath or wheeze. Other symptoms may include tiredness, chest discomfort, and chills.  Bronchitis that is caused by a virus is not treated with antibiotics. Instead, medicines may be given to help relieve symptoms. Symptoms can last up to 2 weeks, although the cough may last much longer.  This illness is contagious during the first few days and is spread through the air by coughing and sneezing, or by direct contact (touching the sick person and then touching your own eyes, nose, or mouth).  Most viral illnesses resolve within 10 to 14 days with rest and simple home remedies, although they may sometimes last for several weeks.  Home care    If symptoms are severe, rest at home for the first 2 to 3 days. When you go back to your usual activities, don't let yourself get too tired.    Do not smoke. Also avoid being exposed to secondhand smoke.    You may use over-the-counter medicine to control fever or pain, unless another pain medicine was prescribed. If you have chronic liver or kidney disease or have ever had a stomach ulcer or gastrointestinal bleeding, talk with your healthcare provider before using these medicines. Also talk to your provider if you are taking medicine to prevent blood clots. Aspirin should never be given to anyone younger than 18 years of age who is ill with a viral infection or fever. It may cause severe liver or brain damage.    Your appetite may  be poor, so a light diet is fine. Avoid dehydration by drinking 6 to 8 glasses of fluids per day (such as water, soft drinks, sports drinks, juices, tea, or soup). Extra fluids will help loosen secretions in the nose and lungs.    Over-the-counter cough, cold, and sore-throat medicines will not shorten the length of the illness, but they may help to reduce symptoms. Don't use decongestants if you have high blood pressure.  Follow-up care  Follow up with your healthcare provider, or as advised. If you had an X-ray or ECG (electrocardiogram), a specialist will review it. You will be notified of any new findings that may affect your care.  If you are age 65 or older, or if you have a chronic lung disease or condition that affects your immune system, or you smoke, ask your healthcare provider about getting a pneumococcal vaccine and a yearly flu shot (influenza vaccine).  When to seek medical advice  Call your healthcare provider right away if any of these occur:    Fever of 100.4 F (38 C) or higher, or as directed by your healthcare provider    Coughing up increased amounts of colored sputum    Weakness, drowsiness, headache, facial pain, ear pain, or a stiff neck  Call 911  Call 911 if any of these occur:    Coughing up blood    Worsening weakness, drowsiness, headache, or stiff neck    Trouble breathing, wheezing, or pain with breathing  Date Last Reviewed: 6/1/2018 2000-2018 [employer]. [address] [place] [id number]. All rights reserved. This information is not intended as a substitute for professional medical care. Always follow your healthcare professional's instructions.

## 2019-05-01 LAB
BACTERIA SPEC CULT: NORMAL
SPECIMEN SOURCE: NORMAL

## 2019-05-01 NOTE — PROGRESS NOTES
URI   This is a new problem. Episode onset: 5 days. The problem occurs daily. The problem has been unchanged. Associated symptoms include coughing and a sore throat. Pertinent negatives include no abdominal pain, chest pain, chills, congestion, fever, myalgias, nausea, rash or vomiting.       Past Medical History:   Diagnosis Date     Allergic rhinitis      Allergic rhinitis due to pollen      Arthritis      Decreased libido      Depressive disorder      Esophageal motility disorder      H pylori ulcer      Hoarseness      Immunosuppression (H)      Lupus (systemic lupus erythematosus) (H)     diagnosed 12 yrs ago     MCI (mild cognitive impairment)      Menopausal osteoporosis 02/17/2015     Movement disorder      Neuropathy     feet and hands     S/P cholecystectomy      S/P TIESHA-BSO        Review of Systems   Constitutional: Positive for malaise/fatigue. Negative for chills and fever.   HENT: Positive for sore throat. Negative for congestion, ear pain and sinus pain.    Respiratory: Positive for cough. Negative for hemoptysis, sputum production (Occasional scant sputum but mainly nonproductive) and shortness of breath.    Cardiovascular: Negative for chest pain.   Gastrointestinal: Negative for abdominal pain, diarrhea, nausea and vomiting.   Musculoskeletal: Negative for myalgias.   Skin: Negative for rash.       /75   Pulse 58   Temp 98  F (36.7  C) (Oral)   SpO2 98%       Physical Exam   Constitutional: She is oriented to person, place, and time. No distress.   HENT:   Mouth/Throat: No oropharyngeal exudate.   Oropharynx mildly erythematous but no soft tissue edema   Pulmonary/Chest: Breath sounds normal. She is in respiratory distress.   Lymphadenopathy:     She has no cervical adenopathy.   Neurological: She is alert and oriented to person, place, and time.   Skin: No rash noted.   Vitals reviewed.        ICD-10-CM    1. Acute bronchitis, unspecified organism J20.9 doxycycline monohydrate (ADOXA) 100  MG tablet   2. Throat pain R07.0 Strep, Rapid Screen     Beta strep group A culture       Patient Instructions   Follow guidelines for viral bronchitis care.    May take prescribed antibiotic (doxycycline) if you develop fever, worsening cough, increasing weakness.    Seek medical attention if your symptoms get worse despite antibiotic treatment.      Patient Education     Viral Bronchitis (Adult)    You have a viral bronchitis. Bronchitis is inflammation and swelling of the lining of the lungs. This is often caused by an infection. Symptoms include a dry, hacking cough that is worse at night. The cough may bring up yellow-green mucus. You may also feel short of breath or wheeze. Other symptoms may include tiredness, chest discomfort, and chills.  Bronchitis that is caused by a virus is not treated with antibiotics. Instead, medicines may be given to help relieve symptoms. Symptoms can last up to 2 weeks, although the cough may last much longer.  This illness is contagious during the first few days and is spread through the air by coughing and sneezing, or by direct contact (touching the sick person and then touching your own eyes, nose, or mouth).  Most viral illnesses resolve within 10 to 14 days with rest and simple home remedies, although they may sometimes last for several weeks.  Home care    If symptoms are severe, rest at home for the first 2 to 3 days. When you go back to your usual activities, don't let yourself get too tired.    Do not smoke. Also avoid being exposed to secondhand smoke.    You may use over-the-counter medicine to control fever or pain, unless another pain medicine was prescribed. If you have chronic liver or kidney disease or have ever had a stomach ulcer or gastrointestinal bleeding, talk with your healthcare provider before using these medicines. Also talk to your provider if you are taking medicine to prevent blood clots. Aspirin should never be given to anyone younger than 18 years  of age who is ill with a viral infection or fever. It may cause severe liver or brain damage.    Your appetite may be poor, so a light diet is fine. Avoid dehydration by drinking 6 to 8 glasses of fluids per day (such as water, soft drinks, sports drinks, juices, tea, or soup). Extra fluids will help loosen secretions in the nose and lungs.    Over-the-counter cough, cold, and sore-throat medicines will not shorten the length of the illness, but they may help to reduce symptoms. Don't use decongestants if you have high blood pressure.  Follow-up care  Follow up with your healthcare provider, or as advised. If you had an X-ray or ECG (electrocardiogram), a specialist will review it. You will be notified of any new findings that may affect your care.  If you are age 65 or older, or if you have a chronic lung disease or condition that affects your immune system, or you smoke, ask your healthcare provider about getting a pneumococcal vaccine and a yearly flu shot (influenza vaccine).  When to seek medical advice  Call your healthcare provider right away if any of these occur:    Fever of 100.4 F (38 C) or higher, or as directed by your healthcare provider    Coughing up increased amounts of colored sputum    Weakness, drowsiness, headache, facial pain, ear pain, or a stiff neck  Call 911  Call 911 if any of these occur:    Coughing up blood    Worsening weakness, drowsiness, headache, or stiff neck    Trouble breathing, wheezing, or pain with breathing  Date Last Reviewed: 6/1/2018 2000-2018 [employer]. [address] [place] [address], [place] [address]. All rights reserved. This information is not intended as a substitute for professional medical care. Always follow your healthcare professional's instructions.

## 2019-05-23 ENCOUNTER — TRANSFERRED RECORDS (OUTPATIENT)
Dept: HEALTH INFORMATION MANAGEMENT | Facility: CLINIC | Age: 69
End: 2019-05-23

## 2019-05-28 ENCOUNTER — TRANSFERRED RECORDS (OUTPATIENT)
Dept: HEALTH INFORMATION MANAGEMENT | Facility: CLINIC | Age: 69
End: 2019-05-28

## 2019-06-27 ENCOUNTER — TRANSFERRED RECORDS (OUTPATIENT)
Dept: HEALTH INFORMATION MANAGEMENT | Facility: CLINIC | Age: 69
End: 2019-06-27

## 2019-07-11 ENCOUNTER — OFFICE VISIT (OUTPATIENT)
Dept: PODIATRY | Facility: CLINIC | Age: 69
End: 2019-07-11
Payer: COMMERCIAL

## 2019-07-11 VITALS
WEIGHT: 130 LBS | HEIGHT: 64 IN | SYSTOLIC BLOOD PRESSURE: 124 MMHG | BODY MASS INDEX: 22.2 KG/M2 | DIASTOLIC BLOOD PRESSURE: 64 MMHG

## 2019-07-11 DIAGNOSIS — L84 CALLUS: ICD-10-CM

## 2019-07-11 DIAGNOSIS — M79.89 SOFT TISSUE MASS: ICD-10-CM

## 2019-07-11 DIAGNOSIS — M21.612 BUNION, LEFT: Primary | ICD-10-CM

## 2019-07-11 PROCEDURE — 99203 OFFICE O/P NEW LOW 30 MIN: CPT | Performed by: PODIATRIST

## 2019-07-11 ASSESSMENT — MIFFLIN-ST. JEOR: SCORE: 1096.74

## 2019-07-11 NOTE — PROGRESS NOTES
"Foot & Ankle Surgery  July 11, 2019    CC: \"swollen area on left foot/calluses/pain/sometimes hurts to walk\"    I was asked to see Yessenia Martines regarding the chief complaint by:  self    HPI:  Pt is a 69 year old female who presents with above complaint.  Left lower extremity pain x months.  No injury noted.  Has SLE.  Describes deep ache, shooting pain, \"shooting down front + back radiating from toe\".  Pain 6/10 \"almost daily\", worse with \"?\".  Tylenol arthritis for treatment, \"not usually\" helpful.  Points to L 1st MPJ as painful area, which can swell but denies redness/extreme pain/swelling to the area, as well as callusing plantar-medial L 1st MPJ.  She also mentions a \"new\" bump on the top of the R 2nd toe, no injury reported.  History of ganglion cysts.    ROS:   Pos for CC.  The patient denies current nausea, vomiting, chills, fevers, belly pain, calf pain, chest pain or SOB.  Complete remainder of ROS is otherwise neg.    VITALS:    Vitals:    07/11/19 0917   BP: 124/64   Weight: 59 kg (130 lb)   Height: 1.613 m (5' 3.5\")       PMH:    Past Medical History:   Diagnosis Date     Allergic rhinitis      Allergic rhinitis due to pollen      Arthritis      Decreased libido      Depressive disorder      Esophageal motility disorder      H pylori ulcer      Hoarseness      Immunosuppression (H)      Lupus (systemic lupus erythematosus) (H)     diagnosed 12 yrs ago     MCI (mild cognitive impairment)      Menopausal osteoporosis 02/17/2015     Movement disorder      Neuropathy     feet and hands     S/P cholecystectomy      S/P TIESHA-BSO        SXHX:    Past Surgical History:   Procedure Laterality Date     ARTHRODESIS FINGER(S) Left 5/18/2018    Procedure: ARTHRODESIS FINGER(S);;  Surgeon: Shahida Carlton MD;  Location: [facility]     ARTHROPLASTY CARPOMETACARPAL (THUMB JOINT) Left 5/18/2018    Procedure: ARTHROPLASTY CARPOMETACARPAL (THUMB JOINT);  LEFT THUMB CARPALMETACARPAL WITH MINI TIGHTROPE, " LEFT METAPHALANGEAL FUSION WITH RADIAL BONE GRAFT AND EASY CLIP STAPLE.;  Surgeon: Briana Carlton MD;  Location: [facility]     ARTHROPLASTY CARPOMETACARPAL (THUMB JOINT), ARTHRODESIS, COMBINED Left 2018    Procedure: COMBINED ARTHROPLASTY CARPOMETACARPAL (THUMB JOINT), ARTHRODESIS;  LEFT THUMB CARPOMETACARPAL EXCISIONAL ARTHROPLASTY WITH FACSIA TANIA GRAFT, PARTIAL TRAPEZOID EXCISION;  Surgeon: Briana Carlton MD;  Location: [facility]     BACK SURGERY      L2-L4 or L5 fusion     BLEPHAROPLASTY BILATERAL        SECTION      x3     CHOLECYSTECTOMY      feels better. No pathology on gallbladder     EYE SURGERY      Raised eyelids.     GRAFT BONE TO FINGER Left 2018    Procedure: GRAFT BONE TO FINGER;;  Surgeon: Briana Carlton MD;  Location: [facility]     HC UGI ENDOSCOPY, SIMPLE EXAM  14     HYSTERECTOMY       KNEE SURGERY      bilateral arthroscopy     ORTHOPEDIC SURGERY      right thumb tendon surgery     REPAIR TENDON FINGER(S)  2011    Procedure:REPAIR TENDON FINGER(S); RIGHT INDEX FINGER FLEXOR DIGITORUM PROFUNDUS REPAIR, RADIAL DIGITAL NERVE REPAIR ; Surgeon:BRIANA CARLTON; Location:[facility]     SALPINGO-OOPHORECTOMY BILATERAL       SHOULDER SURGERY Right     arthroscopy     TONSILLECTOMY      Child        MEDS:    Current Outpatient Medications   Medication     calcium-vitamin D (CALCIUM 600/VITAMIN D) 600-400 MG-UNIT per tablet     cetirizine (ZYRTEC) 10 MG tablet     Cholecalciferol (VITAMIN D3) 2000 UNITS TABS     diclofenac (VOLTAREN) 1 % GEL     DULoxetine HCl (CYMBALTA PO)     estradiol (ESTRACE) 1 MG tablet     leucovorin (WELLCOVORIN) 5 MG tablet     meloxicam (MOBIC) 15 MG tablet     methotrexate 2.5 MG tablet     mycophenolate (CELLCEPT) 500 MG tablet     Nortriptyline HCl (NORTRIPYTLINE HCL PO)     omeprazole (PRILOSEC) 40 MG DR capsule     oxyCODONE-acetaminophen (PERCOCET) 5-325 MG per tablet     predniSONE (DELTASONE) 5 MG  tablet     pyridostigmine (MESTINON) 60 MG tablet     ranitidine HCl 300 MG CAPS     traZODone (DESYREL) 100 MG tablet     No current facility-administered medications for this visit.        ALL:     Allergies   Allergen Reactions     Augmentin Other (See Comments)     C Diff colitis     Cefdinir Other (See Comments)     Many years ago, does not recall what reaction was     Sudafed [Pseudoephedrine]      Jittery, pacing     Tizanidine Other (See Comments)     Many years ago, does not recall what reaction was     Latex Rash       FMH:    Family History   Problem Relation Age of Onset     C.A.D. Father         cabg, carotid stenosis     Hyperlipidemia Father      Hypertension Father      Neurologic Disorder Mother         ALS? Parkinsons?     Osteoporosis Mother      Hypertension Brother      Colon Cancer Maternal Grandmother      Colon Cancer Maternal Aunt      Hyperlipidemia Sister      Hyperlipidemia Brother      Osteoporosis Sister      Osteoporosis Maternal Aunt      Hypertension Sister        SocHx:    Social History     Socioeconomic History     Marital status:      Spouse name: Not on file     Number of children: 4     Years of education: Not on file     Highest education level: Not on file   Occupational History     Not on file   Social Needs     Financial resource strain: Not on file     Food insecurity:     Worry: Not on file     Inability: Not on file     Transportation needs:     Medical: Not on file     Non-medical: Not on file   Tobacco Use     Smoking status: Never Smoker     Smokeless tobacco: Never Used   Substance and Sexual Activity     Alcohol use: Yes     Alcohol/week: 0.0 oz     Comment: 1 wine/day     Drug use: No     Sexual activity: Yes     Partners: Male     Birth control/protection: Female Surgical, Post-menopausal     Comment: hysterectomy   Lifestyle     Physical activity:     Days per week: Not on file     Minutes per session: Not on file     Stress: Not on file   Relationships      Social connections:     Talks on phone: Not on file     Gets together: Not on file     Attends Mu-ism service: Not on file     Active member of club or organization: Not on file     Attends meetings of clubs or organizations: Not on file     Relationship status: Not on file     Intimate partner violence:     Fear of current or ex partner: Not on file     Emotionally abused: Not on file     Physically abused: Not on file     Forced sexual activity: Not on file   Other Topics Concern     Parent/sibling w/ CABG, MI or angioplasty before 65F 55M? 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  Minimal edema noted around L 1st MPJ.  Vasc:    Pulses palpable, CFT minimally delayed  Neuro:    Light touch sensation intact to all sensory nerve distributions without paresthesias  Derm:    Mild callusing plantar medial L 1st MPJ  MSK:    Bunion left foot. PROM > 60 degrees with minimal crepitus, but she states it is painful, and this is the pain she typically has.  Mildly fluctuant but firm, minimally-mobile mass dorsal R 2nd toe consistent with ganglion.  Calf:    Neg for redness, swelling or tenderness    Assessment:  69 year old female with bunion left lower extremity with joint pain; callusing plantar-medial L 1st MPJ; soft tissue mass R 2nd toe      Plan:  Discussed etiologies, anatomy and options  1.  Bunion left lower extremity with joint pain  -comfortable accommodative shoe  gear; discussed stiff-soled shoe gear to minimize strain on joint  -RICE/NSAID vs tylenol prn based on pain levels  -has SLE; does not sound like auto-immune or inflammatory arthritic condition, but rather degenerative joint pain.  No bogginess to suggest synovitis.    -discussed steroid injection option    2.  Callus plantar-medial L 1st MPJ  -We discussed that many calluses are caused by pressure or shearing forces on the skin, and these can often be treated sufficiently with shoes, inserts and local callus debridement.  Some calluses, however, can have a deep core, and while home treatments are helpful, occasional clinical debridement may be necessary.  In certain cases, surgical excision may be required if no other treatments have proven sufficient.  -Our home callus instruction handout was dispensed, detailing home therapies to minimize regrowth of the calluses.    -we discussed a rough estimate of the cost of callus debridement in clinic.    3.  Soft tissue mass R 2nd toe  -consistent with ganglion cyst  -shoe gear, padding and RICE/NSAID vs tylenol  -discussed aspiration and excision.  However, not symptomatic enough to warrant currently      Follow up:  prn or sooner with acute issues      Patient's medical history was reviewed today    Body mass index is 22.67 kg/m .          Royce Shaffer DPM FACFAS FACFAOM  Podiatric Foot & Ankle Surgeon  [facility]  [phone]

## 2019-07-11 NOTE — PATIENT INSTRUCTIONS
Thank you for choosing [facility]!    DR. CANO'S CLINIC LOCATIONS:   MONDAY - [place] TUESDAY - [place]   [address] [facility]  [address]   [place] [address] [place] [address]   [phone]       THURSDAY AM - [place] THURSDAY PM - [place]   [address]   [place] [address] [place], [place] [id number] [phone]       FRIDAY AM - [place] SET UP SURGERY: [phone] [address] APPOINTMENTS: [phone]   [place] [address] BILLING QUESTIONS: [phone] FAX NUMBER: [phone]       Follow Up:  As needed      Tips for treating painful calluses:    1.  Pumice stone/eleanor board therapy multiple times weekly to remove callus tissue as it builds up    2.  Good supportive shoes and minimizing barefoot ambulation can slow down callus formation, and can decrease pain levels    3.  Gel inserts can also provide padding to the bottom of the foot, to prevent pain and slow recurrence.    4.  Applying lotion daily/twice daily to the callus tissue can keep it softer and less painful.  Lotions with lactic acid or urea work well, but most lotions are sufficient        PRICE THERAPY  Many aches and pains throughout the foot and ankle can be helped with many simple treatments. This is usually described as PRICE Therapy.      P - Protection - often times, inflammation/pain in the lower extremity is not able to improve simply because the areas involved are never allowed to rest. Every step we take can bother the problematic area. Protecting those areas is an important step in the healing process. This may involve a walking cast boot, a special insert/orthotic device, an ankle brace, or simply avoiding barefoot walking.    R - Rest - in addition to protecting the foot/ankle, resting is an important, but often times difficult, treatment option. Getting off your feet when they bother you, and  specifically avoiding activities that cause pain/discomfort, are very beneficial to prevent, and treat, foot/ankle pain.      I - Ice - icing regularly can help to decrease inflammation and swelling in the foot, thus decreasing pain. Using an ice pack or a bag of frozen veggies works very well. Ice for 20 minutes multiple times per day as needed.  Do not place the ice directly on the skin as this can cause tissue damage.    C - Compression - using a compression wrap or an ACE wrap can help to decrease swelling, which can help to decrease pain. Wearing the wraps is generally not needed at night, but they should be worn on a regular basis when you are going to be on your feet for prolonged periods as gravity tends to pull fluids down to your feet/ankles.    E - Elevation - elevating your lower extremities multiple times daily for 15-20 minutes can help to decrease swelling, which works well in decreasing pain levels.    NSAID/Tylenol - Anti-inflammatories like Aleve or ibuprofen, and/or a pain medication, such as Tylenol, can help to improve pain levels and get the issue resolved sooner rather than later. Anyone with liver issues should be careful with Tylenol, and anyone with high blood pressure or heart, stomach or kidney issues should be careful with anti-inflammatories. Please ask if you have questions about these medications, including dosage.      BODY WEIGHT AND YOUR FEET  The following information is included in the after visit summary for all patients. Body weight can be a sensitive issue to discuss in clinic, but we think the following information is very important. Although we focus on the feet and ankles, we do support the overall health of our patients.     Many things can cause foot and ankle problems. Foot structure, activity level, foot mechanics and injuries are common causes of pain. One very important issue that often goes unmentioned, is body weight. Extra weight can cause increased stress on  muscles, ligaments, bones and tendons. Sometimes just a few extra pounds is all it takes to put one over her/his threshold. Without reducing that stress, it can be difficult to alleviate pain. As Foot & Ankle specialists, our job is addressing the lower extremity problem and possible causes. Regarding extra body weight, we encourage patients to discuss diet and weight management plans with their primary care doctors. It is this team approach that gives you the best opportunity for pain relief and getting you back on your feet.      [facility] has a Comprehensive Weight Management Program. This program includes counseling, education, non-surgical and surgical approaches to weight loss. If you are interested in learning more either talk to you primary care provider or call [phone].

## 2019-08-07 DIAGNOSIS — K21.9 GASTROESOPHAGEAL REFLUX DISEASE WITHOUT ESOPHAGITIS: ICD-10-CM

## 2019-08-07 NOTE — TELEPHONE ENCOUNTER
"Requested Prescriptions   Pending Prescriptions Disp Refills     traZODone (DESYREL) 100 MG tablet [Pharmacy Med Name: TRAZODONE HCL TABS 100MG] 180 tablet 0     Sig: TAKE 2 TABLETS AT BEDTIME  Last Written Prescription Date:  5/29/19  Last Fill Quantity: 180 tab,  # refills: 0   Last office visit: 9/4/2018 with prescribing provider:  Roberto   Future Office Visit:         Serotonin Modulators Passed - 8/7/2019 11:42 AM        Passed - Recent (12 mo) or future (30 days) visit within the authorizing provider's specialty     Patient had office visit in the last 12 months or has a visit in the next 30 days with authorizing provider or within the authorizing provider's specialty.  See \"Patient Info\" tab in inbasket, or \"Choose Columns\" in Meds & Orders section of the refill encounter.              Passed - Medication is active on med list        Passed - Patient is age 18 or older        Passed - No active pregnancy on record        Passed - No positive pregnancy test in past 12 months          "

## 2019-08-08 RX ORDER — TRAZODONE HYDROCHLORIDE 100 MG/1
TABLET ORAL
Qty: 60 TABLET | Refills: 0 | Status: SHIPPED | OUTPATIENT
Start: 2019-08-08 | End: 2019-09-03

## 2019-08-08 NOTE — TELEPHONE ENCOUNTER
Medication is being filled for 1 time refill only due to:  Patient needs to be seen because due for physical.   Melinda BONILLA RN

## 2019-09-03 ENCOUNTER — TRANSFERRED RECORDS (OUTPATIENT)
Dept: HEALTH INFORMATION MANAGEMENT | Facility: CLINIC | Age: 69
End: 2019-09-03

## 2019-09-27 ENCOUNTER — TRANSFERRED RECORDS (OUTPATIENT)
Dept: HEALTH INFORMATION MANAGEMENT | Facility: CLINIC | Age: 69
End: 2019-09-27

## 2019-09-27 ASSESSMENT — ACTIVITIES OF DAILY LIVING (ADL): CURRENT_FUNCTION: NO ASSISTANCE NEEDED

## 2019-09-30 ENCOUNTER — OFFICE VISIT (OUTPATIENT)
Dept: FAMILY MEDICINE | Facility: CLINIC | Age: 69
End: 2019-09-30
Payer: COMMERCIAL

## 2019-09-30 VITALS
HEART RATE: 69 BPM | SYSTOLIC BLOOD PRESSURE: 117 MMHG | BODY MASS INDEX: 19.46 KG/M2 | DIASTOLIC BLOOD PRESSURE: 74 MMHG | OXYGEN SATURATION: 99 % | HEIGHT: 64 IN | TEMPERATURE: 98.4 F | WEIGHT: 114 LBS

## 2019-09-30 DIAGNOSIS — M32.9 SYSTEMIC LUPUS ERYTHEMATOSUS, UNSPECIFIED SLE TYPE, UNSPECIFIED ORGAN INVOLVEMENT STATUS (H): ICD-10-CM

## 2019-09-30 DIAGNOSIS — M81.0 OSTEOPOROSIS, UNSPECIFIED OSTEOPOROSIS TYPE, UNSPECIFIED PATHOLOGICAL FRACTURE PRESENCE: ICD-10-CM

## 2019-09-30 DIAGNOSIS — K31.84 GASTROPARESIS: ICD-10-CM

## 2019-09-30 DIAGNOSIS — R31.29 MICROSCOPIC HEMATURIA: ICD-10-CM

## 2019-09-30 DIAGNOSIS — F51.01 PRIMARY INSOMNIA: ICD-10-CM

## 2019-09-30 DIAGNOSIS — Z23 NEED FOR PROPHYLACTIC VACCINATION AND INOCULATION AGAINST INFLUENZA: ICD-10-CM

## 2019-09-30 DIAGNOSIS — Z00.00 ROUTINE GENERAL MEDICAL EXAMINATION AT A HEALTH CARE FACILITY: Primary | ICD-10-CM

## 2019-09-30 DIAGNOSIS — K21.9 GASTROESOPHAGEAL REFLUX DISEASE WITHOUT ESOPHAGITIS: ICD-10-CM

## 2019-09-30 LAB
ALBUMIN UR-MCNC: NEGATIVE MG/DL
APPEARANCE UR: CLEAR
BILIRUB UR QL STRIP: NEGATIVE
COLOR UR AUTO: YELLOW
ERYTHROCYTE [DISTWIDTH] IN BLOOD BY AUTOMATED COUNT: 14.7 % (ref 10–15)
GLUCOSE UR STRIP-MCNC: NEGATIVE MG/DL
HCT VFR BLD AUTO: 42.6 % (ref 35–47)
HGB BLD-MCNC: 14 G/DL (ref 11.7–15.7)
HGB UR QL STRIP: ABNORMAL
KETONES UR STRIP-MCNC: NEGATIVE MG/DL
LEUKOCYTE ESTERASE UR QL STRIP: NEGATIVE
MCH RBC QN AUTO: 30.6 PG (ref 26.5–33)
MCHC RBC AUTO-ENTMCNC: 32.9 G/DL (ref 31.5–36.5)
MCV RBC AUTO: 93 FL (ref 78–100)
NITRATE UR QL: NEGATIVE
PH UR STRIP: 7 PH (ref 5–7)
PLATELET # BLD AUTO: 259 10E9/L (ref 150–450)
RBC # BLD AUTO: 4.57 10E12/L (ref 3.8–5.2)
RBC #/AREA URNS AUTO: NORMAL /HPF
SOURCE: ABNORMAL
SP GR UR STRIP: 1.02 (ref 1–1.03)
UROBILINOGEN UR STRIP-ACNC: 0.2 EU/DL (ref 0.2–1)
WBC # BLD AUTO: 11.5 10E9/L (ref 4–11)
WBC #/AREA URNS AUTO: NORMAL /HPF

## 2019-09-30 PROCEDURE — G0008 ADMIN INFLUENZA VIRUS VAC: HCPCS | Performed by: INTERNAL MEDICINE

## 2019-09-30 PROCEDURE — 99397 PER PM REEVAL EST PAT 65+ YR: CPT | Mod: 25 | Performed by: INTERNAL MEDICINE

## 2019-09-30 PROCEDURE — 36415 COLL VENOUS BLD VENIPUNCTURE: CPT | Performed by: INTERNAL MEDICINE

## 2019-09-30 PROCEDURE — 93000 ELECTROCARDIOGRAM COMPLETE: CPT | Performed by: INTERNAL MEDICINE

## 2019-09-30 PROCEDURE — 99213 OFFICE O/P EST LOW 20 MIN: CPT | Mod: 25 | Performed by: INTERNAL MEDICINE

## 2019-09-30 PROCEDURE — 80053 COMPREHEN METABOLIC PANEL: CPT | Performed by: INTERNAL MEDICINE

## 2019-09-30 PROCEDURE — 80061 LIPID PANEL: CPT | Performed by: INTERNAL MEDICINE

## 2019-09-30 PROCEDURE — 90662 IIV NO PRSV INCREASED AG IM: CPT | Performed by: INTERNAL MEDICINE

## 2019-09-30 PROCEDURE — 99207 C PAF COMPLETED  NO CHARGE: CPT | Mod: 25 | Performed by: INTERNAL MEDICINE

## 2019-09-30 PROCEDURE — 81001 URINALYSIS AUTO W/SCOPE: CPT | Performed by: INTERNAL MEDICINE

## 2019-09-30 PROCEDURE — 85027 COMPLETE CBC AUTOMATED: CPT | Performed by: INTERNAL MEDICINE

## 2019-09-30 RX ORDER — METOCLOPRAMIDE 5 MG/1
5 TABLET ORAL 4 TIMES DAILY PRN
COMMUNITY
End: 2020-10-08

## 2019-09-30 RX ORDER — TRAZODONE HYDROCHLORIDE 100 MG/1
200 TABLET ORAL AT BEDTIME
Qty: 180 TABLET | Refills: 3 | Status: SHIPPED | OUTPATIENT
Start: 2019-09-30 | End: 2020-10-05

## 2019-09-30 RX ORDER — PROMETHAZINE HYDROCHLORIDE 12.5 MG/1
25 TABLET ORAL EVERY 6 HOURS PRN
COMMUNITY
End: 2020-10-08

## 2019-09-30 ASSESSMENT — ANXIETY QUESTIONNAIRES
GAD7 TOTAL SCORE: 2
7. FEELING AFRAID AS IF SOMETHING AWFUL MIGHT HAPPEN: NOT AT ALL
IF YOU CHECKED OFF ANY PROBLEMS ON THIS QUESTIONNAIRE, HOW DIFFICULT HAVE THESE PROBLEMS MADE IT FOR YOU TO DO YOUR WORK, TAKE CARE OF THINGS AT HOME, OR GET ALONG WITH OTHER PEOPLE: NOT DIFFICULT AT ALL
3. WORRYING TOO MUCH ABOUT DIFFERENT THINGS: NOT AT ALL
6. BECOMING EASILY ANNOYED OR IRRITABLE: SEVERAL DAYS
2. NOT BEING ABLE TO STOP OR CONTROL WORRYING: NOT AT ALL
1. FEELING NERVOUS, ANXIOUS, OR ON EDGE: NOT AT ALL
5. BEING SO RESTLESS THAT IT IS HARD TO SIT STILL: NOT AT ALL

## 2019-09-30 ASSESSMENT — ACTIVITIES OF DAILY LIVING (ADL): CURRENT_FUNCTION: NO ASSISTANCE NEEDED

## 2019-09-30 ASSESSMENT — MIFFLIN-ST. JEOR: SCORE: 1024.16

## 2019-09-30 ASSESSMENT — PATIENT HEALTH QUESTIONNAIRE - PHQ9: 5. POOR APPETITE OR OVEREATING: SEVERAL DAYS

## 2019-09-30 NOTE — PATIENT INSTRUCTIONS
(Z00.00) Routine general medical examination at a health care facility  (primary encounter diagnosis)  Comment: For routine exam, we will draw labs as ordered, cholesterol, diabetes mellitus check, liver function, renal function, and discussed pros/cons of mammogram which was declined.  We will also update vaccination history.  Plan: COMPREHENSIVE METABOLIC PANEL, Lipid panel         reflex to direct LDL Fasting, CBC with         Platelets              (M32.9) Systemic lupus erythematosus, unspecified SLE type, unspecified organ involvement status (H)  Comment: We will plan to follow up in rheumatology and note that you have not tapered prednisone over the past year  Plan:     (M81.0) Osteoporosis, unspecified osteoporosis type, unspecified pathological fracture presence  Comment: Conitnue calcium + vitamin D   Plan:     (R31.29) Microscopic hematuria  Comment: check urinalysis today  Plan:     (K21.9) Insomnia  Comment: We will continue trazodone for treatment of insmonia  Plan: traZODone (DESYREL) 100 MG tablet

## 2019-09-30 NOTE — PROGRESS NOTES
"SUBJECTIVE:   Yessenia Martines is a 69 year old female who presents for Preventive Visit.      Are you in the first 12 months of your Medicare coverage?  No    Healthy Habits:     In general, how would you rate your overall health?  Good    Frequency of exercise:  4-5 days/week    Duration of exercise:  45-60 minutes    Do you usually eat at least 4 servings of fruit and vegetables a day, include whole grains    & fiber and avoid regularly eating high fat or \"junk\" foods?  No    Taking medications regularly:  Yes    Medication side effects:  Not applicable    Ability to successfully perform activities of daily living:  No assistance needed    Home Safety:  No safety concerns identified    Hearing Impairment:  Difficulty understanding speech on the telephone    In the past 6 months, have you been bothered by leaking of urine?  No    In general, how would you rate your overall mental or emotional health?  Excellent      PHQ-2 Total Score: 0    Additional concerns today:  No    Do you feel safe in your environment? Yes    Do you have a Health Care Directive? Yes: Advance Directive has been received and scanned.      Fall risk  Fallen 2 or more times in the past year?: No  Any fall with injury in the past year?: No    Cognitive Screening   1) Repeat 3 items (Leader, Season, Table)    2) Clock draw: NORMAL  3) 3 item recall: Recalls 2 objects  Results: 2 items recalled: COGNITIVE IMPAIRMENT LESS LIKELY    Mini-CogTM Copyright S Delia. Licensed by the author for use in [facility]; reprinted with permission (natalie@.[facility]). All rights reserved.      Do you have sleep apnea, excessive snoring or daytime drowsiness?: no    Reviewed and updated as needed this visit by clinical staff         Reviewed and updated as needed this visit by Provider        Social History     Tobacco Use     Smoking status: Never Smoker     Smokeless tobacco: Never Used   Substance Use Topics     Alcohol use: Yes     Alcohol/week: " "0.0 standard drinks     Comment: 1 wine/day     If you drink alcohol do you typically have >3 drinks per day or >7 drinks per week? Yes      Alcohol Use 9/27/2019   Prescreen: >3 drinks/day or >7 drinks/week? No   Prescreen: >3 drinks/day or >7 drinks/week? -           PROBLEMS TO ADD ON...    Current providers sharing in care for this patient include:   Patient Care Team:  Josue Mejia MD as PCP - General (Internal Medicine)  Josue Mejia MD as Assigned PCP    The following health maintenance items are reviewed in Epic and correct as of today:  Health Maintenance   Topic Date Due     URINE DRUG SCREEN  1950     MAMMO SCREENING  06/06/2018     CMP  03/04/2019     MEDICARE ANNUAL WELLNESS VISIT  06/12/2019     CHE ASSESSMENT  06/12/2019     FALL RISK ASSESSMENT  06/12/2019     PHQ-9  06/12/2019     INFLUENZA VACCINE (1) 09/01/2019     LIPID  09/04/2019     CBC  09/04/2019     PNEUMOCOCCAL IMMUNIZATION 65+ LOW/MEDIUM RISK (2 of 2 - PPSV23) 07/22/2020     DTAP/TDAP/TD IMMUNIZATION (2 - Td) 11/07/2021     ADVANCE CARE PLANNING  01/19/2022     COLONOSCOPY  10/12/2025     DEXA  Completed     HEPATITIS C SCREENING  Completed     ZOSTER IMMUNIZATION  Completed     IPV IMMUNIZATION  Aged Out     MENINGITIS IMMUNIZATION  Aged Out          Systemic lupus erythematosus, unspecified SLE type, unspecified organ involvement status (H)   No issues  Osteoporosis, unspecified osteoporosis type, unspecified pathological fracture presence  Microscopic hematuria    Review of Systems  Constitutional, HEENT, cardiovascular, pulmonary, GI, , musculoskeletal, neuro, skin, endocrine and psych systems are negative, except as otherwise noted.    OBJECTIVE:   /74 (BP Location: Left arm, Patient Position: Chair, Cuff Size: Adult Regular)   Pulse 69   Temp 98.4  F (36.9  C) (Tympanic)   Ht 1.613 m (5' 3.5\")   Wt 51.7 kg (114 lb)   SpO2 99%   BMI 19.88 kg/m   Estimated body mass index is 19.88 kg/m  as " "calculated from the following:    Height as of this encounter: 1.613 m (5' 3.5\").    Weight as of this encounter: 51.7 kg (114 lb).  Physical Exam  GENERAL APPEARANCE: healthy, alert and no distress  EYES: Eyes grossly normal to inspection, PERRL and conjunctivae and sclerae normal  HENT: ear canals and TM's normal, nose and mouth without ulcers or lesions, oropharynx clear and oral mucous membranes moist  NECK: no adenopathy, no asymmetry, masses, or scars and thyroid normal to palpation  RESP: lungs clear to auscultation - no rales, rhonchi or wheezes  BREAST: deferred  CV: regular rate and rhythm, normal S1 S2, no S3 or S4, no murmur, click or rub, no peripheral edema and peripheral pulses strong  ABDOMEN: soft, nontender, no hepatosplenomegaly, no masses and bowel sounds normal  MS: no musculoskeletal defects are noted and gait is age appropriate without ataxia  SKIN: no suspicious lesions or rashes  NEURO: Normal strength and tone, sensory exam grossly normal, mentation intact and speech normal  PSYCH: mentation appears normal and affect normal/bright    Diagnostic Test Results:  Labs reviewed in Epic    ASSESSMENT / PLAN:     Patient Instructions   (Z00.00) Routine general medical examination at a health care facility  (primary encounter diagnosis)  Comment: For routine exam, we will draw labs as ordered, cholesterol, diabetes mellitus check, liver function, renal function, and discussed pros/cons of mammogram which was declined.  We will also update vaccination history.  Plan: COMPREHENSIVE METABOLIC PANEL, Lipid panel         reflex to direct LDL Fasting, CBC with         Platelets              (M32.9) Systemic lupus erythematosus, unspecified SLE type, unspecified organ involvement status (H)  Comment: We will plan to follow up in rheumatology and note that you have not tapered prednisone over the past year  Plan:     (M81.0) Osteoporosis, unspecified osteoporosis type, unspecified pathological fracture " "presence  Comment: Conitnue calcium + vitamin D   Plan:     (R31.29) Microscopic hematuria  Comment: check urinalysis today  Plan:     (K21.9) Insomnia  Comment: We will continue trazodone for treatment of insmonia  Plan: traZODone (DESYREL) 100 MG tablet                  End of Life Planning:  Patient currently has an advanced directive: Yes.  Practitioner is supportive of decision.    COUNSELING:  Reviewed preventive health counseling, as reflected in patient instructions    Estimated body mass index is 22.67 kg/m  as calculated from the following:    Height as of 7/11/19: 1.613 m (5' 3.5\").    Weight as of 7/11/19: 59 kg (130 lb).         reports that she has never smoked. She has never used smokeless tobacco.      Appropriate preventive services were discussed with this patient, including applicable screening as appropriate for cardiovascular disease, diabetes, osteopenia/osteoporosis, and glaucoma.  As appropriate for age/gender, discussed screening for colorectal cancer, prostate cancer, breast cancer, and cervical cancer. Checklist reviewing preventive services available has been given to the patient.    Reviewed patients plan of care and provided an AVS. The Basic Care Plan (routine screening as documented in Health Maintenance) for Yessenia meets the Care Plan requirement. This Care Plan has been established and reviewed with the Patient.    Counseling Resources:  ATP IV Guidelines  Pooled Cohorts Equation Calculator  Breast Cancer Risk Calculator  FRAX Risk Assessment  ICSI Preventive Guidelines  Dietary Guidelines for Americans, 2010  USDA's MyPlate  ASA Prophylaxis  Lung CA Screening    Josue Mejia MD, MD  [facility]    Identified Health Risks:  "

## 2019-10-01 LAB
ALBUMIN SERPL-MCNC: 4.2 G/DL (ref 3.4–5)
ALP SERPL-CCNC: 70 U/L (ref 40–150)
ALT SERPL W P-5'-P-CCNC: 15 U/L (ref 0–50)
ANION GAP SERPL CALCULATED.3IONS-SCNC: 9 MMOL/L (ref 3–14)
AST SERPL W P-5'-P-CCNC: 12 U/L (ref 0–45)
BILIRUB SERPL-MCNC: 0.4 MG/DL (ref 0.2–1.3)
BUN SERPL-MCNC: 15 MG/DL (ref 7–30)
CALCIUM SERPL-MCNC: 9.3 MG/DL (ref 8.5–10.1)
CHLORIDE SERPL-SCNC: 103 MMOL/L (ref 94–109)
CHOLEST SERPL-MCNC: 301 MG/DL
CO2 SERPL-SCNC: 25 MMOL/L (ref 20–32)
CREAT SERPL-MCNC: 0.81 MG/DL (ref 0.52–1.04)
GFR SERPL CREATININE-BSD FRML MDRD: 74 ML/MIN/{1.73_M2}
GLUCOSE SERPL-MCNC: 115 MG/DL (ref 70–99)
HDLC SERPL-MCNC: 97 MG/DL
LDLC SERPL CALC-MCNC: 174 MG/DL
NONHDLC SERPL-MCNC: 204 MG/DL
POTASSIUM SERPL-SCNC: 4.1 MMOL/L (ref 3.4–5.3)
PROT SERPL-MCNC: 7.1 G/DL (ref 6.8–8.8)
SODIUM SERPL-SCNC: 137 MMOL/L (ref 133–144)
TRIGL SERPL-MCNC: 149 MG/DL

## 2019-10-01 ASSESSMENT — ANXIETY QUESTIONNAIRES: GAD7 TOTAL SCORE: 2

## 2019-10-01 NOTE — RESULT ENCOUNTER NOTE
Awais Casas,    I had the opportunity to review your recent labs and a summary of your labs reads as follows:    Your complete blood counts show no sign of anemia, stable white blood cell count and normal platelets.  Your comprehensive metabolic panel showed normal renal function, normal liver function, and elevated nonfasting blood glucose indicating no evidence of diabetes mellitus.  Your fasting lipid panel show  -Fantastic HDL (good) cholesterol -as your goal is greater than 40  -Elevated LDL (bad) cholesterol as your goal is less than 130 -but this was notably not fasting  - normal triglyceride levels  Persistent finding of trace blood on the urine dipstick with a normal urine microscopic exam    Overall your labs look great.  I would not recommend any changes at this time    Sincerely,  Josue Mejia MD

## 2019-10-07 ENCOUNTER — TRANSFERRED RECORDS (OUTPATIENT)
Dept: HEALTH INFORMATION MANAGEMENT | Facility: CLINIC | Age: 69
End: 2019-10-07

## 2019-11-02 ENCOUNTER — HEALTH MAINTENANCE LETTER (OUTPATIENT)
Age: 69
End: 2019-11-02

## 2019-11-11 DIAGNOSIS — F51.01 PRIMARY INSOMNIA: ICD-10-CM

## 2019-11-12 RX ORDER — OMEPRAZOLE 40 MG/1
CAPSULE, DELAYED RELEASE ORAL
Start: 2019-11-12

## 2019-11-12 NOTE — TELEPHONE ENCOUNTER
"omeprazole (PRILOSEC) 40 MG DR capsule 90 capsule 3 2/5/2019     Last Written Prescription Date:  2/5/19  Last Fill Quantity: 90,  # refills: 3   Last office visit: 9/30/2019 with prescribing provider:  Roberto   Future Office Visit:  None    Requested Prescriptions   Pending Prescriptions Disp Refills     omeprazole (PRILOSEC) 40 MG DR capsule [Pharmacy Med Name: OMEPRAZOLE DR CAPS 40MG] 90 capsule 4     Sig: TAKE 1 CAPSULE DAILY       PPI Protocol Failed - 11/11/2019 10:05 AM        Failed - No diagnosis of osteoporosis on record        Passed - Not on Clopidogrel (unless Pantoprazole ordered)        Passed - Recent (12 mo) or future (30 days) visit within the authorizing provider's specialty     Patient has had an office visit with the authorizing provider or a provider within the authorizing providers department within the previous 12 mos or has a future within next 30 days. See \"Patient Info\" tab in inbasket, or \"Choose Columns\" in Meds & Orders section of the refill encounter.              Passed - Medication is active on med list        Passed - Patient is age 18 or older        Passed - No active pregnacy on record        Passed - No positive pregnancy test in past 12 months        [facility] Follow-up to PHQ 6/12/2018 6/12/2018 9/30/2019   PHQ-9 9. Suicide Ideation past 2 weeks Not at all Not at all Not at all   Thoughts of suicide or self harm in past 2 weeks - - -   PHQ-9 Self harm plan? - - -   PHQ-9 Self harm action? - - -   PHQ-9 Safety concerns? - - -         "

## 2019-11-14 ENCOUNTER — TELEPHONE (OUTPATIENT)
Dept: FAMILY MEDICINE | Facility: CLINIC | Age: 69
End: 2019-11-14

## 2019-11-14 NOTE — TELEPHONE ENCOUNTER
[facility] Triage          Previous Messages      ----- Message -----   From: Bernarda Evans CMA   Sent: 9/30/2019  10:44 AM CST   To: [facility] Advance Directives Pool     Health Care directive has been scanned in but not triggering health Maint.  Please update .

## 2019-12-03 ENCOUNTER — TRANSFERRED RECORDS (OUTPATIENT)
Dept: HEALTH INFORMATION MANAGEMENT | Facility: CLINIC | Age: 69
End: 2019-12-03

## 2019-12-17 ENCOUNTER — TRANSFERRED RECORDS (OUTPATIENT)
Dept: HEALTH INFORMATION MANAGEMENT | Facility: CLINIC | Age: 69
End: 2019-12-17

## 2020-01-23 ENCOUNTER — TRANSFERRED RECORDS (OUTPATIENT)
Dept: HEALTH INFORMATION MANAGEMENT | Facility: CLINIC | Age: 70
End: 2020-01-23

## 2020-02-13 ENCOUNTER — TRANSFERRED RECORDS (OUTPATIENT)
Dept: HEALTH INFORMATION MANAGEMENT | Facility: CLINIC | Age: 70
End: 2020-02-13

## 2020-03-03 ENCOUNTER — TRANSFERRED RECORDS (OUTPATIENT)
Dept: HEALTH INFORMATION MANAGEMENT | Facility: CLINIC | Age: 70
End: 2020-03-03

## 2020-04-07 ENCOUNTER — E-VISIT (OUTPATIENT)
Dept: FAMILY MEDICINE | Facility: CLINIC | Age: 70
End: 2020-04-07
Payer: COMMERCIAL

## 2020-04-07 DIAGNOSIS — M54.2 CERVICALGIA: Primary | ICD-10-CM

## 2020-04-07 PROCEDURE — 99421 OL DIG E/M SVC 5-10 MIN: CPT | Performed by: INTERNAL MEDICINE

## 2020-04-07 RX ORDER — CYCLOBENZAPRINE HCL 5 MG
5 TABLET ORAL 3 TIMES DAILY PRN
Qty: 30 TABLET | Refills: 1 | Status: SHIPPED | OUTPATIENT
Start: 2020-04-07 | End: 2020-10-15

## 2020-04-07 NOTE — TELEPHONE ENCOUNTER
Provider E-Visit time total (minutes):  8    We discussed that headaches likely originating from cervical spine with some possible arthritic changes and cervicalgia.  I recommended referral to physical therapy as well as a trial of Flexeril in addition to taking Tylenol as needed.  She is agreeable to this plan.

## 2020-04-12 ENCOUNTER — E-VISIT (OUTPATIENT)
Dept: FAMILY MEDICINE | Facility: CLINIC | Age: 70
End: 2020-04-12
Payer: COMMERCIAL

## 2020-04-12 DIAGNOSIS — G43.009 MIGRAINE WITHOUT AURA AND WITHOUT STATUS MIGRAINOSUS, NOT INTRACTABLE: Primary | ICD-10-CM

## 2020-04-12 PROCEDURE — 99422 OL DIG E/M SVC 11-20 MIN: CPT | Performed by: INTERNAL MEDICINE

## 2020-04-13 ENCOUNTER — TELEPHONE (OUTPATIENT)
Dept: FAMILY MEDICINE | Facility: CLINIC | Age: 70
End: 2020-04-13

## 2020-04-13 ENCOUNTER — VIRTUAL VISIT (OUTPATIENT)
Dept: PHYSICAL THERAPY | Facility: CLINIC | Age: 70
End: 2020-04-13
Payer: COMMERCIAL

## 2020-04-13 DIAGNOSIS — G44.209 TENSION HEADACHE: ICD-10-CM

## 2020-04-13 DIAGNOSIS — M54.2 NECK PAIN: ICD-10-CM

## 2020-04-13 PROCEDURE — 97110 THERAPEUTIC EXERCISES: CPT | Mod: 95 | Performed by: PHYSICAL THERAPIST

## 2020-04-13 PROCEDURE — 97535 SELF CARE MNGMENT TRAINING: CPT | Mod: 95 | Performed by: PHYSICAL THERAPIST

## 2020-04-13 PROCEDURE — 97161 PT EVAL LOW COMPLEX 20 MIN: CPT | Mod: 95 | Performed by: PHYSICAL THERAPIST

## 2020-04-13 RX ORDER — SUMATRIPTAN 50 MG/1
50 TABLET, FILM COATED ORAL
Qty: 30 TABLET | Refills: 4 | Status: SHIPPED | OUTPATIENT
Start: 2020-04-13 | End: 2020-04-20

## 2020-04-13 RX ORDER — DRONABINOL 5 MG/1
5 CAPSULE ORAL AT BEDTIME
COMMUNITY
Start: 2020-04-13 | End: 2022-04-04

## 2020-04-13 NOTE — TELEPHONE ENCOUNTER
I called and spoke with Yessenia with regards to her ongoing headaches.  She is now experiencing lacrimation and erythema in the right eye eye, her headache is more isolated in the right temple also radiating back to the right side scalp.  It seems less likely a tension headache and more likely not related to possible migraine.  I recommended an initial trial of empiric treatment with sumatriptan 50 mg take at onset of headache followed by another 50 mg within 2 hours if not improved.  I also recommended a referral to neurology given the onset of the symptoms and the fact that she is never had migraines previously.  Finally, I recommended she try to get in touch with her ophthalmologist immediately.  She is agreeable with this plan and we will follow-up by phone again tomorrow.    Provider E-Visit time total (minutes): 15

## 2020-04-13 NOTE — TELEPHONE ENCOUNTER
Placed call.  Information given to patient from PCP.  States understood and agreed with advise.  Dania Fine RN

## 2020-04-13 NOTE — TELEPHONE ENCOUNTER
Reason for Call:  Other call back    Detailed comments: pt had televisit with Dr Mejia this morning. He recommended she see the eye dr. Eye Dr said she should take Valtrex 1g x2 daily, as they suspect shingles. Pt was told to check if this was ok with Dr Kerns first    Phone Number Patient can be reached at: Home number on file [phone] (home)    Best Time: any    Can we leave a detailed message on this number? YES    Call taken on 4/13/2020 at 11:37 AM by Steven Sorensen

## 2020-04-13 NOTE — TELEPHONE ENCOUNTER
PCP,    Eye doctor would like pt to take Valtrex for possible shingles. She wanted to double check with you to see if its ok for her to take.    Thank you,  Nestor KRUEGER RN

## 2020-04-13 NOTE — PROGRESS NOTES
"Physical Therapy Virtual Initial Visit      The patient has been notified of following:     \"This virtual visit will be conducted between you and your provider. We have found that certain health care needs can be provided without the need for physical presence.  This service lets us provide the care you need with a virtual visit.\"    Due to external, as well as internal [facility] management of the COVID-19 Virus, Yessenia Martines was not seen in our clinic.  As a substitution, we implemented a virtual visit to manage this patient's condition utilizing the PTRx virtual visit platform via the patient s existing code.  The provider, Neyda Murray, reviewed the patient's chart, PTRx prescription, and spoke with the patient to determine the following telemedicine visit is appropriate and effective for the patient's care.    The following type of visit was completed:   Video Visit:  The [employer] platform uses a synchronous HIPAA compliant video stream for this patient encounter.         Subjective:  The history is provided by the patient. No  was used.   Therapist Generated HPI Evaluation  Problem details: Pat reports chronic history of neck pain with worsening of pain and headaches past month. There is more stress with COVID-19 restrictions, living in temporary housing until home built,  has muscular dystrophy and is currently hospitalized. Her daughter is helping with shopping, etc but not living with her and her . .         Type of problem:  Cervical spine.    This is a chronic condition.  Condition occurred with:  Degenerative joint disease.  Where condition occurred: for unknown reasons.  Patient reports pain:  Upper cervical spine and lower cervical spine.  Pain is described as aching and sharp and is constant.  Pain radiates to:  Head. Pain is the same all the time.  Since onset symptoms are gradually worsening.  Associated symptoms:  Headache. Symptoms are " exacerbated by nothing  and relieved by heat.  Special tests included:  X-ray (DDD cervical).  Previous treatment includes physical therapy. There was moderate improvement following previous treatment.  Home/work barriers: Living in temporary housing and primary caretaker of  with muscular dystrophy (currently hospitalized but coming home today)    Patient Health History  Yessenia Martines being seen for Neck and HA (right).     Date of Onset: Feb 2020 began wrosening.   Problem occurred: Cervical DDD    Pain score: ranges 7-10/10.  General health as reported by patient is good.  Pertinent medical history includes: osteoarthritis (Lupus).   Red flags:  None as reported by patient.     Surgeries include:  Orthopedic surgery. Other surgery history details: Lumbar fusions .        Current occupation is Retired.                                 Objective:    Standing Alignment:    Cervical/Thoracic:  Forward head  Shoulder/UE:  Rounded shoulders, protracted scapula L and protracted scapula R              Gait:    Gait Type:  Not assessed         Flexibility/Screens:   Positive screens:  Cervical  Upper Extremity:    Decreased left upper extremity flexibility at:  Pectoralis Major and Pectoralis Minor    Decreased right upper extremity flexibility present at:  Pectoralis Major and Pectoralis Minor    Spine:  Decreased left spine flexibility:  Upper Trap    Decreased right spine flexibility:  Upper Trap                Cervical/Thoracic Evaluation    AROM:  AROM Cervical:    Flexion:            WNL with pulling at end range   Extension:       WNL with pain at end range  Rotation:         Left: 75% with end range pain right      Right: 75% with more end range pain right   Side Bend:      Left: 50% with end range pain      Right:  50% with end range pain     Strength: N/A  Headaches: cervical  Cervical Myotomes:  not assessed                  DTR's:  not assessed          Cervical Dermatomes:  not  assessed                    Cervical Palpation:  not assessed      Functional Tests:  not assessed    Cervical Stability/Joint Clearing:  not assessed        Cord Sign:  not assessed                                        Observe drooped right eye lid in relation to left. Pat is aware last 2 days with skin rash presenting. She will discuss with MD today on phone visit.   General   ROS    PTRx Content from today's visit:  Exercise Name: Education Sheet General - Reps: 5 x 5 sec each (using only 5 grams of pressure) - Sessions: 3-5 as needed for HA relief, Notes: 1. Cheek Bone Lift (up & out)  2. Ear Pull (down & out)  Exercise Name: Beninese Stretch for Thoracic Mobility, Sets: MODIFIED EXERCISE FROM VIDEO  - Reps: 10 - Sessions: 1-2, Notes: DO NOT LIFT ARMS OVERHEAD.  With 2 racquetballs or tennis balls taped together, roll on either side of spine from bra line to C/T junction against wall to mobilize spine.   Exercise Name: Suboccipital Release - Reps: 1 x 2-3' progressing to 4-5' over time - Sessions: 2 (or as needed for headache relief), Notes: Use racquetballs taped or in sock together. Ly on bed with or without pillow and knees bent with feet flat on bed.   Exercise Name: Scapular Retraction/Depression - Reps: 5 x 5 sec - Sessions: 3-5 x daily as needed, Notes: Seated or standing with arms resting in lap  Discussed options for stress reduction. Since Pat has not access to paints or piano, recommended trial of Meditation APPS: Headspace or Calm.     Assessment/Plan:     Patient is a 70 year old female with cervical complaints.    Patient has the following significant findings with corresponding treatment plan.                Diagnosis 1:  Cervical DDD & HA  Pain -  self management, education, directional preference exercise and home program  Decreased ROM/flexibility - manual therapy, therapeutic exercise and home program  Inflammation - self management/home program  Decreased function - therapeutic activities  and home program    Therapy Evaluation Codes:   1) History comprised of:   Personal factors that impact the plan of care:      Time since onset of symptoms.    Comorbidity factors that impact the plan of care are:      Lupus & OA.     Medications impacting care: Muscle Relaxant.  2) Examination of Body Systems comprised of:   Body structures and functions that impact the plan of care:      Cervical spine.   Activity limitations that impact the plan of care are:      Reading/Computer work.  3) Clinical presentation characteristics are:   Stable/Uncomplicated.  4) Decision-Making    Low complexity using standardized patient assessment instrument and/or measureable assessment of functional outcome.  Cumulative Therapy Evaluation is: Low complexity.    Previous and current functional limitations:  (See Goal Flow Sheet for this information)    Short term and Long term goals: (See Goal Flow Sheet for this information)     Communication ability:  Patient appears to be able to clearly communicate and understand verbal and written communication and follow directions correctly.  Treatment Explanation - The following has been discussed with the patient:   RX ordered/plan of care  Anticipated outcomes  Possible risks and side effects  This patient would benefit from PT intervention to resume normal activities.   Rehab potential is good.    Frequency:  2 X week, once daily  Duration:  for 3 weeks  Discharge Plan:  Achieve all LTG.  Independent in home treatment program.  Return to work with or without restrictions.  Reach maximal therapeutic benefit.    Please refer to the daily flowsheet for treatment today, total treatment time and time spent performing 1:1 timed codes.       Virtual visit contact time    Time of service began: 9:20 AM  Time of service ended: 10:00 AM  Total Time for set up, visit, and documentation: 40 minutes    Payor: [employer]AL[employer] / Plan: [facility] MEDICARE / Product Type: HMO /     Procedure Code/s   Therapeutic  Exercise (94745): 15 minutes  Self Care / Home Management Training (30519): 15 minutes    I have reviewed the note as documented above.  This accurately captures the substance of my conversation with the patient.  Provider location: [facility]ON[facility]IO Gutierrez ([facility])  Patient location: Home    ___________________________________________________

## 2020-04-18 ENCOUNTER — E-VISIT (OUTPATIENT)
Dept: FAMILY MEDICINE | Facility: CLINIC | Age: 70
End: 2020-04-18
Payer: COMMERCIAL

## 2020-04-18 DIAGNOSIS — B02.9 HERPES ZOSTER WITHOUT COMPLICATION: Primary | ICD-10-CM

## 2020-04-20 ENCOUNTER — E-VISIT (OUTPATIENT)
Dept: FAMILY MEDICINE | Facility: CLINIC | Age: 70
End: 2020-04-20
Payer: COMMERCIAL

## 2020-04-20 DIAGNOSIS — H57.89 RED EYE: Primary | ICD-10-CM

## 2020-04-20 PROCEDURE — 99421 OL DIG E/M SVC 5-10 MIN: CPT | Performed by: INTERNAL MEDICINE

## 2020-04-20 RX ORDER — GABAPENTIN 100 MG/1
600 CAPSULE ORAL 3 TIMES DAILY
COMMUNITY
Start: 2020-04-20 | End: 2020-10-15

## 2020-04-20 RX ORDER — VALACYCLOVIR HYDROCHLORIDE 1 G/1
1000 TABLET, FILM COATED ORAL 2 TIMES DAILY
Qty: 30 TABLET | Refills: 0 | COMMUNITY
Start: 2020-04-20 | End: 2020-10-15

## 2020-04-20 NOTE — TELEPHONE ENCOUNTER
Provider E-Visit time total (minutes): 5 MINUTES    I called and spoke with Yessenia over the phone with regards to her recent eye infection.  She has seen ophthalmology and was given a course of Valtrex.  In addition she was seen by neurology and recommended gabapentin to help with pain.  She continues to have some right eye irritation and injection with some discharge.  I suggested that she call back to her ophthalmologist to review her treatment and see if there may be any additional treatment options that would be recommended.  She will continue with gabapentin and dose has been increased up to 600 mg 3 times a day.  This was noted in her record.    Josue Mejia MD, MD

## 2020-05-13 ENCOUNTER — TRANSFERRED RECORDS (OUTPATIENT)
Dept: HEALTH INFORMATION MANAGEMENT | Facility: CLINIC | Age: 70
End: 2020-05-13

## 2020-06-04 ENCOUNTER — TRANSFERRED RECORDS (OUTPATIENT)
Dept: HEALTH INFORMATION MANAGEMENT | Facility: CLINIC | Age: 70
End: 2020-06-04

## 2020-09-02 ENCOUNTER — NURSE TRIAGE (OUTPATIENT)
Dept: NURSING | Facility: CLINIC | Age: 70
End: 2020-09-02

## 2020-09-02 DIAGNOSIS — Z20.822 EXPOSURE TO COVID-19 VIRUS: Primary | ICD-10-CM

## 2020-09-03 ENCOUNTER — VIRTUAL VISIT (OUTPATIENT)
Dept: FAMILY MEDICINE | Facility: OTHER | Age: 70
End: 2020-09-03

## 2020-09-03 DIAGNOSIS — Z20.822 SUSPECTED COVID-19 VIRUS INFECTION: Primary | ICD-10-CM

## 2020-09-03 NOTE — PROGRESS NOTES
"Date: 2020 11:44:03  Clinician: Marko Alvarado  Clinician NPI: [id number]  Patient: Yessenia Martines  Patient : 1950  Patient Address: [address] [place] [address], [place] [address]  Patient Phone: [phone]  Visit Protocol: URI  Patient Summary:  Yessenia is a 70 year old ( : 1950 ) female who initiated a Visit for COVID-19 (Coronavirus) evaluation and screening. When asked the question \"Please sign me up to receive news, health information and promotions. \", Yessenia responded \"No\".    When asked when her symptoms started, Yessenia reported that she does not have any symptoms.   She denies taking antibiotic medication in the past month and having recent facial or sinus surgery in the past 60 days.    Pertinent COVID-19 (Coronavirus) information  In the past 14 days, Yessenia has not worked in a congregate living setting.   She does not work or volunteer as healthcare worker or a  and does not work or volunteer in a healthcare facility.   Yessenia also has not lived in a congregate living setting in the past 14 days. She does not live with a healthcare worker.   Yessenia has had a close contact with a laboratory-confirmed COVID-19 patient in the last 14 days. Additional information about contact with COVID-19 (Coronavirus) patient as reported by the patient (free text): My  received a positive test result yesterday.  I would like to be tested therefore.  We are both asymptomatic.    Patient reported they are living in the same household with a COVID-19 positive patient.  Since 2019, Yessenia and has not had upper respiratory infection or influenza-like illness. Has not been diagnosed with lab-confirmed COVID-19 test   Pertinent medical history  Yessenia does not get yeast infections when she takes antibiotics.   Yessenia does not need a return to work/school note.   Weight: 115 lbs   Yessenia does not smoke or use smokeless tobacco.   Additional information " as reported by the patient (free text): I have 13 grandchildren many of whom I have interacted with recently   Weight: 115 lbs    MEDICATIONS: methotrexate sodium oral, ALLERGIES: amoxicillin  Clinician Response:  Dear Yessenia,   Your symptoms show that you may have coronavirus (COVID-19). This illness can cause fever, cough and trouble breathing. Many people get a mild case and get better on their own. Some people can get very sick.  What should I do?  We would like to test you for this virus.   1. Please call [phone] to schedule your visit. Explain that you were [facility] have a COVID-19 test. Be [facility].  The following will serve as your written order for this COVID Test, ordered by me, for the indication of suspected COVID [Z20.828]: The test will be ordered in [facility], our electronic health record, after you are scheduled. It will show as ordered and authorized by Ronnell Ireland MD.  Order: COVID-19 (Coronavirus) PCR for SYMPTOMATIC testing from [facility].     2. When it's time for your COVID test:  Stay at least 6 feet away from others. (If someone will drive you to your test, stay in the backseat, as far away from the  as you can.)   Cover your mouth and nose with a mask, tissue or washcloth.  Go straight to the testing site. Don't make any stops on the way there or back.      3.Starting now: Stay home and away from others (self-isolate) until:   You've had no fever---and no medicine that reduces fever---for one full day (24 hours). And...   Your other symptoms have gotten better. For example, your cough or breathing has improved. And...   At least 10 days have passed since your symptoms started.       During this time, don't leave the house except for testing or medical care.   Stay in your own room, even for meals. Use your own bathroom if you can.   Stay away from others in your home. No hugging, kissing or shaking hands. No visitors.  Don't go to work,  "school or anywhere else.    Clean \"high touch\" surfaces often (doorknobs, counters, handles, etc.). Use a household cleaning spray or wipes. You'll find a full list of  on the EPA website: www.epa.gov/pesticide-registration/list-n-disinfectants-use-against-sars-cov-2.   Cover your mouth and nose with a mask, tissue or washcloth to avoid spreading germs.  Wash your hands and face often. Use soap and water.  Caregivers in these groups are at risk for severe illness due to COVID-19:  o People 65 years and older  o People who live in a nursing home or long-term care facility  o People with chronic disease (lung, heart, cancer, diabetes, kidney, liver, immunologic)  o People who have a weakened immune system, including those who:   Are in cancer treatment  Take medicine that weakens the immune system, such as corticosteroids  Had a bone marrow or organ transplant  Have an immune deficiency  Have poorly controlled HIV or AIDS  Are obese (body mass index of 40 or higher)  Smoke regularly   o Caregivers should wear gloves while washing dishes, handling laundry and cleaning bedrooms and bathrooms.  o Use caution when washing and drying laundry: Don't shake dirty laundry, and use the warmest water setting that you can.  o For more tips, go to www.cdc.gov/coronavirus/2019-ncov/downloads/10Things.pdf.       How can I take care of myself?   Get lots of rest. Drink extra fluids (unless a doctor has told you not to).   Take Tylenol (acetaminophen) for fever or pain. If you have liver or kidney problems, ask your family doctor if it's okay to take Tylenol.   Adults can take either:    650 mg (two 325 mg pills) every 4 to 6 hours, or...   1,000 mg (two 500 mg pills) every 8 hours as needed.    Note: Don't take more than 3,000 mg in one day. Acetaminophen is found in many medicines (both prescribed and over-the-counter medicines). Read all labels to be sure you don't take too much.   For children, check the Tylenol bottle for " the right dose. The dose is based on the child's age or weight.    If you have other health problems (like cancer, heart failure, an organ transplant or severe kidney disease): Call your specialty clinic if you don't feel better in the next 2 days.       Know when to call 911. Emergency warning signs include:    Trouble breathing or shortness of breath Pain or pressure in the chest that doesn't go away Feeling confused like you haven't felt before, or not being able to wake up Bluish-colored lips or face.  Where can I get more information?   [facility] -- About COVID-19: [email or web address]   CDC -- What to Do If You're Sick: www.cdc.gov/coronavirus/2019-ncov/about/steps-when-sick.html   [facility] -- Ending Home Isolation: www.cdc.gov/coronavirus/2019-ncov/hcp/disposition-in-home-patients.html   [facility] -- Caring for Someone: www.cdc.gov/coronavirus/2019-ncov/if-you-are-sick/care-for-someone.html   [facility] -- Interim Guidance for Hospital Discharge to Home: www.[facility].[place]./diseases/coronavirus/hcp/hospdischarge.pdf   [facility] clinical trials (COVID-19 research studies): clinicalaffairs.[facility]-clinical-trials    Below are the COVID-19 hotlines at the [place] Department of Health ([facility]). Interpreters are available.    For health questions: Call 339-970-9523 or 1-820.950.3036 (7 a.m. to 7 p.m.) For questions about schools and childcare: Call 145-482-3295 or 1-483.708.2763 (7 a.m. to 7 p.m.)    Diagnosis: Contact with and (suspected) exposure to other viral communicable diseases  Diagnosis ICD: Z20.828

## 2020-09-03 NOTE — TELEPHONE ENCOUNTER
This one you would order.  Our protocol allows to order for a pt that has been exposed to a known positive..      Nanci Groves RN

## 2020-09-03 NOTE — TELEPHONE ENCOUNTER
I think this would be reasonable, but I know there is a protocol for testing family members.  Is this something that the [place] Department of Health would oversee?    Josue Mejia MD, MD

## 2020-09-04 DIAGNOSIS — Z20.822 SUSPECTED COVID-19 VIRUS INFECTION: ICD-10-CM

## 2020-09-04 PROCEDURE — U0003 INFECTIOUS AGENT DETECTION BY NUCLEIC ACID (DNA OR RNA); SEVERE ACUTE RESPIRATORY SYNDROME CORONAVIRUS 2 (SARS-COV-2) (CORONAVIRUS DISEASE [COVID-19]), AMPLIFIED PROBE TECHNIQUE, MAKING USE OF HIGH THROUGHPUT TECHNOLOGIES AS DESCRIBED BY CMS-2020-01-R: HCPCS | Performed by: FAMILY MEDICINE

## 2020-09-05 LAB
SARS-COV-2 RNA SPEC QL NAA+PROBE: NOT DETECTED
SPECIMEN SOURCE: NORMAL

## 2020-09-10 PROBLEM — G44.209 TENSION HEADACHE: Status: RESOLVED | Noted: 2020-04-13 | Resolved: 2020-09-10

## 2020-09-10 PROBLEM — M54.2 NECK PAIN: Status: RESOLVED | Noted: 2020-04-13 | Resolved: 2020-09-10

## 2020-09-15 ENCOUNTER — TRANSFERRED RECORDS (OUTPATIENT)
Dept: HEALTH INFORMATION MANAGEMENT | Facility: CLINIC | Age: 70
End: 2020-09-15

## 2020-09-15 LAB
ALT SERPL-CCNC: 14 IU/L (ref 5–35)
AST SERPL-CCNC: 15 U/L (ref 5–34)
CREAT SERPL-MCNC: 0.73 MG/DL (ref 0.5–1.3)
GFR SERPL CREATININE-BSD FRML MDRD: 84 ML/MIN/1.73M2

## 2020-10-04 DIAGNOSIS — F51.01 PRIMARY INSOMNIA: ICD-10-CM

## 2020-10-05 ENCOUNTER — TRANSFERRED RECORDS (OUTPATIENT)
Dept: HEALTH INFORMATION MANAGEMENT | Facility: CLINIC | Age: 70
End: 2020-10-05

## 2020-10-05 RX ORDER — TRAZODONE HYDROCHLORIDE 100 MG/1
TABLET ORAL
Qty: 180 TABLET | Refills: 3 | Status: SHIPPED | OUTPATIENT
Start: 2020-10-05 | End: 2021-01-19

## 2020-10-05 RX ORDER — TRAZODONE HYDROCHLORIDE 100 MG/1
TABLET ORAL
Qty: 60 TABLET | Refills: 11 | Status: SHIPPED | OUTPATIENT
Start: 2020-10-05 | End: 2020-10-08

## 2020-10-08 ENCOUNTER — OFFICE VISIT (OUTPATIENT)
Dept: FAMILY MEDICINE | Facility: CLINIC | Age: 70
End: 2020-10-08
Payer: COMMERCIAL

## 2020-10-08 VITALS
HEIGHT: 64 IN | BODY MASS INDEX: 19.81 KG/M2 | WEIGHT: 116 LBS | DIASTOLIC BLOOD PRESSURE: 76 MMHG | SYSTOLIC BLOOD PRESSURE: 114 MMHG | HEART RATE: 83 BPM | OXYGEN SATURATION: 95 % | TEMPERATURE: 97.7 F

## 2020-10-08 DIAGNOSIS — R31.0 GROSS HEMATURIA: Primary | ICD-10-CM

## 2020-10-08 LAB
ALBUMIN UR-MCNC: NEGATIVE MG/DL
APPEARANCE UR: CLEAR
BACTERIA #/AREA URNS HPF: ABNORMAL /HPF
BILIRUB UR QL STRIP: NEGATIVE
COLOR UR AUTO: YELLOW
GLUCOSE UR STRIP-MCNC: NEGATIVE MG/DL
HGB UR QL STRIP: ABNORMAL
KETONES UR STRIP-MCNC: NEGATIVE MG/DL
LEUKOCYTE ESTERASE UR QL STRIP: ABNORMAL
NITRATE UR QL: NEGATIVE
NON-SQ EPI CELLS #/AREA URNS LPF: ABNORMAL /LPF
PH UR STRIP: 6 PH (ref 5–7)
RBC #/AREA URNS AUTO: ABNORMAL /HPF
SOURCE: ABNORMAL
SP GR UR STRIP: 1.01 (ref 1–1.03)
UROBILINOGEN UR STRIP-ACNC: 0.2 EU/DL (ref 0.2–1)
WBC #/AREA URNS AUTO: ABNORMAL /HPF

## 2020-10-08 PROCEDURE — 81001 URINALYSIS AUTO W/SCOPE: CPT | Performed by: PHYSICIAN ASSISTANT

## 2020-10-08 PROCEDURE — 99214 OFFICE O/P EST MOD 30 MIN: CPT | Performed by: PHYSICIAN ASSISTANT

## 2020-10-08 ASSESSMENT — MIFFLIN-ST. JEOR: SCORE: 1028.23

## 2020-10-08 NOTE — PROGRESS NOTES
Jim Martines is a 70 year old female who presents to clinic today for the following health issues:    HPI          Pt here with dysuria, gross hematuria and freq starting today  Denies flank pain or fever  Denies vaginal bleeding or odor  She is s/p hyst  She has been taking ibuprofen 800mg 2 times per day for her SI joint  She has a hx of hemorrhagic renal cyst in 2017 (see CT in Epic)  She doesn't recall ever seeing urology for this.    Past Medical History:   Diagnosis Date     Allergic rhinitis      Allergic rhinitis due to pollen      Arthritis      Congestive heart failure (H)      Decreased libido      Depressive disorder      Diabetes (H)      Esophageal motility disorder      H pylori ulcer      Heart disease      History of blood transfusion 1980's     Hoarseness      Hypertension      Immunosuppression (H)      Lupus (systemic lupus erythematosus) (H)     diagnosed 12 yrs ago     MCI (mild cognitive impairment)      Menopausal osteoporosis 02/17/2015     Movement disorder      Neuropathy     feet and hands     S/P cholecystectomy      S/P TIESHA-BSO      Thyroid disease      Uncomplicated asthma      Past Surgical History:   Procedure Laterality Date     APPENDECTOMY       ARTHRODESIS FINGER(S) Left 5/18/2018    Procedure: ARTHRODESIS FINGER(S);;  Surgeon: Shahida Carlton MD;  Location: [facility]     ARTHROPLASTY CARPOMETACARPAL (THUMB JOINT) Left 5/18/2018    Procedure: ARTHROPLASTY CARPOMETACARPAL (THUMB JOINT);  LEFT THUMB CARPALMETACARPAL WITH MINI TIGHTROPE, LEFT METAPHALANGEAL FUSION WITH RADIAL BONE GRAFT AND EASY CLIP STAPLE.;  Surgeon: Shahida Carlton MD;  Location: [facility]     ARTHROPLASTY CARPOMETACARPAL (THUMB JOINT), ARTHRODESIS, COMBINED Left 1/24/2018    Procedure: COMBINED ARTHROPLASTY CARPOMETACARPAL (THUMB JOINT), ARTHRODESIS;  LEFT THUMB CARPOMETACARPAL EXCISIONAL ARTHROPLASTY WITH FACSIA TANIA GRAFT, PARTIAL TRAPEZOID EXCISION;  Surgeon:  Briana Carlton MD;  Location: [facility]     BACK SURGERY      L2-L4 or L5 fusion     BLEPHAROPLASTY BILATERAL        SECTION      x3     CHOLECYSTECTOMY      feels better. No pathology on gallbladder     COLONOSCOPY       EYE SURGERY      Raised eyelids.     GENITOURINARY SURGERY       GRAFT BONE TO FINGER Left 2018    Procedure: GRAFT BONE TO FINGER;;  Surgeon: Briana Carlton MD;  Location: [facility]     HC UGI ENDOSCOPY, SIMPLE EXAM  14     HYSTERECTOMY       KNEE SURGERY      bilateral arthroscopy     ORTHOPEDIC SURGERY      right thumb tendon surgery     REPAIR TENDON FINGER(S)  2011    Procedure:REPAIR TENDON FINGER(S); RIGHT INDEX FINGER FLEXOR DIGITORUM PROFUNDUS REPAIR, RADIAL DIGITAL NERVE REPAIR ; Surgeon:BRIANA CARLTON; Location:[facility]     SALPINGO-OOPHORECTOMY BILATERAL       SHOULDER SURGERY Right     arthroscopy     SOFT TISSUE SURGERY      Tendon repair on finger     TONSILLECTOMY      Child     Social History     Tobacco Use     Smoking status: Never Smoker     Smokeless tobacco: Never Used   Substance Use Topics     Alcohol use: Yes     Alcohol/week: 0.0 standard drinks     Comment: 1 wine/day     Current Outpatient Medications   Medication Sig Dispense Refill     calcium-vitamin D (CALCIUM 600/VITAMIN D) 600-400 MG-UNIT per tablet Take 1 tablet by mouth 2 times daily       cetirizine (ZYRTEC) 10 MG tablet 10 mg by Oral or J tube route       Cholecalciferol (VITAMIN D3) 2000 UNITS TABS Take 2,000 Int'l Units by mouth daily       cyclobenzaprine (FLEXERIL) 5 MG tablet Take 1 tablet (5 mg) by mouth 3 times daily as needed for muscle spasms 30 tablet 1     diclofenac (VOLTAREN) 1 % GEL Place 2 g onto the skin 4 times daily       dronabinol (MARINOL) 5 MG capsule Take 1 capsule (5 mg) by mouth At Bedtime       leucovorin (WELLCOVORIN) 5 MG tablet Take 1 tablet (5 mg) by mouth daily       methotrexate 2.5 MG tablet Take 4 tablets by mouth  "once a week        omeprazole (PRILOSEC) 40 MG DR capsule TAKE 1 CAPSULE DAILY 90 capsule 3     predniSONE (DELTASONE) 5 MG tablet Taking 5 mg and 1 mg tablet x2 to get a total 7 mg daily       traZODone (DESYREL) 100 MG tablet TAKE 2 TABLETS AT BEDTIME 180 tablet 3     valACYclovir (VALTREX) 1000 mg tablet Take 1 tablet (1,000 mg) by mouth 2 times daily 30 tablet 0     gabapentin (NEURONTIN) 100 MG capsule Take 6 capsules (600 mg) by mouth 3 times daily       ranitidine HCl 300 MG CAPS Take 300 mg by mouth every evening       Allergies   Allergen Reactions     Augmentin Other (See Comments)     C Diff colitis     Cefdinir Other (See Comments)     Many years ago, does not recall what reaction was     Sudafed [Pseudoephedrine]      Jittery, pacing     Tizanidine Other (See Comments)     Many years ago, does not recall what reaction was     Latex Rash     FAMILY HISTORY NOTED AND REVIEWED    PHYSICAL EXAM:    /76 (BP Location: Right arm, Patient Position: Chair, Cuff Size: Adult Regular)   Pulse 83   Temp 97.7  F (36.5  C) (Temporal)   Ht 1.613 m (5' 3.5\")   Wt 52.6 kg (116 lb)   SpO2 95%   BMI 20.23 kg/m      Patient appears non toxic  : no vulvar lesions. Speculum exam neg for blood    Results for orders placed or performed in visit on 10/08/20   UA reflex to Microscopic     Status: Abnormal   Result Value Ref Range    Color Urine Yellow     Appearance Urine Clear     Glucose Urine Negative NEG^Negative mg/dL    Bilirubin Urine Negative NEG^Negative    Ketones Urine Negative NEG^Negative mg/dL    Specific Gravity Urine 1.010 1.003 - 1.035    Blood Urine Large (A) NEG^Negative    pH Urine 6.0 5.0 - 7.0 pH    Protein Albumin Urine Negative NEG^Negative mg/dL    Urobilinogen Urine 0.2 0.2 - 1.0 EU/dL    Nitrite Urine Negative NEG^Negative    Leukocyte Esterase Urine Trace (A) NEG^Negative    Source Midstream Urine    Urine Microscopic     Status: Abnormal   Result Value Ref Range    WBC Urine 0 - 5 OTO5^0 " - 5 /HPF    RBC Urine 5-10 (A) OTO2^O - 2 /HPF    Squamous Epithelial /LPF Urine Few FEW^Few /LPF    Bacteria Urine Few (A) NEG^Negative /HPF     Assessment and Plan:     (R31.0) Gross hematuria  (primary encounter diagnosis)  Comment:   Plan: UA reflex to Microscopic, Urine Microscopic,         UROLOGY ADULT REFERRAL, CT Abdomen Pelvis w/o &        w Contrast        Push fluids and call back if urinary sxs of urgency/dysuria get worse.      Payal Newsome PA-C

## 2020-10-14 ENCOUNTER — TELEPHONE (OUTPATIENT)
Dept: UROLOGY | Facility: CLINIC | Age: 70
End: 2020-10-14

## 2020-10-14 ENCOUNTER — HOSPITAL ENCOUNTER (OUTPATIENT)
Dept: CT IMAGING | Facility: CLINIC | Age: 70
Discharge: HOME | End: 2020-10-14
Attending: PHYSICIAN ASSISTANT | Admitting: PHYSICIAN ASSISTANT
Payer: COMMERCIAL

## 2020-10-14 DIAGNOSIS — R31.0 GROSS HEMATURIA: ICD-10-CM

## 2020-10-14 PROCEDURE — 74178 CT ABD&PLV WO CNTR FLWD CNTR: CPT

## 2020-10-14 PROCEDURE — 250N000009 HC RX 250: Performed by: PHYSICIAN ASSISTANT

## 2020-10-14 PROCEDURE — 250N000011 HC RX IP 250 OP 636: Performed by: PHYSICIAN ASSISTANT

## 2020-10-14 RX ORDER — IOPAMIDOL 755 MG/ML
60 INJECTION, SOLUTION INTRAVASCULAR ONCE
Status: COMPLETED | OUTPATIENT
Start: 2020-10-14 | End: 2020-10-14

## 2020-10-14 RX ADMIN — IOPAMIDOL 60 ML: 755 INJECTION, SOLUTION INTRAVENOUS at 17:28

## 2020-10-14 RX ADMIN — SODIUM CHLORIDE 80 ML: 9 INJECTION, SOLUTION INTRAVENOUS at 17:29

## 2020-10-14 NOTE — LETTER
October 15, 2020      Yessenia Martines  [address] [address] [address]        [facility], I am covering for Payal Newsome today and have reviewed her note and the CT.  The hemorrhagic cyst is again seen on the CT.  I note that you have been referred to urology for further discussion or investigation.  If you have any questions Payal will return to clinic next week.     Resulted Orders   CT Abdomen Pelvis w/o & w Contrast    Narrative    CT ABDOMEN PELVIS W/O & W CONTRAST 10/14/2020 5:51 PM    CLINICAL HISTORY: Hematuria, unknown cause; Gross hematuria    TECHNIQUE: CT urogram without and following injection of IV contrast.  Multiplanar reformats were obtained. Dose reduction techniques were  used.  CONTRAST: 60 mL Isovue-370        COMPARISON: 12/28/2017    FINDINGS:   LOWER CHEST: Right lower lobe calcified granuloma. Otherwise, the  visualized lung bases are clear.    HEPATOBILIARY: Punctate calcified granulomas in the liver. A few small  hypodense lesions in the liver are likely cysts. Cholecystectomy.    PANCREAS: Normal.    SPLEEN: Punctate calcified granulomas.    ADRENAL GLANDS: Normal.    KIDNEYS/BLADDER: No renal, ureteric or bladder catheter. Symmetric  nephrograms. No suspicious renal masses. Exophytic 1.8 cm cyst  containing hemorrhagic/proteinaceous materials without enhancement at  the upper pole of the left kidney. Subcentimeter hypodensity at the  lower pole of the right kidney is too small to adequately characterize  but likely a benign simple cyst. No abnormal urothelial enhancement or  filling defects in the renal collecting systems and ureters. No focal  masses in the urinary bladder.    BOWEL: Small hiatal hernia. Normal caliber loops of small bowel and  colon. Scattered sigmoid diverticulosis. No inflammatory changes.    PELVIC ORGANS: Hysterectomy. No pelvic masses.    ADDITIONAL FINDINGS: The evaluation slightly limited by streak  artifact from instrumented lumbar spine fusion. No  lymphadenopathy in  the abdomen and pelvis. No free fluid.    MUSCULOSKELETAL: Instrument fusion L2-L5. No suspicious lesions in the  bones.      Impression    IMPRESSION:   1.  No urinary system calculi. No suspicious renal masses or  urothelial masses in the upper tracts. Left renal 1.8 cm cyst  containing hemorrhagic/proteinaceous material requiring no specific  follow-up.  2.  Sequela of prior granulomatous disease.    GINGER ODONNELL MD       If you have any questions or concerns, please call the clinic at the number listed above.       Sincerely,        JAKY Parks CNP  maria luisa

## 2020-10-15 ENCOUNTER — OFFICE VISIT (OUTPATIENT)
Dept: UROLOGY | Facility: CLINIC | Age: 70
End: 2020-10-15
Payer: COMMERCIAL

## 2020-10-15 VITALS
HEIGHT: 63 IN | HEART RATE: 80 BPM | SYSTOLIC BLOOD PRESSURE: 120 MMHG | DIASTOLIC BLOOD PRESSURE: 70 MMHG | BODY MASS INDEX: 20.2 KG/M2 | WEIGHT: 114 LBS

## 2020-10-15 DIAGNOSIS — R31.0 GROSS HEMATURIA: ICD-10-CM

## 2020-10-15 LAB
ALBUMIN UR-MCNC: NEGATIVE MG/DL
APPEARANCE UR: CLEAR
BILIRUB UR QL STRIP: NEGATIVE
COLOR UR AUTO: YELLOW
GLUCOSE UR STRIP-MCNC: NEGATIVE MG/DL
HGB UR QL STRIP: ABNORMAL
KETONES UR STRIP-MCNC: NEGATIVE MG/DL
LEUKOCYTE ESTERASE UR QL STRIP: ABNORMAL
NITRATE UR QL: NEGATIVE
PH UR STRIP: 6.5 PH (ref 5–7)
SOURCE: ABNORMAL
SP GR UR STRIP: 1.02 (ref 1–1.03)
UROBILINOGEN UR STRIP-ACNC: 0.2 EU/DL (ref 0.2–1)

## 2020-10-15 PROCEDURE — 81003 URINALYSIS AUTO W/O SCOPE: CPT | Performed by: UROLOGY

## 2020-10-15 PROCEDURE — 99203 OFFICE O/P NEW LOW 30 MIN: CPT | Performed by: UROLOGY

## 2020-10-15 RX ORDER — METOCLOPRAMIDE 5 MG/1
5 TABLET ORAL ONCE
COMMUNITY
End: 2024-04-23

## 2020-10-15 ASSESSMENT — PAIN SCALES - GENERAL: PAINLEVEL: NO PAIN (0)

## 2020-10-15 ASSESSMENT — MIFFLIN-ST. JEOR: SCORE: 1011.23

## 2020-10-15 NOTE — PROGRESS NOTES
History: It is a great pleasure to see this very pleasant 70-year-old lady in initial consultation today at the request of Dr. Mejia  She is presented with a single episode of gross painless hematuria.  She has been noted to have microscopic hematuria in the past.  Urinalysis did not suggest infection, and urinalysis today does show small amount of blood in the urine but certainly no evidence of infection at this time.  She does have a significant other medical issues as listed below this includes history of systemic lupus erythematosus and she has been on methotrexate and prednisone because of significant joint involvement.  She has had spinal fusion.  Full list of issues listed below      Past Medical History:   Diagnosis Date     Allergic rhinitis      Allergic rhinitis due to pollen      Arthritis      Congestive heart failure (H)      Decreased libido      Depressive disorder      Diabetes (H)      Esophageal motility disorder      H pylori ulcer      Heart disease      History of blood transfusion 1980's     Hoarseness      Hypertension      Immunosuppression (H)      Lupus (systemic lupus erythematosus) (H)     diagnosed 12 yrs ago     MCI (mild cognitive impairment)      Menopausal osteoporosis 02/17/2015     Movement disorder      Mumps     as a child     Neuropathy     feet and hands     S/P cholecystectomy      S/P TIESHA-BSO      Thyroid disease      Uncomplicated asthma        Past Surgical History:   Procedure Laterality Date     APPENDECTOMY       ARTHRODESIS FINGER(S) Left 5/18/2018    Procedure: ARTHRODESIS FINGER(S);;  Surgeon: Shahida Carlton MD;  Location: [facility]     ARTHROPLASTY CARPOMETACARPAL (THUMB JOINT) Left 5/18/2018    Procedure: ARTHROPLASTY CARPOMETACARPAL (THUMB JOINT);  LEFT THUMB CARPALMETACARPAL WITH MINI TIGHTROPE, LEFT METAPHALANGEAL FUSION WITH RADIAL BONE GRAFT AND EASY CLIP STAPLE.;  Surgeon: Shahida Carlton MD;  Location: [facility]     ARTHROPLASTY  CARPOMETACARPAL (THUMB JOINT), ARTHRODESIS, COMBINED Left 2018    Procedure: COMBINED ARTHROPLASTY CARPOMETACARPAL (THUMB JOINT), ARTHRODESIS;  LEFT THUMB CARPOMETACARPAL EXCISIONAL ARTHROPLASTY WITH FACSIA TANIA GRAFT, PARTIAL TRAPEZOID EXCISION;  Surgeon: Briana Carlton MD;  Location: [facility]     BACK SURGERY      L2-L4 or L5 fusion     BLEPHAROPLASTY BILATERAL        SECTION      x3     CHOLECYSTECTOMY      feels better. No pathology on gallbladder     COLONOSCOPY       EYE SURGERY      Raised eyelids.     GENITOURINARY SURGERY       GRAFT BONE TO FINGER Left 2018    Procedure: GRAFT BONE TO FINGER;;  Surgeon: Briana Carlton MD;  Location: [facility] UGI ENDOSCOPY, SIMPLE EXAM  14     HYSTERECTOMY       KNEE SURGERY      bilateral arthroscopy     ORTHOPEDIC SURGERY      right thumb tendon surgery     REPAIR TENDON FINGER(S)  2011    Procedure:REPAIR TENDON FINGER(S); RIGHT INDEX FINGER FLEXOR DIGITORUM PROFUNDUS REPAIR, RADIAL DIGITAL NERVE REPAIR ; Surgeon:BRIANA CARLTON; Location:[facility]     SALPINGO-OOPHORECTOMY BILATERAL       SHOULDER SURGERY Right     arthroscopy     SOFT TISSUE SURGERY      Tendon repair on finger     TONSILLECTOMY      Child       Family History   Problem Relation Age of Onset     C.A.D. Father         cabg, carotid stenosis     Hyperlipidemia Father      Hypertension Father      Coronary Artery Disease Father      Anesthesia Reaction Father      Neurologic Disorder Mother         ALS? Parkinsons?     Osteoporosis Mother      Depression Mother      Anxiety Disorder Mother      Hypertension Brother      Hyperlipidemia Brother      Depression Brother      Colon Cancer Maternal Grandmother      Colon Cancer Maternal Aunt      Hyperlipidemia Sister      Hypertension Sister      Osteoporosis Sister      Hyperlipidemia Brother      Osteoporosis Sister      Osteoporosis Maternal Aunt      Hypertension Sister      Diabetes  Son      Genetic Disorder Son      Thyroid Disease Son      Diabetes Other      Genetic Disorder Other      Genetic Disorder Daughter      Thyroid Disease Niece      Mental Illness No family hx of        Social History     Socioeconomic History     Marital status:      Spouse name: Not on file     Number of children: 4     Years of education: Not on file     Highest education level: Not on file   Occupational History     Not on file   Social Needs     Financial resource strain: Not on file     Food insecurity     Worry: Not on file     Inability: Not on file     Transportation needs     Medical: Not on file     Non-medical: Not on file   Tobacco Use     Smoking status: Never Smoker     Smokeless tobacco: Never Used   Substance and Sexual Activity     Alcohol use: Yes     Alcohol/week: 0.0 standard drinks     Comment: 1 wine/day     Drug use: No     Sexual activity: Yes     Partners: Male     Birth control/protection: None     Comment: hysterectomy   Lifestyle     Physical activity     Days per week: Not on file     Minutes per session: Not on file     Stress: Not on file   Relationships     Social connections     Talks on phone: Not on file     Gets together: Not on file     Attends Zoroastrian service: Not on file     Active member of club or organization: Not on file     Attends meetings of clubs or organizations: Not on file     Relationship status: Not on file     Intimate partner violence     Fear of current or ex partner: Not on file     Emotionally abused: Not on file     Physically abused: Not on file     Forced sexual activity: Not on file   Other Topics Concern     Parent/sibling w/ CABG, MI or angioplasty before 65F 55M? 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 - [employer]        Has Primary Care joshua         prostate CA       Current Outpatient Medications   Medication Sig Dispense Refill     calcium-vitamin D (CALCIUM 600/VITAMIN  "D) 600-400 MG-UNIT per tablet Take 1 tablet by mouth 2 times daily       cetirizine (ZYRTEC) 10 MG tablet 10 mg by Oral or J tube route       Cholecalciferol (VITAMIN D3) 2000 UNITS TABS Take 2,000 Int'l Units by mouth daily       diclofenac (VOLTAREN) 1 % GEL Place 2 g onto the skin 4 times daily       dronabinol (MARINOL) 5 MG capsule Take 1 capsule (5 mg) by mouth At Bedtime       leucovorin (WELLCOVORIN) 5 MG tablet Take 1 tablet (5 mg) by mouth daily       methotrexate 2.5 MG tablet Take 4 tablets by mouth once a week        metoclopramide (REGLAN) 5 MG tablet Take 5 mg by mouth once       omeprazole (PRILOSEC) 40 MG DR capsule TAKE 1 CAPSULE DAILY 90 capsule 3     predniSONE (DELTASONE) 5 MG tablet Taking 5 mg and 1 mg tablet x2 to get a total 7 mg daily       traZODone (DESYREL) 100 MG tablet TAKE 2 TABLETS AT BEDTIME 180 tablet 3       Review Of Systems:  Skin: negative  Eyes: negative  Ears/Nose/Throat: negative  Respiratory: No shortness of breath, dyspnea on exertion, cough, or hemoptysis  Cardiovascular: Apparent history in the past of congestive cardiac failure  Gastrointestinal: Gastroparesis  Genitourinary: hematuria  Musculoskeletal: arthritis, with history of spinal fusion  Neurologic: History of ophthalmic herpes zoster  Psychiatric: negative  Hematologic/Lymphatic/Immunologic: negative  Endocrine: Apparent history in the positive diabetes but not currently being treated with medication at the present time    Exam:  /70   Pulse 80   Ht 1.6 m (5' 3\")   Wt 51.7 kg (114 lb)   BMI 20.19 kg/m      General Impression: Very pleasant lady in no acute distress, well oriented in time place and person.    Mental status.  Normal    HEENT: There is no clinical evidence of jaundice on examination of conjunctiva.  Extraocular eye movements normal.  Mucous membranes unremarkable    Skin: Skin otherwise normal to examination    Lymph Nodes: Negative    Respiratory System: The respiratory cycle is " normal    Cardiovascular: There is no significant pitting peripheral edema    Abdominal: The abdomen is not obese    Extremities: Extremities otherwise unremarkable    Back and Flank: Not examined    Genital: Not examined    Rectal: Not examined    Neurologic: There are no focal abnormal clinical neurological signs in the central, peripheral nervous systems    Impression: Patient has quite a complicated history as noted above.  She has had a single episode of gross hematuria.  Renal function is satisfactory with a creatinine of 0.76.  CT urogram has been performed.  CT ABDOMEN PELVIS W/O & W CONTRAST 10/14/2020 5:51 PM     CLINICAL HISTORY: Hematuria, unknown cause; Gross hematuria     TECHNIQUE: CT urogram without and following injection of IV contrast.  Multiplanar reformats were obtained. Dose reduction techniques were  used.  CONTRAST: 60 mL Isovue-370         COMPARISON: 12/28/2017     FINDINGS:   LOWER CHEST: Right lower lobe calcified granuloma. Otherwise, the  visualized lung bases are clear.     HEPATOBILIARY: Punctate calcified granulomas in the liver. A few small  hypodense lesions in the liver are likely cysts. Cholecystectomy.     PANCREAS: Normal.     SPLEEN: Punctate calcified granulomas.     ADRENAL GLANDS: Normal.     KIDNEYS/BLADDER: No renal, ureteric or bladder catheter. Symmetric  nephrograms. No suspicious renal masses. Exophytic 1.8 cm cyst  containing hemorrhagic/proteinaceous materials without enhancement at  the upper pole of the left kidney. Subcentimeter hypodensity at the  lower pole of the right kidney is too small to adequately characterize  but likely a benign simple cyst. No abnormal urothelial enhancement or  filling defects in the renal collecting systems and ureters. No focal  masses in the urinary bladder.     BOWEL: Small hiatal hernia. Normal caliber loops of small bowel and  colon. Scattered sigmoid diverticulosis. No inflammatory changes.     PELVIC ORGANS: Hysterectomy. No  "pelvic masses.     ADDITIONAL FINDINGS: The evaluation slightly limited by streak  artifact from instrumented lumbar spine fusion. No lymphadenopathy in  the abdomen and pelvis. No free fluid.     MUSCULOSKELETAL: Instrument fusion L2-L5. No suspicious lesions in the  bones.                                                                      IMPRESSION:   1.  No urinary system calculi. No suspicious renal masses or  urothelial masses in the upper tracts. Left renal 1.8 cm cyst  containing hemorrhagic/proteinaceous material requiring no specific  follow-up.  2.  Sequela of prior granulomatous disease.     GINGER ODONNELL MD    The findings of the CT scan are reassuring.  There is no evidence of significant lesions in the upper urinary tract except for a cyst on the left kidney which is considered with a hemorrhagic proteinaceous cyst.  The bladder appears unremarkable on CT scan.    I discussed the situation with the patient in detail today.  We will need to do a cystoscopy and urine cytology to finalize our investigations.  The chances are we will not find any serious.  Should this be the case then even if noted in the future microscopic hematuria should not need further investigation.  Only if she observes gross hematuria would I wish to see her promptly.  I carefully discussed the entire situation with her today.  I went over her medications in detail.  I answered many questions    Plan: Cystoscopy and urine cytology    \"This dictation was performed with voice recognition software and may contain errors,  omissions and inadvertent word substitution.\"    "

## 2020-10-15 NOTE — RESULT ENCOUNTER NOTE
Andrew Shaw, I am covering for Payal Newsome today and have reviewed her note and the CT.  The hemorrhagic cyst is again seen on the CT.  I note that you have been referred to urology for further discussion or investigation.  If you have any questions Payal will return to clinic next week.

## 2020-11-14 ENCOUNTER — HEALTH MAINTENANCE LETTER (OUTPATIENT)
Age: 70
End: 2020-11-14

## 2021-01-16 ASSESSMENT — ACTIVITIES OF DAILY LIVING (ADL): CURRENT_FUNCTION: NO ASSISTANCE NEEDED

## 2021-01-19 ENCOUNTER — OFFICE VISIT (OUTPATIENT)
Dept: FAMILY MEDICINE | Facility: CLINIC | Age: 71
End: 2021-01-19
Payer: COMMERCIAL

## 2021-01-19 ENCOUNTER — TELEPHONE (OUTPATIENT)
Dept: FAMILY MEDICINE | Facility: CLINIC | Age: 71
End: 2021-01-19

## 2021-01-19 VITALS
SYSTOLIC BLOOD PRESSURE: 128 MMHG | WEIGHT: 120 LBS | TEMPERATURE: 97.8 F | DIASTOLIC BLOOD PRESSURE: 72 MMHG | BODY MASS INDEX: 21.26 KG/M2 | HEART RATE: 73 BPM | OXYGEN SATURATION: 97 % | HEIGHT: 63 IN

## 2021-01-19 DIAGNOSIS — M32.9 SYSTEMIC LUPUS ERYTHEMATOSUS, UNSPECIFIED SLE TYPE, UNSPECIFIED ORGAN INVOLVEMENT STATUS (H): ICD-10-CM

## 2021-01-19 DIAGNOSIS — Z12.31 ENCOUNTER FOR SCREENING MAMMOGRAM FOR MALIGNANT NEOPLASM OF BREAST: ICD-10-CM

## 2021-01-19 DIAGNOSIS — F51.01 PRIMARY INSOMNIA: ICD-10-CM

## 2021-01-19 DIAGNOSIS — Z13.6 SCREENING FOR HEART DISEASE: ICD-10-CM

## 2021-01-19 DIAGNOSIS — K31.84 GASTROPARESIS: ICD-10-CM

## 2021-01-19 DIAGNOSIS — G25.81 RESTLESS LEGS SYNDROME: ICD-10-CM

## 2021-01-19 DIAGNOSIS — E78.5 HYPERLIPIDEMIA LDL GOAL <130: Primary | ICD-10-CM

## 2021-01-19 DIAGNOSIS — Z00.00 ROUTINE GENERAL MEDICAL EXAMINATION AT A HEALTH CARE FACILITY: Primary | ICD-10-CM

## 2021-01-19 DIAGNOSIS — G89.4 CHRONIC PAIN SYNDROME: ICD-10-CM

## 2021-01-19 DIAGNOSIS — M81.0 OSTEOPOROSIS, UNSPECIFIED OSTEOPOROSIS TYPE, UNSPECIFIED PATHOLOGICAL FRACTURE PRESENCE: ICD-10-CM

## 2021-01-19 LAB
ALBUMIN SERPL-MCNC: 4 G/DL (ref 3.4–5)
ALP SERPL-CCNC: 50 U/L (ref 40–150)
ALT SERPL W P-5'-P-CCNC: 19 U/L (ref 0–50)
ANION GAP SERPL CALCULATED.3IONS-SCNC: 4 MMOL/L (ref 3–14)
AST SERPL W P-5'-P-CCNC: 14 U/L (ref 0–45)
BILIRUB SERPL-MCNC: 0.4 MG/DL (ref 0.2–1.3)
BUN SERPL-MCNC: 16 MG/DL (ref 7–30)
CALCIUM SERPL-MCNC: 9.3 MG/DL (ref 8.5–10.1)
CHLORIDE SERPL-SCNC: 107 MMOL/L (ref 94–109)
CHOLEST SERPL-MCNC: 301 MG/DL
CO2 SERPL-SCNC: 29 MMOL/L (ref 20–32)
CREAT SERPL-MCNC: 0.84 MG/DL (ref 0.52–1.04)
ERYTHROCYTE [DISTWIDTH] IN BLOOD BY AUTOMATED COUNT: 13 % (ref 10–15)
FERRITIN SERPL-MCNC: 19 NG/ML (ref 8–252)
GFR SERPL CREATININE-BSD FRML MDRD: 70 ML/MIN/{1.73_M2}
GLUCOSE SERPL-MCNC: 94 MG/DL (ref 70–99)
HCT VFR BLD AUTO: 44.5 % (ref 35–47)
HDLC SERPL-MCNC: 101 MG/DL
HGB BLD-MCNC: 14.6 G/DL (ref 11.7–15.7)
LDLC SERPL CALC-MCNC: 168 MG/DL
MCH RBC QN AUTO: 31.9 PG (ref 26.5–33)
MCHC RBC AUTO-ENTMCNC: 32.8 G/DL (ref 31.5–36.5)
MCV RBC AUTO: 97 FL (ref 78–100)
NONHDLC SERPL-MCNC: 200 MG/DL
PLATELET # BLD AUTO: 272 10E9/L (ref 150–450)
POTASSIUM SERPL-SCNC: 4.1 MMOL/L (ref 3.4–5.3)
PROT SERPL-MCNC: 7.2 G/DL (ref 6.8–8.8)
RBC # BLD AUTO: 4.58 10E12/L (ref 3.8–5.2)
SODIUM SERPL-SCNC: 140 MMOL/L (ref 133–144)
TRIGL SERPL-MCNC: 160 MG/DL
WBC # BLD AUTO: 10 10E9/L (ref 4–11)

## 2021-01-19 PROCEDURE — G0438 PPPS, INITIAL VISIT: HCPCS | Performed by: INTERNAL MEDICINE

## 2021-01-19 PROCEDURE — 36415 COLL VENOUS BLD VENIPUNCTURE: CPT | Performed by: INTERNAL MEDICINE

## 2021-01-19 PROCEDURE — 82728 ASSAY OF FERRITIN: CPT | Performed by: INTERNAL MEDICINE

## 2021-01-19 PROCEDURE — 85027 COMPLETE CBC AUTOMATED: CPT | Performed by: INTERNAL MEDICINE

## 2021-01-19 PROCEDURE — 80053 COMPREHEN METABOLIC PANEL: CPT | Performed by: INTERNAL MEDICINE

## 2021-01-19 PROCEDURE — 80061 LIPID PANEL: CPT | Performed by: INTERNAL MEDICINE

## 2021-01-19 PROCEDURE — 99214 OFFICE O/P EST MOD 30 MIN: CPT | Mod: 25 | Performed by: INTERNAL MEDICINE

## 2021-01-19 RX ORDER — TRAZODONE HYDROCHLORIDE 100 MG/1
100 TABLET ORAL AT BEDTIME
Qty: 180 TABLET | Refills: 3 | Status: SHIPPED | OUTPATIENT
Start: 2021-01-19 | End: 2021-12-01

## 2021-01-19 RX ORDER — GABAPENTIN 100 MG/1
100 CAPSULE ORAL 3 TIMES DAILY
Qty: 270 CAPSULE | Refills: 3 | Status: SHIPPED | OUTPATIENT
Start: 2021-01-19 | End: 2021-11-16

## 2021-01-19 ASSESSMENT — ACTIVITIES OF DAILY LIVING (ADL): CURRENT_FUNCTION: NO ASSISTANCE NEEDED

## 2021-01-19 ASSESSMENT — MIFFLIN-ST. JEOR: SCORE: 1038.45

## 2021-01-19 NOTE — PROGRESS NOTES
"SUBJECTIVE:   Yessenia Martines is a 70 year old female who presents for Preventive Visit.    Patient has been advised of split billing requirements and indicates understanding: Yes   Are you in the first 12 months of your Medicare coverage?  No    Healthy Habits:     In general, how would you rate your overall health?  Good    Frequency of exercise:  None    Do you usually eat at least 4 servings of fruit and vegetables a day, include whole grains    & fiber and avoid regularly eating high fat or \"junk\" foods?  No    Taking medications regularly:  Yes    Barriers to taking medications:  None    Medication side effects:  None    Ability to successfully perform activities of daily living:  No assistance needed    Home Safety:  No safety concerns identified    Hearing Impairment:  Difficulty understanding soft or whispered speech and difficulty understanding speech on the telephone    In the past 6 months, have you been bothered by leaking of urine?  No    In general, how would you rate your overall mental or emotional health?  Excellent      PHQ-2 Total Score: 0    Additional concerns today:  No    Do you feel safe in your environment? Yes    Have you ever done Advance Care Planning? (For example, a Health Directive, POLST, or a discussion with a medical provider or your loved ones about your wishes): Yes, advance care planning is on file.      Fall risk  Fallen 2 or more times in the past year?: No  Any fall with injury in the past year?: No    Cognitive Screening   1) Repeat 3 items (Leader, Season, Table)    2) Clock draw: NORMAL  3) 3 item recall: Recalls 3 objects  Results: 3 items recalled: COGNITIVE IMPAIRMENT LESS LIKELY    Mini-CogTM Copyright JOHANNA Lin. Licensed by the author for use in [facility]; reprinted with permission (natalie@.[email or web address]). All rights reserved.      Do you have sleep apnea, excessive snoring or daytime drowsiness?: no    Reviewed and updated as needed this visit by clinical " staff  Tobacco  Allergies               Reviewed and updated as needed this visit by Provider                Social History     Tobacco Use     Smoking status: Never Smoker     Smokeless tobacco: Never Used   Substance Use Topics     Alcohol use: Yes     Alcohol/week: 0.0 standard drinks     Comment: 1 wine/day     If you drink alcohol do you typically have >3 drinks per day or >7 drinks per week? No    Alcohol Use 1/16/2021   Prescreen: >3 drinks/day or >7 drinks/week? No   Prescreen: >3 drinks/day or >7 drinks/week? -               Current providers sharing in care for this patient include:   Patient Care Team:  Josue Mejia MD as PCP - General (Internal Medicine)  Josue Mejia MD as Assigned PCP  Royce Shaffer DPM as Assigned Musculoskeletal Provider  Deo Thibodeaux MD as Assigned Surgical Provider    The following health maintenance items are reviewed in Epic and correct as of today:  Health Maintenance   Topic Date Due     URINE DRUG SCREEN  1950     MAMMO SCREENING  06/06/2018     CMP  03/30/2020     LIPID  09/30/2020     FALL RISK ASSESSMENT  09/30/2020     CBC  09/30/2020     Pneumococcal Vaccine: 65+ Years (2 of 2 - PPSV23) 07/22/2020     DTAP/TDAP/TD IMMUNIZATION (2 - Td) 11/07/2021     MEDICARE ANNUAL WELLNESS VISIT  01/19/2022     ADVANCE CARE PLANNING  01/19/2022     COLORECTAL CANCER SCREENING  10/12/2025     DEXA  02/05/2035     HEPATITIS C SCREENING  Completed     PHQ-2  Completed     INFLUENZA VACCINE  Completed     ZOSTER IMMUNIZATION  Completed     Pneumococcal Vaccine: Pediatrics (0 to 5 Years) and At-Risk Patients (6 to 64 Years)  Aged Out     IPV IMMUNIZATION  Aged Out     MENINGITIS IMMUNIZATION  Aged Out     HEPATITIS B IMMUNIZATION  Aged Out        Primary insomnia  Routine general medical examination at a health care facility  Chronic pain syndrome  Gastroparesis  Osteoporosis, unspecified osteoporosis type, unspecified pathological fracture  "presence  Restless legs syndrome  Systemic lupus erythematosus, unspecified SLE type, unspecified organ involvement status (H)  Encounter for screening mammogram for malignant neoplasm of breast    Having had some symptoms of restless leg syndrome as well as neuropathy. She has tolerated gabapentin in the past, but had symptoms of drowsiness in the past.    Review of Systems  Constitutional, HEENT, cardiovascular, pulmonary, GI, , musculoskeletal, neuro, skin, endocrine and psych systems are negative, except as otherwise noted.    OBJECTIVE:   /72 (BP Location: Right arm, Cuff Size: Adult Regular)   Pulse 73   Temp 97.8  F (36.6  C) (Temporal)   Ht 1.6 m (5' 3\")   Wt 54.4 kg (120 lb)   SpO2 97%   Breastfeeding No   BMI 21.26 kg/m   Estimated body mass index is 21.26 kg/m  as calculated from the following:    Height as of this encounter: 1.6 m (5' 3\").    Weight as of this encounter: 54.4 kg (120 lb).  Physical Exam  GENERAL: healthy, alert and no distress  EYES: Eyes grossly normal to inspection, PERRL and conjunctivae and sclerae normal  HENT: ear canals and TM's normal, nose and mouth without ulcers or lesions  NECK: no adenopathy, no asymmetry, masses, or scars and thyroid normal to palpation  RESP: lungs clear to auscultation - no rales, rhonchi or wheezes  CV: regular rate and rhythm, normal S1 S2, no S3 or S4, no murmur, click or rub, no peripheral edema and peripheral pulses strong  ABDOMEN: soft, nontender, no hepatosplenomegaly, no masses and bowel sounds normal  MS: no gross musculoskeletal defects noted, no edema  SKIN: no suspicious lesions or rashes  NEURO: Normal strength and tone, mentation intact and speech normal  PSYCH: mentation appears normal, affect normal/bright    Diagnostic Test Results:  Labs reviewed in Epic    ASSESSMENT / PLAN:     Patient Instructions   (Z00.00) Routine general medical examination at a health care facility  (primary encounter diagnosis)  Comment: For " "routine exam, we will draw labs as ordered, cholesterol, diabetes mellitus check, liver function, renal function .  We will also update vaccination history.  [facility] (also performs diagnostic mammogram, ultrasound and biopsy) [phone].   Plan:     (G89.4) Chronic pain syndrome  Comment: Continue diclofenac and marinol  Plan:     (K31.84) Gastroparesis  Comment: Continue metoclopramide   Plan:     (M81.0) Osteoporosis, unspecified osteoporosis type, unspecified pathological fracture presence  Comment: Continue calcium and vitamin D   Plan:     (M32.9) Systemic lupus erythematosus, unspecified SLE type, unspecified organ involvement status (H)  Comment: Continue methotrexate, and leucovorin and follow up in rheumatology   Plan:        Restless leg syndrome  Comment: check ferritin and recommend trial of gabapentin 100 mg at bedtime and titrate up to three times daily        Patient has been advised of split billing requirements and indicates understanding: Yes  COUNSELING:  Reviewed preventive health counseling, as reflected in patient instructions    Estimated body mass index is 21.26 kg/m  as calculated from the following:    Height as of this encounter: 1.6 m (5' 3\").    Weight as of this encounter: 54.4 kg (120 lb).        She reports that she has never smoked. She has never used smokeless tobacco.      Appropriate preventive services were discussed with this patient, including applicable screening as appropriate for cardiovascular disease, diabetes, osteopenia/osteoporosis, and glaucoma.  As appropriate for age/gender, discussed screening for colorectal cancer, prostate cancer, breast cancer, and cervical cancer. Checklist reviewing preventive services available has been given to the patient.    Reviewed patients plan of care and provided an AVS. The Basic Care Plan (routine screening as documented in Health Maintenance) for Yessenia meets the Care Plan requirement. This Care Plan has " been established and reviewed with the Patient.    Counseling Resources:  ATP IV Guidelines  Pooled Cohorts Equation Calculator  Breast Cancer Risk Calculator  Breast Cancer: Medication to Reduce Risk  FRAX Risk Assessment  ICSI Preventive Guidelines  Dietary Guidelines for Americans, 2010  USDA's MyPlate  ASA Prophylaxis  Lung CA Screening    Josue Mejia MD, MD  [facility] [place]    Identified Health Risks:

## 2021-01-19 NOTE — PATIENT INSTRUCTIONS
(Z00.00) Routine general medical examination at a health care facility  (primary encounter diagnosis)  Comment: For routine exam, we will draw labs as ordered, cholesterol, diabetes mellitus check, liver function, renal function .  We will also update vaccination history.  [facility] (also performs diagnostic mammogram, ultrasound and biopsy) [phone].   Plan:     (G89.4) Chronic pain syndrome  Comment: Continue diclofenac and marinol  Plan:     (K31.84) Gastroparesis  Comment: Continue metoclopramide   Plan:     (M81.0) Osteoporosis, unspecified osteoporosis type, unspecified pathological fracture presence  Comment: Continue calcium and vitamin D   Plan:     (M32.9) Systemic lupus erythematosus, unspecified SLE type, unspecified organ involvement status (H)  Comment: Continue methotrexate, and leucovorin and follow up in rheumatology   Plan:        Restless leg syndrome  Comment: check ferritin and recommend trial of gabapentin 100 mg at bedtime and titrate up to three times daily

## 2021-01-20 NOTE — RESULT ENCOUNTER NOTE
Awais Sal,    I had the opportunity to review your recent labs and a summary of your labs reads as follows:    Your complete blood counts show no sign of anemia, normal white blood cell count and platelets.  Your comprehensive metabolic panel showed normal renal function, normal liver function, and normal fasting blood glucose indicating no evidence of diabetes mellitus.  Your fasting lipid panel show  - normal HDL (good) cholesterol -as your goal is greater than 40  - elevated LDL (bad) cholesterol as your goal is less than 160 - I would recommend we consider a CT coronary calcium score to determine if there may be a benefit of starting a statin   - normal triglyceride levels        Sincerely,  Josue Mejia MD

## 2021-01-20 NOTE — TELEPHONE ENCOUNTER
PCP - patient read the results on [employer] and sent this message    Please order test and we can give patient scheduling number    Thank you  Yani CARDENAS RN        Question about a test result  1/19/2021 7:37 PM Reply    To: [facility]      From: Jonelle Martines      Created: 1/19/2021 7:37 PM        *-*-*This message has not been handled.*-*-*    Hi Dr WILKINS  How do I arrange the test to determine if I need statins?     Thanks, Jonelle Martines

## 2021-01-21 NOTE — TELEPHONE ENCOUNTER
Will call patient a bit later this a.m.  She may have question:  Mariaa Naylor RN on 1/21/2021 at 7:36 AM      *Cardiac computed tomography (CT) for Calcium Scoring uses special x-ray equipment to produce pictures of the coronary arteries to determine if they are blocked or narrowed by the buildup of plaque - an indicator for atherosclerosis or coronary artery disease (CAD).    SENT [employer] MESSAGE TO PATIENT WITH THE ABOVE INFORMATION AND SCHEDULING NO.

## 2021-03-01 LAB
ALT SERPL-CCNC: 18 LU/L (ref 5–35)
AST SERPL-CCNC: 26 U/L (ref 5–34)
CREATININE (EXTERNAL): 0.83 MG/DL (ref 0.5–1.3)

## 2021-03-10 ENCOUNTER — HOSPITAL ENCOUNTER (OUTPATIENT)
Dept: CT IMAGING | Facility: CLINIC | Age: 71
Discharge: HOME | End: 2021-03-10
Attending: INTERNAL MEDICINE | Admitting: INTERNAL MEDICINE
Payer: COMMERCIAL

## 2021-03-10 DIAGNOSIS — E78.5 HYPERLIPIDEMIA LDL GOAL <130: ICD-10-CM

## 2021-03-10 DIAGNOSIS — Z13.6 SCREENING FOR HEART DISEASE: ICD-10-CM

## 2021-03-10 PROCEDURE — 75571 CT HRT W/O DYE W/CA TEST: CPT | Mod: 26 | Performed by: INTERNAL MEDICINE

## 2021-03-10 PROCEDURE — 75571 CT HRT W/O DYE W/CA TEST: CPT

## 2021-03-11 NOTE — RESULT ENCOUNTER NOTE
Awais Casas,    I have had the opportunity to review your recent results and an interpretation is as follows:  Congratulaions on your excellent results!  No evidence of any calcium and we can hold off on starting a statin at this time     Sincerely,  Josue Mejia MD

## 2021-04-12 ENCOUNTER — TRANSFERRED RECORDS (OUTPATIENT)
Dept: HEALTH INFORMATION MANAGEMENT | Facility: CLINIC | Age: 71
End: 2021-04-12

## 2021-04-12 LAB
ALT SERPL-CCNC: 14 IU/L (ref 5–35)
AST SERPL-CCNC: 17 U/L (ref 5–34)
CREAT SERPL-MCNC: 0.79 MG/DL (ref 0.5–1.3)
GFR SERPL CREATININE-BSD FRML MDRD: 76.5 ML/MIN/1.73M2

## 2021-08-03 ENCOUNTER — TRANSFERRED RECORDS (OUTPATIENT)
Dept: HEALTH INFORMATION MANAGEMENT | Facility: CLINIC | Age: 71
End: 2021-08-03

## 2021-08-03 LAB
ALT SERPL-CCNC: 17 IU/L (ref 5–35)
AST SERPL-CCNC: 23 U/L (ref 5–34)
CREATININE (EXTERNAL): 0.75 MG/DL (ref 0.5–1.3)

## 2021-09-12 ENCOUNTER — HEALTH MAINTENANCE LETTER (OUTPATIENT)
Age: 71
End: 2021-09-12

## 2021-11-08 ENCOUNTER — TRANSFERRED RECORDS (OUTPATIENT)
Dept: HEALTH INFORMATION MANAGEMENT | Facility: CLINIC | Age: 71
End: 2021-11-08
Payer: COMMERCIAL

## 2021-12-28 DIAGNOSIS — F51.01 PRIMARY INSOMNIA: ICD-10-CM

## 2021-12-29 RX ORDER — TRAZODONE HYDROCHLORIDE 100 MG/1
200 TABLET ORAL AT BEDTIME
Qty: 180 TABLET | Refills: 3 | Status: SHIPPED | OUTPATIENT
Start: 2021-12-29 | End: 2022-11-25

## 2021-12-29 NOTE — TELEPHONE ENCOUNTER
Routing refill request to provider for review/approval because:  Warning on dosing is firing, please address.

## 2022-01-02 ENCOUNTER — HEALTH MAINTENANCE LETTER (OUTPATIENT)
Age: 72
End: 2022-01-02

## 2022-01-22 ASSESSMENT — ENCOUNTER SYMPTOMS
PALPITATIONS: 1
BREAST MASS: 0
ARTHRALGIAS: 1

## 2022-01-22 ASSESSMENT — ACTIVITIES OF DAILY LIVING (ADL): CURRENT_FUNCTION: NO ASSISTANCE NEEDED

## 2022-01-25 ENCOUNTER — OFFICE VISIT (OUTPATIENT)
Dept: FAMILY MEDICINE | Facility: CLINIC | Age: 72
End: 2022-01-25
Payer: COMMERCIAL

## 2022-01-25 VITALS
TEMPERATURE: 96.8 F | HEART RATE: 64 BPM | HEIGHT: 63 IN | WEIGHT: 118 LBS | DIASTOLIC BLOOD PRESSURE: 70 MMHG | OXYGEN SATURATION: 99 % | BODY MASS INDEX: 20.91 KG/M2 | RESPIRATION RATE: 14 BRPM | SYSTOLIC BLOOD PRESSURE: 120 MMHG

## 2022-01-25 DIAGNOSIS — D84.9 IMMUNOSUPPRESSED STATUS (H): ICD-10-CM

## 2022-01-25 DIAGNOSIS — G25.81 RESTLESS LEGS SYNDROME: ICD-10-CM

## 2022-01-25 DIAGNOSIS — M54.59 MECHANICAL LOW BACK PAIN: ICD-10-CM

## 2022-01-25 DIAGNOSIS — M32.9 SYSTEMIC LUPUS ERYTHEMATOSUS, UNSPECIFIED SLE TYPE, UNSPECIFIED ORGAN INVOLVEMENT STATUS (H): ICD-10-CM

## 2022-01-25 DIAGNOSIS — M81.0 OSTEOPOROSIS, UNSPECIFIED OSTEOPOROSIS TYPE, UNSPECIFIED PATHOLOGICAL FRACTURE PRESENCE: ICD-10-CM

## 2022-01-25 DIAGNOSIS — G60.9 PERIPHERAL NEUROPATHY, IDIOPATHIC: ICD-10-CM

## 2022-01-25 DIAGNOSIS — Z00.00 ROUTINE GENERAL MEDICAL EXAMINATION AT A HEALTH CARE FACILITY: Primary | ICD-10-CM

## 2022-01-25 LAB
ALBUMIN SERPL-MCNC: 4.3 G/DL (ref 3.4–5)
ALP SERPL-CCNC: 53 U/L (ref 40–150)
ALT SERPL W P-5'-P-CCNC: 27 U/L (ref 0–50)
ANION GAP SERPL CALCULATED.3IONS-SCNC: 5 MMOL/L (ref 3–14)
AST SERPL W P-5'-P-CCNC: 18 U/L (ref 0–45)
BILIRUB SERPL-MCNC: 0.6 MG/DL (ref 0.2–1.3)
BUN SERPL-MCNC: 18 MG/DL (ref 7–30)
CALCIUM SERPL-MCNC: 9.6 MG/DL (ref 8.5–10.1)
CHLORIDE BLD-SCNC: 101 MMOL/L (ref 94–109)
CHOLEST SERPL-MCNC: 269 MG/DL
CO2 SERPL-SCNC: 30 MMOL/L (ref 20–32)
CREAT SERPL-MCNC: 1.03 MG/DL (ref 0.52–1.04)
ERYTHROCYTE [DISTWIDTH] IN BLOOD BY AUTOMATED COUNT: 13.4 % (ref 10–15)
FASTING STATUS PATIENT QL REPORTED: YES
GFR SERPL CREATININE-BSD FRML MDRD: 58 ML/MIN/1.73M2
GLUCOSE BLD-MCNC: 94 MG/DL (ref 70–99)
HCT VFR BLD AUTO: 47.1 % (ref 35–47)
HDLC SERPL-MCNC: 96 MG/DL
HGB BLD-MCNC: 15.4 G/DL (ref 11.7–15.7)
LDLC SERPL CALC-MCNC: 137 MG/DL
MCH RBC QN AUTO: 32.2 PG (ref 26.5–33)
MCHC RBC AUTO-ENTMCNC: 32.7 G/DL (ref 31.5–36.5)
MCV RBC AUTO: 98 FL (ref 78–100)
NONHDLC SERPL-MCNC: 173 MG/DL
PLATELET # BLD AUTO: 241 10E3/UL (ref 150–450)
POTASSIUM BLD-SCNC: 3.9 MMOL/L (ref 3.4–5.3)
PROT SERPL-MCNC: 7.3 G/DL (ref 6.8–8.8)
RBC # BLD AUTO: 4.79 10E6/UL (ref 3.8–5.2)
SODIUM SERPL-SCNC: 136 MMOL/L (ref 133–144)
TRIGL SERPL-MCNC: 178 MG/DL
WBC # BLD AUTO: 8 10E3/UL (ref 4–11)

## 2022-01-25 PROCEDURE — 80053 COMPREHEN METABOLIC PANEL: CPT | Performed by: INTERNAL MEDICINE

## 2022-01-25 PROCEDURE — 90732 PPSV23 VACC 2 YRS+ SUBQ/IM: CPT | Performed by: INTERNAL MEDICINE

## 2022-01-25 PROCEDURE — 99397 PER PM REEVAL EST PAT 65+ YR: CPT | Mod: 25 | Performed by: INTERNAL MEDICINE

## 2022-01-25 PROCEDURE — G0009 ADMIN PNEUMOCOCCAL VACCINE: HCPCS | Performed by: INTERNAL MEDICINE

## 2022-01-25 PROCEDURE — 85027 COMPLETE CBC AUTOMATED: CPT | Performed by: INTERNAL MEDICINE

## 2022-01-25 PROCEDURE — 90472 IMMUNIZATION ADMIN EACH ADD: CPT | Performed by: INTERNAL MEDICINE

## 2022-01-25 PROCEDURE — 36415 COLL VENOUS BLD VENIPUNCTURE: CPT | Performed by: INTERNAL MEDICINE

## 2022-01-25 PROCEDURE — 80061 LIPID PANEL: CPT | Performed by: INTERNAL MEDICINE

## 2022-01-25 PROCEDURE — 99214 OFFICE O/P EST MOD 30 MIN: CPT | Mod: 25 | Performed by: INTERNAL MEDICINE

## 2022-01-25 PROCEDURE — 90714 TD VACC NO PRESV 7 YRS+ IM: CPT | Performed by: INTERNAL MEDICINE

## 2022-01-25 RX ORDER — METHOCARBAMOL 500 MG/1
500 TABLET, FILM COATED ORAL 4 TIMES DAILY PRN
Qty: 120 TABLET | Refills: 3 | Status: SHIPPED | OUTPATIENT
Start: 2022-01-25 | End: 2022-01-26

## 2022-01-25 RX ORDER — GABAPENTIN 300 MG/1
300 CAPSULE ORAL 3 TIMES DAILY
Qty: 270 CAPSULE | Refills: 3 | Status: SHIPPED | OUTPATIENT
Start: 2022-01-25 | End: 2023-03-07

## 2022-01-25 ASSESSMENT — ACTIVITIES OF DAILY LIVING (ADL): CURRENT_FUNCTION: NO ASSISTANCE NEEDED

## 2022-01-25 ASSESSMENT — PAIN SCALES - GENERAL: PAINLEVEL: NO PAIN (0)

## 2022-01-25 ASSESSMENT — MIFFLIN-ST. JEOR: SCORE: 1024.37

## 2022-01-25 NOTE — PROGRESS NOTES
"SUBJECTIVE:   Yessenia Martines is a 71 year old female who presents for Preventive Visit.      Patient has been advised of split billing requirements and indicates understanding: Yes  Are you in the first 12 months of your Medicare coverage?  No    Healthy Habits:     In general, how would you rate your overall health?  Good    Frequency of exercise:  None    Do you usually eat at least 4 servings of fruit and vegetables a day, include whole grains    & fiber and avoid regularly eating high fat or \"junk\" foods?  No    Taking medications regularly:  Yes    Medication side effects:  Not applicable    Ability to successfully perform activities of daily living:  No assistance needed    Home Safety:  No safety concerns identified    Hearing Impairment:  Difficulty following a conversation in a noisy restaurant or crowded room and difficulty following dialogue in the theater    In the past 6 months, have you been bothered by leaking of urine?  No    In general, how would you rate your overall mental or emotional health?  Good      PHQ-2 Total Score: 0    Do you feel safe in your environment? Yes    Have you ever done Advance Care Planning? (For example, a Health Directive, POLST, or a discussion with a medical provider or your loved ones about your wishes): Yes, advance care planning is on file.       Fall risk  Fallen 2 or more times in the past year?: No  Any fall with injury in the past year?: No    Cognitive Screening   1) Repeat 3 items (Leader, Season, Table)    2) Clock draw: NORMAL  3) 3 item recall: Recalls 3 objects  Results: 3 items recalled: COGNITIVE IMPAIRMENT LESS LIKELY    Mini-CogTM Copyright JOHANNA Lin. Licensed by the author for use in [facility]; reprinted with permission (natalie@.[facility]). All rights reserved.      Do you have sleep apnea, excessive snoring or daytime drowsiness?: no    Reviewed and updated as needed this visit by clinical staff  Tobacco  Allergies  Meds       "       Reviewed and updated as needed this visit by Provider               Social History     Tobacco Use     Smoking status: Never Smoker     Smokeless tobacco: Never Used   Substance Use Topics     Alcohol use: Yes     Alcohol/week: 0.0 standard drinks     Comment: 1 wine/day     If you drink alcohol do you typically have >3 drinks per day or >7 drinks per week? No    No flowsheet data found.      Current providers sharing in care for this patient include:   Patient Care Team:  Josue Mejia MD as PCP - General (Internal Medicine)  Josue Mejia MD as Assigned PCP  Deo Thibodeaux MD as Assigned Surgical Provider    The following health maintenance items are reviewed in Epic and correct as of today:  Health Maintenance Due   Topic Date Due     URINE DRUG SCREEN  Never done     ANNUAL REVIEW OF HM ORDERS  Never done     MAMMO SCREENING  06/06/2018     Pneumococcal Vaccine: 65+ Years (2 of 2 - PPSV23) 07/22/2020     CMP  07/19/2021     DTAP/TDAP/TD IMMUNIZATION (2 - Td or Tdap) 11/07/2021     LIPID  01/19/2022     FALL RISK ASSESSMENT  01/19/2022     CBC  01/19/2022     Lab work is in process  Labs reviewed in EPIC       Immunosuppressed status (H)  Systemic lupus erythematosus, unspecified SLE type, unspecified organ involvement status (H)  Peripheral neuropathy, idiopathic  Mechanical low back pain   Yessenia CAMILLA Martines has had stiffening of her lower back  Restless legs syndrome  Osteoporosis, unspecified osteoporosis type, unspecified pathological fracture presence   Planning for a follow up bone density scan later next month.  Doing well with zolendronate and calcium + vitamin D     Review of Systems  Constitutional, HEENT, cardiovascular, pulmonary, GI, , musculoskeletal - as above, neuro, skin, endocrine and psych systems are negative, except as otherwise noted.      OBJECTIVE:   /70 (BP Location: Left arm, Patient Position: Sitting, Cuff Size: Adult Regular)   Pulse 64    "Temp 96.8  F (36  C) (Temporal)   Resp 14   Ht 1.6 m (5' 3\")   Wt 53.5 kg (118 lb)   SpO2 99%   BMI 20.90 kg/m   Estimated body mass index is 20.9 kg/m  as calculated from the following:    Height as of this encounter: 1.6 m (5' 3\").    Weight as of this encounter: 53.5 kg (118 lb).  Physical Exam  GENERAL: healthy, alert and no distress  EYES: Eyes grossly normal to inspection, PERRL and conjunctivae and sclerae normal  HENT: ear canals and TM's normal,   NECK: no adenopathy, no asymmetry,   RESP: lungs clear to auscultation - no rales, rhonchi or wheezes  CV: regular rate and rhythm, normal S1 S2, no S3 or S4, no murmur   ABDOMEN: soft, nontender, no hepatosplenomegaly, no masses and bowel sounds normal  MS: no gross musculoskeletal defects noted, no edema  SKIN: no suspicious lesions or rashes  NEURO: Normal strength and tone, mentation intact and speech normal  PSYCH: mentation appears normal, affect normal/bright    Diagnostic Test Results:  Labs reviewed in Epic    ASSESSMENT / PLAN:     Patient Instructions   (Z00.00) Routine general medical examination at a health care facility  (primary encounter diagnosis)  Comment: For routine exam, we will draw labs as ordered, cholesterol, diabetes mellitus check, liver function, renal function.  We will also update vaccination history.   Plan: Lipid panel reflex to direct LDL Fasting,         Comprehensive metabolic panel, CBC with         platelets            (D84.9) Immunosuppressed status (H)  Comment: Continue current medications - follow up in rheumatology  Plan:     (M32.9) Systemic lupus erythematosus, unspecified SLE type, unspecified organ involvement status (H)  Comment: as above   Plan:     (G60.9) Peripheral neuropathy, idiopathic  Comment: Referral to physical therapy placed.  Gabapentin increasd to 300 mg three times daily   Plan: [facility] Ref[facility]            (M54.59) Mechanical low back pain  Comment: OK for trial of methocarbamol four times " "daily as needed   Plan: methocarbamol (ROBAXIN) 500 MG tablet,         gabapentin (NEURONTIN) 300 MG capsule, [facility]         and Hand Referral            (G25.81) Restless legs syndrome  Comment:   Plan: gabapentin (NEURONTIN) 300 MG capsule            (M81.0) Osteoporosis, unspecified osteoporosis type, unspecified pathological fracture presence  Comment: Continue Reclast, calcium + vitamin D   Plan:           Patient has been advised of split billing requirements and indicates understanding: Yes    COUNSELING:  Reviewed preventive health counseling, as reflected in patient instructions    Estimated body mass index is 20.9 kg/m  as calculated from the following:    Height as of this encounter: 1.6 m (5' 3\").    Weight as of this encounter: 53.5 kg (118 lb).        She reports that she has never smoked. She has never used smokeless tobacco.      Appropriate preventive services were discussed with this patient, including applicable screening as appropriate for cardiovascular disease, diabetes, osteopenia/osteoporosis, and glaucoma.  As appropriate for age/gender, discussed screening for colorectal cancer, prostate cancer, breast cancer, and cervical cancer. Checklist reviewing preventive services available has been given to the patient.    Reviewed patients plan of care and provided an AVS. The Basic Care Plan (routine screening as documented in Health Maintenance) for Yessenia meets the Care Plan requirement. This Care Plan has been established and reviewed with the Patient.    Counseling Resources:  ATP IV Guidelines  Pooled Cohorts Equation Calculator  Breast Cancer Risk Calculator  Breast Cancer: Medication to Reduce Risk  FRAX Risk Assessment  ICSI Preventive Guidelines  Dietary Guidelines for Americans, 2010  Metaconomy's MyPlate  ASA Prophylaxis  Lung CA Screening    Josue Mejia MD, MD  [facility]    Identified Health Risks:  "

## 2022-01-25 NOTE — NURSING NOTE
Prior to immunization administration, verified patients identity using patient s name and date of birth. Please see Immunization Activity for additional information.     Screening Questionnaire for Adult Immunization    Are you sick today?   No   Do you have allergies to medications, food, a vaccine component or latex?   No   Have you ever had a serious reaction after receiving a vaccination?   No   Do you have a long-term health problem with heart, lung, kidney, or metabolic disease (e.g., diabetes), asthma, a blood disorder, no spleen, complement component deficiency, a cochlear implant, or a spinal fluid leak?  Are you on long-term aspirin therapy?   No   Do you have cancer, leukemia, HIV/AIDS, or any other immune system problem?   Yes   Do you have a parent, brother, or sister with an immune system problem?   No   In the past 3 months, have you taken medications that affect  your immune system, such as prednisone, other steroids, or anticancer drugs; drugs for the treatment of rheumatoid arthritis, Crohn s disease, or psoriasis; or have you had radiation treatments?   Yes   Have you had a seizure, or a brain or other nervous system problem?   No   During the past year, have you received a transfusion of blood or blood    products, or been given immune (gamma) globulin or antiviral drug?   No   For women: Are you pregnant or is there a chance you could become       pregnant during the next month?   No   Have you received any vaccinations in the past 4 weeks?   No     Immunization questionnaire was positive for at least one answer.  Notified Dr. Mejia.        Per orders of Dr. Mejia, injection of TD and Pneumovax 23 given by Yessenia Hamlin MA. Patient instructed to remain in clinic for 15 minutes afterwards, and to report any adverse reaction to me immediately.       Screening performed by Yessenia Hamlin MA on 1/25/2022 at 9:22 AM.

## 2022-01-25 NOTE — PATIENT INSTRUCTIONS
(Z00.00) Routine general medical examination at a health care facility  (primary encounter diagnosis)  Comment: For routine exam, we will draw labs as ordered, cholesterol, diabetes mellitus check, liver function, renal function.  We will also update vaccination history.   Plan: Lipid panel reflex to direct LDL Fasting,         Comprehensive metabolic panel, CBC with         platelets            (D84.9) Immunosuppressed status (H)  Comment: Continue current medications - follow up in rheumatology  Plan:     (M32.9) Systemic lupus erythematosus, unspecified SLE type, unspecified organ involvement status (H)  Comment: as above   Plan:     (G60.9) Peripheral neuropathy, idiopathic  Comment: Referral to physical therapy placed.  Gabapentin increasd to 300 mg three times daily   Plan: [facility] Ref[facility]            (M54.59) Mechanical low back pain  Comment: OK for trial of methocarbamol four times daily as needed   Plan: methocarbamol (ROBAXIN) 500 MG tablet,         gabapentin (NEURONTIN) 300 MG capsule, [facility]         and Hand Referral            (G25.81) Restless legs syndrome  Comment:   Plan: gabapentin (NEURONTIN) 300 MG capsule            (M81.0) Osteoporosis, unspecified osteoporosis type, unspecified pathological fracture presence  Comment: Continue Reclast, calcium + vitamin D   Plan:       Patient was scheduled for an EGD under MAC Sedation @ [facility], with Dr. Shaikh Reading on 06/28/2019  for globus sensation, dysphagia. Patient advised to contact insurance company to verify coverage as procedure is considered diagnostic. Patient verbalized understanding preregistration may call requesting $250 payment on the day of the procedure. Patient advises she has cardiac clearance from Dr. Hortencia Esparza. Patient also advise to see PCP within 30 days prior to procedure for clearance for Anesthesia. .Please send Service to [facility] for H&P.

## 2022-01-27 ENCOUNTER — TELEPHONE (OUTPATIENT)
Dept: FAMILY MEDICINE | Facility: CLINIC | Age: 72
End: 2022-01-27
Payer: COMMERCIAL

## 2022-01-27 DIAGNOSIS — M54.59 MECHANICAL LOW BACK PAIN: ICD-10-CM

## 2022-01-27 RX ORDER — BACLOFEN 10 MG/1
10 TABLET ORAL 3 TIMES DAILY PRN
Qty: 60 TABLET | Refills: 0 | Status: SHIPPED | OUTPATIENT
Start: 2022-01-27 | End: 2022-02-18

## 2022-01-27 NOTE — TELEPHONE ENCOUNTER
Disp Refills Start End LALITHA   cyclobenzaprine (FLEXERIL) 5 MG tablet 60 tablet 1 1/26/2022  --   Sig - Route: Take 1 tablet (5 mg) by mouth 3 times daily as needed for muscle spasms - Oral     Pharmacy states above med is not covered by patient's insurance, preferred alternatives are Tizanidine and Baclofen

## 2022-01-27 NOTE — RESULT ENCOUNTER NOTE
Awais Casas,    I had the opportunity to review your recent labs and a summary of your labs reads as follows:    Your complete blood counts show no sign of anemia, normal white blood cell count and platelets.  Your comprehensive metabolic panel showed stable renal function, normal liver function, and normal fasting blood glucose indicating no evidence of diabetes mellitus.  Your fasting lipid panel show  - normal HDL (good) cholesterol -as your goal is greater than 40  - low LDL (bad) cholesterol as your goal is less than 160 - given your exceptional coronary calcium score of 0 I would not recommend statin at this time, but I would recommend a you look into the Mediterranean Diet - typically high in fruits, vegetables, whole grains, beans, nuts, and seeds; include olive oil as an important source of monounsaturated fat; and allow low to moderate wine consumption.  There are also typically low to moderate amounts of fish, poultry, and dairy products, with little red meat in this diet.  - elevated triglyceride levels    The 10-year ASCVD risk score (Salac LILLY Jr., et al., 2013) is: 8.3%    Values used to calculate the score:      Age: 71 years      Sex: Female      Is Non- : No      Diabetic: No      Tobacco smoker: No      Systolic Blood Pressure: 120 mmHg      Is BP treated: No      HDL Cholesterol: 96 mg/dL      Total Cholesterol: 269 mg/dL        Sincerely,  Josue Mejia MD

## 2022-01-28 NOTE — TELEPHONE ENCOUNTER
Pharmacy was called with Rx change. Left voice message for patient informing her rx was approve to [facility] today.

## 2022-02-11 ENCOUNTER — E-VISIT (OUTPATIENT)
Dept: FAMILY MEDICINE | Facility: CLINIC | Age: 72
End: 2022-02-11
Payer: COMMERCIAL

## 2022-02-11 DIAGNOSIS — F33.0 MILD RECURRENT MAJOR DEPRESSION (H): Primary | ICD-10-CM

## 2022-02-11 PROCEDURE — 99421 OL DIG E/M SVC 5-10 MIN: CPT | Performed by: INTERNAL MEDICINE

## 2022-02-11 RX ORDER — SERTRALINE HYDROCHLORIDE 25 MG/1
TABLET, FILM COATED ORAL
Qty: 180 TABLET | Refills: 3 | Status: SHIPPED | OUTPATIENT
Start: 2022-02-11 | End: 2022-02-16

## 2022-02-11 ASSESSMENT — PATIENT HEALTH QUESTIONNAIRE - PHQ9
SUM OF ALL RESPONSES TO PHQ QUESTIONS 1-9: 17
10. IF YOU CHECKED OFF ANY PROBLEMS, HOW DIFFICULT HAVE THESE PROBLEMS MADE IT FOR YOU TO DO YOUR WORK, TAKE CARE OF THINGS AT HOME, OR GET ALONG WITH OTHER PEOPLE: VERY DIFFICULT
SUM OF ALL RESPONSES TO PHQ QUESTIONS 1-9: 17

## 2022-02-11 NOTE — TELEPHONE ENCOUNTER
I spoke with Yessenia Martines about symptoms of depression and will start a low dose of selective serotonin reuptake inhibitor - sertraline 25 mg.  Counseled on possible cross reaction with traozodone causing serotonin syndrome.  OK to start low dose and monitor with follow up in 1 month.     Provider E-Visit time total (minutes): 10 minutes

## 2022-02-12 ASSESSMENT — PATIENT HEALTH QUESTIONNAIRE - PHQ9: SUM OF ALL RESPONSES TO PHQ QUESTIONS 1-9: 17

## 2022-02-16 ENCOUNTER — MYC MEDICAL ADVICE (OUTPATIENT)
Dept: FAMILY MEDICINE | Facility: CLINIC | Age: 72
End: 2022-02-16
Payer: COMMERCIAL

## 2022-02-16 ENCOUNTER — TELEPHONE (OUTPATIENT)
Dept: FAMILY MEDICINE | Facility: CLINIC | Age: 72
End: 2022-02-16
Payer: COMMERCIAL

## 2022-02-16 DIAGNOSIS — F33.0 MILD RECURRENT MAJOR DEPRESSION (H): ICD-10-CM

## 2022-02-16 NOTE — TELEPHONE ENCOUNTER
Pt reports Rx for Zoloft is not covered by her insurance and is requesting alternative.     Triage tried calling 's pharmacy, but it is closed at this time.     Will call pharmacy back after 8:00 AM.

## 2022-02-16 NOTE — TELEPHONE ENCOUNTER
Needs to be changed to 50 mg daily.      Royce  checked database.   Will cover qnty # 30 for 30 day.     Order History  Outpatient  Date/Time Action Taken User Additional Information   02/11/22 6623 Sign Josue Mejia MD        Outpatient Medication Detail     Disp Refills Start End LALITHA   sertraline (ZOLOFT) 25 MG tablet 180 tablet 3 2/11/2022  --   Sig: Take 25 mg daily x 7 days and increase to 50 mg daily

## 2022-02-16 NOTE — TELEPHONE ENCOUNTER
Triage called 's pharmacy. Tech reports they get a message day supply max when running Rx.     She gave triage insurance help desk phone number  [phone].    Routing to patient care team. Would you be able to find what medication is covered by insurance or max daily dose approved please. Once we have information, please send to PCP for review.

## 2022-02-17 PROBLEM — G89.4 CHRONIC PAIN SYNDROME: Status: ACTIVE | Noted: 2018-09-17

## 2022-02-17 NOTE — TELEPHONE ENCOUNTER
Pharmacy was called with order for pill dose change.   sertraline (ZOLOFT) 50 MG tablet. Pt was informed of pill change and advised she follow up with her pharmacy for Rx.

## 2022-02-27 ENCOUNTER — HEALTH MAINTENANCE LETTER (OUTPATIENT)
Age: 72
End: 2022-02-27

## 2022-03-01 ENCOUNTER — TRANSFERRED RECORDS (OUTPATIENT)
Dept: HEALTH INFORMATION MANAGEMENT | Facility: CLINIC | Age: 72
End: 2022-03-01
Payer: COMMERCIAL

## 2022-03-01 LAB
ALT SERPL-CCNC: 18 IU/L (ref 5–35)
AST SERPL-CCNC: 26 U/L (ref 5–34)
CREATININE (EXTERNAL): 0.83 MG/DL (ref 0.5–1.3)

## 2022-03-08 ENCOUNTER — OFFICE VISIT (OUTPATIENT)
Dept: FAMILY MEDICINE | Facility: CLINIC | Age: 72
End: 2022-03-08
Payer: COMMERCIAL

## 2022-03-08 ENCOUNTER — MYC MEDICAL ADVICE (OUTPATIENT)
Dept: FAMILY MEDICINE | Facility: CLINIC | Age: 72
End: 2022-03-08

## 2022-03-08 VITALS
BODY MASS INDEX: 21.26 KG/M2 | HEART RATE: 62 BPM | TEMPERATURE: 96.9 F | RESPIRATION RATE: 16 BRPM | WEIGHT: 120 LBS | DIASTOLIC BLOOD PRESSURE: 69 MMHG | SYSTOLIC BLOOD PRESSURE: 119 MMHG | OXYGEN SATURATION: 98 %

## 2022-03-08 DIAGNOSIS — R00.2 PALPITATIONS: Primary | ICD-10-CM

## 2022-03-08 DIAGNOSIS — F33.0 MILD RECURRENT MAJOR DEPRESSION (H): ICD-10-CM

## 2022-03-08 LAB — TSH SERPL DL<=0.005 MIU/L-ACNC: 0.89 MU/L (ref 0.4–4)

## 2022-03-08 PROCEDURE — 93000 ELECTROCARDIOGRAM COMPLETE: CPT | Performed by: INTERNAL MEDICINE

## 2022-03-08 PROCEDURE — 84443 ASSAY THYROID STIM HORMONE: CPT | Performed by: INTERNAL MEDICINE

## 2022-03-08 PROCEDURE — 36415 COLL VENOUS BLD VENIPUNCTURE: CPT | Performed by: INTERNAL MEDICINE

## 2022-03-08 PROCEDURE — 99214 OFFICE O/P EST MOD 30 MIN: CPT | Performed by: INTERNAL MEDICINE

## 2022-03-08 RX ORDER — FAMOTIDINE 40 MG/1
1 TABLET, FILM COATED ORAL EVERY 12 HOURS
COMMUNITY
Start: 2021-11-24

## 2022-03-08 RX ORDER — POLYETHYLENE GLYCOL 3350 17 G/17G
POWDER, FOR SOLUTION ORAL
COMMUNITY
End: 2023-09-05

## 2022-03-08 ASSESSMENT — PAIN SCALES - GENERAL: PAINLEVEL: NO PAIN (0)

## 2022-03-08 NOTE — PATIENT INSTRUCTIONS
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  Plan:       Hourly rounding complete. Patient resting in stretcher and is in NAD at this time. Pt is awake alert and oriented x4, respirations even and unlabored. Pt updated on POC. Visitor at bedside. Dressing to left hand saturated with blood, dressing changed per staff. Bed low and locked with side rails up x2, call bell in pt reach. Pt voices no needs at this time.

## 2022-03-08 NOTE — PROGRESS NOTES
[facility]  CLINIC PROGRESS NOTE    Subjective:     Mild recurrent major depression (H)  Palpitations    Yessenia Martines returns to the clinic for follow up.  She is not sure that the sertraline has helped much.  She is noticing occasional palpitations with this medication and would like to consider tapering.  She is not interested in taking another medication at this time.   She is not involved in a grief group, but notes she is quite busy with her family and feels well when she is with them.     Past medical history, medications, allergies, social history, family history reviewed and updated in EPIC as of 3/8/2022 .    ROS  CONSTITUTIONAL: no fatigue, no unexpected change in weight  SKIN: no worrisome rashes, no worrisome moles, no worrisome lesions  EYES: no acute vision problems or changes  ENT: no ear problems, no mouth problems, no throat problems  RESP: no significant cough, no shortness of breath  CV: notes palpitations more frequently 3-4 times per week and 1-2 per episode.  Symptoms last about 10-15 seconds  GI: no nausea, no vomiting, no constipation, no diarrhea  : no frequency,   MS: stable joint arthritis - sle   PSYCHIATRIC: no changes in mood or affect      Objective:  Vitals  /69 (BP Location: Right arm, Patient Position: Sitting, Cuff Size: Adult Regular)   Pulse 62   Temp 96.9  F (36.1  C) (Temporal)   Resp 16   Wt 54.4 kg (120 lb)   SpO2 98%   Breastfeeding No   BMI 21.26 kg/m    GEN: Alert Oriented x3 NAD  HEENT: Atraumatic, normocephalic,    CV: RRR no murmurs or rubs  PULM: CTA no wheezes or crackles  ABD: Soft, nontender nondistended   SKIN: No visible skin lesion or ulcerations  EXT:  no edema bilateral lower extremities  NEURO: Gait and station normal, No focal neurologic deficits  PSYCH: Mood good, affect mood congruent    No images are attached to the encounter.    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  Plan:        Follow up in 6 months    Disclaimer: This note consists of symbols derived from keyboarding, dictation and/or voice recognition software. As a result, there may be errors in the script that have gone undetected. Please consider this when interpreting information found in this chart.    Josue Mejia MD  [phone]

## 2022-03-09 NOTE — RESULT ENCOUNTER NOTE
Awais Casas,    I have had the opportunity to review your recent results and an interpretation is as follows:  Your thyroid function returned stable and within normal limits.    Sincerely,  Josue Mejia MD

## 2022-03-10 ENCOUNTER — HOSPITAL ENCOUNTER (OUTPATIENT)
Dept: CARDIOLOGY | Facility: CLINIC | Age: 72
Discharge: HOME | End: 2022-03-10
Attending: INTERNAL MEDICINE | Admitting: INTERNAL MEDICINE
Payer: COMMERCIAL

## 2022-03-10 DIAGNOSIS — R00.2 PALPITATIONS: ICD-10-CM

## 2022-03-10 PROCEDURE — 93242 EXT ECG>48HR<7D RECORDING: CPT

## 2022-03-10 PROCEDURE — 93244 EXT ECG>48HR<7D REV&INTERPJ: CPT | Performed by: INTERNAL MEDICINE

## 2022-03-25 ENCOUNTER — MYC MEDICAL ADVICE (OUTPATIENT)
Dept: FAMILY MEDICINE | Facility: CLINIC | Age: 72
End: 2022-03-25
Payer: COMMERCIAL

## 2022-03-25 DIAGNOSIS — I47.10 SVT (SUPRAVENTRICULAR TACHYCARDIA) (H): Primary | ICD-10-CM

## 2022-03-28 NOTE — TELEPHONE ENCOUNTER
Dr. Mejia, please see the patient my chart message.     Bobbi Stevenson RN  [facility]

## 2022-03-31 PROBLEM — I47.10 SVT (SUPRAVENTRICULAR TACHYCARDIA) (H): Status: ACTIVE | Noted: 2022-03-31

## 2022-03-31 NOTE — RESULT ENCOUNTER NOTE
Awais Casas,    I have had the opportunity to review your recent results and an interpretation is as follows:  Your Holter monitor did confirm the presence of supraventricular tachycardia which is not an emergently concerning condition, but does correlate with your symptoms.  I would recommend consultation cardiology to discuss potential medications to reduce the occurrences of these arrhythmias [facility] - [phone]     Sincerely,  Josue Mejia MD

## 2022-04-04 ENCOUNTER — OFFICE VISIT (OUTPATIENT)
Dept: CARDIOLOGY | Facility: CLINIC | Age: 72
End: 2022-04-04
Attending: INTERNAL MEDICINE
Payer: COMMERCIAL

## 2022-04-04 VITALS
HEIGHT: 63 IN | SYSTOLIC BLOOD PRESSURE: 118 MMHG | DIASTOLIC BLOOD PRESSURE: 81 MMHG | BODY MASS INDEX: 21 KG/M2 | WEIGHT: 118.5 LBS | HEART RATE: 61 BPM

## 2022-04-04 DIAGNOSIS — I47.10 SVT (SUPRAVENTRICULAR TACHYCARDIA) (H): ICD-10-CM

## 2022-04-04 PROCEDURE — 99204 OFFICE O/P NEW MOD 45 MIN: CPT | Performed by: INTERNAL MEDICINE

## 2022-04-04 NOTE — LETTER
4/4/2022       RE: Yessenia Martines  [address] [facility] [address]     Dear Colleague,    Thank you for referring your patient, Yessenia Martines, to the [facility] [place] at [facility]. Please see a copy of my visit note below.    HPI and Plan:   See dictation      No orders of the defined types were placed in this encounter.      No orders of the defined types were placed in this encounter.      There are no discontinued medications.      Encounter Diagnosis   Name Primary?     SVT (supraventricular tachycardia) (H)        CURRENT MEDICATIONS:  Current Outpatient Medications   Medication Sig Dispense Refill     calcium-vitamin D (CALCIUM 600/VITAMIN D) 600-400 MG-UNIT per tablet Take 1 tablet by mouth 2 times daily       cetirizine (ZYRTEC) 10 MG tablet 10 mg by Oral or J tube route       diclofenac (VOLTAREN) 1 % GEL Place 2 g onto the skin 4 times daily       famotidine (PEPCID) 40 MG tablet Take 1 tablet by mouth every 12 hours       gabapentin (NEURONTIN) 300 MG capsule Take 1 capsule (300 mg) by mouth 3 times daily Dose increase (Patient taking differently: Take 300 mg by mouth 2 times daily Dose increase) 270 capsule 3     leucovorin (WELLCOVORIN) 5 MG tablet Take 1 tablet (5 mg) by mouth daily       methotrexate 2.5 MG tablet Take 4 tablets by mouth once a week        metoclopramide (REGLAN) 5 MG tablet Take 5 mg by mouth once       omeprazole (PRILOSEC) 40 MG DR capsule TAKE 1 CAPSULE DAILY 90 capsule 3     polyethylene glycol (MIRALAX) 17 GM/Dose powder        predniSONE (DELTASONE) 5 MG tablet Taking 5 mg and 1 mg tablet x2 to get a total 7 mg daily       prochlorperazine (COMPAZINE) 5 mg/mL        traZODone (DESYREL) 100 MG tablet Take 2 tablets (200 mg) by mouth At Bedtime 180 tablet 3     baclofen (LIORESAL) 10 MG tablet TAKE 1 TABLET(10 MG) BY MOUTH THREE TIMES DAILY AS NEEDED FOR MUSCLE SPASMS 90 tablet 0      Cholecalciferol (VITAMIN D3) 2000 UNITS TABS Take 2,000 Int'l Units by mouth daily       dronabinol (MARINOL) 5 MG capsule Take 1 capsule (5 mg) by mouth At Bedtime         ALLERGIES     Allergies   Allergen Reactions     Augmentin Other (See Comments)     C Diff colitis     Cefdinir Other (See Comments)     Many years ago, does not recall what reaction was     Sudafed [Pseudoephedrine]      Jittery, pacing     Tizanidine Other (See Comments)     Many years ago, does not recall what reaction was     Latex Rash       PAST MEDICAL HISTORY:  Past Medical History:   Diagnosis Date     Allergic rhinitis due to pollen      Congestive heart failure (H)      Decreased libido      Depressive disorder      Diabetes (H)      Esophageal motility disorder      H pylori ulcer      History of blood transfusion 1980's     Hoarseness      Hypertension      Lupus (systemic lupus erythematosus) (H)     diagnosed 12 yrs ago     MCI (mild cognitive impairment)      Menopausal osteoporosis 02/17/2015     Movement disorder      Mumps     as a child     Neuropathy     feet and hands     S/P cholecystectomy      S/P TIESHA-BSO      Thyroid disease      Uncomplicated asthma        PAST SURGICAL HISTORY:  Past Surgical History:   Procedure Laterality Date     APPENDECTOMY       ARTHRODESIS FINGER(S) Left 5/18/2018    Procedure: ARTHRODESIS FINGER(S);;  Surgeon: Shahida Carlton MD;  Location: [facility]     ARTHROPLASTY CARPOMETACARPAL (THUMB JOINT) Left 5/18/2018    Procedure: ARTHROPLASTY CARPOMETACARPAL (THUMB JOINT);  LEFT THUMB CARPALMETACARPAL WITH MINI TIGHTROPE, LEFT METAPHALANGEAL FUSION WITH RADIAL BONE GRAFT AND EASY CLIP STAPLE.;  Surgeon: Shahida Carlton MD;  Location: [facility]     ARTHROPLASTY CARPOMETACARPAL (THUMB JOINT), ARTHRODESIS, COMBINED Left 1/24/2018    Procedure: COMBINED ARTHROPLASTY CARPOMETACARPAL (THUMB JOINT), ARTHRODESIS;  LEFT THUMB CARPOMETACARPAL EXCISIONAL ARTHROPLASTY WITH FACSIA TANIA GRAFT,  PARTIAL TRAPEZOID EXCISION;  Surgeon: Briana Carlton MD;  Location: [facility]     BACK SURGERY      L2-L4 or L5 fusion     BLEPHAROPLASTY BILATERAL        SECTION      x3     CHOLECYSTECTOMY      feels better. No pathology on gallbladder     COLONOSCOPY       EYE SURGERY      Raised eyelids.     GENITOURINARY SURGERY       GRAFT BONE TO FINGER Left 2018    Procedure: GRAFT BONE TO FINGER;;  Surgeon: Briana Carlton MD;  Location: [facility]     HYSTERECTOMY       KNEE SURGERY      bilateral arthroscopy     ORTHOPEDIC SURGERY      right thumb tendon surgery     REPAIR TENDON FINGER(S)  2011    Procedure:REPAIR TENDON FINGER(S); RIGHT INDEX FINGER FLEXOR DIGITORUM PROFUNDUS REPAIR, RADIAL DIGITAL NERVE REPAIR ; Surgeon:BRAINA CARLTON; Location:[facility]     SALPINGO-OOPHORECTOMY BILATERAL       SHOULDER SURGERY Right     arthroscopy     SOFT TISSUE SURGERY      Tendon repair on finger     TONSILLECTOMY      Child     ZZHC UGI ENDOSCOPY, SIMPLE EXAM  15       FAMILY HISTORY:  Family History   Problem Relation Age of Onset     C.A.D. Father         cabg, carotid stenosis     Hyperlipidemia Father      Hypertension Father      Coronary Artery Disease Father      Anesthesia Reaction Father      Neurologic Disorder Mother         ALS? Parkinsons?     Osteoporosis Mother      Depression Mother      Anxiety Disorder Mother      Hypertension Brother      Hyperlipidemia Brother      Depression Brother      Colon Cancer Maternal Grandmother      Colon Cancer Maternal Aunt      Hyperlipidemia Sister      Hypertension Sister      Osteoporosis Sister      Hyperlipidemia Brother      Osteoporosis Sister      Osteoporosis Maternal Aunt      Hypertension Sister      Diabetes Son      Genetic Disorder Son      Thyroid Disease Son      Diabetes Other      Genetic Disorder Other      Genetic Disorder Daughter      Thyroid Disease Niece      Mental Illness No family hx of   "      SOCIAL HISTORY:  Social History     Socioeconomic History     Marital status:      Spouse name: None     Number of children: 4     Years of education: None     Highest education level: None   Occupational History     None   Tobacco Use     Smoking status: Never Smoker     Smokeless tobacco: Never Used   Substance and Sexual Activity     Alcohol use: Yes     Alcohol/week: 0.0 standard drinks     Comment: 1 wine/day     Drug use: No     Sexual activity: Yes     Partners: Male     Birth control/protection: None     Comment: hysterectomy   Other Topics Concern     Parent/sibling w/ CABG, MI or angioplasty before 65F 55M? No   Social History Narrative    , 4 children    Retired - [employer] - [employer]        Has Primary Care joshua         prostate [place]     Social Determinants of Health     Financial Resource Strain: Not on file   Food Insecurity: Not on file   Transportation Needs: Not on file   Physical Activity: Not on file   Stress: Not on file   Social Connections: Not on file   Intimate Partner Violence: Not on file   Housing Stability: Not on file       Review of Systems:  Skin:  Negative       Eyes:  Negative glasses    ENT:  not assessed      Respiratory:    shortness of breath;dyspnea on exertion     Cardiovascular:    palpitations;Positive for;heaviness;chest pain;lightheadedness;dizziness    Gastroenterology: Positive for reflux;heartburn;vomiting;nausea;poor appetite    Genitourinary:  Negative      Musculoskeletal:  Positive for arthritis    Neurologic:  Negative      Psychiatric:  Negative depression    Heme/Lymph/Imm:  Negative      Endocrine:  Negative        Physical Exam:  Vitals: /81   Pulse 61   Ht 1.6 m (5' 2.99\")   Wt 53.8 kg (118 lb 8 oz)   BMI 21.00 kg/m      Constitutional:  cooperative, alert and oriented, well developed, well nourished, in no acute distress        Skin:  warm and dry to the touch, no apparent skin lesions or masses " noted          Head:  normocephalic, no masses or lesions        Eyes:  pupils equal and round, conjunctivae and lids unremarkable, sclera white, no xanthalasma, EOMS intact, no nystagmus        Lymph:No Cervical lymphadenopathy present     ENT:  no pallor or cyanosis, dentition good        Neck:  carotid pulses are full and equal bilaterally, JVP normal, no carotid bruit        Respiratory:  normal breath sounds, clear to auscultation, normal A-P diameter, normal symmetry, normal respiratory excursion, no use of accessory muscles         Cardiac: regular rhythm, normal S1/S2, no S3 or S4, apical impulse not displaced, no murmurs, gallops or rubs                                                         GI:  abdomen soft, non-tender, BS normoactive, no mass, no HSM, no bruits        Extremities and Muscular Skeletal:  no deformities, clubbing, cyanosis, erythema observed              Neurological:  no gross motor deficits        Psych:  Alert and Oriented x 3        CC  Josue Mejia MD  [address] [facility] [address]  [place] [address]                Service Date: 04/04/2022    CLINIC NOTE    HISTORY OF PRESENT ILLNESS:  I saw Ms. Martines for evaluation of palpitation.  She is a 72-year-old white female with a history of lupus.  She recently started on sertraline, I assume for depression.  She has felt recurrent palpitations.  For that reason, she was put on a Zio Patch monitor for a week.  The Zio Patch monitor detected 18 episodes of nonsustained atrial tachycardia up to 22 seconds.  The heart rate was relatively low.  The patient's event triggers of chest pressure, pounding and fluttering were associated with nonsustained atrial tachycardia sometimes but at the other times, her symptoms were not associated with cardiac arrhythmia.  She has no chest pain, shortness of breath, syncope or near syncope.    PAST MEDICAL HISTORY:  Remarkable for depression, diabetes, H. pylori infection, hypertension,  lupus.    PHYSICAL EXAMINATION:    VITAL SIGNS:  Blood pressure was 118/81, heart rate 61 beats per minute, body weight 118 pounds.  HEENT:  The eyes and ENT were unremarkable.  LUNGS:  Clear.  CARDIAC:  Rhythm was regular.  Heart sounds were normal without murmur.  ABDOMINAL EXAMINATION:  Showed no obesity.  EXTREMITIES:  There was no pedal edema.    ASSESSMENT AND RECOMMENDATIONS:  Ms. Martines is a 72-year-old white female who has occasional minor palpitations for seconds.  The Zio Patch monitor showed that some of her symptoms were indeed correlated with the nonsustained atrial tachycardia but the other symptoms were not associated with any arrhythmia.  Overall, the Zio Patch detected minor arrhythmias.  I do not recommend any intervention.  She is asked to continue the current activities without restriction.  She does not need routine cardiology followup.    Jake Mcgill MD     cc:  Josue Mejia MD   [facility]  [address] [facility] [address]  [place] [address]    Jake Mcgill MD        D: 2022   T: 2022   MT: ghazala    Name:     ADE MARTINES  MRN:      [id number]        Account:      [id number]   :      1950           Service Date: 2022       Document: [id number]        Again, thank you for allowing me to participate in the care of your patient.      Sincerely,    Jake Mcgill MD

## 2022-04-04 NOTE — PROGRESS NOTES
HPI and Plan:   See dictation      No orders of the defined types were placed in this encounter.      No orders of the defined types were placed in this encounter.      There are no discontinued medications.      Encounter Diagnosis   Name Primary?     SVT (supraventricular tachycardia) (H)        CURRENT MEDICATIONS:  Current Outpatient Medications   Medication Sig Dispense Refill     calcium-vitamin D (CALCIUM 600/VITAMIN D) 600-400 MG-UNIT per tablet Take 1 tablet by mouth 2 times daily       cetirizine (ZYRTEC) 10 MG tablet 10 mg by Oral or J tube route       diclofenac (VOLTAREN) 1 % GEL Place 2 g onto the skin 4 times daily       famotidine (PEPCID) 40 MG tablet Take 1 tablet by mouth every 12 hours       gabapentin (NEURONTIN) 300 MG capsule Take 1 capsule (300 mg) by mouth 3 times daily Dose increase (Patient taking differently: Take 300 mg by mouth 2 times daily Dose increase) 270 capsule 3     leucovorin (WELLCOVORIN) 5 MG tablet Take 1 tablet (5 mg) by mouth daily       methotrexate 2.5 MG tablet Take 4 tablets by mouth once a week        metoclopramide (REGLAN) 5 MG tablet Take 5 mg by mouth once       omeprazole (PRILOSEC) 40 MG DR capsule TAKE 1 CAPSULE DAILY 90 capsule 3     polyethylene glycol (MIRALAX) 17 GM/Dose powder        predniSONE (DELTASONE) 5 MG tablet Taking 5 mg and 1 mg tablet x2 to get a total 7 mg daily       prochlorperazine (COMPAZINE) 5 mg/mL        traZODone (DESYREL) 100 MG tablet Take 2 tablets (200 mg) by mouth At Bedtime 180 tablet 3     baclofen (LIORESAL) 10 MG tablet TAKE 1 TABLET(10 MG) BY MOUTH THREE TIMES DAILY AS NEEDED FOR MUSCLE SPASMS 90 tablet 0     Cholecalciferol (VITAMIN D3) 2000 UNITS TABS Take 2,000 Int'l Units by mouth daily       dronabinol (MARINOL) 5 MG capsule Take 1 capsule (5 mg) by mouth At Bedtime         ALLERGIES     Allergies   Allergen Reactions     Augmentin Other (See Comments)     C Diff colitis     Cefdinir Other (See Comments)     Many years  ago, does not recall what reaction was     Sudafed [Pseudoephedrine]      Jittery, pacing     Tizanidine Other (See Comments)     Many years ago, does not recall what reaction was     Latex Rash       PAST MEDICAL HISTORY:  Past Medical History:   Diagnosis Date     Allergic rhinitis due to pollen      Congestive heart failure (H)      Decreased libido      Depressive disorder      Diabetes (H)      Esophageal motility disorder      H pylori ulcer      History of blood transfusion      Hoarseness      Hypertension      Lupus (systemic lupus erythematosus) (H)     diagnosed 12 yrs ago     MCI (mild cognitive impairment)      Menopausal osteoporosis 2015     Movement disorder      Mumps     as a child     Neuropathy     feet and hands     S/P cholecystectomy      S/P TIESHA-BSO      Thyroid disease      Uncomplicated asthma        PAST SURGICAL HISTORY:  Past Surgical History:   Procedure Laterality Date     APPENDECTOMY       ARTHRODESIS FINGER(S) Left 2018    Procedure: ARTHRODESIS FINGER(S);;  Surgeon: Shahida Carlton MD;  Location: [facility]     ARTHROPLASTY CARPOMETACARPAL (THUMB JOINT) Left 2018    Procedure: ARTHROPLASTY CARPOMETACARPAL (THUMB JOINT);  LEFT THUMB CARPALMETACARPAL WITH MINI TIGHTROPE, LEFT METAPHALANGEAL FUSION WITH RADIAL BONE GRAFT AND EASY CLIP STAPLE.;  Surgeon: Shahida Carlton MD;  Location: [facility]     ARTHROPLASTY CARPOMETACARPAL (THUMB JOINT), ARTHRODESIS, COMBINED Left 2018    Procedure: COMBINED ARTHROPLASTY CARPOMETACARPAL (THUMB JOINT), ARTHRODESIS;  LEFT THUMB CARPOMETACARPAL EXCISIONAL ARTHROPLASTY WITH FACSIA TANIA GRAFT, PARTIAL TRAPEZOID EXCISION;  Surgeon: Shahida Carlton MD;  Location: [facility]     BACK SURGERY      L2-L4 or L5 fusion     BLEPHAROPLASTY BILATERAL        SECTION      x3     CHOLECYSTECTOMY      feels better. No pathology on gallbladder     COLONOSCOPY       EYE SURGERY      Raised eyelids.      GENITOURINARY SURGERY       GRAFT BONE TO FINGER Left 5/18/2018    Procedure: GRAFT BONE TO FINGER;;  Surgeon: Briana Carlton MD;  Location: [facility]     HYSTERECTOMY       KNEE SURGERY      bilateral arthroscopy     ORTHOPEDIC SURGERY      right thumb tendon surgery     REPAIR TENDON FINGER(S)  11/9/2011    Procedure:REPAIR TENDON FINGER(S); RIGHT INDEX FINGER FLEXOR DIGITORUM PROFUNDUS REPAIR, RADIAL DIGITAL NERVE REPAIR ; Surgeon:BRIANA CARLTON; Location:[facility]     SALPINGO-OOPHORECTOMY BILATERAL       SHOULDER SURGERY Right     arthroscopy     SOFT TISSUE SURGERY      Tendon repair on finger     TONSILLECTOMY      Child     ZZHC UGI ENDOSCOPY, SIMPLE EXAM  04/02/14       FAMILY HISTORY:  Family History   Problem Relation Age of Onset     C.A.D. Father         cabg, carotid stenosis     Hyperlipidemia Father      Hypertension Father      Coronary Artery Disease Father      Anesthesia Reaction Father      Neurologic Disorder Mother         ALS? Parkinsons?     Osteoporosis Mother      Depression Mother      Anxiety Disorder Mother      Hypertension Brother      Hyperlipidemia Brother      Depression Brother      Colon Cancer Maternal Grandmother      Colon Cancer Maternal Aunt      Hyperlipidemia Sister      Hypertension Sister      Osteoporosis Sister      Hyperlipidemia Brother      Osteoporosis Sister      Osteoporosis Maternal Aunt      Hypertension Sister      Diabetes Son      Genetic Disorder Son      Thyroid Disease Son      Diabetes Other      Genetic Disorder Other      Genetic Disorder Daughter      Thyroid Disease Niece      Mental Illness No family hx of        SOCIAL HISTORY:  Social History     Socioeconomic History     Marital status:      Spouse name: None     Number of children: 4     Years of education: None     Highest education level: None   Occupational History     None   Tobacco Use     Smoking status: Never Smoker     Smokeless tobacco: Never Used   Substance  "and Sexual Activity     Alcohol use: Yes     Alcohol/week: 0.0 standard drinks     Comment: 1 wine/day     Drug use: No     Sexual activity: Yes     Partners: Male     Birth control/protection: None     Comment: hysterectomy   Other Topics Concern     Parent/sibling w/ CABG, MI or angioplasty before 65F 55M? 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 - [employer]        Has Primary Care joshua         prostate [place]     Social Determinants of Health     Financial Resource Strain: Not on file   Food Insecurity: Not on file   Transportation Needs: Not on file   Physical Activity: Not on file   Stress: Not on file   Social Connections: Not on file   Intimate Partner Violence: Not on file   Housing Stability: Not on file       Review of Systems:  Skin:  Negative       Eyes:  Negative glasses    ENT:  not assessed      Respiratory:    shortness of breath;dyspnea on exertion     Cardiovascular:    palpitations;Positive for;heaviness;chest pain;lightheadedness;dizziness    Gastroenterology: Positive for reflux;heartburn;vomiting;nausea;poor appetite    Genitourinary:  Negative      Musculoskeletal:  Positive for arthritis    Neurologic:  Negative      Psychiatric:  Negative depression    Heme/Lymph/Imm:  Negative      Endocrine:  Negative        Physical Exam:  Vitals: /81   Pulse 61   Ht 1.6 m (5' 2.99\")   Wt 53.8 kg (118 lb 8 oz)   BMI 21.00 kg/m      Constitutional:  cooperative, alert and oriented, well developed, well nourished, in no acute distress        Skin:  warm and dry to the touch, no apparent skin lesions or masses noted          Head:  normocephalic, no masses or lesions        Eyes:  pupils equal and round, conjunctivae and lids unremarkable, sclera white, no xanthalasma, EOMS intact, no nystagmus        Lymph:No Cervical lymphadenopathy present     ENT:  no pallor or cyanosis, dentition good        Neck:  carotid pulses are full and " equal bilaterally, JVP normal, no carotid bruit        Respiratory:  normal breath sounds, clear to auscultation, normal A-P diameter, normal symmetry, normal respiratory excursion, no use of accessory muscles         Cardiac: regular rhythm, normal S1/S2, no S3 or S4, apical impulse not displaced, no murmurs, gallops or rubs                                                         GI:  abdomen soft, non-tender, BS normoactive, no mass, no HSM, no bruits        Extremities and Muscular Skeletal:  no deformities, clubbing, cyanosis, erythema observed              Neurological:  no gross motor deficits        Psych:  Alert and Oriented x 3        CC  Josue Mejia MD  [address]  [place] [address]

## 2022-04-04 NOTE — PROGRESS NOTES
Service Date: 04/04/2022    CLINIC NOTE    HISTORY OF PRESENT ILLNESS:  I saw Ms. Martines for evaluation of palpitation.  She is a 72-year-old white female with a history of lupus.  She recently started on sertraline, I assume for depression.  She has felt recurrent palpitations.  For that reason, she was put on a Zio Patch monitor for a week.  The Zio Patch monitor detected 18 episodes of nonsustained atrial tachycardia up to 22 seconds.  The heart rate was relatively low.  The patient's event triggers of chest pressure, pounding and fluttering were associated with nonsustained atrial tachycardia sometimes but at the other times, her symptoms were not associated with cardiac arrhythmia.  She has no chest pain, shortness of breath, syncope or near syncope.    PAST MEDICAL HISTORY:  Remarkable for depression, diabetes, H. pylori infection, hypertension, lupus.    PHYSICAL EXAMINATION:    VITAL SIGNS:  Blood pressure was 118/81, heart rate 61 beats per minute, body weight 118 pounds.  HEENT:  The eyes and ENT were unremarkable.  LUNGS:  Clear.  CARDIAC:  Rhythm was regular.  Heart sounds were normal without murmur.  ABDOMINAL EXAMINATION:  Showed no obesity.  EXTREMITIES:  There was no pedal edema.    ASSESSMENT AND RECOMMENDATIONS:  Ms. Martines is a 72-year-old white female who has occasional minor palpitations for seconds.  The Zio Patch monitor showed that some of her symptoms were indeed correlated with the nonsustained atrial tachycardia but the other symptoms were not associated with any arrhythmia.  Overall, the Zio Patch detected minor arrhythmias.  I do not recommend any intervention.  She is asked to continue the current activities without restriction.  She does not need routine cardiology followup.    Jake Mcgill MD     cc:  Josue Mejia MD   [facility]  [address] [facility] [address] [facility] [address]  [place] [address]    Jake Mcgill MD        D: 04/04/2022   T: 04/04/2022   MT: ghazala    Name:     YI  ADE CAMILLATay  MRN:      [id number]        Account:      [id number]   :      1950           Service Date: 2022       Document: [id number]

## 2022-04-04 NOTE — LETTER
4/4/2022    Josue Mejia MD, MD  [address]  [place] [address]    RE: Yessenia Martines       Dear Colleague,     I had the pleasure of seeing Yessenia Martines in the [facility].  HPI and Plan:   See dictation      No orders of the defined types were placed in this encounter.      No orders of the defined types were placed in this encounter.      There are no discontinued medications.      Encounter Diagnosis   Name Primary?     SVT (supraventricular tachycardia) (H)        CURRENT MEDICATIONS:  Current Outpatient Medications   Medication Sig Dispense Refill     calcium-vitamin D (CALCIUM 600/VITAMIN D) 600-400 MG-UNIT per tablet Take 1 tablet by mouth 2 times daily       cetirizine (ZYRTEC) 10 MG tablet 10 mg by Oral or J tube route       diclofenac (VOLTAREN) 1 % GEL Place 2 g onto the skin 4 times daily       famotidine (PEPCID) 40 MG tablet Take 1 tablet by mouth every 12 hours       gabapentin (NEURONTIN) 300 MG capsule Take 1 capsule (300 mg) by mouth 3 times daily Dose increase (Patient taking differently: Take 300 mg by mouth 2 times daily Dose increase) 270 capsule 3     leucovorin (WELLCOVORIN) 5 MG tablet Take 1 tablet (5 mg) by mouth daily       methotrexate 2.5 MG tablet Take 4 tablets by mouth once a week        metoclopramide (REGLAN) 5 MG tablet Take 5 mg by mouth once       omeprazole (PRILOSEC) 40 MG DR capsule TAKE 1 CAPSULE DAILY 90 capsule 3     polyethylene glycol (MIRALAX) 17 GM/Dose powder        predniSONE (DELTASONE) 5 MG tablet Taking 5 mg and 1 mg tablet x2 to get a total 7 mg daily       prochlorperazine (COMPAZINE) 5 mg/mL        traZODone (DESYREL) 100 MG tablet Take 2 tablets (200 mg) by mouth At Bedtime 180 tablet 3     baclofen (LIORESAL) 10 MG tablet TAKE 1 TABLET(10 MG) BY MOUTH THREE TIMES DAILY AS NEEDED FOR MUSCLE SPASMS 90 tablet 0     Cholecalciferol (VITAMIN D3) 2000 UNITS TABS Take 2,000 Int'l Units by mouth daily        dronabinol (MARINOL) 5 MG capsule Take 1 capsule (5 mg) by mouth At Bedtime         ALLERGIES     Allergies   Allergen Reactions     Augmentin Other (See Comments)     C Diff colitis     Cefdinir Other (See Comments)     Many years ago, does not recall what reaction was     Sudafed [Pseudoephedrine]      Jittery, pacing     Tizanidine Other (See Comments)     Many years ago, does not recall what reaction was     Latex Rash       PAST MEDICAL HISTORY:  Past Medical History:   Diagnosis Date     Allergic rhinitis due to pollen      Congestive heart failure (H)      Decreased libido      Depressive disorder      Diabetes (H)      Esophageal motility disorder      H pylori ulcer      History of blood transfusion 1980's     Hoarseness      Hypertension      Lupus (systemic lupus erythematosus) (H)     diagnosed 12 yrs ago     MCI (mild cognitive impairment)      Menopausal osteoporosis 02/17/2015     Movement disorder      Mumps     as a child     Neuropathy     feet and hands     S/P cholecystectomy      S/P TIESHA-BSO      Thyroid disease      Uncomplicated asthma        PAST SURGICAL HISTORY:  Past Surgical History:   Procedure Laterality Date     APPENDECTOMY       ARTHRODESIS FINGER(S) Left 5/18/2018    Procedure: ARTHRODESIS FINGER(S);;  Surgeon: Shahida Carlton MD;  Location: [facility]     ARTHROPLASTY CARPOMETACARPAL (THUMB JOINT) Left 5/18/2018    Procedure: ARTHROPLASTY CARPOMETACARPAL (THUMB JOINT);  LEFT THUMB CARPALMETACARPAL WITH MINI TIGHTROPE, LEFT METAPHALANGEAL FUSION WITH RADIAL BONE GRAFT AND EASY CLIP STAPLE.;  Surgeon: Shahida Carlton MD;  Location: [facility]     ARTHROPLASTY CARPOMETACARPAL (THUMB JOINT), ARTHRODESIS, COMBINED Left 1/24/2018    Procedure: COMBINED ARTHROPLASTY CARPOMETACARPAL (THUMB JOINT), ARTHRODESIS;  LEFT THUMB CARPOMETACARPAL EXCISIONAL ARTHROPLASTY WITH FACSIA TANIA GRAFT, PARTIAL TRAPEZOID EXCISION;  Surgeon: Shahida Carlton MD;  Location: [facility]      BACK SURGERY      L2-L4 or L5 fusion     BLEPHAROPLASTY BILATERAL        SECTION      x3     CHOLECYSTECTOMY      feels better. No pathology on gallbladder     COLONOSCOPY       EYE SURGERY      Raised eyelids.     GENITOURINARY SURGERY       GRAFT BONE TO FINGER Left 2018    Procedure: GRAFT BONE TO FINGER;;  Surgeon: Briana Carlton MD;  Location: [facility]     HYSTERECTOMY       KNEE SURGERY      bilateral arthroscopy     ORTHOPEDIC SURGERY      right thumb tendon surgery     REPAIR TENDON FINGER(S)  2011    Procedure:REPAIR TENDON FINGER(S); RIGHT INDEX FINGER FLEXOR DIGITORUM PROFUNDUS REPAIR, RADIAL DIGITAL NERVE REPAIR ; Surgeon:BRIANA CARLTON; Location:[facility]     SALPINGO-OOPHORECTOMY BILATERAL       SHOULDER SURGERY Right     arthroscopy     SOFT TISSUE SURGERY      Tendon repair on finger     TONSILLECTOMY      Child     ZZHC UGI ENDOSCOPY, SIMPLE EXAM  15       FAMILY HISTORY:  Family History   Problem Relation Age of Onset     C.A.D. Father         cabg, carotid stenosis     Hyperlipidemia Father      Hypertension Father      Coronary Artery Disease Father      Anesthesia Reaction Father      Neurologic Disorder Mother         ALS? Parkinsons?     Osteoporosis Mother      Depression Mother      Anxiety Disorder Mother      Hypertension Brother      Hyperlipidemia Brother      Depression Brother      Colon Cancer Maternal Grandmother      Colon Cancer Maternal Aunt      Hyperlipidemia Sister      Hypertension Sister      Osteoporosis Sister      Hyperlipidemia Brother      Osteoporosis Sister      Osteoporosis Maternal Aunt      Hypertension Sister      Diabetes Son      Genetic Disorder Son      Thyroid Disease Son      Diabetes Other      Genetic Disorder Other      Genetic Disorder Daughter      Thyroid Disease Niece      Mental Illness No family hx of        SOCIAL HISTORY:  Social History     Socioeconomic History     Marital status:      " Spouse name: None     Number of children: 4     Years of education: None     Highest education level: None   Occupational History     None   Tobacco Use     Smoking status: Never Smoker     Smokeless tobacco: Never Used   Substance and Sexual Activity     Alcohol use: Yes     Alcohol/week: 0.0 standard drinks     Comment: 1 wine/day     Drug use: No     Sexual activity: Yes     Partners: Male     Birth control/protection: None     Comment: hysterectomy   Other Topics Concern     Parent/sibling w/ CABG, MI or angioplasty before 65F 55M? 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[place] [employer]        Has Primary Care joshua         prostate CA     Social Determinants of Health     Financial Resource Strain: Not on file   Food Insecurity: Not on file   Transportation Needs: Not on file   Physical Activity: Not on file   Stress: Not on file   Social Connections: Not on file   Intimate Partner Violence: Not on file   Housing Stability: Not on file       Review of Systems:  Skin:  Negative       Eyes:  Negative glasses    ENT:  not assessed      Respiratory:    shortness of breath;dyspnea on exertion     Cardiovascular:    palpitations;Positive for;heaviness;chest pain;lightheadedness;dizziness    Gastroenterology: Positive for reflux;heartburn;vomiting;nausea;poor appetite    Genitourinary:  Negative      Musculoskeletal:  Positive for arthritis    Neurologic:  Negative      Psychiatric:  Negative depression    Heme/Lymph/Imm:  Negative      Endocrine:  Negative        Physical Exam:  Vitals: /81   Pulse 61   Ht 1.6 m (5' 2.99\")   Wt 53.8 kg (118 lb 8 oz)   BMI 21.00 kg/m      Constitutional:  cooperative, alert and oriented, well developed, well nourished, in no acute distress        Skin:  warm and dry to the touch, no apparent skin lesions or masses noted          Head:  normocephalic, no masses or lesions        Eyes:  pupils equal and round, " conjunctivae and lids unremarkable, sclera white, no xanthalasma, EOMS intact, no nystagmus        Lymph:No Cervical lymphadenopathy present     ENT:  no pallor or cyanosis, dentition good        Neck:  carotid pulses are full and equal bilaterally, JVP normal, no carotid bruit        Respiratory:  normal breath sounds, clear to auscultation, normal A-P diameter, normal symmetry, normal respiratory excursion, no use of accessory muscles         Cardiac: regular rhythm, normal S1/S2, no S3 or S4, apical impulse not displaced, no murmurs, gallops or rubs                                                         GI:  abdomen soft, non-tender, BS normoactive, no mass, no HSM, no bruits        Extremities and Muscular Skeletal:  no deformities, clubbing, cyanosis, erythema observed              Neurological:  no gross motor deficits        Psych:  Alert and Oriented x 3        CC  Josue Mejia MD  [address]  [place] [address]                  Thank you for allowing me to participate in the care of your patient.      Sincerely,     Jake Mcgill MD     [facility]  cc:   Josue Mejia MD  [address]  [place] [address]

## 2022-04-06 NOTE — TELEPHONE ENCOUNTER
"See below - Rx was marked as discontinued 3/8/22 - per visit notes \"She is not sure that the sertraline has helped much.  She is noticing occasional palpitations with this medication and would like to consider tapering\"     Per below she is asking to continue 50mg sertraline and states she never stopped it, sent message to clarify if palpitations stopped when she tapered off this     sertraline (ZOLOFT) 50 MG tablet (Discontinued) 90 tablet 3 2/16/2022 3/8/2022 No   Sig: Take 25 mg daily x 7 days and increase to 50 mg daily   Sent to pharmacy as: Sertraline HCl 50 MG Oral Tablet (ZOLOFT)   Class: E-Prescribe   Order: [id number]   E-Prescribing Status: Receipt confirmed by pharmacy (2/16/2022  5:45 PM CST)   E-Cancel Status: Request approved by pharmacy (3/8/2022  8:56 AM CST)       E-Cancel Status Note: Some or all of Rx dispensed; Last fill:02/17/22     Marry Ferrell RN  [facility]     "

## 2022-04-26 ENCOUNTER — APPOINTMENT (OUTPATIENT)
Dept: GENERAL RADIOLOGY | Facility: CLINIC | Age: 72
End: 2022-04-26
Attending: EMERGENCY MEDICINE
Payer: COMMERCIAL

## 2022-04-26 ENCOUNTER — HOSPITAL ENCOUNTER (EMERGENCY)
Facility: CLINIC | Age: 72
Discharge: HOME | End: 2022-04-26
Attending: EMERGENCY MEDICINE | Admitting: EMERGENCY MEDICINE
Payer: COMMERCIAL

## 2022-04-26 VITALS
DIASTOLIC BLOOD PRESSURE: 86 MMHG | RESPIRATION RATE: 16 BRPM | SYSTOLIC BLOOD PRESSURE: 127 MMHG | OXYGEN SATURATION: 99 % | TEMPERATURE: 97.1 F | HEART RATE: 73 BPM

## 2022-04-26 DIAGNOSIS — T14.8XXA SKIN AVULSION: ICD-10-CM

## 2022-04-26 DIAGNOSIS — S50.02XA CONTUSION OF LEFT ELBOW, INITIAL ENCOUNTER: ICD-10-CM

## 2022-04-26 DIAGNOSIS — S61.012A LACERATION OF LEFT THUMB WITHOUT FOREIGN BODY WITHOUT DAMAGE TO NAIL, INITIAL ENCOUNTER: ICD-10-CM

## 2022-04-26 PROCEDURE — 73080 X-RAY EXAM OF ELBOW: CPT | Mod: LT

## 2022-04-26 PROCEDURE — 12001 RPR S/N/AX/GEN/TRNK 2.5CM/<: CPT

## 2022-04-26 PROCEDURE — 73110 X-RAY EXAM OF WRIST: CPT | Mod: LT

## 2022-04-26 PROCEDURE — 73130 X-RAY EXAM OF HAND: CPT | Mod: LT

## 2022-04-26 PROCEDURE — 99285 EMERGENCY DEPT VISIT HI MDM: CPT

## 2022-04-26 RX ORDER — LIDOCAINE HYDROCHLORIDE 20 MG/ML
JELLY TOPICAL
Status: DISCONTINUED
Start: 2022-04-26 | End: 2022-04-26 | Stop reason: HOSPADM

## 2022-04-26 ASSESSMENT — ENCOUNTER SYMPTOMS
SHORTNESS OF BREATH: 0
FEVER: 0
WOUND: 1
COUGH: 0
BACK PAIN: 0
NECK PAIN: 0
RHINORRHEA: 0
SORE THROAT: 0

## 2022-04-26 NOTE — ED TRIAGE NOTES
Pt reports falling down half an escalator after her bag tipped over. Pt states she did not have LOC and denies blood thinners. Pt has a laceration to her L forearm, a laceration to her L thumb, and L ankle pain.      Triage Assessment     Row Name 04/26/22 1419       Triage Assessment (Adult)    Airway WDL WDL       Respiratory WDL    Respiratory WDL WDL       Skin Circulation/Temperature WDL    Skin Circulation/Temperature WDL X  L thumb laceration, L forearm laceration       Cardiac WDL    Cardiac WDL WDL       Peripheral/Neurovascular WDL    Peripheral Neurovascular WDL WDL       Cognitive/Neuro/Behavioral WDL    Cognitive/Neuro/Behavioral WDL WDL

## 2022-04-27 NOTE — ED PROVIDER NOTES
History   Chief Complaint:  Fall       The history is provided by the patient.      Yessenia Martines is a left-handed 72 year old female with history of diabetes who presents with fall. eYssenia reports to having a fall today on the escalator after her bag tipped over.  She states she has prone to falling over due to imbalance issues.  She denies any head trauma or loss of consciousness in the fall. She does however have a laceration to the left forearm, left thumb, and left ankle.  She was able to walk without difficulty.  Here at the ED she denies fever, sore throat, cough, shortness of breath. No neck or back pain. No knee or hip pain.  Her last tetanus was a month ago.    Review of Systems   Constitutional: Negative for fever.   HENT: Negative for rhinorrhea and sore throat.    Respiratory: Negative for cough and shortness of breath.    Musculoskeletal: Negative for back pain and neck pain.   Skin: Positive for wound.   Neurological: Negative for syncope.   All other systems reviewed and are negative.        Allergies:  Augmentin  Cefdinir  Sudafed [Pseudoephedrine]  Tizanidine  Latex    Medications:  Calcium-vitamin D   Cetirizine   Famotidine   Gabapentin   Leucovorin   methotrexate 2.5 MG tablet  Metoclopramide   Omeprazole   Polyethylene glycol  Prednisone    Prochlorperazine   Sertraline   Trazodone      Past Medical History:     Allergic rhinitis due to pollen   Congestive heart failure (H)   Decreased libido   Depressive disorder   Diabetes (H)   Esophageal motility disorder   H pylori ulcer   History of blood transfusion   Hoarseness   Hypertension   Lupus (systemic lupus erythematosus)   MCI (mild cognitive impairment)   Menopausal osteoporosis   Movement disorder   Mumps  Neuropathy  Restless leg syndrome  Thoracic segment dysfunction  Sacral pain  Gastroparesis  SVT  Primary insomnia  Immunosuppressed status  Globus sensation  MCI  Hemorrhage of cyst of native kidney      Past Surgical History:     Appendectomy  Arthrodesis fingers  Arthroplasty carpometacarpal  L2-L4 or L5 fusion  Blepharoplasty bilateral  Cholecystectomy  Colonoscopy  Raised eyelids  Genitourinary surgery  Graft bone to finger  Hysterectomy  Tonsillectomy  Salpingo-oophorectomy bilateral  Repair tendon, fingers and right thumb  UGI endoscopy    Family History:    CAD  Hyperlipidemia  HTN  Anesthesia reaction  Neurologic disorder (ALS? Parkinson's?)  Anxiety disorder  Depression  Colon cancer  Osteoporosis   Thyroid disease  Genetic disorder--daughter and son  Diabetes     Social History:  Patient accompanied by daughter  PCP: Josue Mejia     Physical Exam     Patient Vitals for the past 24 hrs:   BP Temp Temp src Pulse Resp SpO2   04/26/22 1408 130/73 97.1  F (36.2  C) Temporal 64 14 98 %       Physical Exam    Physical Exam   Constitutional:  Patient is oriented to person, place, and time. They appear well-developed and well-nourished. Mild distress secondary to lacerations   Head:   Atraumatic.   Eyes:    Conjunctivae normal and EOM are normal. Pupils are equal, round, and reactive to light.   Neck:    Normal range of motion. no focal vertebral tenderness.   Cardiovascular: Normal rate, regular rhythm and normal heart sounds.  Exam reveals no gallop and no friction rub.  No murmur heard.  Pulmonary/Chest:  Effort normal and breath sounds normal. Patient has no wheezes. Patient has no rales.   Abdominal:   Soft. Bowel sounds are normal. Patient exhibits no mass. There is no tenderness. There is no rebound and no guarding.  No pain across the ribs   Musculoskeletal:  Left elbow bruising and edema. Limited ROM. No pain at the shoulder or clavicle. Left wrist pain to palpation. No deformity or bruising. Bruising on fingers two and three on the palmar aspect between the PIP and MCP. Pain at the base of the thumb with chronic mobility issues due to fusion. Small 1.5 cm laceration on the medial aspect of the left thumb as well as  the distal thumb 0.25. superficial cut the the MCP on the left and a small abrasion to the third finger pad. Small abrasion on the left lateral ankle and a long superficial skin tear on the dorsum of the left forearm. No rib pain to palpation.    Neurological:   Patient is alert and oriented to person, place, and time. Patient has normal strength. No cranial nerve deficit or sensory deficit. GCS 15.  Skin:   Skin is warm and dry. No rash noted. No erythema.   Psychiatric:   Patient has a normal mood and affect. Patient's behavior is normal. Judgment and thought content normal.     Emergency Department Course     Imaging:  XR Hand Left G/E 3 Views   Final Result   IMPRESSION: No fracture or dislocation. Status post resection of the trapezium. Status post arthrodesis of the MCP joint of the thumb. Suture anchors in the bases of the first and second metacarpals. Lucent area in the distal radius likely a bone graft    donor site.      Elbow  XR, G/E 3 views, left   Final Result   IMPRESSION: No fracture. No joint effusion. Mild soft tissue swelling along the dorsal aspect of the olecranon process.      XR Wrist Left G/E 3 Views   Final Result   IMPRESSION: No fracture or dislocation. Status post resection of the trapezium. Status post arthrodesis of the MCP joint of the thumb. Suture anchors in the bases of the first and second metacarpals. Lucent area in the distal radius likely a bone graft    donor site.        Report per radiology    Laboratory:  Labs Ordered and Resulted from Time of ED Arrival to Time of ED Departure - No data to display     Procedures     Laceration Repair      LACERATION:  A simple clean 1.5 cm laceration.    LOCATION:  Left thumb.    FUNCTION:  Distally sensation, circulation, motor and tendon function are intact.    ANESTHESIA:  Local using 1% lidocaine w/ no epinephrine.    PREPARATION:  Irrigation with Normal Saline.    DEBRIDEMENT:  no debridement.    CLOSURE:  Wound was closed with One  Layer.  Skin closed with 3 4.0 nylon using interrupted sutures..       Emergency Department Course:             Reviewed:  I reviewed nursing notes, vitals, past medical history and Care Everywhere    Assessments/Consults:  ED Course as of 04/26/22 2149 Tue Apr 26, 2022 1924 Obtained history and examined the patient as noted above.        Interventions:  Medications   lidocaine (XYLOCAINE) 2 % external gel (has no administration in time range)       Disposition:  The patient was discharged to home.     Impression & Plan     CMS Diagnoses: None    Medical Decision Making:   Yessenia Martines is a 72 year old female presenting to the emergency department after mechanical fall.  She sustained lacerations to her left arm and thumb and pain to the elbow.  X-rays of the fingers, elbow and wrist were obtained.  Fortune there is no acute fractures or dislocations.  I did do stitches on the thumb to aid in healing and preventing infection and cosmesis.  Symptomatic cares were discussed of all of her injuries.  She will have her stitches out in 10 days at her primary care doctor's office.  Tylenol recommended for pain.  At this time she is safe for discharge.    Diagnosis:    ICD-10-CM    1. Skin avulsion  T14.8XXA    2. Laceration of left thumb without foreign body without damage to nail, initial encounter  S61.012A    3. Contusion of left elbow, initial encounter  S50.02XA        Scribe Disclosure:  I, Kimani Prado, am serving as a scribe at 7:23 PM on 4/26/2022 to document services personally performed by Dayanna Ferguson MD based on my observations and the provider's statements to me.            Dayanna Ferguson MD  04/26/22 8109

## 2022-04-28 ENCOUNTER — TRANSFERRED RECORDS (OUTPATIENT)
Dept: HEALTH INFORMATION MANAGEMENT | Facility: CLINIC | Age: 72
End: 2022-04-28
Payer: COMMERCIAL

## 2022-05-02 ENCOUNTER — OFFICE VISIT (OUTPATIENT)
Dept: FAMILY MEDICINE | Facility: CLINIC | Age: 72
End: 2022-05-02
Payer: COMMERCIAL

## 2022-05-02 VITALS
TEMPERATURE: 97.3 F | OXYGEN SATURATION: 96 % | SYSTOLIC BLOOD PRESSURE: 107 MMHG | HEART RATE: 71 BPM | WEIGHT: 120.1 LBS | BODY MASS INDEX: 21.28 KG/M2 | HEIGHT: 63 IN | DIASTOLIC BLOOD PRESSURE: 70 MMHG | RESPIRATION RATE: 16 BRPM

## 2022-05-02 DIAGNOSIS — M79.89 LEFT ARM SWELLING: ICD-10-CM

## 2022-05-02 DIAGNOSIS — S49.92XD INJURY OF LEFT UPPER ARM, SUBSEQUENT ENCOUNTER: ICD-10-CM

## 2022-05-02 DIAGNOSIS — T14.8XXA SKIN AVULSION: Primary | ICD-10-CM
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
## 2022-05-02 PROCEDURE — 99214 OFFICE O/P EST MOD 30 MIN: CPT | Performed by: INTERNAL MEDICINE

## 2022-05-02 RX ORDER — MUPIROCIN 20 MG/G
OINTMENT TOPICAL 3 TIMES DAILY
Qty: 30 G | Refills: 0 | Status: SHIPPED | OUTPATIENT
Start: 2022-05-02 | End: 2022-05-31

## 2022-05-02 ASSESSMENT — PAIN SCALES - GENERAL: PAINLEVEL: SEVERE PAIN (7)

## 2022-05-02 NOTE — PROGRESS NOTES
Assessment & Plan   Problem List Items Addressed This Visit    None     Visit Diagnoses     Skin avulsion    -  Primary    Relevant Medications    mupirocin (BACTROBAN) 2 % external ointment    Fall down stairs, subsequent encounter        Injury of left upper arm, subsequent encounter        Left arm swelling             Continue supportive measures at this time, left arm elevation as possible during the day, has been applying ice packs to the lateral area, suspect olecranon bursitis probably reactive inflammatory doubt, though we will keep the low suspicion for secondary infection, due to the patient being on chronic low-dose prednisone  7.5 mg daily due to history of autoimmune inflammatory arthritic condition.  Patient of note has side effect to Augmentin had C. difficile colitis, had also allergies to cefdinir unknown what allergies, will hold on prescribing oral antibiotics such as Keflex or doxycycline or Bactrim but will keep a low threshold if the redness or pain increases or if there is any purulent discharge.  Patient to keep us updated in the next 48 hours of their condition.  Apply mupirocin ointment at the skin avulsion sites in the thumb laceration.  [Appear to be healing appropriately without secondary infection/cellulitis/purulent collection.]  We applied nonadhesive medicated gauze on the wound as well as please Ace wrap.  Triple antibiotic applied on the thumb laceration covered with a Band-Aid.  All questions answered.  Patient discharged from the clinic in stable condition.      {Provider  Link to [facility] Help Grid :016500}     See Patient Instructions    Return in about 2 days (around 5/4/2022), or if symptoms worsen or fail to improve, for As needed and if symptoms worsen.    Milagro Quispe MD  [facility] CARLOS EDUARDO Sal is a 72 year old who presents for the following health issues   [facility]     ED/UC Followup:    Facility:   ED  Date of visit: 4-  Reason for  "visit: Fall  Current Status: Continues with pain in Left arm       Patient presenting for follow-up from the ER she was seen in the ER on 4/26.  [place]  She fell [facility].  She tripped trying to catch her purse, there was no mention of any head trauma or neck injury or loss of consciousness.  She was seen in the emergency room, she had sustained a laceration to the left forearm left elbow and left ankle.  She was able to walk at that time without any difficulty.  X-rays of the left hand, left elbow, left wrist were done showed no fracture or dislocation.  Patient had 1.5 cm consideration for left thumb that was sutured with 3 four-point 0 nylon interrupted sutures.  Is not due to be removed.  Patient reports some increased swelling of the dorsal left hand with decreased swelling in the left elbow olecranon area.  She has marked soreness at the olecranon and left arm skin superficial abrasion/skin avulsion that appears to be healing well without any secondary purulence collection or cellulitis.    Review of Systems   Constitutional, HEENT, cardiovascular, pulmonary, gi and gu systems are negative, except as otherwise noted.      Objective    /70 (BP Location: Right arm, Cuff Size: Adult Regular)   Pulse 71   Temp 97.3  F (36.3  C) (Tympanic)   Resp 16   Ht 1.6 m (5' 3\")   Wt 54.5 kg (120 lb 1.6 oz)   SpO2 96%   BMI 21.27 kg/m    Body mass index is 21.27 kg/m .  Physical Exam   General appearance not in acute distress alert oriented x3, head and neck no trauma or swelling.  Musculoskeletal left upper extremity there is marked bruises on the dorsum of left hand healing left forearm left thumb medial , no surrounding erythema at the laceration sutures in place.  There is marked ecchymosis and bruise in the left elbow proximal left forearm and marked yellow skin discoloration over the extensor surface of the left arm down to the wrist area.  There is skin " avulsions wounds on the posterior aspect of the left arm minimal surrounding erythema there is some swelling and very minimal erythroderma in the olecranon bursa, there is no purulence collection of the wounds, no suspicions of purulence collection, no evidence of cellulitis by exam.  Intact neurovascular exam of the left upper extremity.    Office Visit on 03/08/2022   Component Date Value Ref Range Status     TSH 03/08/2022 0.89  0.40 - 4.00 mU/L Final

## 2022-05-05 DIAGNOSIS — L03.114 CELLULITIS OF LEFT UPPER EXTREMITY: Primary | ICD-10-CM

## 2022-05-05 DIAGNOSIS — M71.122 SEPTIC BURSITIS OF ELBOW, LEFT: ICD-10-CM

## 2022-05-05 RX ORDER — CEPHALEXIN 500 MG/1
500 CAPSULE ORAL 3 TIMES DAILY
Qty: 21 CAPSULE | Refills: 0 | Status: SHIPPED | OUTPATIENT
Start: 2022-05-05 | End: 2022-05-31

## 2022-05-06 ENCOUNTER — MYC MEDICAL ADVICE (OUTPATIENT)
Dept: FAMILY MEDICINE | Facility: CLINIC | Age: 72
End: 2022-05-06
Payer: COMMERCIAL

## 2022-05-06 NOTE — TELEPHONE ENCOUNTER
Received a call from the patient stating her arm has gotten worse since she was assessed yesterday. She has pitting edema, increased swelling, redness, and drainage, and the arm is very tender to the touch. No fever. No appointments available today in the clinic except for a virtual appointment. Patient wound rather be assessed in person. Triage nurse encouraged patient to be assessed in urgent care. Patient is agreeable with this plan and will come into urgent care.    Mary Hayes RN

## 2022-05-06 NOTE — TELEPHONE ENCOUNTER
Called patient for follow up.  She was seen today in an Urgent Care outside of [place].    Barbi Hood RN

## 2022-05-11 NOTE — TELEPHONE ENCOUNTER
traZODone (DESYREL) 100 MG tablet 180 tablet 3 12/29/2021  No   Sig - Route: Take 2 tablets (200 mg) by mouth At Bedtime - Oral   Sent to pharmacy as: traZODone HCl 100 MG Oral Tablet (DESYREL)   Class: E-Prescribe   Order: [id number]   E-Prescribing Status: Receipt confirmed by pharmacy (12/29/2021  6:35 PM CST)     Pharmacy    [facility] - [address]     Associated Diagnoses    Primary insomnia [F51.01]           Pharmacy seeking refill of trazodone.  Last Rx'd on 12- good for one year .    Too soon to renew.    Fax sent back to pharmacy - check records for Rx; not due to renew.    RT Una (R)

## 2022-05-28 ASSESSMENT — PATIENT HEALTH QUESTIONNAIRE - PHQ9
SUM OF ALL RESPONSES TO PHQ QUESTIONS 1-9: 1
SUM OF ALL RESPONSES TO PHQ QUESTIONS 1-9: 1
10. IF YOU CHECKED OFF ANY PROBLEMS, HOW DIFFICULT HAVE THESE PROBLEMS MADE IT FOR YOU TO DO YOUR WORK, TAKE CARE OF THINGS AT HOME, OR GET ALONG WITH OTHER PEOPLE: SOMEWHAT DIFFICULT

## 2022-05-31 ENCOUNTER — TELEPHONE (OUTPATIENT)
Dept: FAMILY MEDICINE | Facility: CLINIC | Age: 72
End: 2022-05-31

## 2022-05-31 ENCOUNTER — OFFICE VISIT (OUTPATIENT)
Dept: FAMILY MEDICINE | Facility: CLINIC | Age: 72
End: 2022-05-31
Payer: COMMERCIAL

## 2022-05-31 VITALS
BODY MASS INDEX: 21.51 KG/M2 | TEMPERATURE: 96.8 F | RESPIRATION RATE: 12 BRPM | SYSTOLIC BLOOD PRESSURE: 125 MMHG | HEART RATE: 64 BPM | DIASTOLIC BLOOD PRESSURE: 87 MMHG | OXYGEN SATURATION: 97 % | WEIGHT: 121.4 LBS

## 2022-05-31 DIAGNOSIS — F51.01 PRIMARY INSOMNIA: ICD-10-CM

## 2022-05-31 DIAGNOSIS — G60.9 PERIPHERAL NEUROPATHY, IDIOPATHIC: ICD-10-CM

## 2022-05-31 DIAGNOSIS — F33.0 MILD RECURRENT MAJOR DEPRESSION (H): ICD-10-CM

## 2022-05-31 DIAGNOSIS — M32.9 SYSTEMIC LUPUS ERYTHEMATOSUS, UNSPECIFIED SLE TYPE, UNSPECIFIED ORGAN INVOLVEMENT STATUS (H): ICD-10-CM

## 2022-05-31 DIAGNOSIS — Z01.818 PREOP GENERAL PHYSICAL EXAM: Primary | ICD-10-CM

## 2022-05-31 DIAGNOSIS — M19.031 PRIMARY OSTEOARTHRITIS OF RIGHT WRIST: ICD-10-CM

## 2022-05-31 DIAGNOSIS — Z12.31 ENCOUNTER FOR SCREENING MAMMOGRAM FOR BREAST CANCER: ICD-10-CM

## 2022-05-31 LAB
ANION GAP SERPL CALCULATED.3IONS-SCNC: 3 MMOL/L (ref 3–14)
BUN SERPL-MCNC: 16 MG/DL (ref 7–30)
CALCIUM SERPL-MCNC: 10.4 MG/DL (ref 8.5–10.1)
CHLORIDE BLD-SCNC: 102 MMOL/L (ref 94–109)
CO2 SERPL-SCNC: 31 MMOL/L (ref 20–32)
CREAT SERPL-MCNC: 0.75 MG/DL (ref 0.52–1.04)
ERYTHROCYTE [DISTWIDTH] IN BLOOD BY AUTOMATED COUNT: 13.1 % (ref 10–15)
GFR SERPL CREATININE-BSD FRML MDRD: 85 ML/MIN/1.73M2
GLUCOSE BLD-MCNC: 104 MG/DL (ref 70–99)
HCT VFR BLD AUTO: 44.4 % (ref 35–47)
HGB BLD-MCNC: 14.3 G/DL (ref 11.7–15.7)
MCH RBC QN AUTO: 31.4 PG (ref 26.5–33)
MCHC RBC AUTO-ENTMCNC: 32.2 G/DL (ref 31.5–36.5)
MCV RBC AUTO: 97 FL (ref 78–100)
PLATELET # BLD AUTO: 263 10E3/UL (ref 150–450)
POTASSIUM BLD-SCNC: 4.2 MMOL/L (ref 3.4–5.3)
RBC # BLD AUTO: 4.56 10E6/UL (ref 3.8–5.2)
SODIUM SERPL-SCNC: 136 MMOL/L (ref 133–144)
WBC # BLD AUTO: 12.8 10E3/UL (ref 4–11)

## 2022-05-31 PROCEDURE — 99214 OFFICE O/P EST MOD 30 MIN: CPT | Performed by: INTERNAL MEDICINE

## 2022-05-31 PROCEDURE — 85027 COMPLETE CBC AUTOMATED: CPT | Performed by: INTERNAL MEDICINE

## 2022-05-31 PROCEDURE — 36415 COLL VENOUS BLD VENIPUNCTURE: CPT | Performed by: INTERNAL MEDICINE

## 2022-05-31 PROCEDURE — 80048 BASIC METABOLIC PNL TOTAL CA: CPT | Performed by: INTERNAL MEDICINE

## 2022-05-31 ASSESSMENT — PAIN SCALES - GENERAL: PAINLEVEL: NO PAIN (0)

## 2022-05-31 NOTE — TELEPHONE ENCOUNTER
"Called and left VM for Dr. Carlton's nurse to return call back - please see message from Dr. Mejia below.     \"Please inform Dr. Carlton that Yessenia will need stress dose steroids for surgery\"      [facility] - Ph: [phone] (Cathy Carlton MD) .    Yessenia PEREZ MA on 5/31/2022 at 9:50 AM      "

## 2022-05-31 NOTE — PATIENT INSTRUCTIONS
(Z01.818) Preop general physical exam  (primary encounter diagnosis)  Comment: You are medically optimized for your upcoming procedure pending labs.  Hold aspirin, NSAIDs for 1 week.  Continue methotrexate, but note that you are also taking chronic prednisone. Chronic steroids should be considered to include those patients on greater than 3 weeks of 20 mg or above in the last 3 months or continuous prednisone greater than 5 mg ongoing. Consider assessing adrenal stress response with Cosyntropin testing for elective surgery if adrenal function is uncertain.  Moderate Surgical stress: Usual morning steroid dose plus 50 mg hydrocortisone IV just before procedure and 25 mg every 8 hours x 24 hours. Resume usual dose thereafter.  Plan:     (M32.9) Systemic lupus erythematosus, unspecified SLE type, unspecified organ involvement status (H)  Comment: As above - note that you are taking chronic presdnisone 7 mg daily and will require stress dose hydrocortisone given surgery requiring general anesthesia.  If you do not require general anesthesia then do not need stress dose steroids.  Continue methotrexate at current dose.    Plan:     (M19.031) Primary osteoarthritis of right wrist  Comment: Plan for surgery  Plan:     (G60.9) Peripheral neuropathy, idiopathic  Comment: Continue gabapentin  Plan:     (F51.01) Primary insomnia  Comment: Continue trazodone  Plan:     (F33.0) Mild recurrent major depression (H)  Comment: Continue sertraline  Plan:     Gastroesophageal reflux  Continue omeprazole and famotidine as well as metoclopramide   \   [facility] (also performs diagnostic mammogram, ultrasound and biopsy) [phone].

## 2022-05-31 NOTE — PROGRESS NOTES
[address], [address]  [facility] [address]  Phone: [phone]  Primary Provider: Frederic Mejia  Pre-op Performing Provider: FREDERIC MEJIA    PREOPERATIVE EVALUATION:  Today's date: 5/31/2022    Yessenia Martines is a 72 year old female who presents for a preoperative evaluation.    Surgical Information:  Surgery/Procedure: Joint fusion - right hand   Surgery Location: [facility]   Surgeon: Dr. Carlton   Surgery Date: 6/8/2022  Time of Surgery: TBD   Where patient plans to recover: At home with family  Fax number for surgical facility: [phone]    Type of Anesthesia Anticipated: to be determined    Assessment & Plan     The proposed surgical procedure is considered INTERMEDIATE risk.    Problem List Items Addressed This Visit    None              Risks and Recommendations:  The patient has the following additional risks and recommendations for perioperative complications:     Patient Instructions   (Z01.818) Preop general physical exam  (primary encounter diagnosis)  Comment: You are medically optimized for your upcoming procedure pending labs.  Hold aspirin, NSAIDs for 1 week.  Continue methotrexate, but note that you are also taking chronic prednisone. Chronic steroids should be considered to include those patients on greater than 3 weeks of 20 mg or above in the last 3 months or continuous prednisone greater than 5 mg ongoing. Consider assessing adrenal stress response with Cosyntropin testing for elective surgery if adrenal function is uncertain.  Moderate Surgical stress: Usual morning steroid dose plus 50 mg hydrocortisone IV just before procedure and 25 mg every 8 hours x 24 hours. Resume usual dose thereafter.  Plan:     (M32.9) Systemic lupus erythematosus, unspecified SLE type, unspecified organ involvement status (H)  Comment: As above - note that you are taking chronic presdnisone 7 mg daily and will require stress dose  hydrocortisone given surgery requiring general anesthesia.  If you do not require general anesthesia then do not need stress dose steroids.  Continue methotrexate at current dose.    Plan:     (M19.031) Primary osteoarthritis of right wrist  Comment: Plan for surgery  Plan:     (G60.9) Peripheral neuropathy, idiopathic  Comment: Continue gabapentin  Plan:     (F51.01) Primary insomnia  Comment: Continue trazodone  Plan:     (F33.0) Mild recurrent major depression (H)  Comment: Continue sertraline  Plan:     Gastroesophageal reflux  Continue omeprazole and famotidine as well as metoclopramide   \   [facility] (also performs diagnostic mammogram, ultrasound and biopsy) [phone].         Medication Instructions:   - Intraoperative stress dose steroids may be indicated due to chronic steroid use in the last 3 months (e.g. > 3 weeks of prednisone 20 mg or daily prednisone 5 mg).    RECOMMENDATION:  APPROVAL GIVEN to proceed with proposed procedure, without further diagnostic evaluation.        Subjective     HPI related to upcoming procedure: osteoarthritis right wrist    Preop Questions 5/28/2022   1. Have you ever had a heart attack or stroke? No   2. Have you ever had surgery on your heart or blood vessels, such as a stent placement, a coronary artery bypass, or surgery on an artery in your head, neck, heart, or legs? No   3. Do you have chest pain with activity? No   4. Do you have a history of  heart failure? No   5. Do you currently have a cold, bronchitis or symptoms of other infection? No   6. Do you have a cough, shortness of breath, or wheezing? No   7. Do you or anyone in your family have previous history of blood clots? No   8. Do you or does anyone in your family have a serious bleeding problem such as prolonged bleeding following surgeries or cuts? UNKNOWN - secondary to prednisone   9. Have you ever had problems with anemia or been told to take iron pills? YES - Not currently    10. Have you had any abnormal blood loss such as black, tarry or bloody stools, or abnormal vaginal bleeding? YES - not currently   11. Have you ever had a blood transfusion? YES - distant past   11a. Have you ever had a transfusion reaction? No   12. Are you willing to have a blood transfusion if it is medically needed before, during, or after your surgery? Yes   13. Have you or any of your relatives ever had problems with anesthesia? UNKNOWN - Difficulty arrousing   14. Do you have sleep apnea, excessive snoring or daytime drowsiness? No   15. Do you have any artifical heart valves or other implanted medical devices like a pacemaker, defibrillator, or continuous glucose monitor? No   16. Do you have artificial joints? YES - Two wrist surgeries right, spinal fusion, left wrist surgery   17. Are you allergic to latex? No   18. Is there any chance that you may be pregnant? -       Health Care Directive:  Patient has a Health Care Directive on file      Preoperative Review of :   reviewed - no record of controlled substances prescribed.      Status of Chronic Conditions:  See problem list for active medical problems.  Problems all longstanding and stable, except as noted/documented.  See ROS for pertinent symptoms related to these conditions.      Review of Systems  CONSTITUTIONAL: NEGATIVE for fever, chills, change in weight  INTEGUMENTARY/SKIN: NEGATIVE for worrisome rashes, moles or lesions  EYES: NEGATIVE for vision changes or irritation  ENT/MOUTH: NEGATIVE for ear, mouth and throat problems  RESP: NEGATIVE for significant cough or SOB  CV: NEGATIVE for chest pain, palpitations or peripheral edema  GI: NEGATIVE for nausea, abdominal pain, heartburn, or change in bowel habits  : NEGATIVE for frequency, dysuria, or hematuria  MUSCULOSKELETAL: NEGATIVE for significant arthralgias or myalgia  NEURO: NEGATIVE for weakness, dizziness or paresthesias  ENDOCRINE: NEGATIVE for temperature intolerance, skin/hair  changes  HEME: NEGATIVE for bleeding problems  PSYCHIATRIC: NEGATIVE for changes in mood or affect    Patient Active Problem List    Diagnosis Date Noted     SVT (supraventricular tachycardia) (H) 03/31/2022     Priority: Medium     Immunosuppressed status (H) 01/25/2022     Priority: Medium     Gastroparesis 09/30/2019     Priority: Medium     Primary insomnia 09/30/2019     Priority: Medium     Chronic pain syndrome 09/17/2018     Priority: Medium     Patient is followed by Josue Mejia MD, MD for ongoing prescription of pain medication.  All refills should only be approved by this provider, or covering partner.    Medication(s): .oxyCODONE-acetaminophen (PERCOCET) 5-325 MG per tablet   Maximum quantity per month: 30  Clinic visit frequency required: Q 3 months     Controlled substance agreement:  Encounter-Level CSA:     There are no encounter-level csa.          DIRE Total Score(s):  No flowsheet data found.    Last [facility] website verification:  done on 9-17-18   [email or web address]         History of Clostridium difficile colitis 01/15/2018     Priority: Medium     Microscopic hematuria 12/28/2017     Priority: Medium     Advance care planning 01/19/2017     Priority: Medium     Advance Care Planning 1/19/2017: Receipt of ACP document:  Received: Health Care Directive which was witnessed or notarized on 11/8/16.  Document previously scanned on 11/23/16.  Validation form completed and sent to be scanned.  Code Status needs to be updated to reflect choices in most recent ACP document.   Confirmed/documented designated decision maker(s).  Added by Joanie Quiroz   Advance Care Planning Liaison             Somatic dysfunction of lumbar region 12/22/2016     Priority: Medium     Bilateral low back pain with sciatica, sciatica laterality unspecified, unspecified chronicity 12/22/2016     Priority: Medium     Somatic dysfunction of sacral region 12/22/2016     Priority: Medium     Sacral pain  12/22/2016     Priority: Medium     Thoracic segment dysfunction 12/22/2016     Priority: Medium     Abnormal CT scan, lung 04/22/2016     Priority: Medium     Dysphonia 04/14/2016     Priority: Medium     Globus sensation 04/14/2016     Priority: Medium     Lupus (systemic lupus erythematosus) (H)      Priority: Medium     diagnosed 12 yrs ago       MCI (mild cognitive impairment)      Priority: Medium     Hemorrhage of cyst of native kidney 12/02/2015     Priority: Medium     Mechanical low back pain 04/14/2015     Priority: Medium     Menopausal osteoporosis 02/17/2015     Priority: Medium     Systemic lupus erythematosus (H) 03/03/2014     Priority: Medium     Diagnosis 1997  Dr. Hickman  - no renal complications       CARDIOVASCULAR SCREENING; LDL GOAL LESS THAN 130 03/03/2014     Priority: Medium     Restless legs syndrome 03/03/2014     Priority: Medium     Peripheral neuropathy, idiopathic 03/03/2014     Priority: Medium     Emanuel Idelkope       Osteoporosis 03/03/2014     Priority: Medium     Had been treated with bisphosphonates for 5 years - [id number]  Problem list name updated by automated process. Provider to review        Past Medical History:   Diagnosis Date     Allergic rhinitis due to pollen      Congestive heart failure (H)      Decreased libido      Depressive disorder      Diabetes (H)      Esophageal motility disorder      H pylori ulcer      History of blood transfusion 1980's     Hoarseness      Hypertension      Lupus (systemic lupus erythematosus) (H)     diagnosed 12 yrs ago     MCI (mild cognitive impairment)      Menopausal osteoporosis 02/17/2015     Movement disorder      Mumps     as a child     Neuropathy     feet and hands     S/P cholecystectomy      S/P TIESHA-BSO      Thyroid disease      Uncomplicated asthma      Past Surgical History:   Procedure Laterality Date     APPENDECTOMY       ARTHRODESIS FINGER(S) Left 5/18/2018    Procedure: ARTHRODESIS FINGER(S);;  Surgeon: Shahida Carlton  Cathy Farley MD;  Location: [facility]     ARTHROPLASTY CARPOMETACARPAL (THUMB JOINT) Left 2018    Procedure: ARTHROPLASTY CARPOMETACARPAL (THUMB JOINT);  LEFT THUMB CARPALMETACARPAL WITH MINI TIGHTROPE, LEFT METAPHALANGEAL FUSION WITH RADIAL BONE GRAFT AND EASY CLIP STAPLE.;  Surgeon: Briana Antony MD;  Location: [facility]     ARTHROPLASTY CARPOMETACARPAL (THUMB JOINT), ARTHRODESIS, COMBINED Left 2018    Procedure: COMBINED ARTHROPLASTY CARPOMETACARPAL (THUMB JOINT), ARTHRODESIS;  LEFT THUMB CARPOMETACARPAL EXCISIONAL ARTHROPLASTY WITH FACSIA TANIA GRAFT, PARTIAL TRAPEZOID EXCISION;  Surgeon: Briana Antony MD;  Location: [facility]     BACK SURGERY      L2-L4 or L5 fusion     BLEPHAROPLASTY BILATERAL        SECTION      x3     CHOLECYSTECTOMY      feels better. No pathology on gallbladder     COLONOSCOPY       EYE SURGERY      Raised eyelids.     GENITOURINARY SURGERY       GRAFT BONE TO FINGER Left 2018    Procedure: GRAFT BONE TO FINGER;;  Surgeon: Briana Antony MD;  Location: [facility]     HYSTERECTOMY       KNEE SURGERY      bilateral arthroscopy     ORTHOPEDIC SURGERY      right thumb tendon surgery     REPAIR TENDON FINGER(S)  2011    Procedure:REPAIR TENDON FINGER(S); RIGHT INDEX FINGER FLEXOR DIGITORUM PROFUNDUS REPAIR, RADIAL DIGITAL NERVE REPAIR ; Surgeon:BRIANA ANTONY; Location:[facility]     SALPINGO-OOPHORECTOMY BILATERAL       SHOULDER SURGERY Right     arthroscopy     SOFT TISSUE SURGERY      Tendon repair on finger     TONSILLECTOMY      Child     [facility] UGI ENDOSCOPY, SIMPLE EXAM  14     Current Outpatient Medications   Medication Sig Dispense Refill     calcium carbonate 600 mg-vitamin D 400 units (CALTRATE) 600-400 MG-UNIT per tablet Take 1 tablet by mouth 2 times daily       cetirizine (ZYRTEC) 10 MG tablet 10 mg by Oral or J tube route       diclofenac (VOLTAREN) 1 % topical gel Place 2 g onto the skin 4 times daily        famotidine (PEPCID) 40 MG tablet Take 1 tablet by mouth every 12 hours       gabapentin (NEURONTIN) 300 MG capsule Take 1 capsule (300 mg) by mouth 3 times daily Dose increase (Patient taking differently: Take 300 mg by mouth 2 times daily Dose increase) 270 capsule 3     leucovorin (WELLCOVORIN) 5 MG tablet Take 1 tablet (5 mg) by mouth daily       methotrexate 2.5 MG tablet Take 4 tablets by mouth once a week        metoclopramide (REGLAN) 5 MG tablet Take 5 mg by mouth once       omeprazole (PRILOSEC) 40 MG DR capsule TAKE 1 CAPSULE DAILY 90 capsule 3     polyethylene glycol (MIRALAX) 17 GM/Dose powder        predniSONE (DELTASONE) 5 MG tablet Taking 5 mg and 1 mg tablet x2 to get a total 7 mg daily  Taking 22 mg daily x 2 days, then 17 mg x 3 days, 12 mg x 3 days then back to 7 mg daily 06/09/2022       prochlorperazine (COMPAZINE) 5 mg/mL        sertraline (ZOLOFT) 50 MG tablet Take 1 tablet (50 mg) by mouth daily 90 tablet 0     traZODone (DESYREL) 100 MG tablet Take 2 tablets (200 mg) by mouth At Bedtime 180 tablet 3       Allergies   Allergen Reactions     Augmentin Other (See Comments)     C Diff colitis     Cefdinir Other (See Comments)     Many years ago, does not recall what reaction was     Sudafed [Pseudoephedrine]      Jittery, pacing     Tizanidine Other (See Comments)     Many years ago, does not recall what reaction was     Latex Rash        Social History     Tobacco Use     Smoking status: Never Smoker     Smokeless tobacco: Never Used   Substance Use Topics     Alcohol use: Yes     Alcohol/week: 0.0 standard drinks     Comment: 1 wine/day     Family History   Problem Relation Age of Onset     C.A.D. Father         cabg, carotid stenosis     Hyperlipidemia Father      Hypertension Father      Coronary Artery Disease Father      Anesthesia Reaction Father      Neurologic Disorder Mother         ALS? Parkinsons?     Osteoporosis Mother      Depression Mother      Anxiety Disorder Mother       Hypertension Brother      Hyperlipidemia Brother      Depression Brother      Colon Cancer Maternal Grandmother      Colon Cancer Maternal Aunt      Hyperlipidemia Sister      Hypertension Sister      Osteoporosis Sister      Hyperlipidemia Brother      Osteoporosis Sister      Osteoporosis Maternal Aunt      Hypertension Sister      Diabetes Son      Genetic Disorder Son      Thyroid Disease Son      Diabetes Other      Genetic Disorder Other      Genetic Disorder Daughter      Thyroid Disease Niece      Mental Illness No family hx of      History   Drug Use No         Objective     /87   Pulse 64   Temp 96.8  F (36  C) (Temporal)   Resp 12   Wt 55.1 kg (121 lb 6.4 oz)   SpO2 97%   Breastfeeding No   BMI 21.51 kg/m      Physical Exam    GENERAL APPEARANCE: healthy, alert and no distress     EYES: EOMI,  PERRL     NECK: no adenopathy, no asymmetry, masses, or scars and thyroid normal to palpation     RESP: lungs clear to auscultation - no rales, rhonchi or wheezes     CV: regular rates and rhythm, normal S1 S2, no S3 or S4 and no murmur, click or rub     ABDOMEN:  soft, nontender, no HSM or masses and bowel sounds normal     MS: + bruising left thumb, full range of motion of both hands      SKIN: no suspicious lesions or rashes     NEURO: Normal strength and tone, sensory exam grossly normal, mentation intact and speech normal     PSYCH: mentation appears normal. and affect normal/bright     LYMPHATICS: No cervical adenopathy    Recent Labs   Lab Test 01/25/22  0927 04/12/21  0000 01/19/21  0943   HGB 15.4  --  14.6     --  272     --  140   POTASSIUM 3.9  --  4.1   CR 1.03 0.790 0.84        Diagnostics:  No results found for this or any previous visit (from the past 24 hour(s)).   EKG: appears normal, NSR, normal axis, normal intervals, no acute ST/T changes c/w ischemia, no LVH by voltage criteria, unchanged from previous tracings  03/-8/2022    Revised Cardiac Risk Index (RCRI):  The  patient has the following serious cardiovascular risks for perioperative complications:   - No serious cardiac risks = 0 points     RCRI Interpretation: 0 points: Class I (very low risk - 0.4% complication rate)           Signed Electronically by: Josue Mejia MD, MD  Copy of this evaluation report is provided to requesting physician.

## 2022-05-31 NOTE — RESULT ENCOUNTER NOTE
Awais Casas,    I have had the opportunity to review your recent results and an interpretation is as follows:  Your complete blood counts shows elevated white blood cell count related to prednisone dose  Your basic metabolic panel shows stable renal function with slight rise in calcium - We can recheck following your surgery to ensure this returns to normal      Sincerely,  Josue Mejia MD

## 2022-06-21 ENCOUNTER — TRANSFERRED RECORDS (OUTPATIENT)
Dept: HEALTH INFORMATION MANAGEMENT | Facility: CLINIC | Age: 72
End: 2022-06-21

## 2022-06-30 ENCOUNTER — TRANSFERRED RECORDS (OUTPATIENT)
Dept: FAMILY MEDICINE | Facility: CLINIC | Age: 72
End: 2022-06-30

## 2022-06-30 LAB
ALT SERPL-CCNC: 15 IU/L (ref 5–35)
AST SERPL-CCNC: 23 U/L (ref 5–34)
CREATININE (EXTERNAL): 0.81 MG/DL (ref 0.5–1.3)

## 2022-07-21 ENCOUNTER — TRANSFERRED RECORDS (OUTPATIENT)
Dept: FAMILY MEDICINE | Facility: CLINIC | Age: 72
End: 2022-07-21

## 2022-08-01 ENCOUNTER — HOSPITAL ENCOUNTER (EMERGENCY)
Facility: CLINIC | Age: 72
Discharge: HOME | End: 2022-08-01
Attending: EMERGENCY MEDICINE | Admitting: EMERGENCY MEDICINE
Payer: COMMERCIAL

## 2022-08-01 ENCOUNTER — NURSE TRIAGE (OUTPATIENT)
Dept: NURSING | Facility: CLINIC | Age: 72
End: 2022-08-01

## 2022-08-01 ENCOUNTER — APPOINTMENT (OUTPATIENT)
Dept: CT IMAGING | Facility: CLINIC | Age: 72
End: 2022-08-01
Attending: EMERGENCY MEDICINE
Payer: COMMERCIAL

## 2022-08-01 VITALS
OXYGEN SATURATION: 98 % | BODY MASS INDEX: 18.96 KG/M2 | WEIGHT: 118 LBS | SYSTOLIC BLOOD PRESSURE: 119 MMHG | DIASTOLIC BLOOD PRESSURE: 80 MMHG | RESPIRATION RATE: 14 BRPM | HEART RATE: 68 BPM | TEMPERATURE: 96.9 F | HEIGHT: 66 IN

## 2022-08-01 DIAGNOSIS — S06.0X0A CONCUSSION WITHOUT LOSS OF CONSCIOUSNESS, INITIAL ENCOUNTER: ICD-10-CM

## 2022-08-01 DIAGNOSIS — S00.83XA CONTUSION OF FOREHEAD, INITIAL ENCOUNTER: ICD-10-CM

## 2022-08-01 PROCEDURE — 70450 CT HEAD/BRAIN W/O DYE: CPT

## 2022-08-01 PROCEDURE — 99284 EMERGENCY DEPT VISIT MOD MDM: CPT | Mod: 25

## 2022-08-01 ASSESSMENT — ENCOUNTER SYMPTOMS
VOMITING: 0
COLOR CHANGE: 1
HEADACHES: 1
WOUND: 0
NAUSEA: 1
ARTHRALGIAS: 1
BRUISES/BLEEDS EASILY: 1

## 2022-08-01 NOTE — ED TRIAGE NOTES
Pt hit her head on Thursday on a wall, pt stood up and hit her head on the wall, bruising has since appeared, Pt is concerned for concussion. Pt is not a thinner.

## 2022-08-01 NOTE — ED PROVIDER NOTES
History   Chief Complaint:  Head Injury       The history is provided by the patient.      Yessenia Martines is a 72 year old female with history of  who presents with head injury. 4 days ago she hit the left side of her head on the staircase. Yesterday she noticed bruising around her left eye which was spreading to the right eye and lower face. Note headaches and nausea. She has not taken any pain medication for the pain but regularly takes compazine for nausea. Denies vomiting.  Denies confusion, numbness, weakness, neck pain.    Review of Systems   Gastrointestinal: Positive for nausea. Negative for vomiting.   Musculoskeletal: Positive for arthralgias.   Skin: Positive for color change. Negative for wound.   Neurological: Positive for headaches.   Hematological: Bruises/bleeds easily.   All other systems reviewed and are negative.      Allergies:  Augmentin  Cefdinir  Sudafed [Pseudoephedrine]  Tizanidine  Latex    Medications:  Compazine    Past Medical History:     Systemic lupus erythematosus  RLS  Peripheral neuropathy  Osteoporosis  Hemorrhage of cyst of kidney  Menopausal osteoporosis  Lupus  MCI  Dysphonia  Globus sensation  Somatic dysfunction of lumbar and sacral region  Thoracic segment dysfunction  Clostridium difficile colitis  Gastroparesis  Insomnia  SVT    Past Surgical History:    Appendectomy  Arthrodesis fingers  Arthroplasty carpometacarpal x2  L2-L4 back fusion  Blepharoplasty bilateral   section x3  Cholecystectomy  Colonoscopy  Raised eyelids  Genitourinary surgery  Graft bone to finger  Hysterectomy  Bilateral knee arthroscopy  Repair tendon fingers  Salpingo-oophorectomy bilateral  Shoulder surgery right  Soft tissue surgery  Tosillectomy  ZZHC UGI endoscopy    Family History:    Father: CAD, hyperlipidemia, hypertension, CABG  Mother: Neurological disorder osteoporosis, depression, anxiety  Brother: hypertension, hyperlipidemia, depression  Sister: hyperlipidemia,  "hypertension, osteoporosis  Son: diabetes, genetic disorder, thyroid disease, muscular dystrophy   Daughter: genetic disorder    Social History:  The patient presents to the ED alone    Physical Exam     Patient Vitals for the past 24 hrs:   BP Temp Temp src Pulse Resp SpO2 Height Weight   08/01/22 1547 -- -- -- -- 14 -- -- --   08/01/22 1423 102/48 96.9  F (36.1  C) Temporal 68 -- 94 % 1.676 m (5' 6\") 53.5 kg (118 lb)       Physical Exam  Nursing note and vitals reviewed.  Constitutional:  Appears well-developed and well-nourished.   HENT:    Echymosis and swelling to the left of forehead region. Echymosis to left lower forehead and left upper eyelid and bridge of nose.  Head:    Atraumatic.   Mouth/Throat:   Oropharynx is clear and moist. No oropharyngeal exudate.   Eyes:    Pupils are equal, round, and reactive to light.   Neck:    Normal range of motion. Neck supple.      No tracheal deviation present. No thyromegaly present.   Cardiovascular:  Normal rate, regular rhythm, no murmur   Pulmonary/Chest: Breath sounds are clear and equal without wheezes or crackles.  Abdominal:   Soft. Bowel sounds are normal. Exhibits no distension and      no mass. There is no tenderness.      There is no rebound and no guarding.   Musculoskeletal:  Exhibits no edema.   Lymphadenopathy:  No cervical adenopathy.   Neurological:   Alert and oriented to person, place, and time.   Skin:    Skin is warm and dry. No rash noted. No pallor.     Emergency Department Course     Imaging:  CT Head w/o Contrast   Preliminary Result   IMPRESSION:   1. No CT findings of acute intracranial process.   2. Small left anterior frontal scalp hematoma without underlying   calvarial fracture.        Report per radiology    Emergency Department Course:         Reviewed:  I reviewed nursing notes, vitals, past medical history and Care Everywhere    Assessments:  1556 I obtained history and examined the patient as noted above.     Disposition:  The " patient was discharged to home.     Impression & Plan     Medical Decision Making:  Yessenia Martines is a 72 year old female who presents for evaluation for trauma to the head.  This patient has a history and clinical exam consistent with concussion.   The differential diagnosis includes skull fracture, concussion, epidural hematoma, subdural hematoma, intracerebral hemorrhage, and traumatic subarachnoid hemorrhage;  CT imaging is reassuring.     Return to ED for red flags (change in behavior, drowsiness, seizures, vomiting, etc) and gave concussion precautions for home.  I did stress importance of avoiding a second concussion while brain heals.  The patients head to toe trauma exam is otherwise negative for serious underlying disease of the head, neck, chest, abdomen, extremities, pelvis. Patient has a contusion of head, no signs of laceration.  I doubt underlying skull fracture.  Follow-up per discharge instructions.   Diagnosis:    ICD-10-CM    1. Concussion without loss of consciousness, initial encounter  S06.0X0A    2. Contusion of forehead, initial encounter  S00.83XA        Scribe Disclosure:  I, Vashti Alicea, am serving as a scribe at 3:47 PM on 8/1/2022 to document services personally performed by Anamaria Rubalcava MD based on my observations and the provider's statements to me.      Anamaria Rubalcava MD  08/01/22 2014

## 2022-08-01 NOTE — ED NOTES
Rapid Assessment Note    History:   Yessenia Martines is a 72 year old female who presents with head injury. On 07/28 Yessenia hit her head on a wall. She was not seen for this. Today she developed headaches with nausea, but has not been taking any OTC medications for this. Denies use of blood thinners.    Exam:   General:  Alert, interactive  Cardiovascular:  Well perfused  Lungs:  No respiratory distress, no accessory muscle use  Neuro:  Moving all 4 extremities  Skin:  Warm, dry. Ecchymosis on the left forehead and orbit.  Psych:  Normal affect     Plan of Care:   I evaluated the patient and developed an initial plan of care. I discussed this plan and explained that I, or one of my partners, would be returning to complete the evaluation.     I, Kimani Prado, am serving as a scribe to document services personally performed by Mani Rodriguez MD, based on my observations and the provider's statements to me.    08/01/2022  [employer]    Portions of this medical record were completed by a scribe. UPON MY REVIEW AND AUTHENTICATION BY ELECTRONIC SIGNATURE, this confirms (a) I performed the applicable clinical services, and (b) the record is accurate.       Mani Rodriguez MD  08/01/22 9740

## 2022-08-01 NOTE — TELEPHONE ENCOUNTER
"Patient calling reporting hitting her head \"hard\" on a wall last Thursday 7/28/22.    Stating she fell to the floor feeling sick, \"and a little confusion.\"     Swelling to top of head \"50 cent piece\" in size.    Reporting today new onset of bruising over both eyes.     Ongoing headache \"6\" on 1-10 pain scale.      Patient is sitting in [facility] parking lot near Urgent Care. Reviewed Urgent Care hours open at 5 pm.   Reviewed other options for UC.     Patient prefers to go into [facility] ER now as she is currently in lot.    Mechelle Yeboah RN  [facility]        Reason for Disposition    Large swelling or bruise > 2 inches (5 cm)    Additional Information    Negative: ACUTE NEUROLOGIC SYMPTOM and symptom present now    Negative: Knocked out (unconscious) > 1 minute    Negative: Seizure (convulsion) occurred (Exception: prior history of seizures and now alert and without Acute Neurologic Symptoms)    Negative: Neck pain after dangerous injury (e.g., MVA, diving, trampoline, contact sports, fall > 10 feet or 3 meters) (Exception: neck pain began > 1 hour after injury)    Negative: Major bleeding (actively dripping or spurting) that can't be stopped    Negative: Penetrating head injury (e.g., knife, gunshot wound, metal object)    Negative: Sounds like a life-threatening emergency to the triager    Negative: Recently examined and diagnosed with a concussion by a healthcare provider and has questions about concussion symptoms    Negative: Can't remember what happened (amnesia)    Negative: Vomiting once or more    Negative: Watery or blood-tinged fluid dripping from the nose or ears    Negative: ACUTE NEUROLOGIC SYMPTOM and now fine    Negative: Knocked out (unconscious) < 1 minute and now fine    Negative: Severe headache    Negative: Dangerous injury (e.g., MVA, diving, trampoline, contact sports, fall > 10 feet or 3 meters) or severe blow from hard object (e.g., golf club or baseball " bat)    Protocols used: HEAD INJURY-A-OH

## 2022-08-02 ENCOUNTER — MYC MEDICAL ADVICE (OUTPATIENT)
Dept: FAMILY MEDICINE | Facility: CLINIC | Age: 72
End: 2022-08-02

## 2022-08-02 DIAGNOSIS — S06.0X0A CONCUSSION WITHOUT LOSS OF CONSCIOUSNESS, INITIAL ENCOUNTER: Primary | ICD-10-CM

## 2022-08-03 RX ORDER — HYDROCODONE BITARTRATE AND ACETAMINOPHEN 5; 325 MG/1; MG/1
1 TABLET ORAL EVERY 6 HOURS PRN
Qty: 12 TABLET | Refills: 0 | Status: SHIPPED | OUTPATIENT
Start: 2022-08-03 | End: 2022-10-31

## 2022-08-03 NOTE — TELEPHONE ENCOUNTER
Please see patient's mychart:    Patient seen 8/1/22 in ER for concussion and noted nausea.       Please reply back to patient, route to triage follow up, or route to team coordinators to have patient schedule an appointment.     Giancarlo Hernández RN  [facility]

## 2022-08-03 NOTE — TELEPHONE ENCOUNTER
I called and spoke to Yessenia Martines and sent in prescription for hydrocodone to help with headache     Counseled on risks including addictive potential, constipation, confusion among others    Josue Mejia MD, MD

## 2022-08-05 ENCOUNTER — NURSE TRIAGE (OUTPATIENT)
Dept: NURSING | Facility: CLINIC | Age: 72
End: 2022-08-05

## 2022-08-05 ENCOUNTER — OFFICE VISIT (OUTPATIENT)
Dept: INTERNAL MEDICINE | Facility: CLINIC | Age: 72
End: 2022-08-05
Payer: COMMERCIAL

## 2022-08-05 VITALS
TEMPERATURE: 100.3 F | HEART RATE: 81 BPM | OXYGEN SATURATION: 96 % | DIASTOLIC BLOOD PRESSURE: 76 MMHG | SYSTOLIC BLOOD PRESSURE: 114 MMHG

## 2022-08-05 DIAGNOSIS — R50.9 FEVER, UNSPECIFIED FEVER CAUSE: Primary | ICD-10-CM

## 2022-08-05 DIAGNOSIS — M32.9 SYSTEMIC LUPUS ERYTHEMATOSUS, UNSPECIFIED SLE TYPE, UNSPECIFIED ORGAN INVOLVEMENT STATUS (H): ICD-10-CM

## 2022-08-05 DIAGNOSIS — S06.0X0D CONCUSSION WITHOUT LOSS OF CONSCIOUSNESS, SUBSEQUENT ENCOUNTER: ICD-10-CM

## 2022-08-05 LAB — SARS-COV-2 RNA RESP QL NAA+PROBE: NEGATIVE

## 2022-08-05 PROCEDURE — U0005 INFEC AGEN DETEC AMPLI PROBE: HCPCS | Performed by: INTERNAL MEDICINE

## 2022-08-05 PROCEDURE — U0003 INFECTIOUS AGENT DETECTION BY NUCLEIC ACID (DNA OR RNA); SEVERE ACUTE RESPIRATORY SYNDROME CORONAVIRUS 2 (SARS-COV-2) (CORONAVIRUS DISEASE [COVID-19]), AMPLIFIED PROBE TECHNIQUE, MAKING USE OF HIGH THROUGHPUT TECHNOLOGIES AS DESCRIBED BY CMS-2020-01-R: HCPCS | Performed by: INTERNAL MEDICINE

## 2022-08-05 PROCEDURE — 99213 OFFICE O/P EST LOW 20 MIN: CPT | Mod: CS | Performed by: INTERNAL MEDICINE

## 2022-08-05 ASSESSMENT — ENCOUNTER SYMPTOMS
DIZZINESS: 1
SINUS PRESSURE: 0
SINUS PAIN: 0
DIARRHEA: 0
FEVER: 1
COUGH: 0
EYE PAIN: 0
ABDOMINAL PAIN: 0
SHORTNESS OF BREATH: 0
DIFFICULTY URINATING: 0
DYSURIA: 0
SORE THROAT: 0

## 2022-08-05 NOTE — TELEPHONE ENCOUNTER
In ER last Thursday. Diagnosed with hematoma and concussion. Dr Wren gave Hydrocodone. This AM has chills and low grade fever 100.3. She has Lupus.  I connected with scheduling for an appointment and advised urgent care if they can't get her in. She said she knows where urgent cares are incase she can't get an appointment.  Padmini Gardner RN  [facility]        Reason for Disposition    [1] Fever > 100.0 F (37.8 C) AND [2] diabetes mellitus or weak immune system (e.g., HIV positive, cancer chemo, splenectomy, organ transplant, chronic steroids)    Additional Information    Negative: Shock suspected (e.g., cold/pale/clammy skin, too weak to stand, low BP, rapid pulse)    Negative: Difficult to awaken or acting confused (e.g., disoriented, slurred speech)    Negative: [1] Difficulty breathing AND [2] bluish lips, tongue or face    Negative: New-onset rash with multiple purple (or blood-colored) spots or dots    Negative: Sounds like a life-threatening emergency to the triager    Negative: Fever in a cancer patient who is currently (or recently) receiving chemotherapy or radiation therapy, or cancer patient who has metastatic or end-stage cancer and is receiving palliative care    Negative: Pregnant    Negative: Postpartum (from 0 to 6 weeks after delivery)    Negative: Fever onset within 24 hours of receiving vaccine    Negative: [1] Fever AND [2] within 14 days of COVID-19 Exposure    Negative: Other symptom is present, see that guideline (e.g., symptoms of cough, runny nose, sore throat, earache, abdominal pain, diarrhea, vomiting)    Negative: [1] Headache AND [2] stiff neck (can't touch chin to chest)     headache    Negative: Difficulty breathing    Negative: IV Drug Use (IVDU)    Negative: [1] Drinking very little AND [2] dehydration suspected (e.g., no urine > 12 hours, very dry mouth, very lightheaded)    Negative: Widespread rash and cause unknown    Negative: Patient sounds very sick or  weak to the triager  (Exception: mild weakness and hasn't taken fever medicine)    Negative: Fever > 104 F (40 C)    Negative: [1] Fever > 101 F (38.3 C) AND [2] age > 60 years    Negative: [1] Fever > 100.0 F (37.8 C) AND [2] bedridden (e.g., nursing home patient, CVA, chronic illness, recovering from surgery)    Negative: [1] Fever > 100.0 F (37.8 C) AND [2] indwelling urinary catheter (e.g., Anderson, Coude)    Negative: [1] Fever > 100.0 F (37.8 C) AND [2] has port (portacath), central line, or PICC line    Protocols used: FEVER-A-AH

## 2022-08-05 NOTE — PROGRESS NOTES
Assessment & Plan     Fever, unspecified fever cause  Concussion without loss of consciousness, subsequent encounter  Systemic lupus erythematosus, unspecified SLE type, unspecified organ involvement status (H)  - no clear source. R/o COVID  - exam quite normal- she looks ok- not acutely ill  -monitor sx , f/u , reassess with any clinical change      Review of prior external note(s) from - Outside records from rheum  Review of the result(s) of each unique test - prior labs  Ordering of each unique test         See Patient Instructions    No follow-ups on file.    Davie Tavares MD  [facility] FATOUMATA Sal is a 72 year old, presenting for the following health issues:  No chief complaint on file.      History of Present Illness       Reason for visit:  Chills and fever with concussion.  Symptom onset:  Today  Symptom intensity:  Moderate  Symptom progression:  Staying the same  Had these symptoms before:  Yes  Has tried/received treatment for these symptoms:  Yes  What makes it worse:  Reading.  TV.  What makes it better:   gave me hydrocordone for pain    She eats 0-1 servings of fruits and vegetables daily.She consumes 0 sweetened beverage(s) daily.She exercises with enough effort to increase her heart rate 10 to 19 minutes per day.  She exercises with enough effort to increase her heart rate 3 or less days per week.   She is taking medications regularly.             Review of Systems   Constitutional: Positive for fever.   HENT: Negative for congestion, ear pain, sinus pressure, sinus pain and sore throat.    Eyes: Negative for pain.   Respiratory: Negative for cough and shortness of breath.    Cardiovascular: Negative for chest pain.   Gastrointestinal: Negative for abdominal pain and diarrhea.   Endocrine: Negative for polyuria.   Genitourinary: Negative for difficulty urinating, dysuria and pelvic pain.   Neurological: Positive for dizziness.            Objective     /76   Pulse 81   Temp 100.3  F (37.9  C) (Tympanic)   SpO2 96%   There is no height or weight on file to calculate BMI.  Physical Exam   GENERAL: alert and no distress  EYES: PERRL, EOMI, conjunctivae and sclerae normal and bruising periorbital areas L  HENT: ear canals and TM's normal, nose and mouth without ulcers or lesions, oropharynx clear and oral mucous membranes moist  RESP: lungs clear to auscultation - no rales, rhonchi or wheezes  CV: regular rate and rhythm, normal S1 S2, no S3 or S4, no murmur, click or rub, no peripheral edema and peripheral pulses strong  ABDOMEN: soft, nontender, no hepatosplenomegaly, no masses and bowel sounds normal  MS: no gross musculoskeletal defects noted, no edema  SKIN: no suspicious lesions or rashes  LYMPH: no cervical, supraclavicular, axillary, or inguinal adenopathy                    .  ..

## 2022-08-05 NOTE — PATIENT INSTRUCTIONS

## 2022-08-09 ENCOUNTER — TELEPHONE (OUTPATIENT)
Dept: FAMILY MEDICINE | Facility: CLINIC | Age: 72
End: 2022-08-09

## 2022-08-09 NOTE — TELEPHONE ENCOUNTER
Patient Quality Outreach    Patient is due for the following:   Breast Cancer Screening - Mammogram    Next Steps:   Need to schedule mammogram if they have not yet scheduled.    Type of outreach:    Phone, left message for patient/parent to call back.      Questions for provider review:    None     Lizette Aguilar, CMA

## 2022-08-15 ENCOUNTER — IMMUNIZATION (OUTPATIENT)
Dept: NURSING | Facility: CLINIC | Age: 72
End: 2022-08-15
Payer: COMMERCIAL

## 2022-08-15 PROCEDURE — 91305 COVID-19,PF,PFIZER (12+ YRS): CPT

## 2022-08-15 PROCEDURE — 0054A COVID-19,PF,PFIZER (12+ YRS): CPT

## 2022-08-16 ENCOUNTER — ANCILLARY PROCEDURE (OUTPATIENT)
Dept: MAMMOGRAPHY | Facility: CLINIC | Age: 72
End: 2022-08-16
Attending: INTERNAL MEDICINE
Payer: COMMERCIAL

## 2022-08-16 DIAGNOSIS — Z12.31 ENCOUNTER FOR SCREENING MAMMOGRAM FOR BREAST CANCER: ICD-10-CM

## 2022-08-16 PROCEDURE — 77067 SCR MAMMO BI INCL CAD: CPT | Mod: TC | Performed by: RADIOLOGY

## 2022-09-20 ENCOUNTER — TRANSFERRED RECORDS (OUTPATIENT)
Dept: HEALTH INFORMATION MANAGEMENT | Facility: CLINIC | Age: 72
End: 2022-09-20

## 2022-09-29 ENCOUNTER — TRANSFERRED RECORDS (OUTPATIENT)
Dept: FAMILY MEDICINE | Facility: CLINIC | Age: 72
End: 2022-09-29

## 2022-09-29 ENCOUNTER — TRANSFERRED RECORDS (OUTPATIENT)
Dept: HEALTH INFORMATION MANAGEMENT | Facility: CLINIC | Age: 72
End: 2022-09-29

## 2022-09-29 LAB
ALT SERPL-CCNC: 17 IU/L (ref 5–35)
AST SERPL-CCNC: 20 U/L (ref 5–34)
CREATININE (EXTERNAL): 0.73 MG/DL (ref 0.5–1.3)
GFR ESTIMATED (EXTERNAL): 83.5 ML/MIN/1.73M2

## 2022-10-31 ENCOUNTER — VIRTUAL VISIT (OUTPATIENT)
Dept: FAMILY MEDICINE | Facility: CLINIC | Age: 72
End: 2022-10-31
Payer: COMMERCIAL

## 2022-10-31 ENCOUNTER — LAB (OUTPATIENT)
Dept: URGENT CARE | Facility: URGENT CARE | Age: 72
End: 2022-10-31
Attending: FAMILY MEDICINE
Payer: COMMERCIAL

## 2022-10-31 DIAGNOSIS — D84.9 IMMUNOSUPPRESSED STATUS (H): ICD-10-CM

## 2022-10-31 DIAGNOSIS — M32.9 SYSTEMIC LUPUS ERYTHEMATOSUS, UNSPECIFIED SLE TYPE, UNSPECIFIED ORGAN INVOLVEMENT STATUS (H): Primary | ICD-10-CM

## 2022-10-31 DIAGNOSIS — R05.9 COUGH, UNSPECIFIED TYPE: ICD-10-CM

## 2022-10-31 DIAGNOSIS — Z20.822 SUSPECTED 2019 NOVEL CORONAVIRUS INFECTION: ICD-10-CM

## 2022-10-31 DIAGNOSIS — M32.9 SYSTEMIC LUPUS ERYTHEMATOSUS, UNSPECIFIED SLE TYPE, UNSPECIFIED ORGAN INVOLVEMENT STATUS (H): ICD-10-CM

## 2022-10-31 DIAGNOSIS — R53.83 FATIGUE, UNSPECIFIED TYPE: ICD-10-CM

## 2022-10-31 DIAGNOSIS — R06.02 SHORTNESS OF BREATH: ICD-10-CM

## 2022-10-31 LAB
DEPRECATED S PYO AG THROAT QL EIA: NEGATIVE
FLUAV AG SPEC QL IA: NEGATIVE
FLUBV AG SPEC QL IA: NEGATIVE
GROUP A STREP BY PCR: NOT DETECTED
SARS-COV-2 RNA RESP QL NAA+PROBE: NEGATIVE

## 2022-10-31 PROCEDURE — 87804 INFLUENZA ASSAY W/OPTIC: CPT

## 2022-10-31 PROCEDURE — 99214 OFFICE O/P EST MOD 30 MIN: CPT | Mod: 95 | Performed by: NURSE PRACTITIONER

## 2022-10-31 PROCEDURE — 87651 STREP A DNA AMP PROBE: CPT

## 2022-10-31 PROCEDURE — U0005 INFEC AGEN DETEC AMPLI PROBE: HCPCS

## 2022-10-31 PROCEDURE — U0003 INFECTIOUS AGENT DETECTION BY NUCLEIC ACID (DNA OR RNA); SEVERE ACUTE RESPIRATORY SYNDROME CORONAVIRUS 2 (SARS-COV-2) (CORONAVIRUS DISEASE [COVID-19]), AMPLIFIED PROBE TECHNIQUE, MAKING USE OF HIGH THROUGHPUT TECHNOLOGIES AS DESCRIBED BY CMS-2020-01-R: HCPCS

## 2022-10-31 RX ORDER — PREDNISONE 1 MG/1
TABLET ORAL EVERY 24 HOURS
COMMUNITY
Start: 2021-02-08 | End: 2023-03-07

## 2022-10-31 RX ORDER — ALBUTEROL SULFATE 90 UG/1
2 AEROSOL, METERED RESPIRATORY (INHALATION) EVERY 6 HOURS PRN
Qty: 18 G | Refills: 0 | Status: SHIPPED | OUTPATIENT
Start: 2022-10-31 | End: 2023-03-07

## 2022-10-31 RX ORDER — AZITHROMYCIN 250 MG/1
TABLET, FILM COATED ORAL
Qty: 6 TABLET | Refills: 0 | Status: SHIPPED | OUTPATIENT
Start: 2022-10-31 | End: 2022-11-05

## 2022-10-31 NOTE — PROGRESS NOTES
Jonelle is a 72 year old who is being evaluated via a billable video visit.      How would you like to obtain your AVS? [employer]  If the video visit is dropped, the invitation should be resent by: Text to cell phone: [phone]  Will anyone else be joining your video visit? No    Assessment & Plan     Systemic lupus erythematosus, unspecified SLE type, unspecified organ involvement status (H)  Immunosuppressed status (H)  Cough, unspecified type  Fatigue, unspecified type  Immunocompromise of lupus on methotrexate and steroids.  Also had recent COVID vaccination.  Also went on a cruise.  High suspicion of viral illness and/or effects of recent vaccination.  Symptoms progressively worsening quickly.  Will cover with antibiotics due to immunocompromise and appointment tomorrow with me in clinic for auscultation and possible x-ray if needed.  Outpatient COVID influenza and strep testing ordered.  Scheduling ticket sent to [employer] appt set up for this morning.   Exam today is reassuring-no reason for higher level of care felt at this time.   Encouraged to use pulse oximetry to check oxygen heart rate.  Inhaler sent to pharmacy she can use if she develops shortness of breath.  Symptom control at home discussed. Questions answered.   Written education provided.   Red flag symptoms discussed and if these occur present to the emergency room or call 911.   Yessenia verbalizes understanding of plan of care and is in agreement.    - Influenza A & B Antigen - Clinic Collect  - Symptomatic; Yes; 10/25/2022 COVID-19 Virus (Coronavirus) by PCR Nose  - azithromycin (ZITHROMAX) 250 MG tablet  Dispense: 6 tablet; Refill: 0  - Streptococcus A Rapid Screen w/Reflex to PCR - Clinic Collect      Return today (on 10/31/2022) for Lab only appointment.      JAKY Martines [facility]   Jonelle is a 72 year old, presenting for the following health issues:  URI    History of Present Illness        Reason for visit:  Chest cough,general malaise and fatigue,fever,sore throat  Symptom onset:  3-7 days ago  Symptoms include:  Chest cough, sore throat, general aching and fatigue, congestion  Symptom intensity:  Severe  Symptom progression:  Worsening  Had these symptoms before:  Yes  Has tried/received treatment for these symptoms:  Yes  Previous treatment was successful:  Yes  Prior treatment description:  Antibiotic for bronchitis and another time for pneumonia  What makes it worse:  Not taking a cold med  What makes it better:  Not really    She eats 0-1 servings of fruits and vegetables daily.She consumes 0 sweetened beverage(s) daily.She exercises with enough effort to increase her heart rate 20 to 29 minutes per day.  She exercises with enough effort to increase her heart rate 3 or less days per week. She is missing 1 dose(s) of medications per week.  She is not taking prescribed medications regularly due to remembering to take.       Acute Illness - 3 Negative Covid tests  Acute illness concerns: Fever, cough, chest cold  Onset/Duration: 10/25/2022  Symptoms:  Fever: YES- Highest 101.0F  Chills/Sweats: YES- very mild  Headache (location?): YES  Sinus Pressure: No  Conjunctivitis:  No  Ear Pain: no  Rhinorrhea: YES  Congestion: YES- chest  Sore Throat: YES  Cough: YES- started as non-productive, and is now productive of with sputum, worsening over time  Wheeze: No  Decreased Appetite: YES  Nausea: No  Vomiting: No  Diarrhea: No  Dysuria/Freq.: No  Dysuria or Hematuria: No  Fatigue/Achiness: YES  Sick/Strep Exposure: YES- Patient is not sure as she was on a [place] Cruise and Returned on Monday 10/24/2022.  Therapies tried and outcome: OTC Cold and Flu med.     Recent travel cruise. NO one else sick at home.   Lupus immunosuppressed on methotrexate and steroids.  COVID Vaccine 10/24/22.  When seen on Saturday at an urgent care outside of [place] had influenza done only negative result.      Review of  Systems   Constitutional, HEENT, cardiovascular, pulmonary, GI, , musculoskeletal, neuro, skin, endocrine and psych systems are negative, except as otherwise noted in the HPI.      Objective       Vitals:  No vitals were obtained today due to virtual visit.    Physical Exam   GENERAL: Healthy, alert and no distress but appears ill, very pleasant  EYES: Eyes grossly normal to inspection.  No discharge or erythema, or obvious scleral/conjunctival abnormalities.  RESP: No audible wheeze, cough, or visible cyanosis. Cough congestion. No visible retractions or increased work of breathing.    SKIN: Visible skin clear. No significant rash, abnormal pigmentation or lesions.  NEURO: Cranial nerves grossly intact.  Mentation and speech appropriate for age.  PSYCH: Mentation appears normal, affect normal/bright, judgement and insight intact, normal speech and appearance well-groomed.      Video-Visit Details    Video Start Time: 7:47 AM    Type of service:  Video Visit    Video End Time:8:09 AM    Originating Location (pt. Location): Home    Distant Location (provider location):  On-site    Platform used for Video Visit: [employer]

## 2022-11-01 ENCOUNTER — OFFICE VISIT (OUTPATIENT)
Dept: FAMILY MEDICINE | Facility: CLINIC | Age: 72
End: 2022-11-01
Payer: COMMERCIAL

## 2022-11-01 ENCOUNTER — ANCILLARY PROCEDURE (OUTPATIENT)
Dept: GENERAL RADIOLOGY | Facility: CLINIC | Age: 72
End: 2022-11-01
Attending: NURSE PRACTITIONER
Payer: COMMERCIAL

## 2022-11-01 VITALS
OXYGEN SATURATION: 97 % | SYSTOLIC BLOOD PRESSURE: 112 MMHG | HEIGHT: 66 IN | HEART RATE: 95 BPM | WEIGHT: 121.3 LBS | BODY MASS INDEX: 19.49 KG/M2 | DIASTOLIC BLOOD PRESSURE: 72 MMHG | RESPIRATION RATE: 14 BRPM | TEMPERATURE: 100.5 F

## 2022-11-01 DIAGNOSIS — M32.9 SYSTEMIC LUPUS ERYTHEMATOSUS, UNSPECIFIED SLE TYPE, UNSPECIFIED ORGAN INVOLVEMENT STATUS (H): ICD-10-CM

## 2022-11-01 DIAGNOSIS — R05.9 COUGH, UNSPECIFIED TYPE: Primary | ICD-10-CM

## 2022-11-01 DIAGNOSIS — D84.9 IMMUNOSUPPRESSED STATUS (H): ICD-10-CM

## 2022-11-01 DIAGNOSIS — R05.9 COUGH, UNSPECIFIED TYPE: ICD-10-CM

## 2022-11-01 DIAGNOSIS — R31.29 MICROSCOPIC HEMATURIA: ICD-10-CM

## 2022-11-01 DIAGNOSIS — R53.83 FATIGUE, UNSPECIFIED TYPE: ICD-10-CM

## 2022-11-01 DIAGNOSIS — R50.9 FEVER, UNSPECIFIED FEVER CAUSE: ICD-10-CM

## 2022-11-01 LAB
ALBUMIN SERPL-MCNC: 3.8 G/DL (ref 3.4–5)
ALBUMIN UR-MCNC: NEGATIVE MG/DL
ALP SERPL-CCNC: 88 U/L (ref 40–150)
ALT SERPL W P-5'-P-CCNC: 31 U/L (ref 0–50)
ANION GAP SERPL CALCULATED.3IONS-SCNC: 3 MMOL/L (ref 3–14)
APPEARANCE UR: CLEAR
AST SERPL W P-5'-P-CCNC: 22 U/L (ref 0–45)
BACTERIA #/AREA URNS HPF: ABNORMAL /HPF
BASOPHILS # BLD AUTO: 0 10E3/UL (ref 0–0.2)
BASOPHILS NFR BLD AUTO: 0 %
BILIRUB SERPL-MCNC: 0.4 MG/DL (ref 0.2–1.3)
BILIRUB UR QL STRIP: NEGATIVE
BUN SERPL-MCNC: 7 MG/DL (ref 7–30)
CALCIUM SERPL-MCNC: 9.7 MG/DL (ref 8.5–10.1)
CHLORIDE BLD-SCNC: 100 MMOL/L (ref 94–109)
CO2 SERPL-SCNC: 30 MMOL/L (ref 20–32)
COLOR UR AUTO: YELLOW
CREAT SERPL-MCNC: 0.74 MG/DL (ref 0.52–1.04)
CRP SERPL-MCNC: 147 MG/L
EOSINOPHIL # BLD AUTO: 0 10E3/UL (ref 0–0.7)
EOSINOPHIL NFR BLD AUTO: 0 %
ERYTHROCYTE [DISTWIDTH] IN BLOOD BY AUTOMATED COUNT: 12.9 % (ref 10–15)
ERYTHROCYTE [SEDIMENTATION RATE] IN BLOOD BY WESTERGREN METHOD: 19 MM/HR (ref 0–30)
GFR SERPL CREATININE-BSD FRML MDRD: 86 ML/MIN/1.73M2
GLUCOSE BLD-MCNC: 103 MG/DL (ref 70–99)
GLUCOSE UR STRIP-MCNC: NEGATIVE MG/DL
HCT VFR BLD AUTO: 42.2 % (ref 35–47)
HGB BLD-MCNC: 14.2 G/DL (ref 11.7–15.7)
HGB UR QL STRIP: ABNORMAL
IMM GRANULOCYTES # BLD: 0 10E3/UL
IMM GRANULOCYTES NFR BLD: 0 %
KETONES UR STRIP-MCNC: NEGATIVE MG/DL
LEUKOCYTE ESTERASE UR QL STRIP: NEGATIVE
LYMPHOCYTES # BLD AUTO: 0.7 10E3/UL (ref 0.8–5.3)
LYMPHOCYTES NFR BLD AUTO: 6 %
MCH RBC QN AUTO: 31.1 PG (ref 26.5–33)
MCHC RBC AUTO-ENTMCNC: 33.6 G/DL (ref 31.5–36.5)
MCV RBC AUTO: 93 FL (ref 78–100)
MONOCYTES # BLD AUTO: 0.9 10E3/UL (ref 0–1.3)
MONOCYTES NFR BLD AUTO: 8 %
NEUTROPHILS # BLD AUTO: 10.2 10E3/UL (ref 1.6–8.3)
NEUTROPHILS NFR BLD AUTO: 86 %
NITRATE UR QL: NEGATIVE
PH UR STRIP: 7 [PH] (ref 5–7)
PLATELET # BLD AUTO: 238 10E3/UL (ref 150–450)
POTASSIUM BLD-SCNC: 4.4 MMOL/L (ref 3.4–5.3)
PROT SERPL-MCNC: 7.5 G/DL (ref 6.8–8.8)
RBC # BLD AUTO: 4.56 10E6/UL (ref 3.8–5.2)
RBC #/AREA URNS AUTO: ABNORMAL /HPF
SODIUM SERPL-SCNC: 133 MMOL/L (ref 133–144)
SP GR UR STRIP: 1.01 (ref 1–1.03)
SQUAMOUS #/AREA URNS AUTO: ABNORMAL /LPF
UROBILINOGEN UR STRIP-ACNC: 0.2 E.U./DL
WBC # BLD AUTO: 11.9 10E3/UL (ref 4–11)
WBC #/AREA URNS AUTO: ABNORMAL /HPF

## 2022-11-01 PROCEDURE — 86140 C-REACTIVE PROTEIN: CPT | Performed by: NURSE PRACTITIONER

## 2022-11-01 PROCEDURE — 85652 RBC SED RATE AUTOMATED: CPT | Performed by: NURSE PRACTITIONER

## 2022-11-01 PROCEDURE — 87086 URINE CULTURE/COLONY COUNT: CPT | Performed by: NURSE PRACTITIONER

## 2022-11-01 PROCEDURE — 80053 COMPREHEN METABOLIC PANEL: CPT | Performed by: NURSE PRACTITIONER

## 2022-11-01 PROCEDURE — 85025 COMPLETE CBC W/AUTO DIFF WBC: CPT | Performed by: NURSE PRACTITIONER

## 2022-11-01 PROCEDURE — 99214 OFFICE O/P EST MOD 30 MIN: CPT | Performed by: NURSE PRACTITIONER

## 2022-11-01 PROCEDURE — 36415 COLL VENOUS BLD VENIPUNCTURE: CPT | Performed by: NURSE PRACTITIONER

## 2022-11-01 PROCEDURE — 71046 X-RAY EXAM CHEST 2 VIEWS: CPT | Mod: TC | Performed by: RADIOLOGY

## 2022-11-01 PROCEDURE — 81001 URINALYSIS AUTO W/SCOPE: CPT | Performed by: NURSE PRACTITIONER

## 2022-11-01 NOTE — RESULT ENCOUNTER NOTE
Dear Jonelle,    Here is a summary of your recent test results:  Labs overall look good waiting for kidney function.   Xray does not show pneumonia.   Lets continue to closely watch symptoms and follow up Friday.     Any concerning symptoms like we discussed please present to the emergency room or call 911.      For additional lab test information, [email or web address] is an excellent reference.    In addition, here is a list of due or overdue Health Maintenance reminders:    URINE DRUG SCREEN Never done  ANNUAL REVIEW OF HM ORDERS Never done  Depression Action Plan Never done  Hepatitis B Vaccine(1 of 3 - 3-dose series) Never done  Comprehensive Metabolic Panel due on 07/25/2022    Please call us at [phone] (or use [facility]) to address the above recommendations if needed.    [facility].  It was an honor and a privilege to participate in your care.       Healthy regards,    Gisselle Garcia, PEPPER  [facility]

## 2022-11-01 NOTE — PROGRESS NOTES
Assessment & Plan     Cough, unspecified type  Reassuring exam no higher level of care felt to be needed.   Labs.   CXR awaiting official read.  If noted to have pneumonia or not improving on recently started Z-pack will add another antibiotic.   Red flag symptoms discussed and if these occur present to the emergency room or call 911.  Yessenia verbalizes understanding of plan of care and is in agreement.   - XR Chest 2 Views  - CRP, inflammation  - ESR: Erythrocyte sedimentation rate  - Comprehensive metabolic panel (BMP + Alb, Alk Phos, ALT, AST, Total. Bili, TP)  - CBC with platelets and differential  - CRP, inflammation  - ESR: Erythrocyte sedimentation rate  - Comprehensive metabolic panel (BMP + Alb, Alk Phos, ALT, AST, Total. Bili, TP)  - CBC with platelets and differential    Fatigue, unspecified type    - XR Chest 2 Views  - CRP, inflammation  - ESR: Erythrocyte sedimentation rate  - Comprehensive metabolic panel (BMP + Alb, Alk Phos, ALT, AST, Total. Bili, TP)  - CBC with platelets and differential  - CRP, inflammation  - ESR: Erythrocyte sedimentation rate  - Comprehensive metabolic panel (BMP + Alb, Alk Phos, ALT, AST, Total. Bili, TP)  - CBC with platelets and differential  - UA Macro with Reflex to Micro and Culture - lab collect    Fever, unspecified fever cause    - UA Macro with Reflex to Micro and Culture - lab collect    Immunosuppressed status (H)    - XR Chest 2 Views  - CRP, inflammation  - ESR: Erythrocyte sedimentation rate  - Comprehensive metabolic panel (BMP + Alb, Alk Phos, ALT, AST, Total. Bili, TP)  - CBC with platelets and differential  - CRP, inflammation  - ESR: Erythrocyte sedimentation rate  - Comprehensive metabolic panel (BMP + Alb, Alk Phos, ALT, AST, Total. Bili, TP)  - CBC with platelets and differential    Systemic lupus erythematosus, unspecified SLE type, unspecified organ involvement status (H)      Return in about 3 days (around 11/4/2022) for Recheck.      Gisselle Garcia,  "APRN CNP  [facility] [place]    Jim   Pat is a 72 year old, presenting for the following health issues:  RECHECK      HPI   Patient is here for follow up from virtual visit 10/31/2022 to ensure improvement  Cough Malaise, Fatigue, Lupus, Fever  Azithromycin just started yesterday diarrhea.   Has History of C-Diff  Strep, COVID, influenza negative.     Review of Systems   Constitutional, HEENT, cardiovascular, pulmonary, GI, , musculoskeletal, neuro, skin, endocrine and psych systems are negative, except as otherwise noted in the HPI.      Objective    /72   Pulse 95   Temp (!) 100.5  F (38.1  C) (Tympanic)   Resp 14   Ht 1.676 m (5' 6\")   Wt 55 kg (121 lb 4.8 oz)   SpO2 97%   BMI 19.58 kg/m    Body mass index is 19.58 kg/m .  Physical Exam   GENERAL: healthy, alert and no distress appears ill  EYES: Eyes grossly normal to inspection, PERRL and conjunctivae and sclerae normal  HENT: ear canals and TM's normal, nose and mouth without ulcers or lesions  NECK: no adenopathy, no asymmetry, masses, or scars and thyroid normal to palpation  RESP: lungs clear to auscultation slight decrease in lung bases; hacking cough- no rales, rhonchi or wheezes  CV: regular rate and rhythm, normal S1 S2, no S3 or S4, no murmur, click or rub, no peripheral edema and peripheral pulses strong  ABDOMEN: soft, nontender, no hepatosplenomegaly, no masses and bowel sounds normal  MS: no gross musculoskeletal defects noted, no edema  SKIN: no suspicious lesions or rashes  NEURO: Normal strength and tone, mentation intact and speech normal  PSYCH: mentation appears normal, affect normal/bright    Results for orders placed or performed in visit on 11/01/22   XR Chest 2 Views     Status: None    Narrative    XR CHEST 2 VIEWS   11/1/2022 1:42 PM     HISTORY: Cough, unspecified type; Fatigue, unspecified type;  Immunosuppressed status (H)    COMPARISON: Chest CT 3/10/2021.      Impression    IMPRESSION: No " acute cardiopulmonary disease. Prior cholecystectomy.  Orthopedic hardware in the lumbar spine partially visualized.    FREDERIC KAUR MD         SYSTEM ID:  [id number]   Results for orders placed or performed in visit on 11/01/22   CRP, inflammation     Status: Abnormal   Result Value Ref Range    CRP Inflammation 147.00 (H) <5.00 mg/L   ESR: Erythrocyte sedimentation rate     Status: Normal   Result Value Ref Range    Erythrocyte Sedimentation Rate 19 0 - 30 mm/hr   Comprehensive metabolic panel (BMP + Alb, Alk Phos, ALT, AST, Total. Bili, TP)     Status: Abnormal   Result Value Ref Range    Sodium 133 133 - 144 mmol/L    Potassium 4.4 3.4 - 5.3 mmol/L    Chloride 100 94 - 109 mmol/L    Carbon Dioxide (CO2) 30 20 - 32 mmol/L    Anion Gap 3 3 - 14 mmol/L    Urea Nitrogen 7 7 - 30 mg/dL    Creatinine 0.74 0.52 - 1.04 mg/dL    Calcium 9.7 8.5 - 10.1 mg/dL    Glucose 103 (H) 70 - 99 mg/dL    Alkaline Phosphatase 88 40 - 150 U/L    AST 22 0 - 45 U/L    ALT 31 0 - 50 U/L    Protein Total 7.5 6.8 - 8.8 g/dL    Albumin 3.8 3.4 - 5.0 g/dL    Bilirubin Total 0.4 0.2 - 1.3 mg/dL    GFR Estimate 86 >60 mL/min/1.73m2   CBC with platelets and differential     Status: Abnormal   Result Value Ref Range    WBC Count 11.9 (H) 4.0 - 11.0 10e3/uL    RBC Count 4.56 3.80 - 5.20 10e6/uL    Hemoglobin 14.2 11.7 - 15.7 g/dL    Hematocrit 42.2 35.0 - 47.0 %    MCV 93 78 - 100 fL    MCH 31.1 26.5 - 33.0 pg    MCHC 33.6 31.5 - 36.5 g/dL    RDW 12.9 10.0 - 15.0 %    Platelet Count 238 150 - 450 10e3/uL    % Neutrophils 86 %    % Lymphocytes 6 %    % Monocytes 8 %    % Eosinophils 0 %    % Basophils 0 %    % Immature Granulocytes 0 %    Absolute Neutrophils 10.2 (H) 1.6 - 8.3 10e3/uL    Absolute Lymphocytes 0.7 (L) 0.8 - 5.3 10e3/uL    Absolute Monocytes 0.9 0.0 - 1.3 10e3/uL    Absolute Eosinophils 0.0 0.0 - 0.7 10e3/uL    Absolute Basophils 0.0 0.0 - 0.2 10e3/uL    Absolute Immature Granulocytes 0.0 <=0.4 10e3/uL   UA Macro with Reflex to  Micro and Culture - lab collect     Status: Abnormal    Specimen: Urine, Clean Catch   Result Value Ref Range    Color Urine Yellow Colorless, Straw, Light Yellow, Yellow    Appearance Urine Clear Clear    Glucose Urine Negative Negative, 1000 , >=2000 mg/dL    Bilirubin Urine Negative Negative    Ketones Urine Negative Negative, 160  mg/dL    Specific Gravity Urine 1.015 1.003 - 1.035    Blood Urine Small (A) Negative    pH Urine 7.0 5.0 - 7.0    Protein Albumin Urine Negative Negative, 300 , >=2000 mg/dL    Urobilinogen Urine 0.2 0.2, 1.0 E.U./dL    Nitrite Urine Negative Negative    Leukocyte Esterase Urine Negative Negative   Urine Microscopic     Status: Abnormal   Result Value Ref Range    Bacteria Urine Few (A) None Seen /HPF    RBC Urine 10-25 (A) 0-2 /HPF /HPF    WBC Urine None Seen 0-5 /HPF /HPF    Squamous Epithelials Urine Few (A) None Seen /LPF    Narrative    Urine Culture not indicated   Urine Culture Aerobic Bacterial - lab collect     Status: None    Specimen: Urine, Clean Catch   Result Value Ref Range    Culture No Growth    CBC with platelets and differential     Status: Abnormal    Narrative    The following orders were created for panel order CBC with platelets and differential.  Procedure                               Abnormality         Status                     ---------                               -----------         ------                     CBC with platelets and d...[[id number]]  Abnormal            Final result                 Please view results for these tests on the individual orders.

## 2022-11-03 LAB — BACTERIA UR CULT: NO GROWTH

## 2022-11-03 NOTE — RESULT ENCOUNTER NOTE
Dear Jonelle,    Here is a summary of your recent test results:  CRP is elevated but this can be from your lupus.   I hope you are feeling better; I would still recommend being seen tomorrow before the weekend.     For additional lab test information, labtestsonline.org is an excellent reference.    In addition, here is a list of due or overdue Health Maintenance reminders:    URINE DRUG SCREEN Never done  ANNUAL REVIEW OF HM ORDERS Never done  Depression Action Plan Never done  Hepatitis B Vaccine(1 of 3 - 3-dose series) Never done    Please call us at [phone] (or use [employer]) to address the above recommendations if needed.    Thank you for choosing [facility].  It was an honor and a privilege to participate in your care.       Healthy regards,    Gisselle Garcia, PEPPER  [facility]

## 2022-11-04 ENCOUNTER — OFFICE VISIT (OUTPATIENT)
Dept: FAMILY MEDICINE | Facility: CLINIC | Age: 72
End: 2022-11-04
Payer: COMMERCIAL

## 2022-11-04 VITALS
SYSTOLIC BLOOD PRESSURE: 123 MMHG | HEART RATE: 71 BPM | BODY MASS INDEX: 19.29 KG/M2 | DIASTOLIC BLOOD PRESSURE: 71 MMHG | WEIGHT: 120 LBS | TEMPERATURE: 98.3 F | HEIGHT: 66 IN | OXYGEN SATURATION: 96 % | RESPIRATION RATE: 14 BRPM

## 2022-11-04 DIAGNOSIS — D84.9 IMMUNOSUPPRESSED STATUS (H): ICD-10-CM

## 2022-11-04 DIAGNOSIS — R53.83 FATIGUE, UNSPECIFIED TYPE: ICD-10-CM

## 2022-11-04 DIAGNOSIS — R79.82 ELEVATED C-REACTIVE PROTEIN (CRP): ICD-10-CM

## 2022-11-04 DIAGNOSIS — J06.9 UPPER RESPIRATORY TRACT INFECTION, UNSPECIFIED TYPE: Primary | ICD-10-CM

## 2022-11-04 PROCEDURE — 99213 OFFICE O/P EST LOW 20 MIN: CPT | Performed by: NURSE PRACTITIONER

## 2022-11-04 NOTE — PROGRESS NOTES
"Assessment & Plan     Upper respiratory tract infection, unspecified type  Fatigue, unspecified type  Elevated C-reactive protein (CRP)  Immunosuppressed status (H)  Thankfully she is significantly improved; looks much better today.  Discussed elevation of her CRP on her labs no comparison from previous studies on this.  Would like her to repeat this within the next few weeks to see if it is trending down.  She can set up a lab only appointment her normal clinic outpatient nonfasting lab anytime a day.  Yessenia verbalizes understanding of plan of care and is in agreement.   - CRP, inflammation    Return in about 2 weeks (around 11/18/2022) for Lab only appointment.      JAKY Martines CNP  [facility]   Jonelle is a 72 year old, presenting for the following health issues:  URI (Follow up)      HPI     Acute Illness  Acute illness concerns: uri follow up  Onset/Duration: 10 days  Symptoms:  Fever: No  Chills/Sweats: No  Headache (location?): No  Sinus Pressure: No  Conjunctivitis:  No  Ear Pain: no  Rhinorrhea: YES  Congestion: YES  Sore Throat: No  Cough: YES  Wheeze: No  Decreased Appetite: YES  Nausea: No  Vomiting: No  Diarrhea: YES  Dysuria/Freq.: No  Dysuria or Hematuria: No  Fatigue/Achiness: No  Sick/Strep Exposure: No  Therapies tried and outcome: just finishing antibiotic      Significantly improved.  Done with antibiotics    Review of Systems   Constitutional, HEENT, cardiovascular, pulmonary, GI, , musculoskeletal, neuro, skin, endocrine and psych systems are negative, except as otherwise noted in the HPI.      Objective    /71 (BP Location: Right arm, Patient Position: Chair, Cuff Size: Adult Regular)   Pulse 71   Temp 98.3  F (36.8  C) (Oral)   Resp 14   Ht 1.676 m (5' 6\")   Wt 54.4 kg (120 lb)   SpO2 96%   BMI 19.37 kg/m    Body mass index is 19.37 kg/m .  Physical Exam   GENERAL: healthy, alert and no distress  EYES: Eyes grossly normal to " inspection, PERRL and conjunctivae and sclerae normal  HENT: ear canals and TM's normal, nose and mouth without ulcers or lesions  RESP: lungs clear to auscultation - no rales, rhonchi or wheezes  CV: regular rate and rhythm, normal S1 S2, no S3 or S4, no murmur, click or rub, no peripheral edema and peripheral pulses strong  SKIN: no suspicious lesions or rashes  PSYCH: mentation appears normal, affect normal/bright

## 2022-11-22 ENCOUNTER — MYC MEDICAL ADVICE (OUTPATIENT)
Dept: FAMILY MEDICINE | Facility: CLINIC | Age: 72
End: 2022-11-22

## 2022-11-22 DIAGNOSIS — F51.01 PRIMARY INSOMNIA: ICD-10-CM

## 2022-11-25 RX ORDER — TRAZODONE HYDROCHLORIDE 100 MG/1
200 TABLET ORAL AT BEDTIME
Qty: 28 TABLET | Refills: 0 | Status: SHIPPED | OUTPATIENT
Start: 2022-11-25 | End: 2023-03-02

## 2022-11-25 NOTE — TELEPHONE ENCOUNTER
Patient requesting 2 wk supply by sent to local pharmacy until mail order pharmacy able to fill 90 day supply.   Prescription approved per [facility] [place].    Dania LOAIZA RN,BSN        Dania LOAIZA RN,BSN

## 2022-11-25 NOTE — TELEPHONE ENCOUNTER
Patient requesting refill of trazodone. Pended refill and routing to refill pool.        Marley Ordonez RN  [facility]

## 2023-01-12 ENCOUNTER — TRANSFERRED RECORDS (OUTPATIENT)
Dept: HEALTH INFORMATION MANAGEMENT | Facility: CLINIC | Age: 73
End: 2023-01-12
Payer: COMMERCIAL

## 2023-01-12 LAB
ALT SERPL-CCNC: 14 IU/L (ref 5–35)
AST SERPL-CCNC: 19 U/L (ref 5–34)
CREATININE (EXTERNAL): 0.82 MG/DL (ref 0.5–1.3)

## 2023-02-14 ENCOUNTER — NURSE TRIAGE (OUTPATIENT)
Dept: FAMILY MEDICINE | Facility: CLINIC | Age: 73
End: 2023-02-14
Payer: COMMERCIAL

## 2023-02-14 DIAGNOSIS — G31.84 MCI (MILD COGNITIVE IMPAIRMENT): Primary | ICD-10-CM

## 2023-02-14 DIAGNOSIS — S06.0X0A CONCUSSION WITHOUT LOSS OF CONSCIOUSNESS, INITIAL ENCOUNTER: ICD-10-CM

## 2023-02-14 NOTE — TELEPHONE ENCOUNTER
Called patient and notified her of referral placed and number to call [phone].    Yani CARDENAS, Triage RN  [facility]

## 2023-02-14 NOTE — TELEPHONE ENCOUNTER
Patient wanting a referral to Neurology. Home care was advised in disposition. Patient has History in family of parkinson and sriram garretts disease.     Order pended for your Review.     Lou Verdin RN on 2/14/2023 at 9:54 AM        Additional Information    Negative: Difficult to awaken or acting confused (e.g., disoriented, slurred speech)    Negative: New neurologic deficit that is present NOW, sudden onset of ANY of the following: * Weakness of the face, arm, or leg on one side of the body* Numbness of the face, arm, or leg on one side of the body* Loss of speech or garbled speech    Negative: Sounds like a life-threatening emergency to the triager    Negative: Confusion, disorientation, or hallucinations is main symptom    Negative: Dizziness is main symptom    Negative: Followed a head injury within last 3 days    Negative: Headache (with neurologic deficit)    Negative: Unable to urinate (or only a few drops) and bladder feels very full    Negative: Loss of bladder or bowel control (urine or bowel incontinence; wetting self, leaking stool) of new-onset    Negative: Back pain with numbness (loss of sensation) in groin or rectal area    Negative: Neurologic deficit that was brief (now gone), ANY of the following: * Weakness of the face, arm, or leg on one side of the body * Numbness of the face, arm, or leg on one side of the body * Loss of speech or garbled speech    Negative: Patient sounds very sick or weak to the triager    Negative: Neurologic deficit of gradual onset (e.g., days to weeks), ANY of the following: * Weakness of the face, arm, or leg on one side of the body* Numbness of the face, arm, or leg on one side of the body* Loss of speech or garbled speech    Negative: Lutsen palsy suspected (i.e., weakness only one side of the face, developing over hours to days, no other symptoms)    Negative: Tingling (e.g., pins and needles) of the face, arm or leg on one side of the body, that is present  "now (Exceptions: Chronic or recurrent symptom lasting > 4 weeks; or tingling from known cause, such as: bumped elbow, carpal tunnel syndrome, pinched nerve, frostbite.)    Negative: Neck pain (with neurologic deficit)    Negative: Back pain (with neurologic deficit)    Negative: Patient wants to be seen    Negative: Loss of speech or garbled speech is a chronic symptom (recurrent or ongoing problem lasting > 4 weeks)    Negative: Weakness of arm or leg is a chronic symptom (recurrent or ongoing problem lasting > 4 weeks)    Negative: Numbness or tingling in one or both hands is a chronic symptom (recurrent or ongoing problem lasting > 4 weeks)    Negative: Numbness or tingling in one or both feet is a chronic symptom (recurrent or ongoing problem lasting > 4 weeks)    Negative: Numbness or tingling on both sides of body and is a new symptom lasting > 24 hours    Negative: Brief (now gone) tingling in hand (e.g., pins and needles) after prolonged lying on arm    Negative: Brief (now gone) tingling in foot (e.g., pins and needles) after prolonged sitting with legs crossed    Negative: Brief (now gone) numbness (or tingling or burning) in hand and fingers after bumped elbow    Answer Assessment - Initial Assessment Questions  1. SYMPTOM: \"What is the main symptom you are concerned about?\" (e.g., weakness, numbness)      Loss of balance  2. ONSET: \"When did this start?\" (minutes, hours, days; while sleeping)      For a couple of years, it's just that it has gotten worse.   3. LAST NORMAL: \"When was the last time you (the patient) were normal (no symptoms)?\"      About two years ago  4. PATTERN \"Does this come and go, or has it been constant since it started?\"  \"Is it present now?\"      Constant  5. CARDIAC SYMPTOMS: \"Have you had any of the following symptoms: chest pain, difficulty breathing, palpitations?\"      No, I do have supraventricular tachycardia.  6. NEUROLOGIC SYMPTOMS: \"Have you had any of the following " "symptoms: headache, dizziness, vision loss, double vision, changes in speech, unsteady on your feet?\"      Unsteady on your feet, dizziness, headaches  7. 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 PREGNANCY: \"Is there any chance you are pregnant?\" \"When was your last menstrual period?\"      N/A    Protocols used: NEUROLOGIC DEFICIT-A-[place]      "

## 2023-02-16 ENCOUNTER — TELEPHONE (OUTPATIENT)
Dept: NEUROLOGY | Facility: CLINIC | Age: 73
End: 2023-02-16
Payer: COMMERCIAL

## 2023-02-16 NOTE — TELEPHONE ENCOUNTER
Patient used central scheduling to get a first available appointment, however has been a patient of Dr. Mejia's.  Patient is now currently scheduled with Dr. Patel on August 11.     Writer can not find evidence of patient being seen by Dr. Mejia within [facility].     Patient states she has increased balance issues, and cognitive changes and would really like to be seen before August.  Please advise if patient can transfer care, and if can be seen sooner than August.

## 2023-02-17 NOTE — TELEPHONE ENCOUNTER
Per chart review, under media tab there are records on 2/28/19 from [facility] of neurology, that pt saw Dr. Mejia there 1/7/19.      Will route to provider to review.     Chloé DUGAN RN, BSN  [facility]

## 2023-02-28 ASSESSMENT — ENCOUNTER SYMPTOMS
WEAKNESS: 1
NAUSEA: 1
BREAST MASS: 0

## 2023-02-28 ASSESSMENT — ACTIVITIES OF DAILY LIVING (ADL): CURRENT_FUNCTION: HOUSEWORK REQUIRES ASSISTANCE

## 2023-03-06 ASSESSMENT — ENCOUNTER SYMPTOMS
WEAKNESS: 1
NAUSEA: 1
BREAST MASS: 0

## 2023-03-06 ASSESSMENT — ACTIVITIES OF DAILY LIVING (ADL): CURRENT_FUNCTION: HOUSEWORK REQUIRES ASSISTANCE

## 2023-03-06 NOTE — PROGRESS NOTES
"SUBJECTIVE:   Pat is a 73 year old who presents for Preventive Visit.  Patient has been advised of split billing requirements and indicates understanding: Yes  Are you in the first 12 months of your Medicare coverage?  No    Healthy Habits:     In general, how would you rate your overall health?  Good    Frequency of exercise:  None    Do you usually eat at least 4 servings of fruit and vegetables a day, include whole grains    & fiber and avoid regularly eating high fat or \"junk\" foods?  No    Taking medications regularly:  Yes    Ability to successfully perform activities of daily living:  Housework requires assistance    Home Safety:  No safety concerns identified    Hearing Impairment:  Difficulty following a conversation in a noisy restaurant or crowded room and need to ask people to speak up or repeat themselves    In the past 6 months, have you been bothered by leaking of urine?  No    In general, how would you rate your overall mental or emotional health?  Good      PHQ-2 Total Score: 0    Additional concerns today:  Yes      Have you ever done Advance Care Planning? (For example, a Health Directive, POLST, or a discussion with a medical provider or your loved ones about your wishes): Yes, advance care planning is on file.      Fall risk  Fallen 2 or more times in the past year?: Yes  Any fall with injury in the past year?: Yes    Cognitive Screening   1) Repeat 3 items (Leader, Season, Table)    2) Clock draw: NORMAL  3) 3 item recall: Recalls 2 objects   Results: NORMAL clock, 1-2 items recalled: COGNITIVE IMPAIRMENT LESS LIKELY    Mini-CogTM Copyright JOHANNA Lin. Licensed by the author for use in [facility]; reprinted with permission (natalie@.[facility]). All rights reserved.      Do you have sleep apnea, excessive snoring or daytime drowsiness?: yes    Reviewed and updated as needed this visit by clinical staff                  Reviewed and updated as needed this visit by Provider               "   Social History     Tobacco Use     Smoking status: Never     Smokeless tobacco: Never   Substance Use Topics     Alcohol use: Yes     Alcohol/week: 0.0 standard drinks     Comment: 1 wine/day         Alcohol Use 2/28/2023   Prescreen: >3 drinks/day or >7 drinks/week? No       Current providers sharing in care for this patient include:   Patient Care Team:  Josue Mejia MD as PCP - General (Internal Medicine)  Josue Mejia MD as Assigned PCP  Jake Mcgill MD as MD (Cardiovascular Disease)  Jake Mcgill MD as Assigned Heart and Vascular Provider  Brian Patel MD as MD    The following health maintenance items are reviewed in Epic and correct as of today:  Health Maintenance   Topic Date Due     URINE DRUG SCREEN  Never done     ANNUAL REVIEW OF HM ORDERS  Never done     DEPRESSION ACTION PLAN  Never done     MEDICARE ANNUAL WELLNESS VISIT  01/25/2023     LIPID  01/25/2023     CMP  05/01/2023     PHQ-9  05/31/2023     MAMMO SCREENING  08/16/2023     CBC  11/01/2023     FALL RISK ASSESSMENT  03/07/2024     COLORECTAL CANCER SCREENING  10/12/2025     ADVANCE CARE PLANNING  02/06/2027     DTAP/TDAP/TD IMMUNIZATION (3 - Td or Tdap) 01/25/2032     DEXA  02/05/2035     HEPATITIS C SCREENING  Completed     INFLUENZA VACCINE  Completed     Pneumococcal Vaccine: 65+ Years  Completed     ZOSTER IMMUNIZATION  Completed     COVID-19 Vaccine  Completed     IPV IMMUNIZATION  Aged Out     MENINGITIS IMMUNIZATION  Aged Out     Lab work is in process  Labs reviewed in EPIC       Mechanical low back pain  Restless legs syndrome   Yessenia Martines has been doing well with current therapy of gabapentin.  Not getting any exercise.  She is able to walk, no joint issues.    Primary insomnia  Mild recurrent major depression (H)   Mood has been OK, but has been feeling of being unmotivated.  She is worried about medical concerns of neurologic changes.    Diminished dexterity  Cognitive Decline   Has had  "noticeable decline in her fine motor skills and shaking over the past 6 months as well as long term memory concerns.  She is planning to follow up in May for neurology for this decline.      Review of Systems   Gastrointestinal: Positive for nausea.   Breasts:  Negative for tenderness, breast mass and discharge.   Genitourinary: Negative for pelvic pain, vaginal bleeding and vaginal discharge.   Neurological: Positive for weakness.     Constitutional, HEENT, cardiovascular - periodic supraventricular tachycardia, pulmonary, GI - gastroparesis, , musculoskeletal - as above, neurology - as above, skin, endocrine and psych systems are negative, except as otherwise noted.    OBJECTIVE:   /89 (BP Location: Right arm, Patient Position: Sitting, Cuff Size: Adult Regular)   Pulse 66   Temp 97.4  F (36.3  C) (Tympanic)   Resp 10   Ht 1.602 m (5' 3.07\")   Wt 56.7 kg (125 lb 1.6 oz)   SpO2 99%   BMI 22.11 kg/m   Estimated body mass index is 22.11 kg/m  as calculated from the following:    Height as of this encounter: 1.602 m (5' 3.07\").    Weight as of this encounter: 56.7 kg (125 lb 1.6 oz).  Physical Exam  GENERAL: healthy, alert and no distress  EYES: Eyes grossly normal to inspection, PERRL and conjunctivae and sclerae normal  HENT: ear canals obstructed  NECK: no adenopathy, no asymmetry,   RESP: lungs clear to auscultation - no rales, rhonchi or wheezes  CV: regular rate and rhythm, normal S1 S2, no S3 or S4, no murmur, click or rub, no peripheral edema and peripheral pulses strong  ABDOMEN: soft, nontender, no hepatosplenomegaly, no masses and bowel sounds normal  MS: no gross musculoskeletal defects noted, no edema  SKIN: no suspicious lesions or rashes  NEURO: Normal strength and tone, balance deficit appreciated  PSYCH: mentation appears normal, affect normal/bright    Diagnostic Test Results:  Labs reviewed in Epic    ASSESSMENT / PLAN:     Patient Instructions   (Z00.00) Routine general medical " examination at a health care facility  (primary encounter diagnosis)  Comment: For routine exam, we will draw labs as ordered, cholesterol, diabetes mellitus check, liver function, renal function, We will also update vaccination history.  Mammogram can be obtained in August 2023  [facility] (also performs diagnostic mammogram, ultrasound and biopsy) [phone].   Plan: Lipid panel reflex to direct LDL Non-fasting,         Comprehensive metabolic panel, CBC with         platelets, TSH with free T4 reflex, Hemoglobin         A1c            (Z79.899) Encounter for long-term (current) use of medications  Comment:   Plan:     (M54.59) Mechanical low back pain  Comment: Continue gabapentin 300 mg three times daily   Plan: gabapentin (NEURONTIN) 300 MG capsule            (G25.81) Restless legs syndrome  Comment:   Plan: gabapentin (NEURONTIN) 300 MG capsule,         Hemoglobin A1c            (F51.01) Primary insomnia  Comment: Continue trazodone   Plan: traZODone (DESYREL) 100 MG tablet, omeprazole         (PRILOSEC) 40 MG DR capsule            (F33.0) Mild recurrent major depression (H)  Comment: Continue sertraline   Plan: sertraline (ZOLOFT) 50 MG tablet            (D84.9) Immunosuppressed status (H)  Comment: Noted, no changes in either methotrexate and prednisone  Plan:     (M32.9) Systemic lupus erythematosus, unspecified SLE type, unspecified organ involvement status (H)  Comment: noted no changes  Plan:     (M81.0) Osteoporosis, unspecified osteoporosis type, unspecified pathological fracture presence  Comment: Continue calcium and vitamin D   Plan:     (E07.9) Thyroid disease  Comment: check TSH  Plan: TSH with free T4 reflex, Hemoglobin A1c            (R73.01) IFG (impaired fasting glucose)  Comment: check hemoglobin A1c   Plan: Hemoglobin A1c                  Patient has been advised of split billing requirements and indicates understanding: Yes      COUNSELING:  Reviewed preventive  health counseling, as reflected in patient instructions        She reports that she has never smoked. She has never used smokeless tobacco.      Appropriate preventive services were discussed with this patient, including applicable screening as appropriate for cardiovascular disease, diabetes, osteopenia/osteoporosis, and glaucoma.  As appropriate for age/gender, discussed screening for colorectal cancer, prostate cancer, breast cancer, and cervical cancer. Checklist reviewing preventive services available has been given to the patient.    Reviewed patients plan of care and provided an AVS. The Basic Care Plan (routine screening as documented in Health Maintenance) for Yessenia meets the Care Plan requirement. This Care Plan has been established and reviewed with the Patient.      Josue Mejia MD, MD  [facility]    Identified Health Risks:

## 2023-03-07 ENCOUNTER — OFFICE VISIT (OUTPATIENT)
Dept: FAMILY MEDICINE | Facility: CLINIC | Age: 73
End: 2023-03-07
Payer: COMMERCIAL

## 2023-03-07 VITALS
SYSTOLIC BLOOD PRESSURE: 134 MMHG | WEIGHT: 125.1 LBS | TEMPERATURE: 97.4 F | HEIGHT: 63 IN | BODY MASS INDEX: 22.16 KG/M2 | RESPIRATION RATE: 10 BRPM | OXYGEN SATURATION: 99 % | HEART RATE: 66 BPM | DIASTOLIC BLOOD PRESSURE: 89 MMHG

## 2023-03-07 DIAGNOSIS — E07.9 THYROID DISEASE: ICD-10-CM

## 2023-03-07 DIAGNOSIS — Z00.00 ROUTINE GENERAL MEDICAL EXAMINATION AT A HEALTH CARE FACILITY: Primary | ICD-10-CM

## 2023-03-07 DIAGNOSIS — F33.0 MILD RECURRENT MAJOR DEPRESSION (H): ICD-10-CM

## 2023-03-07 DIAGNOSIS — M81.0 OSTEOPOROSIS, UNSPECIFIED OSTEOPOROSIS TYPE, UNSPECIFIED PATHOLOGICAL FRACTURE PRESENCE: ICD-10-CM

## 2023-03-07 DIAGNOSIS — R73.01 IFG (IMPAIRED FASTING GLUCOSE): ICD-10-CM

## 2023-03-07 DIAGNOSIS — F51.01 PRIMARY INSOMNIA: ICD-10-CM

## 2023-03-07 DIAGNOSIS — G25.81 RESTLESS LEGS SYNDROME: ICD-10-CM

## 2023-03-07 DIAGNOSIS — D84.9 IMMUNOSUPPRESSED STATUS (H): ICD-10-CM

## 2023-03-07 DIAGNOSIS — H61.22 IMPACTED CERUMEN OF LEFT EAR: ICD-10-CM

## 2023-03-07 DIAGNOSIS — M54.59 MECHANICAL LOW BACK PAIN: ICD-10-CM

## 2023-03-07 DIAGNOSIS — M32.9 SYSTEMIC LUPUS ERYTHEMATOSUS, UNSPECIFIED SLE TYPE, UNSPECIFIED ORGAN INVOLVEMENT STATUS (H): ICD-10-CM

## 2023-03-07 DIAGNOSIS — Z79.899 ENCOUNTER FOR LONG-TERM (CURRENT) USE OF MEDICATIONS: ICD-10-CM

## 2023-03-07 LAB
ALBUMIN SERPL BCG-MCNC: 4.7 G/DL (ref 3.5–5.2)
ALP SERPL-CCNC: 70 U/L (ref 35–104)
ALT SERPL W P-5'-P-CCNC: 9 U/L (ref 10–35)
ANION GAP SERPL CALCULATED.3IONS-SCNC: 13 MMOL/L (ref 7–15)
AST SERPL W P-5'-P-CCNC: 27 U/L (ref 10–35)
BILIRUB SERPL-MCNC: 0.4 MG/DL
BUN SERPL-MCNC: 19.7 MG/DL (ref 8–23)
CALCIUM SERPL-MCNC: 9.7 MG/DL (ref 8.8–10.2)
CHLORIDE SERPL-SCNC: 101 MMOL/L (ref 98–107)
CHOLEST SERPL-MCNC: 281 MG/DL
CREAT SERPL-MCNC: 0.76 MG/DL (ref 0.51–0.95)
DEPRECATED HCO3 PLAS-SCNC: 25 MMOL/L (ref 22–29)
ERYTHROCYTE [DISTWIDTH] IN BLOOD BY AUTOMATED COUNT: 13.1 % (ref 10–15)
GFR SERPL CREATININE-BSD FRML MDRD: 83 ML/MIN/1.73M2
GLUCOSE SERPL-MCNC: 171 MG/DL (ref 70–99)
HBA1C MFR BLD: 5.6 % (ref 0–5.6)
HCT VFR BLD AUTO: 45.5 % (ref 35–47)
HDLC SERPL-MCNC: 103 MG/DL
HGB BLD-MCNC: 14.5 G/DL (ref 11.7–15.7)
LDLC SERPL CALC-MCNC: 156 MG/DL
MCH RBC QN AUTO: 31.1 PG (ref 26.5–33)
MCHC RBC AUTO-ENTMCNC: 31.9 G/DL (ref 31.5–36.5)
MCV RBC AUTO: 98 FL (ref 78–100)
NONHDLC SERPL-MCNC: 178 MG/DL
PLATELET # BLD AUTO: 226 10E3/UL (ref 150–450)
POTASSIUM SERPL-SCNC: 4.6 MMOL/L (ref 3.4–5.3)
PROT SERPL-MCNC: 7 G/DL (ref 6.4–8.3)
RBC # BLD AUTO: 4.66 10E6/UL (ref 3.8–5.2)
SODIUM SERPL-SCNC: 139 MMOL/L (ref 136–145)
TRIGL SERPL-MCNC: 108 MG/DL
TSH SERPL DL<=0.005 MIU/L-ACNC: 0.74 UIU/ML (ref 0.3–4.2)
WBC # BLD AUTO: 10 10E3/UL (ref 4–11)

## 2023-03-07 PROCEDURE — 36415 COLL VENOUS BLD VENIPUNCTURE: CPT | Performed by: INTERNAL MEDICINE

## 2023-03-07 PROCEDURE — 85027 COMPLETE CBC AUTOMATED: CPT | Performed by: INTERNAL MEDICINE

## 2023-03-07 PROCEDURE — G0439 PPPS, SUBSEQ VISIT: HCPCS | Performed by: INTERNAL MEDICINE

## 2023-03-07 PROCEDURE — 83036 HEMOGLOBIN GLYCOSYLATED A1C: CPT | Performed by: INTERNAL MEDICINE

## 2023-03-07 PROCEDURE — 80061 LIPID PANEL: CPT | Performed by: INTERNAL MEDICINE

## 2023-03-07 PROCEDURE — 80053 COMPREHEN METABOLIC PANEL: CPT | Performed by: INTERNAL MEDICINE

## 2023-03-07 PROCEDURE — 84443 ASSAY THYROID STIM HORMONE: CPT | Performed by: INTERNAL MEDICINE

## 2023-03-07 PROCEDURE — 99214 OFFICE O/P EST MOD 30 MIN: CPT | Mod: 25 | Performed by: INTERNAL MEDICINE

## 2023-03-07 PROCEDURE — 69209 REMOVE IMPACTED EAR WAX UNI: CPT | Performed by: INTERNAL MEDICINE

## 2023-03-07 RX ORDER — OMEPRAZOLE 40 MG/1
40 CAPSULE, DELAYED RELEASE ORAL DAILY
Qty: 90 CAPSULE | Refills: 3 | Status: SHIPPED | OUTPATIENT
Start: 2023-03-07 | End: 2024-09-17

## 2023-03-07 RX ORDER — GABAPENTIN 300 MG/1
300 CAPSULE ORAL 3 TIMES DAILY
Qty: 270 CAPSULE | Refills: 3 | Status: SHIPPED | OUTPATIENT
Start: 2023-03-07 | End: 2023-09-05

## 2023-03-07 RX ORDER — TRAZODONE HYDROCHLORIDE 100 MG/1
100 TABLET ORAL AT BEDTIME
Qty: 180 TABLET | Refills: 3 | Status: SHIPPED | OUTPATIENT
Start: 2023-03-07 | End: 2024-01-08

## 2023-03-07 RX ORDER — OMEPRAZOLE 40 MG/1
40 CAPSULE, DELAYED RELEASE ORAL DAILY
Qty: 90 CAPSULE | Refills: 3 | Status: CANCELLED | OUTPATIENT
Start: 2023-03-07

## 2023-03-07 ASSESSMENT — PAIN SCALES - GENERAL: PAINLEVEL: NO PAIN (0)

## 2023-03-07 NOTE — NURSING NOTE
Patient identified using two patient identifiers.  Ear exam showing wax occlusion completed by provider.  Solution: warm water was placed in the left ear(s) via irrigation tool: elephant ear. Pt tolerated well.  Cara Steen, CMA

## 2023-03-07 NOTE — PATIENT INSTRUCTIONS
(Z00.00) Routine general medical examination at a health care facility  (primary encounter diagnosis)  Comment: For routine exam, we will draw labs as ordered, cholesterol, diabetes mellitus check, liver function, renal function, We will also update vaccination history.  Mammogram can be obtained in August 2023  [facility] (also performs diagnostic mammogram, ultrasound and biopsy) [phone].   Plan: Lipid panel reflex to direct LDL Non-fasting,         Comprehensive metabolic panel, CBC with         platelets, TSH with free T4 reflex, Hemoglobin         A1c            (Z79.899) Encounter for long-term (current) use of medications  Comment:   Plan:     (M54.59) Mechanical low back pain  Comment: Continue gabapentin 300 mg three times daily   Plan: gabapentin (NEURONTIN) 300 MG capsule            (G25.81) Restless legs syndrome  Comment:   Plan: gabapentin (NEURONTIN) 300 MG capsule,         Hemoglobin A1c            (F51.01) Primary insomnia  Comment: Continue trazodone   Plan: traZODone (DESYREL) 100 MG tablet, omeprazole         (PRILOSEC) 40 MG DR capsule            (F33.0) Mild recurrent major depression (H)  Comment: Continue sertraline   Plan: sertraline (ZOLOFT) 50 MG tablet            (D84.9) Immunosuppressed status (H)  Comment: Noted, no changes in either methotrexate and prednisone  Plan:     (M32.9) Systemic lupus erythematosus, unspecified SLE type, unspecified organ involvement status (H)  Comment: noted no changes  Plan:     (M81.0) Osteoporosis, unspecified osteoporosis type, unspecified pathological fracture presence  Comment: Continue calcium and vitamin D   Plan:     (E07.9) Thyroid disease  Comment: check TSH  Plan: TSH with free T4 reflex, Hemoglobin A1c            (R73.01) IFG (impaired fasting glucose)  Comment: check hemoglobin A1c   Plan: Hemoglobin A1c

## 2023-03-09 NOTE — RESULT ENCOUNTER NOTE
Awais Casas,    I had the opportunity to review your recent labs and a summary of your labs reads as follows:    Your complete blood counts show no sign of anemia, normal white blood cell count and platelets.  Your comprehensive metabolic panel showed normal renal function, normal liver function,  There was a slight elevation of your blood glucose, but this is not concerning as your A1c was excellent indicating stable average blood glucose  Your thyroid function returned within normal limits  Your fasting lipid panel show  - normal HDL (good) cholesterol -as your goal is greater than 40  -Stable LDL (bad) cholesterol as your goal is less than 160 -given that your prior CT coronary calcium score was 0 in 2021 no statin medications would be recommended at this time  - normal triglyceride levels    Congratulations on your excellent results    Sincerely,  Josue Mejia MD

## 2023-03-15 ENCOUNTER — DOCUMENTATION ONLY (OUTPATIENT)
Dept: OTHER | Facility: CLINIC | Age: 73
End: 2023-03-15
Payer: COMMERCIAL

## 2023-03-28 ENCOUNTER — TRANSFERRED RECORDS (OUTPATIENT)
Dept: HEALTH INFORMATION MANAGEMENT | Facility: CLINIC | Age: 73
End: 2023-03-28
Payer: COMMERCIAL

## 2023-04-20 ENCOUNTER — TRANSFERRED RECORDS (OUTPATIENT)
Dept: HEALTH INFORMATION MANAGEMENT | Facility: CLINIC | Age: 73
End: 2023-04-20
Payer: COMMERCIAL

## 2023-04-20 LAB
ALT SERPL-CCNC: 19 IU/L (ref 5–35)
AST SERPL-CCNC: 23 U/L (ref 5–34)
CREATININE (EXTERNAL): 0.79 MG/DL (ref 0.5–1.3)
GFR ESTIMATED (EXTERNAL): NORMAL ML/MIN/1.73M2
GFR ESTIMATED (IF AFRICAN AMERICAN) (EXTERNAL): NORMAL ML/MIN/1.73M2

## 2023-05-04 ENCOUNTER — OFFICE VISIT (OUTPATIENT)
Dept: NEUROLOGY | Facility: CLINIC | Age: 73
End: 2023-05-04
Payer: COMMERCIAL

## 2023-05-04 VITALS — HEART RATE: 66 BPM | DIASTOLIC BLOOD PRESSURE: 83 MMHG | SYSTOLIC BLOOD PRESSURE: 122 MMHG | OXYGEN SATURATION: 99 %

## 2023-05-04 DIAGNOSIS — R41.3 MEMORY LOSS: Primary | ICD-10-CM

## 2023-05-04 DIAGNOSIS — G31.84 MCI (MILD COGNITIVE IMPAIRMENT): ICD-10-CM

## 2023-05-04 DIAGNOSIS — R26.89 BALANCE PROBLEMS: ICD-10-CM

## 2023-05-04 DIAGNOSIS — R26.9 ABNORMAL GAIT: ICD-10-CM

## 2023-05-04 PROCEDURE — 99204 OFFICE O/P NEW MOD 45 MIN: CPT | Performed by: PSYCHIATRY & NEUROLOGY

## 2023-05-04 NOTE — PROGRESS NOTES
[facility]    Yessenia Martines MRN# [id number]   Age: 73 year old YOB: 1950     Requesting physician: No ref. provider found  Josue Mejia            Assessment and Plan:   Assessment:  1.  Complaints referencing central neurological difficulties with gait ignition, cognitive and memory concerns, balance and falls, and generalized fatigue.     Plan:  1.  Will reassess with updated MRI brain and Neuropsychometric analysis and meet in follow up after completion    At this time, I do not detect significant peripheral neuropathic abnormalities involving myelopathy, neuropathy or myopathy.  I believe that her balance, gait ignition, motor impairments, and cognitive issues are all related to central nervous system impairments.  Whether these are created by as yet undisclosed neuro pathology such as an emerging neurodegenerative disorder versus interference by her depression and other systemic illness is unknown but will be reassessed with plan noted above.  I will confer with her after her MRI is performed as this will probably be within the next few weeks but will not meet with her again until after the neuropsychometric testing is completed which may take several months yet.  Further disposition will be dependent upon the results of those tests.             History of Present Illness:   CC:  I met today with Jonelle and her daughter Xiomara for 40 minutes to review issues she is having with central nervous system decline.  I have known her since calendar year 2013 when she presented with some motor difficulties including fasciculations, tremors, jerking as involuntary movements accompanied by sleep disruption mood disruption difficulty with balance and cognitive decline.  Extensive investigation included MR brain imaging MR cervical spine imaging, EEG and EMG all of which were largely unremarkable.  She was treated symptomatically however neuropsychometric testing was performed in 2015  owing to additional difficulties with word finding and memory she was not found to have substantial neural pathology and I was wondering if perhaps her chronic pain, the pressures of taking care of her  whose health was deteriorating (and who ultimately  2 years ago) or her depression were the source of her difficulties.  We readdressed the same issues in 2019 when she returned complaining of the same difficulties.  We repeated her EMG and MRI brain imaging which did not disclose any obvious pathology and I have not met with her symptoms.    She returns today, now  for the past 2 years and is accompanied by her daughter, Xiomara.  She continues to have difficulties with her gait and balance, with gait ignition stepping up and down curbs, falling, having some jerking fasciculation and tremor in her hands, difficulty with depth perception as well as cognitive decline now having difficulty finding words, difficulty with memory, losing her train of thought and ability to concentrate and process new information or solve problems.  This has become increasingly pervasive and noticeable by her family.    She has recently gone through CT brain imaging when she fell and struck her head showing no acute intracranial pathology.  She has also undergone review by her internist, Dr. Josue Mejia without finding significant abnormalities.    She reports her mood to be stable, she continues to take the Zoloft and has done so since her  passed.  She has been felt to have restless leg disorder for which gabapentin was prescribed which did help although she states that she is really not bothered much by the symptoms any longer.  She continues to have excessive snoring but no excessive daytime sleepiness and reports that her sleep study done previously did not disclose sleep apnea.  She continues to take trazodone at night for sleep and reports that her sleep is refreshing.  She does not nap or fall asleep  during the day.  She is not bothered by any tinnitus or hearing loss and does not have head motion induced vertigo.  She feels generally fatigued during the day but has not lost any power.  The balance of her neurologic review of systems is noncontributory.    The balance of her health history and medications are noted below.               Physical Exam:   Her current clinical examination is remarkable for difficulties with gait and balance.  She has a slightly broad-based and apractic gait.  She has gait ignition difficulties and trouble walking on her heels and toes upon request.  Although her Romberg is negative, she falls with her feet together eyes open and eyes closed.  She does not have significant sensory loss however distally in the lower extremities.  She is largely hyporeflexic but symmetrically so with downgoing toes.  She has difficulty with fine finger movements and rapid alternating movements however these are impairments related to motor initiation rather than coordination.  She is otherwise awake alert, oriented and appropriate, her speech is fluent comprehension is intact and remote and recent recall are not significantly impaired.         Data:   All laboratory data reviewed  All imaging studies reviewed by me             DATA for DOCUMENTATION:         Past Medical History:     Patient Active Problem List   Diagnosis     Systemic lupus erythematosus (H)     CARDIOVASCULAR SCREENING; LDL GOAL LESS THAN 130     Restless legs syndrome     Peripheral neuropathy, idiopathic     Osteoporosis     Mechanical low back pain     Hemorrhage of cyst of native kidney     Menopausal osteoporosis     Lupus (systemic lupus erythematosus) (H)     MCI (mild cognitive impairment)     Dysphonia     Globus sensation     Abnormal CT scan, lung     Somatic dysfunction of lumbar region     Bilateral low back pain with sciatica, sciatica laterality unspecified, unspecified chronicity     Somatic dysfunction of sacral  region     Sacral pain     Thoracic segment dysfunction     Advance care planning     Microscopic hematuria     History of Clostridium difficile colitis     Chronic pain syndrome     Gastroparesis     Primary insomnia     Immunosuppressed status (H)     SVT (supraventricular tachycardia) (H)     Past Medical History:   Diagnosis Date     Allergic rhinitis due to pollen      Congestive heart failure (H)      Decreased libido      Depressive disorder      Diabetes (H)      Esophageal motility disorder      H pylori ulcer      History of blood transfusion      Hoarseness      Hypertension      Lupus (systemic lupus erythematosus) (H)     diagnosed 12 yrs ago     MCI (mild cognitive impairment)      Menopausal osteoporosis 2015     Movement disorder      Mumps     as a child     Neuropathy     feet and hands     S/P cholecystectomy      S/P [id number]      Thyroid disease      Uncomplicated asthma        Also see scanned health assessment forms.       Past Surgical History:     Past Surgical History:   Procedure Laterality Date     APPENDECTOMY       ARTHRODESIS FINGER(S) Left 2018    Procedure: ARTHRODESIS FINGER(S);;  Surgeon: Shahida Carlton MD;  Location: [facility]     ARTHROPLASTY CARPOMETACARPAL (THUMB JOINT) Left 2018    Procedure: ARTHROPLASTY CARPOMETACARPAL (THUMB JOINT);  LEFT THUMB CARPALMETACARPAL WITH MINI TIGHTROPE, LEFT METAPHALANGEAL FUSION WITH RADIAL BONE GRAFT AND EASY CLIP STAPLE.;  Surgeon: Shahida Carlton MD;  Location: [facility]     ARTHROPLASTY CARPOMETACARPAL (THUMB JOINT), ARTHRODESIS, COMBINED Left 2018    Procedure: COMBINED ARTHROPLASTY CARPOMETACARPAL (THUMB JOINT), ARTHRODESIS;  LEFT THUMB CARPOMETACARPAL EXCISIONAL ARTHROPLASTY WITH FACSIA TANIA GRAFT, PARTIAL TRAPEZOID EXCISION;  Surgeon: Shahida Carlton MD;  Location: [facility]     BACK SURGERY      L2-L4 or L5 fusion     BLEPHAROPLASTY BILATERAL        SECTION      x3      CHOLECYSTECTOMY  2010    feels better. No pathology on gallbladder     COLONOSCOPY       EYE SURGERY  2011    Raised eyelids.     GENITOURINARY SURGERY       GRAFT BONE TO FINGER Left 5/18/2018    Procedure: GRAFT BONE TO FINGER;;  Surgeon: Briana Carlton MD;  Location: [facility]     HYSTERECTOMY       KNEE SURGERY      bilateral arthroscopy     ORTHOPEDIC SURGERY      right thumb tendon surgery     REPAIR TENDON FINGER(S)  11/9/2011    Procedure:REPAIR TENDON FINGER(S); RIGHT INDEX FINGER FLEXOR DIGITORUM PROFUNDUS REPAIR, RADIAL DIGITAL NERVE REPAIR ; Surgeon:BRIANA CARLTON; Location:[facility]     SALPINGO-OOPHORECTOMY BILATERAL       SHOULDER SURGERY Right     arthroscopy     SOFT TISSUE SURGERY      Tendon repair on finger     TONSILLECTOMY      Child     [facility] UGI ENDOSCOPY, SIMPLE EXAM  04/02/14            Social History:     Social History     Socioeconomic History     Marital status:      Spouse name: Not on file     Number of children: 4     Years of education: Not on file     Highest education level: Not on file   Occupational History     Not on file   Tobacco Use     Smoking status: Never     Smokeless tobacco: Never   Vaping Use     Vaping status: Never Used   Substance and Sexual Activity     Alcohol use: Yes     Alcohol/week: 0.0 standard drinks of alcohol     Comment: 1 wine/day     Drug use: No     Sexual activity: Yes     Partners: Male     Birth control/protection: None     Comment: hysterectomy   Other Topics Concern     Parent/sibling w/ CABG, MI or angioplasty before 65F 55M? No   Social History Narrative    , 4 children    Retired - highschool counselor - [employer]        Has Primary Care joshua         prostate [place]     Social Determinants of Health     Financial Resource Strain: Not on file   Food Insecurity: Not on file   Transportation Needs: Not on file   Physical Activity: Not on file   Stress: Not on file   Social Connections: Not on  file   Intimate Partner Violence: Not on file   Housing Stability: Not on file              Family History:     Family History   Problem Relation Age of Onset     C.A.D. Father         cabg, carotid stenosis     Hyperlipidemia Father      Hypertension Father      Coronary Artery Disease Father      Anesthesia Reaction Father      Neurologic Disorder Mother         ALS? Parkinsons?     Osteoporosis Mother      Depression Mother      Anxiety Disorder Mother      Hypertension Brother      Hyperlipidemia Brother      Depression Brother      Colon Cancer Maternal Grandmother      Colon Cancer Maternal Aunt      Hyperlipidemia Sister      Hypertension Sister      Osteoporosis Sister      Hyperlipidemia Brother      Osteoporosis Sister      Osteoporosis Maternal Aunt      Hypertension Sister      Diabetes Son      Genetic Disorder Son      Thyroid Disease Son      Diabetes Other      Genetic Disorder Other      Genetic Disorder Daughter      Thyroid Disease Niece      Mental Illness No family hx of             Medications:     Current Outpatient Medications   Medication Sig     calcium carbonate 600 mg-vitamin D 400 units (CALTRATE) 600-400 MG-UNIT per tablet Take 1 tablet by mouth 2 times daily     cetirizine (ZYRTEC) 10 MG tablet 10 mg by Oral or J tube route     diclofenac (VOLTAREN) 1 % topical gel Place 2 g onto the skin 4 times daily     famotidine (PEPCID) 40 MG tablet Take 1 tablet by mouth every 12 hours     gabapentin (NEURONTIN) 300 MG capsule Take 1 capsule (300 mg) by mouth 3 times daily     leucovorin (WELLCOVORIN) 5 MG tablet Take 1 tablet (5 mg) by mouth daily     methotrexate 2.5 MG tablet Take 4 tablets by mouth once a week      metoclopramide (REGLAN) 5 MG tablet Take 5 mg by mouth once     omeprazole (PRILOSEC) 40 MG DR capsule Take 1 capsule (40 mg) by mouth daily     predniSONE (DELTASONE) 5 MG tablet Taking 5 mg and 1 mg tablet x2 to get a total 7 mg daily      prochlorperazine (COMPAZINE) 5 mg/mL       sertraline (ZOLOFT) 50 MG tablet Take 1 tablet (50 mg) by mouth daily     traZODone (DESYREL) 100 MG tablet Take 1 tablet (100 mg) by mouth At Bedtime     polyethylene glycol (MIRALAX) 17 GM/Dose powder      No current facility-administered medications for this visit.              Review of Systems:   A comprehensive 10 point review of systems (constitutional, ENT, cardiac, peripheral vascular, lymphatic, respiratory, GI, , Musculoskeletal, skin, Neurological) was performed and found to be negative except as described in this note.     See intake form completed by patient

## 2023-05-04 NOTE — LETTER
5/4/2023         RE: Yessenia Martines  [address] [facility] [address]        Dear Colleague,    Thank you for referring your patient, Yessenia Martines, to the [facility] NEUROLOG[facility]. Please see a copy of my visit note below.        [facility]    Yessenia Martines MRN# [id number]   Age: 73 year old YOB: 1950     Requesting physician: No ref. provider found  Josue Mejia            Assessment and Plan:   Assessment:  1.  Complaints referencing central neurological difficulties with gait ignition, cognitive and memory concerns, balance and falls, and generalized fatigue.     Plan:  1.  Will reassess with updated MRI brain and Neuropsychometric analysis and meet in follow up after completion    At this time, I do not detect significant peripheral neuropathic abnormalities involving myelopathy, neuropathy or myopathy.  I believe that her balance, gait ignition, motor impairments, and cognitive issues are all related to central nervous system impairments.  Whether these are created by as yet undisclosed neuro pathology such as an emerging neurodegenerative disorder versus interference by her depression and other systemic illness is unknown but will be reassessed with plan noted above.  I will confer with her after her MRI is performed as this will probably be within the next few weeks but will not meet with her again until after the neuropsychometric testing is completed which may take several months yet.  Further disposition will be dependent upon the results of those tests.             History of Present Illness:   CC:  I met today with Jonelle and her daughter Xiomara for 40 minutes to review issues she is having with central nervous system decline.  I have known her since calendar year 2013 when she presented with some motor difficulties including fasciculations, tremors, jerking as involuntary movements accompanied by sleep disruption mood  disruption difficulty with balance and cognitive decline.  Extensive investigation included MR brain imaging MR cervical spine imaging, EEG and EMG all of which were largely unremarkable.  She was treated symptomatically however neuropsychometric testing was performed in  owing to additional difficulties with word finding and memory she was not found to have substantial neural pathology and I was wondering if perhaps her chronic pain, the pressures of taking care of her  whose health was deteriorating (and who ultimately  2 years ago) or her depression were the source of her difficulties.  We readdressed the same issues in 2019 when she returned complaining of the same difficulties.  We repeated her EMG and MRI brain imaging which did not disclose any obvious pathology and I have not met with her symptoms.    She returns today, now  for the past 2 years and is accompanied by her daughter, Xiomara.  She continues to have difficulties with her gait and balance, with gait ignition stepping up and down curbs, falling, having some jerking fasciculation and tremor in her hands, difficulty with depth perception as well as cognitive decline now having difficulty finding words, difficulty with memory, losing her train of thought and ability to concentrate and process new information or solve problems.  This has become increasingly pervasive and noticeable by her family.    She has recently gone through CT brain imaging when she fell and struck her head showing no acute intracranial pathology.  She has also undergone review by her internist, Dr. Josue Mejia without finding significant abnormalities.    She reports her mood to be stable, she continues to take the Zoloft and has done so since her  passed.  She has been felt to have restless leg disorder for which gabapentin was prescribed which did help although she states that she is really not bothered much by the symptoms any longer.  She  continues to have excessive snoring but no excessive daytime sleepiness and reports that her sleep study done previously did not disclose sleep apnea.  She continues to take trazodone at night for sleep and reports that her sleep is refreshing.  She does not nap or fall asleep during the day.  She is not bothered by any tinnitus or hearing loss and does not have head motion induced vertigo.  She feels generally fatigued during the day but has not lost any power.  The balance of her neurologic review of systems is noncontributory.    The balance of her health history and medications are noted below.               Physical Exam:   Her current clinical examination is remarkable for difficulties with gait and balance.  She has a slightly broad-based and apractic gait.  She has gait ignition difficulties and trouble walking on her heels and toes upon request.  Although her Romberg is negative, she falls with her feet together eyes open and eyes closed.  She does not have significant sensory loss however distally in the lower extremities.  She is largely hyporeflexic but symmetrically so with downgoing toes.  She has difficulty with fine finger movements and rapid alternating movements however these are impairments related to motor initiation rather than coordination.  She is otherwise awake alert, oriented and appropriate, her speech is fluent comprehension is intact and remote and recent recall are not significantly impaired.         Data:   All laboratory data reviewed  All imaging studies reviewed by me             DATA for DOCUMENTATION:         Past Medical History:     Patient Active Problem List   Diagnosis     Systemic lupus erythematosus (H)     CARDIOVASCULAR SCREENING; LDL GOAL LESS THAN 130     Restless legs syndrome     Peripheral neuropathy, idiopathic     Osteoporosis     Mechanical low back pain     Hemorrhage of cyst of native kidney     Menopausal osteoporosis     Lupus (systemic lupus erythematosus)  (H)     MCI (mild cognitive impairment)     Dysphonia     Globus sensation     Abnormal CT scan, lung     Somatic dysfunction of lumbar region     Bilateral low back pain with sciatica, sciatica laterality unspecified, unspecified chronicity     Somatic dysfunction of sacral region     Sacral pain     Thoracic segment dysfunction     Advance care planning     Microscopic hematuria     History of Clostridium difficile colitis     Chronic pain syndrome     Gastroparesis     Primary insomnia     Immunosuppressed status (H)     SVT (supraventricular tachycardia) (H)     Past Medical History:   Diagnosis Date     Allergic rhinitis due to pollen      Congestive heart failure (H)      Decreased libido      Depressive disorder      Diabetes (H)      Esophageal motility disorder      H pylori ulcer      History of blood transfusion 1980's     Hoarseness      Hypertension      Lupus (systemic lupus erythematosus) (H)     diagnosed 12 yrs ago     MCI (mild cognitive impairment)      Menopausal osteoporosis 02/17/2015     Movement disorder      Mumps     as a child     Neuropathy     feet and hands     S/P cholecystectomy      S/P TIESHA-BSO      Thyroid disease      Uncomplicated asthma        Also see scanned health assessment forms.       Past Surgical History:     Past Surgical History:   Procedure Laterality Date     APPENDECTOMY       ARTHRODESIS FINGER(S) Left 5/18/2018    Procedure: ARTHRODESIS FINGER(S);;  Surgeon: Shahida Carlton MD;  Location: [facility]     ARTHROPLASTY CARPOMETACARPAL (THUMB JOINT) Left 5/18/2018    Procedure: ARTHROPLASTY CARPOMETACARPAL (THUMB JOINT);  LEFT THUMB CARPALMETACARPAL WITH MINI TIGHTROPE, LEFT METAPHALANGEAL FUSION WITH RADIAL BONE GRAFT AND EASY CLIP STAPLE.;  Surgeon: Shahida Carlton MD;  Location: [facility]     ARTHROPLASTY CARPOMETACARPAL (THUMB JOINT), ARTHRODESIS, COMBINED Left 1/24/2018    Procedure: COMBINED ARTHROPLASTY CARPOMETACARPAL (THUMB JOINT),  ARTHRODESIS;  LEFT THUMB CARPOMETACARPAL EXCISIONAL ARTHROPLASTY WITH FACSIA TANIA GRAFT, PARTIAL TRAPEZOID EXCISION;  Surgeon: Briana Carlton MD;  Location: [facility]     BACK SURGERY      L2-L4 or L5 fusion     BLEPHAROPLASTY BILATERAL        SECTION      x3     CHOLECYSTECTOMY      feels better. No pathology on gallbladder     COLONOSCOPY       EYE SURGERY      Raised eyelids.     GENITOURINARY SURGERY       GRAFT BONE TO FINGER Left 2018    Procedure: GRAFT BONE TO FINGER;;  Surgeon: Briana Carlton MD;  Location: [facility]     HYSTERECTOMY       KNEE SURGERY      bilateral arthroscopy     ORTHOPEDIC SURGERY      right thumb tendon surgery     REPAIR TENDON FINGER(S)  2011    Procedure:REPAIR TENDON FINGER(S); RIGHT INDEX FINGER FLEXOR DIGITORUM PROFUNDUS REPAIR, RADIAL DIGITAL NERVE REPAIR ; Surgeon:BRIANA CARLTON; Location:[facility]     SALPINGO-OOPHORECTOMY BILATERAL       SHOULDER SURGERY Right     arthroscopy     SOFT TISSUE SURGERY      Tendon repair on finger     TONSILLECTOMY      Child     [facility] UGI ENDOSCOPY, SIMPLE EXAM  15            Social History:     Social History     Socioeconomic History     Marital status:      Spouse name: Not on file     Number of children: 4     Years of education: Not on file     Highest education level: Not on file   Occupational History     Not on file   Tobacco Use     Smoking status: Never     Smokeless tobacco: Never   Vaping Use     Vaping status: Never Used   Substance and Sexual Activity     Alcohol use: Yes     Alcohol/week: 0.0 standard drinks of alcohol     Comment: 1 wine/day     Drug use: No     Sexual activity: Yes     Partners: Male     Birth control/protection: None     Comment: hysterectomy   Other Topics Concern     Parent/sibling w/ CABG, MI or angioplasty before 65F 55M? No   Social History Narrative    , 4 children    Retired - highschool counselor - [employer]         Has Primary Care joshua         prostate CA     Social Determinants of Health     Financial Resource Strain: Not on file   Food Insecurity: Not on file   Transportation Needs: Not on file   Physical Activity: Not on file   Stress: Not on file   Social Connections: Not on file   Intimate Partner Violence: Not on file   Housing Stability: Not on file              Family History:     Family History   Problem Relation Age of Onset     C.A.D. Father         cabg, carotid stenosis     Hyperlipidemia Father      Hypertension Father      Coronary Artery Disease Father      Anesthesia Reaction Father      Neurologic Disorder Mother         ALS? Parkinsons?     Osteoporosis Mother      Depression Mother      Anxiety Disorder Mother      Hypertension Brother      Hyperlipidemia Brother      Depression Brother      Colon Cancer Maternal Grandmother      Colon Cancer Maternal Aunt      Hyperlipidemia Sister      Hypertension Sister      Osteoporosis Sister      Hyperlipidemia Brother      Osteoporosis Sister      Osteoporosis Maternal Aunt      Hypertension Sister      Diabetes Son      Genetic Disorder Son      Thyroid Disease Son      Diabetes Other      Genetic Disorder Other      Genetic Disorder Daughter      Thyroid Disease Niece      Mental Illness No family hx of             Medications:     Current Outpatient Medications   Medication Sig     calcium carbonate 600 mg-vitamin D 400 units (CALTRATE) 600-400 MG-UNIT per tablet Take 1 tablet by mouth 2 times daily     cetirizine (ZYRTEC) 10 MG tablet 10 mg by Oral or J tube route     diclofenac (VOLTAREN) 1 % topical gel Place 2 g onto the skin 4 times daily     famotidine (PEPCID) 40 MG tablet Take 1 tablet by mouth every 12 hours     gabapentin (NEURONTIN) 300 MG capsule Take 1 capsule (300 mg) by mouth 3 times daily     leucovorin (WELLCOVORIN) 5 MG tablet Take 1 tablet (5 mg) by mouth daily     methotrexate 2.5 MG tablet Take 4 tablets by mouth once a week       metoclopramide (REGLAN) 5 MG tablet Take 5 mg by mouth once     omeprazole (PRILOSEC) 40 MG DR capsule Take 1 capsule (40 mg) by mouth daily     predniSONE (DELTASONE) 5 MG tablet Taking 5 mg and 1 mg tablet x2 to get a total 7 mg daily      prochlorperazine (COMPAZINE) 5 mg/mL      sertraline (ZOLOFT) 50 MG tablet Take 1 tablet (50 mg) by mouth daily     traZODone (DESYREL) 100 MG tablet Take 1 tablet (100 mg) by mouth At Bedtime     polyethylene glycol (MIRALAX) 17 GM/Dose powder      No current facility-administered medications for this visit.              Review of Systems:   A comprehensive 10 point review of systems (constitutional, ENT, cardiac, peripheral vascular, lymphatic, respiratory, GI, , Musculoskeletal, skin, Neurological) was performed and found to be negative except as described in this note.     See intake form completed by patient        Again, thank you for allowing me to participate in the care of your patient.        Sincerely,        Emanuel Mejia MD, MD

## 2023-05-09 ENCOUNTER — TELEPHONE (OUTPATIENT)
Dept: NEUROPSYCHOLOGY | Facility: CLINIC | Age: 73
End: 2023-05-09
Payer: COMMERCIAL

## 2023-05-09 NOTE — TELEPHONE ENCOUNTER
Spoke with patient, scheduled Neuropsych Eval on 11/6 at 8am with Dr. James at the [facility].    Bobby Lopes on 5/9/2023 at 1:18 AM

## 2023-05-09 NOTE — TELEPHONE ENCOUNTER
[facility] Call Center    Phone Message    May a detailed message be left on voicemail: yes     Reason for Call: Other: Pt is calling to make appt. Please call Pt back.     Action Taken: Message routed to:  [facility] neuropsychology    Travel Screening: Not Applicable

## 2023-05-10 ENCOUNTER — ANCILLARY PROCEDURE (OUTPATIENT)
Dept: MRI IMAGING | Facility: CLINIC | Age: 73
End: 2023-05-10
Attending: PSYCHIATRY & NEUROLOGY
Payer: COMMERCIAL

## 2023-05-10 DIAGNOSIS — R41.3 MEMORY LOSS: ICD-10-CM

## 2023-05-10 DIAGNOSIS — R26.89 BALANCE PROBLEMS: ICD-10-CM

## 2023-05-10 DIAGNOSIS — R26.9 ABNORMAL GAIT: ICD-10-CM

## 2023-05-10 PROCEDURE — 70551 MRI BRAIN STEM W/O DYE: CPT

## 2023-07-17 ENCOUNTER — PATIENT OUTREACH (OUTPATIENT)
Dept: CARE COORDINATION | Facility: CLINIC | Age: 73
End: 2023-07-17
Payer: COMMERCIAL

## 2023-07-25 ENCOUNTER — TRANSFERRED RECORDS (OUTPATIENT)
Dept: HEALTH INFORMATION MANAGEMENT | Facility: CLINIC | Age: 73
End: 2023-07-25
Payer: COMMERCIAL

## 2023-07-25 LAB
ALT SERPL-CCNC: 15 IU/L (ref 5–35)
AST SERPL-CCNC: 20 U/L (ref 5–34)
CREATININE (EXTERNAL): 0.8 MG/DL (ref 0.5–1.3)
GFR ESTIMATED (EXTERNAL): 74.9 ML/MIN/1.73M2

## 2023-08-14 ENCOUNTER — PATIENT OUTREACH (OUTPATIENT)
Dept: CARE COORDINATION | Facility: CLINIC | Age: 73
End: 2023-08-14
Payer: COMMERCIAL

## 2023-09-05 ENCOUNTER — OFFICE VISIT (OUTPATIENT)
Dept: FAMILY MEDICINE | Facility: CLINIC | Age: 73
End: 2023-09-05
Payer: COMMERCIAL

## 2023-09-05 VITALS
DIASTOLIC BLOOD PRESSURE: 77 MMHG | BODY MASS INDEX: 21.95 KG/M2 | OXYGEN SATURATION: 96 % | SYSTOLIC BLOOD PRESSURE: 115 MMHG | WEIGHT: 123.9 LBS | HEIGHT: 63 IN | RESPIRATION RATE: 14 BRPM | HEART RATE: 67 BPM | TEMPERATURE: 98.3 F

## 2023-09-05 DIAGNOSIS — M54.59 MECHANICAL LOW BACK PAIN: ICD-10-CM

## 2023-09-05 DIAGNOSIS — G31.84 MCI (MILD COGNITIVE IMPAIRMENT): Primary | ICD-10-CM

## 2023-09-05 DIAGNOSIS — G25.81 RESTLESS LEGS SYNDROME: ICD-10-CM

## 2023-09-05 DIAGNOSIS — F33.0 MILD RECURRENT MAJOR DEPRESSION (H): ICD-10-CM

## 2023-09-05 DIAGNOSIS — Z12.31 ENCOUNTER FOR SCREENING MAMMOGRAM FOR BREAST CANCER: ICD-10-CM

## 2023-09-05 PROCEDURE — 99213 OFFICE O/P EST LOW 20 MIN: CPT | Performed by: INTERNAL MEDICINE

## 2023-09-05 RX ORDER — GABAPENTIN 300 MG/1
600 CAPSULE ORAL 2 TIMES DAILY
Qty: 360 CAPSULE | Refills: 3 | Status: SHIPPED | OUTPATIENT
Start: 2023-09-05 | End: 2024-02-14

## 2023-09-05 ASSESSMENT — PAIN SCALES - GENERAL: PAINLEVEL: NO PAIN (0)

## 2023-09-05 ASSESSMENT — PATIENT HEALTH QUESTIONNAIRE - PHQ9: SUM OF ALL RESPONSES TO PHQ QUESTIONS 1-9: 0

## 2023-09-05 NOTE — PROGRESS NOTES
"Subjective   Pat is a 73 year old, presenting for the following health issues:  Follow Up      History of Present Illness       Reason for visit:  Folliw up from general exam six months ago    She eats 0-1 servings of fruits and vegetables daily.She consumes 0 sweetened beverage(s) daily.She exercises with enough effort to increase her heart rate 20 to 29 minutes per day.  She exercises with enough effort to increase her heart rate 7 days per week. She is missing 2 dose(s) of medications per week.  She is not taking prescribed medications regularly due to remembering to take.          Mild recurrent major depression (H)  MCI (mild cognitive impairment)  Mechanical low back pain  Yessenia Martines presents to the clinic for follow up.  She has been staying active.  Walking regularly.  Notes tingling in her feet and has increased dose of gabapentin she believes to 600 mg twice per day which helps with burning in the feet.  She still has restless leg symptoms at night.  She is still taking trazodone 200 mg at night to help with sleep.  She reamins on prednisone 7 mg daily per rheumatology.        Review of Systems   Constitutional, HEENT, cardiovascular, pulmonary, GI, , musculoskeletal, neuro, skin, endocrine and psych systems are negative, except as otherwise noted.      Objective    /77 (BP Location: Left arm, Patient Position: Sitting, Cuff Size: Adult Regular)   Pulse 67   Temp 98.3  F (36.8  C) (Oral)   Resp 14   Ht 1.6 m (5' 3\")   Wt 56.2 kg (123 lb 14.4 oz)   SpO2 96%   BMI 21.95 kg/m    Body mass index is 21.95 kg/m .  Physical Exam   GENERAL: healthy, alert and no distress  EYES: Eyes grossly normal to inspection,    HENT: ear canals and TM's normal,  NECK: no adenopathy, no asymmetry   RESP: lungs clear to auscultation - no rales, rhonchi or wheezes  CV: regular rate and rhythm,   ABDOMEN: soft, nontender, no hepatosplenomegaly  MS: full range of motion of bilateral lower extremity, walking " without use of cane  SKIN: no suspicious lesions or rashes  NEURO: Normal strength and tone, mentation intact and speech normal  PSYCH: mentation appears normal, affect normal/bright    No results found for this or any previous visit (from the past 24 hour(s)).      Patient Instructions   (G31.84) MCI (mild cognitive impairment)  (primary encounter diagnosis)  Comment: :Plan for upcoming neuropsychometric testing and follow up in neurology.  Note that you are not taking either namenda or Aricept and these could be prescribed in the future  Plan:     (F33.0) Mild recurrent major depression (H)  Comment: Doing very well.  Sertraline discontinued and we will continue trazodone  Plan:     (M54.59) Mechanical low back pain  Comment: We will continue on gabapentin - double check dosing when you get home.  Dose was increased to 600 mh twice per day   Plan: gabapentin (NEURONTIN) 300 MG capsule            (G25.81) Restless legs syndrome  Comment: as above - doing well  Plan: gabapentin (NEURONTIN) 300 MG capsule           [facility] (also performs diagnostic mammogram, ultrasound and biopsy) [phone].

## 2023-09-05 NOTE — PATIENT INSTRUCTIONS
(G31.84) MCI (mild cognitive impairment)  (primary encounter diagnosis)  Comment: :Plan for upcoming neuropsychometric testing and follow up in neurology.  Note that you are not taking either namenda or Aricept and these could be prescribed in the future  Plan:     (F33.0) Mild recurrent major depression (H)  Comment: Doing very well.  Sertraline discontinued and we will continue trazodone  Plan:     (M54.59) Mechanical low back pain  Comment: We will continue on gabapentin - double check dosing when you get home.  Dose was increased to 600 mh twice per day   Plan: gabapentin (NEURONTIN) 300 MG capsule            (G25.81) Restless legs syndrome  Comment: as above - doing well  Plan: gabapentin (NEURONTIN) 300 MG capsule           [facility] (also performs diagnostic mammogram, ultrasound and biopsy) [phone].

## 2023-10-17 ENCOUNTER — TELEPHONE (OUTPATIENT)
Dept: NEUROPSYCHOLOGY | Facility: CLINIC | Age: 73
End: 2023-10-17
Payer: COMMERCIAL
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
## 2023-10-17 NOTE — TELEPHONE ENCOUNTER
Contact patient- writer stated to patient that a virtual interview appointment need to schedule prior to her in person appointment.     Patient stated that someone called before writer and told her the something to schedule for an interview appt. Pt stated that she does not have time for an interview today. Lesvian stated the othe person told her she can do the interview on the day of the appt.   No encounter found in patients chart.     -Writer stated not to be aware that the interview could be made on the day of the appt but mention to patient will confirm with .    Danii Cintron on 10/17/2023 at 10:05 AM

## 2023-10-19 ENCOUNTER — OFFICE VISIT (OUTPATIENT)
Dept: NEUROPSYCHOLOGY | Facility: CLINIC | Age: 73
End: 2023-10-19
Attending: PSYCHIATRY & NEUROLOGY
Payer: COMMERCIAL

## 2023-10-19 DIAGNOSIS — G31.84 MCI (MILD COGNITIVE IMPAIRMENT): Primary | ICD-10-CM

## 2023-10-19 DIAGNOSIS — R41.3 MEMORY LOSS: ICD-10-CM

## 2023-10-19 PROCEDURE — 96133 NRPSYC TST EVAL PHYS/QHP EA: CPT | Performed by: PSYCHOLOGIST

## 2023-10-19 PROCEDURE — 96132 NRPSYC TST EVAL PHYS/QHP 1ST: CPT | Performed by: PSYCHOLOGIST

## 2023-10-19 PROCEDURE — 90791 PSYCH DIAGNOSTIC EVALUATION: CPT | Performed by: PSYCHOLOGIST

## 2023-10-19 NOTE — NURSING NOTE
Patient was seen for neuropsychological evaluation at the request of Dr. Mejia for the purposes of diagnostic clarification and treatment planning. 170 minutes of test administration and scoring were provided by this writer. Please see Dr. Marlo James's report for a full interpretation of the findings.    Xiomara Camarillo, Practicum Student
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
## 2023-11-07 ENCOUNTER — TRANSFERRED RECORDS (OUTPATIENT)
Dept: HEALTH INFORMATION MANAGEMENT | Facility: CLINIC | Age: 73
End: 2023-11-07
Payer: COMMERCIAL

## 2023-11-07 ENCOUNTER — TRANSCRIBE ORDERS (OUTPATIENT)
Dept: OTHER | Age: 73
End: 2023-11-07

## 2023-11-07 DIAGNOSIS — R29.898 HAND WEAKNESS: Primary | ICD-10-CM

## 2023-11-07 DIAGNOSIS — M19.90 ARTHRITIS: ICD-10-CM

## 2023-11-07 LAB
ALT SERPL-CCNC: 14 IU/L (ref 5–35)
AST SERPL-CCNC: 18 U/L (ref 5–34)
CREATININE (EXTERNAL): 0.82 MG/DL (ref 0.5–1.3)
GFR ESTIMATED (EXTERNAL): 72.8 ML/MIN/1.73M2

## 2023-11-18 ENCOUNTER — HEALTH MAINTENANCE LETTER (OUTPATIENT)
Age: 73
End: 2023-11-18

## 2023-11-30 ENCOUNTER — OFFICE VISIT (OUTPATIENT)
Dept: NEUROLOGY | Facility: CLINIC | Age: 73
End: 2023-11-30
Payer: COMMERCIAL

## 2023-11-30 VITALS
DIASTOLIC BLOOD PRESSURE: 77 MMHG | WEIGHT: 125 LBS | SYSTOLIC BLOOD PRESSURE: 120 MMHG | OXYGEN SATURATION: 97 % | HEART RATE: 74 BPM | BODY MASS INDEX: 22.14 KG/M2

## 2023-11-30 DIAGNOSIS — R41.3 MEMORY LOSS: ICD-10-CM

## 2023-11-30 DIAGNOSIS — R26.89 BALANCE PROBLEMS: ICD-10-CM

## 2023-11-30 DIAGNOSIS — R26.9 ABNORMAL GAIT: ICD-10-CM

## 2023-11-30 DIAGNOSIS — G31.84 MCI (MILD COGNITIVE IMPAIRMENT): Primary | ICD-10-CM

## 2023-11-30 PROCEDURE — 99215 OFFICE O/P EST HI 40 MIN: CPT | Performed by: PSYCHIATRY & NEUROLOGY

## 2023-11-30 RX ORDER — MEMANTINE HYDROCHLORIDE 5 MG/1
5 TABLET ORAL 2 TIMES DAILY
Qty: 70 TABLET | Refills: 0 | Status: SHIPPED | OUTPATIENT
Start: 2023-11-30 | End: 2023-12-28

## 2023-11-30 RX ORDER — DONEPEZIL HYDROCHLORIDE 5 MG/1
5 TABLET, FILM COATED ORAL AT BEDTIME
Qty: 28 TABLET | Refills: 0 | Status: SHIPPED | OUTPATIENT
Start: 2024-02-19 | End: 2024-03-18

## 2023-11-30 RX ORDER — DONEPEZIL HYDROCHLORIDE 10 MG/1
10 TABLET, FILM COATED ORAL AT BEDTIME
Qty: 90 TABLET | Refills: 3 | Status: SHIPPED | OUTPATIENT
Start: 2024-03-18 | End: 2024-04-23

## 2023-11-30 RX ORDER — MEMANTINE HYDROCHLORIDE 10 MG/1
10 TABLET ORAL 2 TIMES DAILY
Qty: 180 TABLET | Refills: 3 | Status: SHIPPED | OUTPATIENT
Start: 2023-12-28 | End: 2024-02-23

## 2023-11-30 NOTE — NURSING NOTE
"Yessenia Martines is a 73 year old female who presents for:  Chief Complaint   Patient presents with    Neurology follow up     Memory  Review Neuropsyc evaluation results        Initial Vitals:  /77   Pulse 74   Wt 56.7 kg (125 lb)   SpO2 97%   BMI 22.14 kg/m   Estimated body mass index is 22.14 kg/m  as calculated from the following:    Height as of 9/5/23: 1.6 m (5' 3\").    Weight as of this encounter: 56.7 kg (125 lb).. Body surface area is 1.59 meters squared. BP completed using cuff size: ese Fuentes    "

## 2023-11-30 NOTE — LETTER
11/30/2023         RE: Yessenia Martines  [id number] [address]  [place] [address]        Dear Colleague,    Thank you for referring your patient, Yessenia Martines, to the [facility]. Please see a copy of my visit note below.        [facility]    Yessenia Martines MRN# [id number]   Age: 73 year old YOB: 1950     Requesting physician: No ref. provider found  Josue Mejia            Assessment and Plan:   Assessment:   Neurological decline best characterized as MCI with manifestation of motor difficulties with gait ignition failure,falls, balance issues and coordination difficulties as well as cognitive issues with processing, memory and other executive functioning     Plan:   Will refer to PT and OT for remedial interventions to sustain her safety and independence in her own home  Initiate trials of memantine followed by donepezil  RTC April to meet with Dr. Patel in follow up                 History of Present Illness:   CC:  I met with Pat today for 50 minutes, she was accompanied by her daughter for a lengthy discussion of her recent reassessment of her difficulties on a neurologic basis with not only cognitive issues but also significant motor impairments.  Since my last visit with her in May of this year, she has undergone updated MR brain imaging which discloses no significant pathology other than continued small vessel changes consistent with microvascular disease but no other contributing pathology.  She has also undergone neuropsychometric assessment completed by Dr. Marlo James this past October demonstrating primarily decline in executive functioning to a mild degree but rather prominent impairment in motor abilities contributing to global impression of MCI.    These findings are not unanticipated as she has had significant difficulties with gait and balance, some tremors as well as coordination of both upper and lower  extremities.  This has resulted in a number of falls and perhaps impairments in her capacity to control her motor vehicle.  In addition to these motor issues, although her learning and memory profile was within expected limits, there seem to be some decline as well as were there some difficulties with visual learning and executive functioning.  These cognitive issues are confirmed by her daughter as well.    I suspect that these findings are most consistent with a neurodegenerative process primarily impacting the frontal regions creating her motor and cognitive issues.  I do not think we are looking at a parkinsonian disorder at this time and will focus instead on treating and managing her neurodegenerative issue.    Pending my nursing home, she will seek follow-up with Dr. Patel.  We discussed at length possible medication interventions and will commence with a trial of memantine and if tolerated after several months followed by a trial of donepezil.  In addition, referral to PT and OT for assessment and management of her safety and judgment will be implemented as well.  She will call for other concerns.               Physical Exam:            Data:   All laboratory data reviewed  All imaging studies reviewed by me             DATA for DOCUMENTATION:         Past Medical History:     Patient Active Problem List   Diagnosis     Systemic lupus erythematosus (H)     CARDIOVASCULAR SCREENING; LDL GOAL LESS THAN 130     Restless legs syndrome     Peripheral neuropathy, idiopathic     Osteoporosis     Mechanical low back pain     Hemorrhage of cyst of native kidney     Menopausal osteoporosis     Lupus (systemic lupus erythematosus) (H)     MCI (mild cognitive impairment)     Dysphonia     Globus sensation     Abnormal CT scan, lung     Somatic dysfunction of lumbar region     Bilateral low back pain with sciatica, sciatica laterality unspecified, unspecified chronicity     Somatic dysfunction of sacral region      Sacral pain     Thoracic segment dysfunction     Advance care planning     Microscopic hematuria     History of Clostridium difficile colitis     Chronic pain syndrome     Gastroparesis     Primary insomnia     Immunosuppressed status (H24)     SVT (supraventricular tachycardia)     Past Medical History:   Diagnosis Date     Allergic rhinitis due to pollen      Congestive heart failure (H)      Decreased libido      Depressive disorder      Diabetes (H)      Esophageal motility disorder      H pylori ulcer      History of blood transfusion      Hoarseness      Hypertension      Lupus (systemic lupus erythematosus) (H)     diagnosed 12 yrs ago     MCI (mild cognitive impairment)      Menopausal osteoporosis 2015     Movement disorder      Mumps     as a child     Neuropathy     feet and hands     S/P cholecystectomy      S/P TIESHA-BSO      Thyroid disease      Uncomplicated asthma        Also see scanned health assessment forms.       Past Surgical History:     Past Surgical History:   Procedure Laterality Date     APPENDECTOMY       ARTHRODESIS FINGER(S) Left 2018    Procedure: ARTHRODESIS FINGER(S);;  Surgeon: Shahida Carlton MD;  Location: [facility]     ARTHROPLASTY CARPOMETACARPAL (THUMB JOINT) Left 2018    Procedure: ARTHROPLASTY CARPOMETACARPAL (THUMB JOINT);  LEFT THUMB CARPALMETACARPAL WITH MINI TIGHTROPE, LEFT METAPHALANGEAL FUSION WITH RADIAL BONE GRAFT AND EASY CLIP STAPLE.;  Surgeon: Shahida Carlton MD;  Location: [facility]     ARTHROPLASTY CARPOMETACARPAL (THUMB JOINT), ARTHRODESIS, COMBINED Left 2018    Procedure: COMBINED ARTHROPLASTY CARPOMETACARPAL (THUMB JOINT), ARTHRODESIS;  LEFT THUMB CARPOMETACARPAL EXCISIONAL ARTHROPLASTY WITH FACSIA TANIA GRAFT, PARTIAL TRAPEZOID EXCISION;  Surgeon: Shahida Carlton MD;  Location: [facility]     BACK SURGERY      L2-L4 or L5 fusion     BLEPHAROPLASTY BILATERAL        SECTION      x3     CHOLECYSTECTOMY   2010    feels better. No pathology on gallbladder     COLONOSCOPY       EYE SURGERY  2011    Raised eyelids.     GENITOURINARY SURGERY       GRAFT BONE TO FINGER Left 5/18/2018    Procedure: GRAFT BONE TO FINGER;;  Surgeon: Briana Carlton MD;  Location: [facility]     HYSTERECTOMY       KNEE SURGERY      bilateral arthroscopy     ORTHOPEDIC SURGERY      right thumb tendon surgery     REPAIR TENDON FINGER(S)  11/9/2011    Procedure:REPAIR TENDON FINGER(S); RIGHT INDEX FINGER FLEXOR DIGITORUM PROFUNDUS REPAIR, RADIAL DIGITAL NERVE REPAIR ; Surgeon:BRIANA CARLTON; Location:[facility]     SALPINGO-OOPHORECTOMY BILATERAL       SHOULDER SURGERY Right     arthroscopy     SOFT TISSUE SURGERY      Tendon repair on finger     TONSILLECTOMY      Child     [facility] UGI ENDOSCOPY, SIMPLE EXAM  04/02/14            Social History:     Social History     Socioeconomic History     Marital status:      Spouse name: Not on file     Number of children: 4     Years of education: Not on file     Highest education level: Not on file   Occupational History     Not on file   Tobacco Use     Smoking status: Never     Smokeless tobacco: Never   Vaping Use     Vaping Use: Never used   Substance and Sexual Activity     Alcohol use: Yes     Alcohol/week: 0.0 standard drinks of alcohol     Comment: 1 wine/day     Drug use: No     Sexual activity: Yes     Partners: Male     Birth control/protection: None     Comment: hysterectomy   Other Topics Concern     Parent/sibling w/ CABG, MI or angioplasty before 65F 55M? 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 Father         cabg, carotid stenosis     Hyperlipidemia Father      Hypertension Father      Coronary Artery Disease Father      Anesthesia Reaction Father      Neurologic Disorder Mother         ALS? Parkinsons?     Osteoporosis Mother      Depression Mother      Anxiety Disorder Mother      Hypertension Brother      Hyperlipidemia Brother      Depression Brother      Colon Cancer Maternal Grandmother      Colon Cancer Maternal Aunt      Hyperlipidemia Sister      Hypertension Sister      Osteoporosis Sister      Hyperlipidemia Brother      Osteoporosis Sister      Osteoporosis Maternal Aunt      Hypertension Sister      Diabetes Son      Genetic Disorder Son      Thyroid Disease Son      Diabetes Other      Genetic Disorder Other      Genetic Disorder Daughter      Thyroid Disease Niece      Mental Illness No family hx of             Medications:     Current Outpatient Medications   Medication Sig     [START ON 3/18/2024] donepezil (ARICEPT) 10 MG tablet Take 1 tablet (10 mg) by mouth at bedtime     [START ON 2/19/2024] donepezil (ARICEPT) 5 MG tablet Take 1 tablet (5 mg) by mouth at bedtime for 28 days     [START ON 12/28/2023] memantine (NAMENDA) 10 MG tablet Take 1 tablet (10 mg) by mouth 2 times daily     memantine (NAMENDA) 5 MG tablet Take 1 tablet (5 mg) by mouth 2 times daily for 28 days 1 tablet daily x 7 days, then 1 tablet twice daily x 7 days, then 1 tablet in the morning and two tablets in the evening x 7 days, then 2 tablets twice daily for 7 days     calcium carbonate 600 mg-vitamin D 400 units (CALTRATE) 600-400 MG-UNIT per tablet Take 1 tablet by mouth 2 times daily     cetirizine (ZYRTEC) 10 MG tablet 10 mg by Oral or J tube route     diclofenac (VOLTAREN) 1 % topical gel Place 2 g onto the skin 4 times daily     famotidine (PEPCID) 40 MG tablet Take 1  tablet by mouth every 12 hours     gabapentin (NEURONTIN) 300 MG capsule Take 2 capsules (600 mg) by mouth 2 times daily     leucovorin (WELLCOVORIN) 5 MG tablet Take 1 tablet (5 mg) by mouth daily     methotrexate 2.5 MG tablet Take 3 tablets by mouth once a week     metoclopramide (REGLAN) 5 MG tablet Take 5 mg by mouth once     omeprazole (PRILOSEC) 40 MG DR capsule Take 1 capsule (40 mg) by mouth daily     predniSONE (DELTASONE) 5 MG tablet Taking 5 mg and 1 mg tablet x2 to get a total 7 mg daily      prochlorperazine (COMPAZINE) 5 mg/mL      traZODone (DESYREL) 100 MG tablet Take 1 tablet (100 mg) by mouth At Bedtime     No current facility-administered medications for this visit.              Review of Systems:   A comprehensive 10 point review of systems (constitutional, ENT, cardiac, peripheral vascular, lymphatic, respiratory, GI, , Musculoskeletal, skin, Neurological) was performed and found to be negative except as described in this note.     See intake form completed by patient    Again, thank you for allowing me to participate in the care of your patient.        Sincerely,        Emanuel Mejia MD, MD

## 2023-11-30 NOTE — PROGRESS NOTES
[facility]3] [facility]icians    Yesseniastephany Martines MRN# [id number]   Age: 73 year old YOB: 1950     Requesting physician: No ref. provider found  Josue Mejia            Assessment and Plan:   Assessment:   Neurological decline best characterized as MCI with manifestation of motor difficulties with gait ignition failure,falls, balance issues and coordination difficulties as well as cognitive issues with processing, memory and other executive functioning     Plan:   Will refer to PT and OT for remedial interventions to sustain her safety and independence in her own home  Initiate trials of memantine followed by donepezil  RTC April to meet with Dr. Patel in follow up                 History of Present Illness:   CC:  I met with Pat today for 50 minutes, she was accompanied by her daughter for a lengthy discussion of her recent reassessment of her difficulties on a neurologic basis with not only cognitive issues but also significant motor impairments.  Since my last visit with her in May of this year, she has undergone updated MR brain imaging which discloses no significant pathology other than continued small vessel changes consistent with microvascular disease but no other contributing pathology.  She has also undergone neuropsychometric assessment completed by Dr. Marlo James this past October demonstrating primarily decline in executive functioning to a mild degree but rather prominent impairment in motor abilities contributing to global impression of MCI.    These findings are not unanticipated as she has had significant difficulties with gait and balance, some tremors as well as coordination of both upper and lower extremities.  This has resulted in a number of falls and perhaps impairments in her capacity to control her motor vehicle.  In addition to these motor issues, although her learning and memory profile was within expected limits, there seem to be some decline as well as were  there some difficulties with visual learning and executive functioning.  These cognitive issues are confirmed by her daughter as well.    I suspect that these findings are most consistent with a neurodegenerative process primarily impacting the frontal regions creating her motor and cognitive issues.  I do not think we are looking at a parkinsonian disorder at this time and will focus instead on treating and managing her neurodegenerative issue.    Pending my care home, she will seek follow-up with Dr. Patel.  We discussed at length possible medication interventions and will commence with a trial of memantine and if tolerated after several months followed by a trial of donepezil.  In addition, referral to PT and OT for assessment and management of her safety and judgment will be implemented as well.  She will call for other concerns.               Physical Exam:            Data:   All laboratory data reviewed  All imaging studies reviewed by me             DATA for DOCUMENTATION:         Past Medical History:     Patient Active Problem List   Diagnosis    Systemic lupus erythematosus (H)    CARDIOVASCULAR SCREENING; LDL GOAL LESS THAN 130    Restless legs syndrome    Peripheral neuropathy, idiopathic    Osteoporosis    Mechanical low back pain    Hemorrhage of cyst of native kidney    Menopausal osteoporosis    Lupus (systemic lupus erythematosus) (H)    MCI (mild cognitive impairment)    Dysphonia    Globus sensation    Abnormal CT scan, lung    Somatic dysfunction of lumbar region    Bilateral low back pain with sciatica, sciatica laterality unspecified, unspecified chronicity    Somatic dysfunction of sacral region    Sacral pain    Thoracic segment dysfunction    Advance care planning    Microscopic hematuria    History of Clostridium difficile colitis    Chronic pain syndrome    Gastroparesis    Primary insomnia    Immunosuppressed status (H24)    SVT (supraventricular tachycardia)     Past Medical History:    Diagnosis Date    Allergic rhinitis due to pollen     Congestive heart failure (H)     Decreased libido     Depressive disorder     Diabetes (H)     Esophageal motility disorder     H pylori ulcer     History of blood transfusion     Hoarseness     Hypertension     Lupus (systemic lupus erythematosus) (H)     diagnosed 12 yrs ago    MCI (mild cognitive impairment)     Menopausal osteoporosis 2015    Movement disorder     Mumps     as a child    Neuropathy     feet and hands    S/P cholecystectomy     S/P TIESHA-BSO     Thyroid disease     Uncomplicated asthma        Also see scanned health assessment forms.       Past Surgical History:     Past Surgical History:   Procedure Laterality Date    APPENDECTOMY      ARTHRODESIS FINGER(S) Left 2018    Procedure: ARTHRODESIS FINGER(S);;  Surgeon: Shahida Carlton MD;  Location: [facility]    ARTHROPLASTY CARPOMETACARPAL (THUMB JOINT) Left 2018    Procedure: ARTHROPLASTY CARPOMETACARPAL (THUMB JOINT);  LEFT THUMB CARPALMETACARPAL WITH MINI TIGHTROPE, LEFT METAPHALANGEAL FUSION WITH RADIAL BONE GRAFT AND EASY CLIP STAPLE.;  Surgeon: Shahida Carlton MD;  Location: [facility]    ARTHROPLASTY CARPOMETACARPAL (THUMB JOINT), ARTHRODESIS, COMBINED Left 2018    Procedure: COMBINED ARTHROPLASTY CARPOMETACARPAL (THUMB JOINT), ARTHRODESIS;  LEFT THUMB CARPOMETACARPAL EXCISIONAL ARTHROPLASTY WITH FACSIA TANIA GRAFT, PARTIAL TRAPEZOID EXCISION;  Surgeon: Shahida Carlton MD;  Location: [facility]    BACK SURGERY      L2-L4 or L5 fusion    BLEPHAROPLASTY BILATERAL       SECTION      x3    CHOLECYSTECTOMY      feels better. No pathology on gallbladder    COLONOSCOPY      EYE SURGERY      Raised eyelids.    GENITOURINARY SURGERY      GRAFT BONE TO FINGER Left 2018    Procedure: GRAFT BONE TO FINGER;;  Surgeon: Shahida Carlton MD;  Location: [facility]    HYSTERECTOMY      KNEE SURGERY      bilateral arthroscopy     ORTHOPEDIC SURGERY      right thumb tendon surgery    REPAIR TENDON FINGER(S)  11/9/2011    Procedure:REPAIR TENDON FINGER(S); RIGHT INDEX FINGER FLEXOR DIGITORUM PROFUNDUS REPAIR, RADIAL DIGITAL NERVE REPAIR ; Surgeon:BRIANA ANTONY; Location:[facility]    SALPINGO-OOPHORECTOMY BILATERAL      SHOULDER SURGERY Right     arthroscopy    SOFT TISSUE SURGERY      Tendon repair on finger    TONSILLECTOMY      Child    [facility] UGI ENDOSCOPY, SIMPLE EXAM  04/02/14            Social History:     Social History     Socioeconomic History    Marital status:      Spouse name: Not on file    Number of children: 4    Years of education: Not on file    Highest education level: Not on file   Occupational History    Not on file   Tobacco Use    Smoking status: Never    Smokeless tobacco: Never   Vaping Use    Vaping Use: Never used   Substance and Sexual Activity    Alcohol use: Yes     Alcohol/week: 0.0 standard drinks of alcohol     Comment: 1 wine/day    Drug use: No    Sexual activity: Yes     Partners: Male     Birth control/protection: None     Comment: hysterectomy   Other Topics Concern    Parent/sibling w/ CABG, MI or angioplasty before 65F 55M? 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 Father         cabg, carotid stenosis    Hyperlipidemia Father     Hypertension Father     Coronary Artery Disease Father     Anesthesia Reaction Father     Neurologic Disorder Mother         ALS? Parkinsons?     Osteoporosis Mother     Depression Mother     Anxiety Disorder Mother     Hypertension Brother     Hyperlipidemia Brother     Depression Brother     Colon Cancer Maternal Grandmother     Colon Cancer Maternal Aunt     Hyperlipidemia Sister     Hypertension Sister     Osteoporosis Sister     Hyperlipidemia Brother     Osteoporosis Sister     Osteoporosis Maternal Aunt     Hypertension Sister     Diabetes Son     Genetic Disorder Son     Thyroid Disease Son     Diabetes Other     Genetic Disorder Other     Genetic Disorder Daughter     Thyroid Disease Niece     Mental Illness No family hx of             Medications:     Current Outpatient Medications   Medication Sig    [START ON 3/18/2024] donepezil (ARICEPT) 10 MG tablet Take 1 tablet (10 mg) by mouth at bedtime    [START ON 2/19/2024] donepezil (ARICEPT) 5 MG tablet Take 1 tablet (5 mg) by mouth at bedtime for 28 days    [START ON 12/28/2023] memantine (NAMENDA) 10 MG tablet Take 1 tablet (10 mg) by mouth 2 times daily    memantine (NAMENDA) 5 MG tablet Take 1 tablet (5 mg) by mouth 2 times daily for 28 days 1 tablet daily x 7 days, then 1 tablet twice daily x 7 days, then 1 tablet in the morning and two tablets in the evening x 7 days, then 2 tablets twice daily for 7 days    calcium carbonate 600 mg-vitamin D 400 units (CALTRATE) 600-400 MG-UNIT per tablet Take 1 tablet by mouth 2 times daily    cetirizine (ZYRTEC) 10 MG tablet 10 mg by Oral or J tube route    diclofenac (VOLTAREN) 1 % topical gel Place 2 g onto the skin 4 times daily    famotidine (PEPCID) 40 MG tablet Take 1 tablet by mouth every 12 hours    gabapentin (NEURONTIN) 300 MG capsule Take 2 capsules (600 mg) by mouth 2 times daily    leucovorin (WELLCOVORIN) 5 MG tablet Take 1 tablet (5 mg) by mouth daily    methotrexate 2.5 MG tablet Take 3 tablets by mouth once a week    metoclopramide (REGLAN) 5 MG tablet Take 5 mg by mouth once    omeprazole (PRILOSEC) 40 MG DR capsule Take 1 capsule (40 mg) by  mouth daily    predniSONE (DELTASONE) 5 MG tablet Taking 5 mg and 1 mg tablet x2 to get a total 7 mg daily     prochlorperazine (COMPAZINE) 5 mg/mL     traZODone (DESYREL) 100 MG tablet Take 1 tablet (100 mg) by mouth At Bedtime     No current facility-administered medications for this visit.              Review of Systems:   A comprehensive 10 point review of systems (constitutional, ENT, cardiac, peripheral vascular, lymphatic, respiratory, GI, , Musculoskeletal, skin, Neurological) was performed and found to be negative except as described in this note.     See intake form completed by patient

## 2023-12-04 ENCOUNTER — THERAPY VISIT (OUTPATIENT)
Dept: OCCUPATIONAL THERAPY | Facility: CLINIC | Age: 73
End: 2023-12-04
Attending: PSYCHIATRY & NEUROLOGY
Payer: COMMERCIAL

## 2023-12-04 DIAGNOSIS — R41.3 MEMORY LOSS: ICD-10-CM

## 2023-12-04 DIAGNOSIS — G31.84 MCI (MILD COGNITIVE IMPAIRMENT): Primary | ICD-10-CM

## 2023-12-04 DIAGNOSIS — R26.89 BALANCE PROBLEMS: ICD-10-CM

## 2023-12-04 PROCEDURE — 97165 OT EVAL LOW COMPLEX 30 MIN: CPT | Mod: GO | Performed by: OCCUPATIONAL THERAPIST

## 2023-12-04 PROCEDURE — 97535 SELF CARE MNGMENT TRAINING: CPT | Mod: GO | Performed by: OCCUPATIONAL THERAPIST

## 2023-12-04 ASSESSMENT — MONTREAL COGNITIVE ASSESSMENT (MOCA)
9. REPEAT EACH SENTENCE: 1
12. MEMORY INDEX SCORE: 4
8. SERIAL SUBTRACTION OF 7S: 3
WHAT IS THE TOTAL SCORE (OUT OF 30): 25
4. NAME EACH OF THE THREE ANIMALS SHOWN: 3
11. FOR EACH PAIR OF WORDS, WHAT CATEGORY DO THEY BELONG TO (OUT OF 2): 2
7. [VIGILENCE] TAP WHEN HEARING DESIGNATED LETTER: 0
13. ORIENTATION SUBSCORE: 6
6. READ LIST OF DIGITS [FORWARD/BACKWARD]: 2
WHAT LEVEL OF EDUCATION WAS ATTAINED: 0
VISUOSPATIAL/EXECUTIVE SUBSCORE: 4
10. [FLUENCY] NAME WORDS STARTING WITH DESIGNATED LETTER: 0

## 2023-12-05 ENCOUNTER — THERAPY VISIT (OUTPATIENT)
Dept: PHYSICAL THERAPY | Facility: CLINIC | Age: 73
End: 2023-12-05
Attending: PSYCHIATRY & NEUROLOGY
Payer: COMMERCIAL

## 2023-12-05 DIAGNOSIS — R26.89 BALANCE PROBLEMS: ICD-10-CM

## 2023-12-05 DIAGNOSIS — R41.3 MEMORY LOSS: ICD-10-CM

## 2023-12-05 DIAGNOSIS — G31.84 MCI (MILD COGNITIVE IMPAIRMENT): ICD-10-CM

## 2023-12-05 PROCEDURE — 97112 NEUROMUSCULAR REEDUCATION: CPT | Mod: GP | Performed by: PHYSICAL THERAPIST

## 2023-12-05 PROCEDURE — 97162 PT EVAL MOD COMPLEX 30 MIN: CPT | Mod: GP | Performed by: PHYSICAL THERAPIST
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
## 2023-12-05 NOTE — PROGRESS NOTES
PHYSICAL THERAPY EVALUATION  Type of Visit: Evaluation    See electronic medical record for Abuse and Falls Screening details.    Subjective       Presenting condition or subjective complaint: Neurological decline of motor systems with mci  The pt presents with balance concerns in setting of MCI. The states that she often has difficulties with turns or dressing. Also will trip on uneven surfaces. Also feels unsteady and often roll backwards when squatting to take of her dog. Has not fallen from a standing height.  Also notes that she is weaker. Also believes fatigue is worse.   Activity: walks dog, cleaning the house  Date of onset: 11/30/23    Relevant medical history: Arthritis; Bladder or bowel problems; Osteoarthritis   Dates & types of surgery: Back fusion; hand surgery both hands;    Prior diagnostic imaging/testing results: MRI     Prior therapy history for the same diagnosis, illness or injury: No      Prior Level of Function  Transfers: Independent  Ambulation: Independent  Pt seeing OT     Living Environment  Social support: Alone   Type of home: 1 level   Stairs to enter the home: No       Ramp: No   Stairs inside the home: No       Help at home: None  Equipment owned: Four-point cane; Grab bars; Raised toilet seat; Bath bench     Employment: No    Hobbies/Interests: Reading, piano, painting    Patient goals for therapy: Take care of dog and walk with more confidence    Pain assessment: Pain denied     Objective   Cognitive Status Examination  Orientation: Oriented to person, place and time   Level of Consciousness: Alert  Pt seeing OT regarding MCI.      POSTURE: WNL     RANGE OF MOTION: LE ROM WNL  UE ROM WNL  STRENGTH:  functional weakness noted with sit to stands    TRANSFERS: Independent    GAIT:   Gait Description: Ambulates with slower speed, tending to drag feet.    BALANCE:     SPECIAL TESTS  Functional Gait Assessment (FGA) TOTAL SCORE: (MAXIMUM SCORE 30): 12  (<22/30 indicates fall risk) No  device   10 Meter Walk Test (Comfortable)  0.83 m/s no AD   6 Minute Walk Test (6MWT)   900' with no AD        [place] Balance Scale (BBS)    (<45/56 indicates fall risk)   5 Times Sit-to-Stand (5TSTS)  20.3                     Modified CTSIB Conditions (sec) Cond 1: 30  Cond 2: 30 but with significant sway  Cond 4: 10.5  Cond 5 : 4   4 square step test 12.2 sec               SENSATION:  Reports neuropathic pain, some numbness on L lateral LE  COORDINATION: Mild impairments noted with 4 square step toest        Assessment & Plan   CLINICAL IMPRESSIONS  Medical Diagnosis: MCI (mild cognitive impairment)  Balance problems  Memory loss    Treatment Diagnosis: Impaired mobility   Impression/Assessment: Patient is a 73 year old female with balance complaints.  The following significant findings have been identified: Decreased strength, Impaired balance, Decreased proprioception, Impaired sensation, Impaired gait, Impaired muscle performance, Decreased activity tolerance, Impaired posture, Instability, Dizziness, and Disequilibrium . These impairments interfere with their ability to perform self care tasks, work tasks, recreational activities, household chores, driving , household mobility, and community mobility as compared to previous level of function.     Clinical Decision Making (Complexity):  Clinical Presentation: Evolving/Changing  Clinical Presentation Rationale: based on medical and personal factors listed in PT evaluation  Clinical Decision Making (Complexity): Moderate complexity    PLAN OF CARE  Treatment Interventions:  Interventions: Gait Training, Manual Therapy, Neuromuscular Re-education, Therapeutic Activity, Therapeutic Exercise, Self-Care/Home Management    Long Term Goals     PT Goal 1  Goal Identifier: HEP  Goal Description: The pt will be independent with an HEP in order to improve self management of symptoms  Target Date: 02/02/24  PT Goal 2  Goal Identifier: FGA  Goal Description: The pt will  improve score on FGA to >17/24 indicating an improvement in balance and ability to ambulate in the community  Goal Progress: baseline: 12/30  Target Date: 02/02/24  PT Goal 3  Goal Identifier: 5x sit to stand  Goal Description: The pt will reduce time to complete 5 sit to stands to < 15 sec due to improvements in functional LE strength and overall stability  Rationale: to maximize safety and independence within the home  Goal Progress: baseline: 20.3 sec  Target Date: 02/02/24  PT Goal 4  Goal Identifier: 6MWT  Goal Description: The pt will increase distance on 6MWT to >1050' due to improvements in gait speed and overall ability to ambulate community distances  Goal Progress: baseline: 900' no AD  Target Date: 02/02/24  PT Goal 5  Goal Identifier: mCTSIB  Goal Description: The pt will maintain balance on mCTSIB to >15 sec on conditions 4 and 5 (complaint surfaces)  Rationale: to maximize safety and independence with self cares  Target Date: 02/02/24      Frequency of Treatment: 1x/week  Duration of Treatment: 60 days      Education Assessment:   Learner/Method: Patient  Education Comments: PT role, POC    Risks and benefits of evaluation/treatment have been explained.   Patient/Family/caregiver agrees with Plan of Care.     Evaluation Time:     PT Eval, Moderate Complexity Minutes (83985): 30       Signing Clinician: Bernarda Whyte, PT      [facility]                                                                                   OUTPATIENT PHYSICAL THERAPY      PLAN OF TREATMENT FOR OUTPATIENT REHABILITATION   Patient's Last Name, First Name, Yessenia Velez YOB: 1950   Provider's Name   [facility]   Medical Record No.  [id number]     Onset Date: 11/30/23  Start of Care Date: 12/05/23     Medical Diagnosis:  MCI (mild cognitive impairment)  Balance problems  Memory loss      PT Treatment Diagnosis:  Impaired mobility  Plan of Treatment  Frequency/Duration: 1x/week/ 60 days    Certification date from 12/05/23 to 02/02/24         See note for plan of treatment details and functional goals     Bernarda Whyte, PT                         I CERTIFY THE NEED FOR THESE SERVICES FURNISHED UNDER        THIS PLAN OF TREATMENT AND WHILE UNDER MY CARE     (Physician attestation of this document indicates review and certification of the therapy plan).              Referring Provider:  Emanuel Mejia    Initial Assessment  See Epic Evaluation- Start of Care Date: 12/05/23

## 2023-12-12 ENCOUNTER — THERAPY VISIT (OUTPATIENT)
Dept: OCCUPATIONAL THERAPY | Facility: CLINIC | Age: 73
End: 2023-12-12
Attending: PSYCHIATRY & NEUROLOGY
Payer: COMMERCIAL

## 2023-12-12 DIAGNOSIS — R41.3 MEMORY LOSS: ICD-10-CM

## 2023-12-12 DIAGNOSIS — G31.84 MCI (MILD COGNITIVE IMPAIRMENT): Primary | ICD-10-CM

## 2023-12-12 PROCEDURE — 97537 COMMUNITY/WORK REINTEGRATION: CPT | Mod: GO | Performed by: OCCUPATIONAL THERAPIST

## 2023-12-12 PROCEDURE — 97530 THERAPEUTIC ACTIVITIES: CPT | Mod: GO | Performed by: OCCUPATIONAL THERAPIST

## 2023-12-30 ENCOUNTER — OFFICE VISIT (OUTPATIENT)
Dept: URGENT CARE | Facility: URGENT CARE | Age: 73
End: 2023-12-30
Payer: COMMERCIAL

## 2023-12-30 VITALS
RESPIRATION RATE: 20 BRPM | OXYGEN SATURATION: 97 % | WEIGHT: 125 LBS | HEART RATE: 69 BPM | TEMPERATURE: 98.3 F | BODY MASS INDEX: 22.14 KG/M2 | DIASTOLIC BLOOD PRESSURE: 84 MMHG | SYSTOLIC BLOOD PRESSURE: 131 MMHG

## 2023-12-30 DIAGNOSIS — J01.90 ACUTE BACTERIAL SINUSITIS: Primary | ICD-10-CM

## 2023-12-30 DIAGNOSIS — B96.89 ACUTE BACTERIAL SINUSITIS: Primary | ICD-10-CM

## 2023-12-30 PROCEDURE — 99213 OFFICE O/P EST LOW 20 MIN: CPT | Performed by: FAMILY MEDICINE

## 2023-12-30 RX ORDER — DOXYCYCLINE HYCLATE 100 MG
100 TABLET ORAL 2 TIMES DAILY
Qty: 20 TABLET | Refills: 0 | Status: SHIPPED | OUTPATIENT
Start: 2023-12-30 | End: 2024-01-09

## 2023-12-30 NOTE — PROGRESS NOTES
Assessment & Plan     Acute bacterial sinusitis    - doxycycline hyclate (VIBRA-TABS) 100 MG tablet; Take 1 tablet (100 mg) by mouth 2 times daily for 10 days    Will treat with doxycycline.  Recommend saline nasal spray to help hasten healing.  Close Follow-up if no change or new or worsening sx prn.    Fadumo York MD  [facility] EDUARDO Sal is a 73 year old, presenting for the following health issues:  Sinus Problem (Neg twice COVID)      HPI     Increased sinus pain and pressure x 3 weeks.  Negative home COVID tests x 2.  Grandson started sx with similar URI- patient states only worsening for her.  No cough.  No SOB.    Review of Systems   Constitutional, HEENT, cardiovascular, pulmonary, GI, , musculoskeletal, neuro, skin, endocrine and psych systems are negative, except as otherwise noted.      Objective    /84   Pulse 69   Temp 98.3  F (36.8  C)   Resp 20   Wt 56.7 kg (125 lb)   SpO2 97%   BMI 22.14 kg/m    Body mass index is 22.14 kg/m .  Physical Exam   GENERAL: healthy, alert and no distress  EYES: Eyes grossly normal to inspection, PERRL and conjunctivae and sclerae normal  EENT: Clear rhinorrhea, swollen anterior turbinates B, + posterior pharynx drainage, + sinus pressure on palp over B frontal and maxillary sinuses  NECK: no adenopathy, no asymmetry, masses, or scars and thyroid normal to palpation  PSYCH: mentation appears normal, affect normal/bright

## 2024-01-02 ENCOUNTER — THERAPY VISIT (OUTPATIENT)
Dept: PHYSICAL THERAPY | Facility: CLINIC | Age: 74
End: 2024-01-02
Attending: PSYCHIATRY & NEUROLOGY
Payer: COMMERCIAL

## 2024-01-02 DIAGNOSIS — G31.84 MCI (MILD COGNITIVE IMPAIRMENT): Primary | ICD-10-CM

## 2024-01-02 DIAGNOSIS — R26.89 BALANCE PROBLEMS: ICD-10-CM

## 2024-01-02 PROCEDURE — 97112 NEUROMUSCULAR REEDUCATION: CPT | Mod: GP | Performed by: PHYSICAL THERAPIST

## 2024-01-04 DIAGNOSIS — G31.84 MCI (MILD COGNITIVE IMPAIRMENT): ICD-10-CM

## 2024-01-04 DIAGNOSIS — R26.9 ABNORMAL GAIT: ICD-10-CM

## 2024-01-04 DIAGNOSIS — R41.3 MEMORY LOSS: ICD-10-CM

## 2024-01-04 DIAGNOSIS — R26.89 BALANCE PROBLEMS: ICD-10-CM

## 2024-01-05 RX ORDER — DONEPEZIL HYDROCHLORIDE 5 MG/1
5 TABLET, FILM COATED ORAL AT BEDTIME
Qty: 28 TABLET | Refills: 0 | OUTPATIENT
Start: 2024-01-05

## 2024-01-05 RX ORDER — MEMANTINE HYDROCHLORIDE 5 MG/1
TABLET ORAL
Qty: 70 TABLET | Refills: 0 | OUTPATIENT
Start: 2024-01-05

## 2024-01-05 NOTE — TELEPHONE ENCOUNTER
Medications were titrated, so pt should be on 10 mg tablets for donepezil and memantine now.     Message sent to pharmacy.  Chloé DUGAN RN, BSN  [facility]

## 2024-01-08 ENCOUNTER — MYC MEDICAL ADVICE (OUTPATIENT)
Dept: FAMILY MEDICINE | Facility: CLINIC | Age: 74
End: 2024-01-08
Payer: COMMERCIAL

## 2024-01-08 DIAGNOSIS — F51.01 PRIMARY INSOMNIA: ICD-10-CM

## 2024-01-08 RX ORDER — TRAZODONE HYDROCHLORIDE 100 MG/1
100 TABLET ORAL AT BEDTIME
Qty: 180 TABLET | Refills: 3 | Status: SHIPPED | OUTPATIENT
Start: 2024-01-08 | End: 2024-01-17

## 2024-01-08 NOTE — TELEPHONE ENCOUNTER
Dr. Mejia,      Pt requesting trazodone 100mg twice daily rx.   Script in chart is once daily, but dispense qty 180    On 9/5/23 you had noted pt is taking 200mg at night to sleep and to continue trazodone.    Please review and order appropriate dose - currently 200mg daily

## 2024-01-17 ENCOUNTER — MYC REFILL (OUTPATIENT)
Dept: FAMILY MEDICINE | Facility: CLINIC | Age: 74
End: 2024-01-17
Payer: COMMERCIAL

## 2024-01-17 DIAGNOSIS — F51.01 PRIMARY INSOMNIA: ICD-10-CM

## 2024-01-19 RX ORDER — TRAZODONE HYDROCHLORIDE 100 MG/1
200 TABLET ORAL AT BEDTIME
Qty: 180 TABLET | Refills: 3 | Status: SHIPPED | OUTPATIENT
Start: 2024-01-19

## 2024-02-13 ENCOUNTER — MYC MEDICAL ADVICE (OUTPATIENT)
Dept: FAMILY MEDICINE | Facility: CLINIC | Age: 74
End: 2024-02-13
Payer: COMMERCIAL

## 2024-02-13 ENCOUNTER — TRANSFERRED RECORDS (OUTPATIENT)
Dept: HEALTH INFORMATION MANAGEMENT | Facility: CLINIC | Age: 74
End: 2024-02-13
Payer: COMMERCIAL

## 2024-02-13 DIAGNOSIS — M54.59 MECHANICAL LOW BACK PAIN: ICD-10-CM

## 2024-02-13 DIAGNOSIS — G25.81 RESTLESS LEGS SYNDROME: ICD-10-CM

## 2024-02-13 LAB
ALT SERPL-CCNC: 24 IU/L (ref 5–35)
AST SERPL-CCNC: 34 U/L (ref 5–34)
CREATININE (EXTERNAL): 0.82 MG/DL (ref 0.5–1.3)

## 2024-02-14 RX ORDER — GABAPENTIN 300 MG/1
600 CAPSULE ORAL 2 TIMES DAILY
Qty: 360 CAPSULE | Refills: 3 | Status: SHIPPED | OUTPATIENT
Start: 2024-02-14 | End: 2024-09-17

## 2024-02-23 ENCOUNTER — TELEPHONE (OUTPATIENT)
Dept: NEUROLOGY | Facility: CLINIC | Age: 74
End: 2024-02-23
Payer: COMMERCIAL

## 2024-02-23 DIAGNOSIS — G31.84 MCI (MILD COGNITIVE IMPAIRMENT): Primary | ICD-10-CM

## 2024-02-23 DIAGNOSIS — G31.84 MCI (MILD COGNITIVE IMPAIRMENT): ICD-10-CM

## 2024-02-23 RX ORDER — MEMANTINE HYDROCHLORIDE 10 MG/1
10 TABLET ORAL 2 TIMES DAILY
Qty: 180 TABLET | Refills: 1 | Status: SHIPPED | OUTPATIENT
Start: 2024-02-23 | End: 2024-04-23

## 2024-02-23 RX ORDER — MEMANTINE HYDROCHLORIDE 10 MG/1
10 TABLET ORAL 2 TIMES DAILY
Qty: 180 TABLET | Refills: 1 | Status: SHIPPED | OUTPATIENT
Start: 2024-02-23 | End: 2024-02-23

## 2024-02-23 NOTE — TELEPHONE ENCOUNTER
Health Call Center    Phone Message    May a detailed message be left on voicemail: yes     Reason for Call: Medication Refill Request    Has the patient contacted the pharmacy for the refill? Yes   Name of medication being requested: memantine (NAMENDA) 10 MG tablet [[id number]] (Order [id number])  Provider who prescribed the medication: Emanuel Mejia MD  Pharmacy: [facility] Address: [id number] [place] [address]  Date medication is needed: 02/23/24   Please call Yessenia at [phone] to discuss further.  Former Pt of Dr Mjeia.    Action Taken: Message routed to:  Other: [facility]    Travel Screening: Not Applicable

## 2024-03-26 ENCOUNTER — TRANSFERRED RECORDS (OUTPATIENT)
Dept: HEALTH INFORMATION MANAGEMENT | Facility: CLINIC | Age: 74
End: 2024-03-26
Payer: COMMERCIAL

## 2024-04-22 ENCOUNTER — TELEPHONE (OUTPATIENT)
Dept: NEUROLOGY | Facility: CLINIC | Age: 74
End: 2024-04-22
Payer: COMMERCIAL
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
## 2024-04-22 NOTE — TELEPHONE ENCOUNTER
Attempted to reach patient to remind them about appointment scheduled with Brian Patel MD on 4/23/24 in our [place] location.  A voicemail was left with a call back number if the patient has questions or would like to reschedule.

## 2024-04-23 ENCOUNTER — OFFICE VISIT (OUTPATIENT)
Dept: NEUROLOGY | Facility: CLINIC | Age: 74
End: 2024-04-23
Payer: COMMERCIAL

## 2024-04-23 VITALS
DIASTOLIC BLOOD PRESSURE: 78 MMHG | OXYGEN SATURATION: 98 % | BODY MASS INDEX: 22.36 KG/M2 | HEART RATE: 59 BPM | WEIGHT: 126.2 LBS | SYSTOLIC BLOOD PRESSURE: 121 MMHG

## 2024-04-23 DIAGNOSIS — R25.1 TREMOR: ICD-10-CM

## 2024-04-23 DIAGNOSIS — F34.1 DYSTHYMIA: ICD-10-CM

## 2024-04-23 DIAGNOSIS — R26.89 BALANCE PROBLEMS: Primary | ICD-10-CM

## 2024-04-23 DIAGNOSIS — G31.84 MCI (MILD COGNITIVE IMPAIRMENT): ICD-10-CM

## 2024-04-23 PROCEDURE — 99205 OFFICE O/P NEW HI 60 MIN: CPT | Performed by: STUDENT IN AN ORGANIZED HEALTH CARE EDUCATION/TRAINING PROGRAM

## 2024-04-23 PROCEDURE — G2211 COMPLEX E/M VISIT ADD ON: HCPCS | Performed by: STUDENT IN AN ORGANIZED HEALTH CARE EDUCATION/TRAINING PROGRAM

## 2024-04-23 RX ORDER — DONEPEZIL HYDROCHLORIDE 10 MG/1
10 TABLET, FILM COATED ORAL AT BEDTIME
Qty: 90 TABLET | Refills: 3 | Status: SHIPPED | OUTPATIENT
Start: 2024-04-23

## 2024-04-23 RX ORDER — SERTRALINE HYDROCHLORIDE 25 MG/1
25 TABLET, FILM COATED ORAL DAILY
Qty: 30 TABLET | Refills: 0 | Status: SHIPPED | OUTPATIENT
Start: 2024-04-23 | End: 2024-07-24

## 2024-04-23 RX ORDER — MEMANTINE HYDROCHLORIDE 10 MG/1
10 TABLET ORAL 2 TIMES DAILY
Qty: 180 TABLET | Refills: 1 | Status: SHIPPED | OUTPATIENT
Start: 2024-04-23
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
## 2024-04-23 NOTE — PATIENT INSTRUCTIONS
"Stay physically and mentally active  Consider going to [facility]   Continue aricept, namenda at current doses  Zoloft 25 mg x1 month then 50 mg daily for mood      \"Essential tremor\" pens/cutlery       Future idea for tremor: propranolol   "

## 2024-04-23 NOTE — NURSING NOTE
"Yessenia Martines is a 74 year old female who presents for:  Chief Complaint   Patient presents with    Referral     Medication review, MCI, Memory              Initial Vitals:  /78   Pulse 59   Wt 57.2 kg (126 lb 3.2 oz)   SpO2 98%   BMI 22.36 kg/m   Estimated body mass index is 22.36 kg/m  as calculated from the following:    Height as of 9/5/23: 1.6 m (5' 3\").    Weight as of this encounter: 57.2 kg (126 lb 3.2 oz).. Body surface area is 1.59 meters squared. BP completed using cuff size: ese Fuentes    "

## 2024-05-02 ENCOUNTER — TRANSFERRED RECORDS (OUTPATIENT)
Dept: HEALTH INFORMATION MANAGEMENT | Facility: CLINIC | Age: 74
End: 2024-05-02
Payer: COMMERCIAL

## 2024-05-10 NOTE — TELEPHONE ENCOUNTER
[facility] Call Center    Phone Message    May a detailed message be left on voicemail: yes     Reason for Call: Other: Pat calling to schedule an appointment per her referral from Payal Newsome for gross hematuria. Per clinic protocols, it is recommended an encounter be sent over for review. Please give Pat a call back at your earliest convenience to discuss. Pat would like to be seen at [place].      Action Taken: Message routed to:  Other: [facility]    Travel Screening: Not Applicable                                                                       MOLECULAR PCR

## 2024-05-16 ENCOUNTER — TRANSFERRED RECORDS (OUTPATIENT)
Dept: HEALTH INFORMATION MANAGEMENT | Facility: CLINIC | Age: 74
End: 2024-05-16
Payer: COMMERCIAL

## 2024-05-16 LAB
ALT SERPL-CCNC: 19 IU/L (ref 5–35)
AST SERPL-CCNC: 20 U/L (ref 5–34)
CREATININE (EXTERNAL): 0.8 MG/DL (ref 0.5–1.3)

## 2024-06-15 ENCOUNTER — HEALTH MAINTENANCE LETTER (OUTPATIENT)
Age: 74
End: 2024-06-15

## 2024-07-15 ENCOUNTER — APPOINTMENT (OUTPATIENT)
Dept: GENERAL RADIOLOGY | Facility: CLINIC | Age: 74
End: 2024-07-15
Attending: EMERGENCY MEDICINE
Payer: COMMERCIAL

## 2024-07-15 ENCOUNTER — NURSE TRIAGE (OUTPATIENT)
Dept: FAMILY MEDICINE | Facility: CLINIC | Age: 74
End: 2024-07-15

## 2024-07-15 ENCOUNTER — HOSPITAL ENCOUNTER (EMERGENCY)
Facility: CLINIC | Age: 74
Discharge: HOME | End: 2024-07-15
Attending: EMERGENCY MEDICINE | Admitting: EMERGENCY MEDICINE
Payer: COMMERCIAL

## 2024-07-15 VITALS
RESPIRATION RATE: 17 BRPM | BODY MASS INDEX: 22.15 KG/M2 | DIASTOLIC BLOOD PRESSURE: 83 MMHG | SYSTOLIC BLOOD PRESSURE: 132 MMHG | WEIGHT: 125 LBS | HEIGHT: 63 IN | OXYGEN SATURATION: 98 % | TEMPERATURE: 97.6 F | HEART RATE: 61 BPM

## 2024-07-15 DIAGNOSIS — R05.1 ACUTE COUGH: ICD-10-CM

## 2024-07-15 DIAGNOSIS — U07.1 COVID-19 VIRUS INFECTION: ICD-10-CM

## 2024-07-15 DIAGNOSIS — R07.9 CHEST PAIN, UNSPECIFIED TYPE: ICD-10-CM

## 2024-07-15 LAB
ALBUMIN SERPL BCG-MCNC: 4.2 G/DL (ref 3.5–5.2)
ALP SERPL-CCNC: 71 U/L (ref 40–150)
ALT SERPL W P-5'-P-CCNC: 29 U/L (ref 0–50)
ANION GAP SERPL CALCULATED.3IONS-SCNC: 12 MMOL/L (ref 7–15)
AST SERPL W P-5'-P-CCNC: 23 U/L (ref 0–45)
ATRIAL RATE - MUSE: 61 BPM
BASOPHILS # BLD AUTO: 0.1 10E3/UL (ref 0–0.2)
BASOPHILS NFR BLD AUTO: 0 %
BILIRUB SERPL-MCNC: 0.3 MG/DL
BUN SERPL-MCNC: 13.7 MG/DL (ref 8–23)
CALCIUM SERPL-MCNC: 9.7 MG/DL (ref 8.8–10.2)
CHLORIDE SERPL-SCNC: 99 MMOL/L (ref 98–107)
CREAT SERPL-MCNC: 0.67 MG/DL (ref 0.51–0.95)
DEPRECATED HCO3 PLAS-SCNC: 25 MMOL/L (ref 22–29)
DIASTOLIC BLOOD PRESSURE - MUSE: NORMAL MMHG
EGFRCR SERPLBLD CKD-EPI 2021: >90 ML/MIN/1.73M2
EOSINOPHIL # BLD AUTO: 0 10E3/UL (ref 0–0.7)
EOSINOPHIL NFR BLD AUTO: 0 %
ERYTHROCYTE [DISTWIDTH] IN BLOOD BY AUTOMATED COUNT: 14.1 % (ref 10–15)
FLUAV RNA SPEC QL NAA+PROBE: NEGATIVE
FLUBV RNA RESP QL NAA+PROBE: NEGATIVE
GLUCOSE SERPL-MCNC: 101 MG/DL (ref 70–99)
HCT VFR BLD AUTO: 44.5 % (ref 35–47)
HGB BLD-MCNC: 14.9 G/DL (ref 11.7–15.7)
HOLD SPECIMEN: NORMAL
HOLD SPECIMEN: NORMAL
IMM GRANULOCYTES # BLD: 0.2 10E3/UL
IMM GRANULOCYTES NFR BLD: 1 %
INTERPRETATION ECG - MUSE: NORMAL
LYMPHOCYTES # BLD AUTO: 0.5 10E3/UL (ref 0.8–5.3)
LYMPHOCYTES NFR BLD AUTO: 3 %
MCH RBC QN AUTO: 31.2 PG (ref 26.5–33)
MCHC RBC AUTO-ENTMCNC: 33.5 G/DL (ref 31.5–36.5)
MCV RBC AUTO: 93 FL (ref 78–100)
MONOCYTES # BLD AUTO: 0.7 10E3/UL (ref 0–1.3)
MONOCYTES NFR BLD AUTO: 5 %
NEUTROPHILS # BLD AUTO: 12.9 10E3/UL (ref 1.6–8.3)
NEUTROPHILS NFR BLD AUTO: 90 %
NRBC # BLD AUTO: 0 10E3/UL
NRBC BLD AUTO-RTO: 0 /100
P AXIS - MUSE: 37 DEGREES
PLATELET # BLD AUTO: 240 10E3/UL (ref 150–450)
POTASSIUM SERPL-SCNC: 4.5 MMOL/L (ref 3.4–5.3)
PR INTERVAL - MUSE: 170 MS
PROT SERPL-MCNC: 6.6 G/DL (ref 6.4–8.3)
QRS DURATION - MUSE: 74 MS
QT - MUSE: 410 MS
QTC - MUSE: 412 MS
R AXIS - MUSE: -27 DEGREES
RBC # BLD AUTO: 4.77 10E6/UL (ref 3.8–5.2)
RSV RNA SPEC NAA+PROBE: NEGATIVE
SARS-COV-2 RNA RESP QL NAA+PROBE: POSITIVE
SODIUM SERPL-SCNC: 136 MMOL/L (ref 135–145)
SYSTOLIC BLOOD PRESSURE - MUSE: NORMAL MMHG
T AXIS - MUSE: 27 DEGREES
TROPONIN T SERPL HS-MCNC: 10 NG/L
TROPONIN T SERPL HS-MCNC: 8 NG/L
VENTRICULAR RATE- MUSE: 61 BPM
WBC # BLD AUTO: 14.3 10E3/UL (ref 4–11)

## 2024-07-15 PROCEDURE — 85025 COMPLETE CBC W/AUTO DIFF WBC: CPT | Performed by: EMERGENCY MEDICINE

## 2024-07-15 PROCEDURE — 93005 ELECTROCARDIOGRAM TRACING: CPT

## 2024-07-15 PROCEDURE — 84484 ASSAY OF TROPONIN QUANT: CPT | Performed by: EMERGENCY MEDICINE

## 2024-07-15 PROCEDURE — 99285 EMERGENCY DEPT VISIT HI MDM: CPT | Mod: 25

## 2024-07-15 PROCEDURE — 87637 SARSCOV2&INF A&B&RSV AMP PRB: CPT | Performed by: EMERGENCY MEDICINE

## 2024-07-15 PROCEDURE — 36415 COLL VENOUS BLD VENIPUNCTURE: CPT | Performed by: EMERGENCY MEDICINE

## 2024-07-15 PROCEDURE — 82040 ASSAY OF SERUM ALBUMIN: CPT | Performed by: EMERGENCY MEDICINE

## 2024-07-15 PROCEDURE — 71046 X-RAY EXAM CHEST 2 VIEWS: CPT

## 2024-07-15 ASSESSMENT — ACTIVITIES OF DAILY LIVING (ADL)
ADLS_ACUITY_SCORE: 35
ADLS_ACUITY_SCORE: 33
ADLS_ACUITY_SCORE: 35

## 2024-07-15 NOTE — ED PROVIDER NOTES
Emergency Department Note      History of Present Illness     Chief Complaint   Covid Concern      HPI   Yessenia Martines is a 74 year old female with a history of CHF and hypertension who presents to the emergency department for evaluation of a COVID concern. The patient reports that for approximately a week she has had chest pain and a cough. She states that her chest pain is exacerbated with coughing but is still persistent without coughing. She reports that her son recently visited who didn't know he had COVID and states that she had a positive COVID test last night. She states she had a temperature of 99 degrees recently. She reports that she is currently taking prednisone for her lupus and is set to begin a taper tomorrow. She denies any vomiting, change in her baseline diarrhea, or history of blood clots. Patient is not anticoagulated.    Independent Historian   None    Review of External Notes   Progress note from 4/23/2024    Past Medical History     Medical History and Problem List   Allergic rhinitis  CHF  Decreased libido  Depressive disorder  Diabetes  Esophageal motility disorder  H pylori ulcer  History of blood transfusion  Hoarseness  Hypertension  Lupus  Mild cognitive impairment  Menopausal osteoporosis  Movement disorder  Mumps  Neuropathy  Thyroid disease  Asthma  Peripheral neuropathy  Hemorrhage of cyst of native kidney  Dysphonia  Globus sensation  Somatic dysfunction of lumbar region  Bilateral low back pain with sciatica  Somatic dysfunction of sacral region  Thoracic segment dysfunction  Microscopic hematuria  History of C diff  Gastroparesis  Insomnia  SVT  RLS  Spinal stenosis  GERD  Osteoporosis  Gastroparalysis    Medications   Aricept  Pepcid  Neurontin  Wellcovorin  Namenda  Methotrexate  Prilosec  Deltasone  Zoloft  Desyrel  Zyrtec  Voltaren  Desyrel  Nortriptyline  Zantac  Mestinon  Cellcept  Linzess  Reglan  Marinol    Surgical History   Appendectomy  Arthrodesis  "finger  Arthroplasty carpometacarpal  Back surgery  Blepharoplasty bilateral  C section  Cholecystectomy  Eye surgery  Genitourinary surgery  Graft bone to finger  Right thumb tendon surgery  Salpingo-oophorectomy bilateral  Tonsillectomy    Physical Exam     Patient Vitals for the past 24 hrs:   BP Temp Pulse Resp SpO2 Height Weight   07/15/24 0900 124/75 -- 62 -- 96 % -- --   07/15/24 0830 123/65 -- 71 -- 97 % -- --   07/15/24 0814 -- -- -- -- 98 % -- --   07/15/24 0813 127/73 -- 66 -- -- -- --   07/15/24 0807 (!) 152/83 97.6  F (36.4  C) 64 17 96 % 1.6 m (5' 3\") 56.7 kg (125 lb)     Physical Exam  General: Patient is alert and normal appearing.  HEENT: Head atraumatic    Eyes: pupils equal and reactive. Conjunctiva clear   Nares: patent   Oropharynx: no lesions, uvula midline, no palatal draping, normal voice, no trismus  Neck: Supple without lymphadenopathy, no meningismus  Chest: Heart regular rate and rhythm.   Lungs: Equal clear to auscultation with no wheeze or rales  Abdomen: Soft, non tender, nondistended, normal bowel sounds  Back: No costovertebral angle tenderness, no midline C, T or L spine tenderness  Neuro: Grossly nonfocal, normal speech, strength equal bilaterally, CN 2-12 intact  Extremities: No deformities, equal radial and DP pulses. No clubbing, cyanosis.  No edema  Skin: Warm and dry with no rash.       Diagnostics     Lab Results   Labs Ordered and Resulted from Time of ED Arrival to Time of ED Departure   INFLUENZA A/B, RSV, & SARS-COV2 PCR - Abnormal       Result Value    Influenza A PCR Negative      Influenza B PCR Negative      RSV PCR Negative      SARS CoV2 PCR Positive (*)    COMPREHENSIVE METABOLIC PANEL - Abnormal    Sodium 136      Potassium 4.5      Carbon Dioxide (CO2) 25      Anion Gap 12      Urea Nitrogen 13.7      Creatinine 0.67      GFR Estimate >90      Calcium 9.7      Chloride 99      Glucose 101 (*)     Alkaline Phosphatase 71      AST 23      ALT 29      Protein Total " 6.6      Albumin 4.2      Bilirubin Total 0.3     CBC WITH PLATELETS AND DIFFERENTIAL - Abnormal    WBC Count 14.3 (*)     RBC Count 4.77      Hemoglobin 14.9      Hematocrit 44.5      MCV 93      MCH 31.2      MCHC 33.5      RDW 14.1      Platelet Count 240      % Neutrophils 90      % Lymphocytes 3      % Monocytes 5      % Eosinophils 0      % Basophils 0      % Immature Granulocytes 1      NRBCs per 100 WBC 0      Absolute Neutrophils 12.9 (*)     Absolute Lymphocytes 0.5 (*)     Absolute Monocytes 0.7      Absolute Eosinophils 0.0      Absolute Basophils 0.1      Absolute Immature Granulocytes 0.2      Absolute NRBCs 0.0     TROPONIN T, HIGH SENSITIVITY - Normal    Troponin T, High Sensitivity 10     TROPONIN T, HIGH SENSITIVITY - Normal    Troponin T, High Sensitivity 8         Imaging   XR Chest 2 Views   Final Result   IMPRESSION: There are no acute infiltrates. The cardiac silhouette is   not enlarged. Pulmonary vasculature is unremarkable. Stable   granulomatous change.       NARCISA THOMPSON MD            SYSTEM ID:  [id number]          EKG   ECG results from 07/15/24   EKG 12-lead, tracing only     Value    Systolic Blood Pressure     Diastolic Blood Pressure     Ventricular Rate 61    Atrial Rate 61    SD Interval 170    QRS Duration 74        QTc 412    P Axis 37    R AXIS -27    T Axis 27    Interpretation ECG      Sinus rhythm  Cannot rule out Anterior infarct , age undetermined  Abnormal ECG    EKG read at 0850.       Independent Interpretation   CXR: No pneumothorax, infiltrate, or pleural effusion.    ED Course      Medications Administered   Medications - No data to display    Procedures   None     Discussion of Management   None    ED Course   ED Course as of 07/15/24 1210   Mon Jul 15, 2024   0840 I initially assessed the patient and obtained the above history and physical exam.     1202 I rechecked with the patient and updated them on results.       Optional/Additional  Documentation  None    Medical Decision Making / Diagnosis     CMS Diagnoses: None    MIPS       None    [facility]   Yessenia Martines is a 74 year old female with history of lupus on methotrexate and a prednisone taper currently for lupus flare who presents for evaluation of cough, chest pain, low-grade fever and sore throat.  Home COVID test was positive yesterday.  Repeat here is also positive..  This is consistent with an upper respiratory tract infection.  There is no signs at this point of serious bacterial infection such as OM, RPA, epiglottitis, PTA, strep pharyngitis, pneumonia, sinusitis, meningitis, bacteremia, serious bacterial infection.      Given cough and chest pain I did a CXR to eval for pneumonia.  EKG without new acute ischemic changes and troponin and delta troponin remain negative.    There are no gastrointestinal symptoms at this point and no signs of dehydration.  Close followup with primary care physician is indicated.  Return to ED for fever > 103, protracted vomiting, confusion.      Patient at day 5 and outside the window for Paxlovid.  O2 saturations are within normal limits.  No history of PE or DVT and no hypoxia or significant tachycardia to indicate PE at this time.  No pleuritic type pain.  Mostly chest pressure with coughing.  Return precautions were reviewed.  Follow-up instructions discussed.  Patient is agreeable with the plan.      Disposition   The patient was discharged.     Diagnosis     ICD-10-CM    1. COVID-19 virus infection  U07.1       2. Lupus (H)  M32.9       3. Chest pain, unspecified type  R07.9       4. Acute cough  R05.1            Discharge Medications   New Prescriptions    No medications on file         Scribe Disclosure:  Yoly BURKS, am serving as a scribe at 8:38 AM on 7/15/2024 to document services personally performed by Bridgett Hendrix MD based on my observations and the provider's statements to me.        Bridgett Hendrix MD  07/15/24 7401

## 2024-07-15 NOTE — TELEPHONE ENCOUNTER
"Reason for Disposition    SEVERE or constant chest pain or pressure  (Exception: Mild central chest pain, present only when coughing.)    Additional Information    Negative: SEVERE difficulty breathing (e.g., struggling for each breath, speaks in single words)    Negative: Difficult to awaken or acting confused (e.g., disoriented, slurred speech)    Negative: Bluish (or gray) lips or face now    Negative: Shock suspected (e.g., cold/pale/clammy skin, too weak to stand, low BP, rapid pulse)    Negative: Sounds like a life-threatening emergency to the triager    Negative: Diagnosed or suspected COVID-19 and symptoms lasting 3 or more weeks    Negative: COVID-19 exposure and no symptoms    Negative: COVID-19 vaccine reaction suspected (e.g., fever, headache, muscle aches) occurring 1 to 3 days after getting vaccine    Negative: COVID-19 vaccine, questions about    Negative: Lives with someone known to have influenza (flu test positive) and flu-like symptoms (e.g., cough, runny nose, sore throat, SOB; with or without fever)    Negative: Possible COVID-19 symptoms and triager concerned about severity of symptoms or other causes    Negative: COVID-19 and breastfeeding, questions about    Answer Assessment - Initial Assessment Questions  1. COVID-19 DIAGNOSIS: \"How do you know that you have COVID?\" (e.g., positive lab test or self-test, diagnosed by doctor or NP/PA, symptoms after exposure).      Positive home test last evening 7/14  2. COVID-19 EXPOSURE: \"Was there any known exposure to COVID before the symptoms began?\" CDC Definition of close contact: within 6 feet (2 meters) for a total of 15 minutes or more over a 24-hour period.       Son visiting from [place] was sick but did not know he had Covid.  3. ONSET: \"When did the COVID-19 symptoms start?\"       Thursday - started last with scratchy throat.   4. WORST SYMPTOM: \"What is your worst symptom?\" (e.g., cough, fever, shortness of breath, muscle aches)      Worst " "symptom today is sore chest.   5. COUGH: \"Do you have a cough?\" If Yes, ask: \"How bad is the cough?\"        Yes, Cough is sporadic. Loose, phlegmy cough. Just above mild.   6. FEVER: \"Do you have a fever?\" If Yes, ask: \"What is your temperature, how was it measured, and when did it start?\"      Low grade fever earlier: normally 97, but ut was 99 F with digital oral thermometer.  Does not feel like has fever today.   7. RESPIRATORY STATUS: \"Describe your breathing?\" (e.g., normal; shortness of breath, wheezing, unable to speak)       Breathing OK. Once in awhile need to take deeper breath. Denies wheezing.   8. BETTER-SAME-WORSE: \"Are you getting better, staying the same or getting worse compared to yesterday?\"  If getting worse, ask, \"In what way?\"      About the same  9. OTHER SYMPTOMS: \"Do you have any other symptoms?\"  (e.g., chills, fatigue, headache, loss of smell or taste, muscle pain, sore throat)      Body aches, fatigue, low appetite, headache terrible HA, did have sore throat that is now gone.  10. HIGH RISK DISEASE: \"Do you have any chronic medical problems?\" (e.g., asthma, heart or lung disease, weak immune system, obesity, etc.)        Lupus  11. VACCINE: \"Have you had the COVID-19 vaccine?\" If Yes, ask: \"Which one, how many shots, when did you get it?\"        Yes, been up to date. Had 4 -5 shots. Last booster was September 2023.  12. PREGNANCY: \"Is there any chance you are pregnant?\" \"When was your last menstrual period?\"        NA  13. O2 SATURATION MONITOR:  \"Do you use an oxygen saturation monitor (pulse oximeter) at home?\" If Yes, ask \"What is your reading (oxygen level) today?\" \"What is your usual oxygen saturation reading?\" (e.g., 95%)        No oximeter at time of call.    Protocols used: Coronavirus (COVID-19) Diagnosed or [id number]    "

## 2024-07-15 NOTE — ED TRIAGE NOTES
Stated she has lupus and is covid positive .      Triage Assessment (Adult)       Row Name 07/15/24 0759          Triage Assessment    Airway WDL WDL        Respiratory WDL    Respiratory WDL WDL        Skin Circulation/Temperature WDL    Skin Circulation/Temperature WDL WDL        Cardiac WDL    Cardiac WDL WDL        Peripheral/Neurovascular WDL    Peripheral Neurovascular WDL WDL        Cognitive/Neuro/Behavioral WDL    Cognitive/Neuro/Behavioral WDL WDL

## 2024-07-15 NOTE — DISCHARGE INSTRUCTIONS
Return to the emergency department for low oxygen saturations less than 92%, increased chest pain, dizziness or lightheadedness, vomiting or diarrhea, high fevers.

## 2024-07-22 ENCOUNTER — MYC MEDICAL ADVICE (OUTPATIENT)
Dept: FAMILY MEDICINE | Facility: CLINIC | Age: 74
End: 2024-07-22
Payer: COMMERCIAL

## 2024-07-23 ASSESSMENT — PATIENT HEALTH QUESTIONNAIRE - PHQ9
10. IF YOU CHECKED OFF ANY PROBLEMS, HOW DIFFICULT HAVE THESE PROBLEMS MADE IT FOR YOU TO DO YOUR WORK, TAKE CARE OF THINGS AT HOME, OR GET ALONG WITH OTHER PEOPLE: SOMEWHAT DIFFICULT
SUM OF ALL RESPONSES TO PHQ QUESTIONS 1-9: 12
SUM OF ALL RESPONSES TO PHQ QUESTIONS 1-9: 12

## 2024-07-24 ENCOUNTER — ANCILLARY PROCEDURE (OUTPATIENT)
Dept: GENERAL RADIOLOGY | Facility: CLINIC | Age: 74
End: 2024-07-24
Attending: PHYSICIAN ASSISTANT
Payer: COMMERCIAL

## 2024-07-24 ENCOUNTER — OFFICE VISIT (OUTPATIENT)
Dept: FAMILY MEDICINE | Facility: CLINIC | Age: 74
End: 2024-07-24
Payer: COMMERCIAL

## 2024-07-24 VITALS
TEMPERATURE: 97.2 F | DIASTOLIC BLOOD PRESSURE: 74 MMHG | BODY MASS INDEX: 21.14 KG/M2 | WEIGHT: 119.3 LBS | HEART RATE: 70 BPM | RESPIRATION RATE: 17 BRPM | SYSTOLIC BLOOD PRESSURE: 110 MMHG | HEIGHT: 63 IN | OXYGEN SATURATION: 98 %

## 2024-07-24 DIAGNOSIS — U07.1 INFECTION DUE TO 2019 NOVEL CORONAVIRUS: Primary | ICD-10-CM

## 2024-07-24 DIAGNOSIS — R05.9 COUGH, UNSPECIFIED TYPE: ICD-10-CM

## 2024-07-24 DIAGNOSIS — F33.0 MILD RECURRENT MAJOR DEPRESSION (H): ICD-10-CM

## 2024-07-24 DIAGNOSIS — M32.9 SYSTEMIC LUPUS ERYTHEMATOSUS, UNSPECIFIED SLE TYPE, UNSPECIFIED ORGAN INVOLVEMENT STATUS (H): ICD-10-CM

## 2024-07-24 DIAGNOSIS — D84.9 IMMUNOSUPPRESSED STATUS (H): ICD-10-CM

## 2024-07-24 DIAGNOSIS — U07.1 INFECTION DUE TO 2019 NOVEL CORONAVIRUS: ICD-10-CM

## 2024-07-24 PROCEDURE — 99213 OFFICE O/P EST LOW 20 MIN: CPT | Performed by: PHYSICIAN ASSISTANT

## 2024-07-24 PROCEDURE — 71046 X-RAY EXAM CHEST 2 VIEWS: CPT | Mod: TC | Performed by: STUDENT IN AN ORGANIZED HEALTH CARE EDUCATION/TRAINING PROGRAM

## 2024-07-24 RX ORDER — BENZONATATE 100 MG/1
100 CAPSULE ORAL 3 TIMES DAILY PRN
Qty: 21 CAPSULE | Refills: 0 | Status: SHIPPED | OUTPATIENT
Start: 2024-07-24

## 2024-07-24 RX ORDER — GUAIFENESIN/DEXTROMETHORPHAN 100-10MG/5
10 SYRUP ORAL EVERY 4 HOURS PRN
Qty: 236 ML | Refills: 1 | Status: SHIPPED | OUTPATIENT
Start: 2024-07-24

## 2024-07-24 ASSESSMENT — PAIN SCALES - GENERAL: PAINLEVEL: NO PAIN (0)

## 2024-07-24 NOTE — PROGRESS NOTES
Assessment & Plan     Infection due to 2019 novel coronavirus  Cough, unspecified type  Appears ill however in no acute distress.  Vitally stable.  Lungs clear to auscultation.  Reassured that she has no constitutional symptoms such as fever, chills, sweats.  No shortness of breath or chest pain.  Likely on-going viral symptoms following COVID-19.  Lower suspicion for superimposed bacterial infection however given immunocompromise status reasonable to repeat a chest x-ray which she is amenable to.  Hold off on antibiotic therapy for now.  Tessalon Perles + cough syrup for symptomatic relief.  Push fluids.  Reviewed signs symptoms with her that warrant urgent/emergent reevaluation.  - XR Chest 2 Views  - benzonatate (TESSALON) 100 MG capsule  Dispense: 21 capsule; Refill: 0  - guaiFENesin-dextromethorphan (ROBITUSSIN DM) 100-10 MG/5ML syrup  Dispense: 236 mL; Refill: 1    Depression Screening Follow Up  Follow Up Actions Taken  Crisis resource information provided in After Visit Summary         Subjective   Pat is a very pleasant 74 year old, presenting for the following health issues:  Cough    Diagnosed with COVID on 7/14/2024.  Chest x-ray at that time negative.  Unable to take Paxlovid.  Ongoing harsh cough.  Mildly productive with phlegm.  Denies shortness of breath, chest pain, fever, chills, night sweats.  Notes fatigue as well as not sleeping well.  Using over-the-counter cough syrup which is not overly helpful.  Did test negative for COVID-19 few days ago on 7/22/2024.  History of lupus on methotrexate as well as prednisone.    History of Present Illness       Reason for visit:  Chest cough acter covid  Symptom onset:  1-2 weeks ago  Symptoms include:  Chest cough. Phlegm.  Symptom intensity:  Moderate  Symptom progression:  Staying the same  Had these symptoms before:  No    She eats 0-1 servings of fruits and vegetables daily.She consumes 0 sweetened beverage(s) daily.She exercises with enough effort to  "increase her heart rate 20 to 29 minutes per day.  She exercises with enough effort to increase her heart rate 3 or less days per week.   She is taking medications regularly.         Review of Systems  Constitutional, neuro, ENT, endocrine, pulmonary, cardiac, gastrointestinal, genitourinary, musculoskeletal, integument and psychiatric systems are negative, except as otherwise noted.      Objective    /74 (BP Location: Left arm, Patient Position: Sitting, Cuff Size: Adult Regular)   Pulse 70   Temp 97.2  F (36.2  C) (Temporal)   Resp 17   Ht 1.6 m (5' 3\")   Wt 54.1 kg (119 lb 4.8 oz)   SpO2 98%   BMI 21.13 kg/m    Body mass index is 21.13 kg/m .  Physical Exam   GENERAL: Ill-appearing however she is alert and in no distress  EYES: Eyes grossly normal to inspection, PERRL and conjunctivae and sclerae normal  HENT: ear canals and TM's normal, nose and mouth without ulcers or lesions  NECK: no adenopathy, no asymmetry, masses, or scars  RESP: lungs clear to auscultation - no rales, rhonchi or wheezes  CV: regular rate and rhythm, normal S1 S2, no S3 or S4, no murmur, click or rub, no peripheral edema  MS: no gross musculoskeletal defects noted, no edema  SKIN: no suspicious lesions or rashes  NEURO: Normal strength and tone, mentation intact and speech normal  PSYCH: mentation appears normal, affect normal/bright    The likelihood of other entities in the differential is insufficient to justify any further testing for them at this time. This was explained to the patient. The patient was advised that persistent or worsening symptoms would require further evaluation. Patient advised to call the office and if unable to reach to go to the emergency room if they develop any new or worsening symptoms. Expressed understanding and agreement with above stated plan.         Signed Electronically by: Alvarado Estrada PA-C    "

## 2024-07-24 NOTE — RESULT ENCOUNTER NOTE
Hi Pat,     Chest xray looks stable which is reassuring!    Let me know if you have any questions or concerns,     Alvarado Estrada PA-C  [facility]

## 2024-07-29 DIAGNOSIS — F34.1 DYSTHYMIA: ICD-10-CM

## 2024-07-29 NOTE — TELEPHONE ENCOUNTER
Pending Prescriptions:                       Disp   Refills    sertraline (ZOLOFT) 50 MG tablet          90 tab*1            Sig: Take 1 tablet (50 mg) by mouth daily        Last office visit:04/23/2024  Next office visit:10/22/2024    Per pharmacy note:  There are ZERO refills remaining on this prescription. Patient is requesting advance approval of refills for this medication to PREVENT ANY MISSED DOSES. (Note: Prescription will be filled when due)

## 2024-08-07 ENCOUNTER — TRANSFERRED RECORDS (OUTPATIENT)
Dept: HEALTH INFORMATION MANAGEMENT | Facility: CLINIC | Age: 74
End: 2024-08-07
Payer: COMMERCIAL

## 2024-08-07 LAB — TSH SERPL-ACNC: 0.91 UIU/ML (ref 0.45–4.5)

## 2024-08-08 ENCOUNTER — TRANSFERRED RECORDS (OUTPATIENT)
Dept: HEALTH INFORMATION MANAGEMENT | Facility: CLINIC | Age: 74
End: 2024-08-08
Payer: COMMERCIAL

## 2024-08-12 ENCOUNTER — TRANSFERRED RECORDS (OUTPATIENT)
Dept: HEALTH INFORMATION MANAGEMENT | Facility: CLINIC | Age: 74
End: 2024-08-12
Payer: COMMERCIAL

## 2024-08-16 ENCOUNTER — NURSE TRIAGE (OUTPATIENT)
Dept: NURSING | Facility: CLINIC | Age: 74
End: 2024-08-16
Payer: COMMERCIAL

## 2024-08-17 NOTE — TELEPHONE ENCOUNTER
"Nurse Triage SBAR    Is this a 2nd Level Triage? NO    Situation: Burn on hands from [place]    Background:   -Peeling Zulma this afternoon, now \"burning\" persists    Assessment:   -Bilateral Hands \"burning\",   -Wash with soap and water  -Per Internet, pt. Tried soaking in White vinegar soaked per Internet  -Pain 5/10      Protocol Recommended Disposition:   Home Care    Recommendation: Care advice reviewed. Pt. Verbalized understanding and agreed to follow care advice given. Advised to call back with any new signs or symptoms or concerns, pt. Agreed.   Advised pt. To wash hands with soap and warm water to remove vinegar, then follow care advice of vegetable oil. Advised caller to callback if pain persisits after home treatment.        Reason for Disposition   Pain or burning feeling from hot peppers    Additional Information   Negative: Difficulty breathing   Negative: Shock suspected (e.g., cold/pale/clammy skin, too weak to stand, low BP, rapid pulse)   Negative: Difficult to awaken or acting confused (e.g., disoriented, slurred speech)   Negative: [1] Burn area larger than 20 palms of hand (> 10% BSA) AND [2] blisters   Negative: Sounds like a life-threatening emergency to the triager   Negative: Chemical gets into the eye from fingers, contaminated object, spray or splash   Negative: SEVERE pain (e.g., excruciating)  (Exception: Pain from hot peppers and has not tried Care Advice per guideline.)   Negative: [1] Chemical has been washed off > 30 minutes ago AND [2] still painful (Exception: Pain from hot peppers; self care at home usually best approach; pain may take an hour to get better.)   Negative: Chemical known to be hydrofluoric acid   Negative: Burn completely circles an arm or leg   Negative: Size > 2% of body surface area   Negative: Center of burn is white (or charred)   Negative: Sounds like a serious injury to the triager   Negative: Triager unable to answer question   Negative: " Blisters present   Negative: [1] Chemical burn of face AND [2] pain or redness   Negative: [1] Looks infected (spreading redness, red streak, pus) AND [2] fever   Negative: Suspicious history for the burn   Negative: [1] Looks infected (spreading redness, pus) AND [2] no fever   Negative: [1] No prior tetanus shots (or is not fully vaccinated) AND [2] any burn wound (e.g., redness, blister)   Negative: [1] After 10 days AND [2] burn isn't healed    Protocols used: Burns - Chemical-A-  Cecy Niño RN, Triage Nurse Advisor, 8/16/2024 8:17 PM

## 2024-08-19 DIAGNOSIS — G31.84 MCI (MILD COGNITIVE IMPAIRMENT): ICD-10-CM

## 2024-08-19 RX ORDER — MEMANTINE HYDROCHLORIDE 10 MG/1
10 TABLET ORAL 2 TIMES DAILY
Qty: 180 TABLET | Refills: 1 | OUTPATIENT
Start: 2024-08-19

## 2024-08-19 NOTE — TELEPHONE ENCOUNTER
Pending Prescriptions:                       Disp   Refills    memantine (NAMENDA) 10 MG tablet          180 ta*1            Sig: Take 1 tablet (10 mg) by mouth 2 times daily        Last office visit: 04-  Next office visit: 10-

## 2024-08-19 NOTE — TELEPHONE ENCOUNTER
Too soon for refill.      Should have enough to last until appt 10/22/24.    Chloé DUGAN RN, BSN  [facility]

## 2024-08-20 ENCOUNTER — TRANSFERRED RECORDS (OUTPATIENT)
Dept: HEALTH INFORMATION MANAGEMENT | Facility: CLINIC | Age: 74
End: 2024-08-20
Payer: COMMERCIAL

## 2024-08-20 LAB
ALT SERPL-CCNC: 18 IU/L (ref 6–32)
AST SERPL-CCNC: 20 U/L (ref 10–35)
CREATININE (EXTERNAL): 0.7 MG/DL (ref 0.5–1.05)

## 2024-08-23 ENCOUNTER — TRANSFERRED RECORDS (OUTPATIENT)
Dept: HEALTH INFORMATION MANAGEMENT | Facility: CLINIC | Age: 74
End: 2024-08-23
Payer: COMMERCIAL

## 2024-08-27 ENCOUNTER — TRANSFERRED RECORDS (OUTPATIENT)
Dept: HEALTH INFORMATION MANAGEMENT | Facility: CLINIC | Age: 74
End: 2024-08-27
Payer: COMMERCIAL

## 2024-09-03 ENCOUNTER — PATIENT OUTREACH (OUTPATIENT)
Dept: CARE COORDINATION | Facility: CLINIC | Age: 74
End: 2024-09-03
Payer: COMMERCIAL

## 2024-09-16 ASSESSMENT — PATIENT HEALTH QUESTIONNAIRE - PHQ9
SUM OF ALL RESPONSES TO PHQ QUESTIONS 1-9: 1
10. IF YOU CHECKED OFF ANY PROBLEMS, HOW DIFFICULT HAVE THESE PROBLEMS MADE IT FOR YOU TO DO YOUR WORK, TAKE CARE OF THINGS AT HOME, OR GET ALONG WITH OTHER PEOPLE: NOT DIFFICULT AT ALL
SUM OF ALL RESPONSES TO PHQ QUESTIONS 1-9: 1

## 2024-09-17 ENCOUNTER — OFFICE VISIT (OUTPATIENT)
Dept: FAMILY MEDICINE | Facility: CLINIC | Age: 74
End: 2024-09-17
Payer: COMMERCIAL

## 2024-09-17 VITALS
WEIGHT: 122.5 LBS | HEART RATE: 64 BPM | RESPIRATION RATE: 17 BRPM | BODY MASS INDEX: 21.71 KG/M2 | SYSTOLIC BLOOD PRESSURE: 116 MMHG | OXYGEN SATURATION: 98 % | DIASTOLIC BLOOD PRESSURE: 80 MMHG | HEIGHT: 63 IN | TEMPERATURE: 97.1 F

## 2024-09-17 DIAGNOSIS — G31.84 MCI (MILD COGNITIVE IMPAIRMENT): ICD-10-CM

## 2024-09-17 DIAGNOSIS — G25.81 RESTLESS LEGS SYNDROME: ICD-10-CM

## 2024-09-17 DIAGNOSIS — Z00.00 ROUTINE GENERAL MEDICAL EXAMINATION AT A HEALTH CARE FACILITY: Primary | ICD-10-CM

## 2024-09-17 DIAGNOSIS — Z13.6 CARDIOVASCULAR SCREENING; LDL GOAL LESS THAN 160: ICD-10-CM

## 2024-09-17 DIAGNOSIS — I47.10 SVT (SUPRAVENTRICULAR TACHYCARDIA) (H): ICD-10-CM

## 2024-09-17 DIAGNOSIS — M32.9 SYSTEMIC LUPUS ERYTHEMATOSUS, UNSPECIFIED SLE TYPE, UNSPECIFIED ORGAN INVOLVEMENT STATUS (H): ICD-10-CM

## 2024-09-17 DIAGNOSIS — Z12.31 ENCOUNTER FOR SCREENING MAMMOGRAM FOR BREAST CANCER: ICD-10-CM

## 2024-09-17 DIAGNOSIS — D84.9 IMMUNOSUPPRESSED STATUS (H): ICD-10-CM

## 2024-09-17 DIAGNOSIS — K21.00 GASTROESOPHAGEAL REFLUX DISEASE WITH ESOPHAGITIS WITHOUT HEMORRHAGE: ICD-10-CM

## 2024-09-17 DIAGNOSIS — R79.9 ABNORMAL FINDING OF BLOOD CHEMISTRY, UNSPECIFIED: ICD-10-CM

## 2024-09-17 DIAGNOSIS — F51.01 PRIMARY INSOMNIA: ICD-10-CM

## 2024-09-17 DIAGNOSIS — M54.59 MECHANICAL LOW BACK PAIN: ICD-10-CM

## 2024-09-17 LAB
BASOPHILS # BLD AUTO: 0 10E3/UL (ref 0–0.2)
BASOPHILS NFR BLD AUTO: 0 %
EOSINOPHIL # BLD AUTO: 0 10E3/UL (ref 0–0.7)
EOSINOPHIL NFR BLD AUTO: 0 %
ERYTHROCYTE [DISTWIDTH] IN BLOOD BY AUTOMATED COUNT: 13 % (ref 10–15)
HCT VFR BLD AUTO: 43.9 % (ref 35–47)
HGB BLD-MCNC: 14.4 G/DL (ref 11.7–15.7)
IMM GRANULOCYTES # BLD: 0.1 10E3/UL
IMM GRANULOCYTES NFR BLD: 1 %
LYMPHOCYTES # BLD AUTO: 0.6 10E3/UL (ref 0.8–5.3)
LYMPHOCYTES NFR BLD AUTO: 6 %
MCH RBC QN AUTO: 31.4 PG (ref 26.5–33)
MCHC RBC AUTO-ENTMCNC: 32.8 G/DL (ref 31.5–36.5)
MCV RBC AUTO: 96 FL (ref 78–100)
MONOCYTES # BLD AUTO: 0.6 10E3/UL (ref 0–1.3)
MONOCYTES NFR BLD AUTO: 6 %
NEUTROPHILS # BLD AUTO: 9 10E3/UL (ref 1.6–8.3)
NEUTROPHILS NFR BLD AUTO: 87 %
PLATELET # BLD AUTO: 239 10E3/UL (ref 150–450)
RBC # BLD AUTO: 4.58 10E6/UL (ref 3.8–5.2)
WBC # BLD AUTO: 10.3 10E3/UL (ref 4–11)

## 2024-09-17 PROCEDURE — 80053 COMPREHEN METABOLIC PANEL: CPT | Performed by: INTERNAL MEDICINE

## 2024-09-17 PROCEDURE — 36415 COLL VENOUS BLD VENIPUNCTURE: CPT | Performed by: INTERNAL MEDICINE

## 2024-09-17 PROCEDURE — 80061 LIPID PANEL: CPT | Performed by: INTERNAL MEDICINE

## 2024-09-17 PROCEDURE — 85025 COMPLETE CBC W/AUTO DIFF WBC: CPT | Performed by: INTERNAL MEDICINE

## 2024-09-17 PROCEDURE — 99214 OFFICE O/P EST MOD 30 MIN: CPT | Mod: 25 | Performed by: INTERNAL MEDICINE

## 2024-09-17 PROCEDURE — G0439 PPPS, SUBSEQ VISIT: HCPCS | Performed by: INTERNAL MEDICINE

## 2024-09-17 PROCEDURE — 82728 ASSAY OF FERRITIN: CPT | Performed by: INTERNAL MEDICINE

## 2024-09-17 RX ORDER — GABAPENTIN 300 MG/1
600 CAPSULE ORAL 2 TIMES DAILY
Qty: 360 CAPSULE | Refills: 3 | Status: SHIPPED | OUTPATIENT
Start: 2024-09-17

## 2024-09-17 RX ORDER — OMEPRAZOLE 40 MG/1
40 CAPSULE, DELAYED RELEASE ORAL DAILY
Qty: 90 CAPSULE | Refills: 3 | Status: SHIPPED | OUTPATIENT
Start: 2024-09-17

## 2024-09-17 ASSESSMENT — PAIN SCALES - GENERAL: PAINLEVEL: NO PAIN (0)

## 2024-09-17 NOTE — PATIENT INSTRUCTIONS
(Z00.00) Routine general medical examination at a health care facility  (primary encounter diagnosis)  Comment: For routine exam, we will draw labs as ordered, cholesterol, diabetes mellitus check, liver function, renal function, and plan for mammogram  [facility] (also performs diagnostic mammogram, ultrasound and biopsy) [phone]. .  We will also update vaccination history.  Plan:     (D84.9) Immunosuppressed status (H24)  Comment: labs reviewed as well as medications   Plan:     (I47.10) SVT (supraventricular tachycardia) (H24)  Comment: we will check labs today   Plan:     (M32.9) Systemic lupus erythematosus, unspecified SLE type, unspecified organ involvement status (H)  Comment: recommend follow up in rheumatology   Plan: Comprehensive metabolic panel            (G31.84) MCI (mild cognitive impairment)  Comment: We will check labs today and continue namenda, aricept and recommend follow up   Plan: Comprehensive metabolic panel            (M54.59) Mechanical low back pain  Comment: Continue on gabaepntin  Plan: gabapentin (NEURONTIN) 300 MG capsule            (G25.81) Restless legs syndrome  Comment: dose increase gabapentin 600 mg in evening to help with restless leg syndrome and check iron studies   Plan: CBC with platelets and differential, gabapentin        (NEURONTIN) 300 MG capsule            (F51.01) Primary insomnia  Comment: continue omeprazole   Plan: omeprazole (PRILOSEC) 40 MG DR capsule            (K21.00) Gastroesophageal reflux disease with esophagitis without hemorrhage  Comment: also follow up in gastroenterology   Plan:     (Z13.6) CARDIOVASCULAR SCREENING; LDL GOAL LESS THAN 130  Comment:   Plan: Lipid panel reflex to direct LDL Non-fasting

## 2024-09-17 NOTE — PROGRESS NOTES
Preventive Care Visit  [facility] CARLOS EDUARDO Mejia MD, MD, Internal Medicine  Sep 17, 2024        Subjective   Pat is a 74 year old, presenting for the following:  Physical          Via the Health Maintenance questionnaire, the patient has reported the following services have been completed -Mammogram: [facility] 2022-01-01, this information has been sent to the abstraction team.  Health Care Directive  Patient has a Health Care Directive on file  Advance care planning document is on file and is current.    HPI          9/12/2024   General Health   How would you rate your overall physical health? Good   Feel stress (tense, anxious, or unable to sleep) Not at all            9/12/2024   Nutrition   Diet: Regular (no restrictions)            9/12/2024   Exercise   Days per week of moderate/strenous exercise 0 days   Average minutes spent exercising at this level 30 min      (!) EXERCISE CONCERN      9/12/2024   Social Factors   Frequency of gathering with friends or relatives More than three times a week   Worry food won't last until get money to buy more No   Food not last or not have enough money for food? No   Do you have housing? (Housing is defined as stable permanent housing and does not include staying ouside in a car, in a tent, in an abandoned building, in an overnight shelter, or couch-surfing.) Yes   Are you worried about losing your housing? No   Lack of transportation? No   Unable to get utilities (heat,electricity)? No            9/17/2024   Fall Risk   Gait Speed Test (Document in seconds) 4.22   Gait Speed Test Interpretation Less than or equal to 5.00 seconds - PASS             9/12/2024   Activities of Daily Living- Home Safety   Needs help with the following daily activites None of the above   Safety concerns in the home None of the above            9/12/2024   Dental   Dentist two times every year? Yes            9/12/2024   Hearing Screening   Hearing concerns?  (!) I NEED TO ASK PEOPLE TO SPEAK UP OR REPEAT THEMSELVES.            9/12/2024   Driving Risk Screening   Patient/family members have concerns about driving No            9/12/2024   General Alertness/Fatigue Screening   Have you been more tired than usual lately? (!) YES            9/12/2024   Urinary Incontinence Screening   Bothered by leaking urine in past 6 months No            9/12/2024   TB Screening   Were you born outside of the [place]? No          Today's PHQ-9 Score:       9/16/2024    10:57 AM   PHQ-9 SCORE   PHQ-9 Total Score MyChart 1 (Minimal depression)   PHQ-9 Total Score 1         9/12/2024   Substance Use   Alcohol more than 3/day or more than 7/wk No   Do you have a current opioid prescription? No   How severe/bad is pain from 1 to 10? 5/10   Do you use any other substances recreationally? No        Social History     Tobacco Use    Smoking status: Never    Smokeless tobacco: Never   Vaping Use    Vaping status: Never Used   Substance Use Topics    Alcohol use: Yes     Alcohol/week: 0.0 standard drinks of alcohol     Comment: 1 wine/day    Drug use: No           8/16/2022   LAST FHS-7 RESULTS   1st degree relative breast or ovarian cancer No   Any relative bilateral breast cancer No   Any male have breast cancer No   Any ONE woman have BOTH breast AND ovarian cancer No   Any woman with breast cancer before 50yrs No   2 or more relatives with breast AND/OR ovarian cancer No   2 or more relatives with breast AND/OR bowel cancer No           Mammogram Screening - Mammogram every 1-2 years updated in Health Maintenance based on mutual decision making    ASCVD Risk   The ASCVD Risk score (Janine YAN, et al., 2019) failed to calculate for the following reasons:    The valid HDL cholesterol range is 20 to 100 mg/dL        Reviewed and updated as needed this visit by Provider                    Past Medical History:   Diagnosis Date    Allergic rhinitis due to pollen     Congestive heart failure (H)      Decreased libido     Depressive disorder     Diabetes (H)     Esophageal motility disorder     H pylori ulcer     History of blood transfusion 1980's    Hoarseness     Hypertension     Lupus (systemic lupus erythematosus) (H)     diagnosed 12 yrs ago    MCI (mild cognitive impairment)     Menopausal osteoporosis 02/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    Movement disorder     Mumps     as a child    Neuropathy     feet and hands    S/P cholecystectomy     S/P TIESHA-BSO     Thyroid disease     Uncomplicated asthma      Current providers sharing in care for this patient include:  Patient Care Team:  Josue Mejia MD as PCP - General (Internal Medicine)  Josue Mejia MD as Assigned PCP  Jake Mcgill MD as MD (Cardiovascular Disease)  Brian Patel MD as Emanuel Nathan MD as MD (Neurology)  Marlo James, PhD as Assigned Behavioral Health Provider  Brian Patel MD as Assigned Neuroscience Provider    The following health maintenance items are reviewed in Epic and correct as of today:  Health Maintenance   Topic Date Due    URINE DRUG SCREEN  Never done    DEPRESSION ACTION PLAN  Never done    MAMMO SCREENING  08/16/2023    MEDICARE ANNUAL WELLNESS VISIT  03/07/2024    LIPID  03/07/2024    ANNUAL REVIEW OF HM ORDERS  03/07/2024    INFLUENZA VACCINE (1) 09/01/2024    COVID-19 Vaccine (9 - 2024-25 season) 09/01/2024    CMP  01/15/2025    CBC  07/15/2025    FALL RISK ASSESSMENT  09/17/2025    PHQ-9  09/17/2025    GLUCOSE  07/15/2027    ADVANCE CARE PLANNING  03/15/2028    COLORECTAL CANCER SCREENING  08/23/2029    DTAP/TDAP/TD IMMUNIZATION (3 - Td or Tdap) 01/25/2032    DEXA  02/05/2035    HEPATITIS C SCREENING  Completed    Pneumococcal Vaccine: 65+ Years  Completed    ZOSTER IMMUNIZATION  Completed    HPV IMMUNIZATION  Aged Out    MENINGITIS IMMUNIZATION  Aged Out    RSV MONOCLONAL ANTIBODY  Aged Out       Immunosuppressed status (H24)   Due for recheck of labs   Systemic lupus erythematosus, unspecified  "SLE type, unspecified organ involvement status (H)   Has had recent follow up in rheumatology and noticed to have an elevated white blood cell count and neutropenia  MCI (mild cognitive impairment)   Memory has been slowly worsening, but stable  Mechanical low back pain   Doing well with gabapentin and acetaminophen for pain control and walking long distances without issues  Restless legs syndrome    Some worsening symptoms at night.  Notes that she is only taking 300 mg gabapentin at night currently    Review of Systems  Constitutional, HEENT, cardiovascular, pulmonary, GI, , musculoskeletal, neuro, skin, endocrine and psych systems are negative, except as otherwise noted.     Objective    Exam  /80 (BP Location: Left arm, Patient Position: Sitting, Cuff Size: Adult Regular)   Pulse 64   Temp 97.1  F (36.2  C) (Tympanic)   Resp 17   Ht 1.595 m (5' 2.8\")   Wt 55.6 kg (122 lb 8 oz)   SpO2 98%   BMI 21.84 kg/m     Estimated body mass index is 21.84 kg/m  as calculated from the following:    Height as of this encounter: 1.595 m (5' 2.8\").    Weight as of this encounter: 55.6 kg (122 lb 8 oz).    Physical Exam  GENERAL: alert and no distress  EYES: Eyes grossly normal to inspection,   HENT: ear canals and TM's normal   NECK: no adenopathy, no asymmetry, masses, or scars  RESP: lungs clear to auscultation - no rales, rhonchi or wheezes  CV: regular rate and rhythm, normal S1 S2, no S3 or S4, no murmur, click or rub, no peripheral edema  ABDOMEN: soft, nontender, no hepatosplenomegaly,   MS: no gross musculoskeletal defects noted, no edema  SKIN: no suspicious lesions or rashes  NEURO: Normal strength and tone, mentation intact and speech normal  PSYCH: mentation appears normal, affect normal/bright         9/17/2024   Mini Cog   Clock Draw Score 2 Normal    2 Normal   3 Item Recall 1 object recalled    1 object recalled   Mini Cog Total Score 3    3       Multiple values from one day are sorted in " reverse-chronological order              Patient Instructions   (Z00.00) Routine general medical examination at a health care facility  (primary encounter diagnosis)  Comment: For routine exam, we will draw labs as ordered, cholesterol, diabetes mellitus check, liver function, renal function, and plan for mammogram  [facility] (also performs diagnostic mammogram, ultrasound and biopsy) [phone]. .  We will also update vaccination history.  Plan:     (D84.9) Immunosuppressed status (H24)  Comment: labs reviewed as well as medications   Plan:     (I47.10) SVT (supraventricular tachycardia) (H24)  Comment: we will check labs today   Plan:     (M32.9) Systemic lupus erythematosus, unspecified SLE type, unspecified organ involvement status (H)  Comment: recommend follow up in rheumatology   Plan: Comprehensive metabolic panel            (G31.84) MCI (mild cognitive impairment)  Comment: We will check labs today and continue namenda, aricept and recommend follow up   Plan: Comprehensive metabolic panel            (M54.59) Mechanical low back pain  Comment: Continue on gabaepntin  Plan: gabapentin (NEURONTIN) 300 MG capsule            (G25.81) Restless legs syndrome  Comment: dose increase gabapentin 600 mg in evening to help with restless leg syndrome and check iron studies   Plan: CBC with platelets and differential, gabapentin        (NEURONTIN) 300 MG capsule            (F51.01) Primary insomnia  Comment: continue omeprazole   Plan: omeprazole (PRILOSEC) 40 MG DR capsule            (K21.00) Gastroesophageal reflux disease with esophagitis without hemorrhage  Comment: also follow up in gastroenterology   Plan:     (Z13.6) CARDIOVASCULAR SCREENING; LDL GOAL LESS THAN 130  Comment:   Plan: Lipid panel reflex to direct LDL Non-fasting              Signed Electronically by: Josue Mejia MD, MD    Answers submitted by the patient for this visit:  Patient Health Questionnaire (Submitted on  9/16/2024)  If you checked off any problems, how difficult have these problems made it for you to do your work, take care of things at home, or get along with other people?: Not difficult at all  PHQ9 TOTAL SCORE: 1

## 2024-09-18 LAB
ALBUMIN SERPL BCG-MCNC: 4.5 G/DL (ref 3.5–5.2)
ALP SERPL-CCNC: 72 U/L (ref 40–150)
ALT SERPL W P-5'-P-CCNC: 28 U/L (ref 0–50)
ANION GAP SERPL CALCULATED.3IONS-SCNC: 16 MMOL/L (ref 7–15)
AST SERPL W P-5'-P-CCNC: 30 U/L (ref 0–45)
BILIRUB SERPL-MCNC: 0.3 MG/DL
BUN SERPL-MCNC: 11.8 MG/DL (ref 8–23)
CALCIUM SERPL-MCNC: 9.7 MG/DL (ref 8.8–10.4)
CHLORIDE SERPL-SCNC: 97 MMOL/L (ref 98–107)
CHOLEST SERPL-MCNC: 284 MG/DL
CREAT SERPL-MCNC: 0.67 MG/DL (ref 0.51–0.95)
EGFRCR SERPLBLD CKD-EPI 2021: >90 ML/MIN/1.73M2
FASTING STATUS PATIENT QL REPORTED: NO
FASTING STATUS PATIENT QL REPORTED: NO
FERRITIN SERPL-MCNC: 67 NG/ML (ref 11–328)
GLUCOSE SERPL-MCNC: 111 MG/DL (ref 70–99)
HCO3 SERPL-SCNC: 24 MMOL/L (ref 22–29)
HDLC SERPL-MCNC: 97 MG/DL
LDLC SERPL CALC-MCNC: 159 MG/DL
NONHDLC SERPL-MCNC: 187 MG/DL
POTASSIUM SERPL-SCNC: 4.8 MMOL/L (ref 3.4–5.3)
PROT SERPL-MCNC: 7 G/DL (ref 6.4–8.3)
SODIUM SERPL-SCNC: 137 MMOL/L (ref 135–145)
TRIGL SERPL-MCNC: 141 MG/DL

## 2024-10-04 NOTE — RESULT ENCOUNTER NOTE
Awais Casas,    I had the opportunity to review your recent labs and a summary of your labs reads as follows:    Your complete blood counts show no sign of anemia, but still elevated white blood cell count with elevated neutrophils  Your comprehensive metabolic panel showed normal renal function, normal liver function  Your fasting lipid panel show  - normal HDL (good) cholesterol -as your goal is greater than 40  - stable LDL (bad) cholesterol as your goal is less than 160 - given that your prior CT coronary calcium score was 0 in 2021 no statin medications would be recommended at this time  - normal triglyceride levels    Your white blood cell count is improved, but given persistent elevation of neutrophils I would recommend that we recheck this again at next office visit     Sincerely,  Josue Mejia MD

## 2024-10-15 ENCOUNTER — TRANSFERRED RECORDS (OUTPATIENT)
Dept: HEALTH INFORMATION MANAGEMENT | Facility: CLINIC | Age: 74
End: 2024-10-15
Payer: COMMERCIAL

## 2024-10-16 ENCOUNTER — PATIENT OUTREACH (OUTPATIENT)
Dept: ONCOLOGY | Facility: CLINIC | Age: 74
End: 2024-10-16
Payer: COMMERCIAL

## 2024-10-16 ENCOUNTER — MYC MEDICAL ADVICE (OUTPATIENT)
Dept: FAMILY MEDICINE | Facility: CLINIC | Age: 74
End: 2024-10-16
Payer: COMMERCIAL

## 2024-10-16 DIAGNOSIS — D72.829 LEUKOCYTOSIS, UNSPECIFIED TYPE: Primary | ICD-10-CM

## 2024-10-16 NOTE — TELEPHONE ENCOUNTER
Good day Dr. eMjia and team,      Please review patient's MyChart message and advise - please let us know if patient will need a visit to discuss.      Yessenia PEREZ MA on 10/16/2024 at 7:42 AM

## 2024-10-16 NOTE — PROGRESS NOTES
New Patient Oncology Nurse Navigator Note     Referral Received: 10/16/24      Referring provider: Brian Zambrano MD     Referring Clinic/Organization: [facility]     Referred to: Benign Hematology    Requested provider (if applicable): First available - did not specify      Evaluation for :   Diagnosis   D72.829 (ICD-10-CM) - Leukocytosis, unspecified type      Clinical History (per Nurse review of records provided):          Records Location: [facility] Everywhere     Additional testing needed prior to consult:     ?    Referral updates and Plan:     10/16/2024 2:31 PM -  Referral received and reviewed. Chronic mild elevation.  Sent to NPS to schedule first available.    MARCO ANTONIO McbrideN, RN  Hematology/Oncology Nurse Navigator  [facility]  [phone] / [phone]

## 2024-10-16 NOTE — TELEPHONE ENCOUNTER
RECORDS STATUS - ALL OTHER DIAGNOSIS      RECORDS RECEIVED FROM: [facility]   NOTES STATUS DETAILS   OFFICE NOTE from referring provider Epic 9/17/2024 - Dr. Josue Mejia   MEDICATION LIST [facility]    LABS     PATHOLOGY REPORTS     ANYTHING RELATED TO DIAGNOSIS Epic 9/17/2024
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
## 2024-10-22 ENCOUNTER — OFFICE VISIT (OUTPATIENT)
Dept: NEUROLOGY | Facility: CLINIC | Age: 74
End: 2024-10-22
Payer: COMMERCIAL

## 2024-10-22 VITALS — OXYGEN SATURATION: 98 % | SYSTOLIC BLOOD PRESSURE: 121 MMHG | HEART RATE: 71 BPM | DIASTOLIC BLOOD PRESSURE: 81 MMHG

## 2024-10-22 DIAGNOSIS — F34.1 DYSTHYMIA: ICD-10-CM

## 2024-10-22 DIAGNOSIS — R29.898 FINE MOTOR SKILL LOSS: ICD-10-CM

## 2024-10-22 DIAGNOSIS — R29.6 FALLS FREQUENTLY: Primary | ICD-10-CM

## 2024-10-22 DIAGNOSIS — R29.818 FINE MOTOR SKILL LOSS: ICD-10-CM

## 2024-10-22 DIAGNOSIS — G31.84 MCI (MILD COGNITIVE IMPAIRMENT): ICD-10-CM

## 2024-10-22 DIAGNOSIS — R25.1 TREMOR: ICD-10-CM

## 2024-10-22 DIAGNOSIS — R26.89 BALANCE PROBLEMS: ICD-10-CM

## 2024-10-22 PROCEDURE — G2211 COMPLEX E/M VISIT ADD ON: HCPCS | Performed by: STUDENT IN AN ORGANIZED HEALTH CARE EDUCATION/TRAINING PROGRAM

## 2024-10-22 PROCEDURE — 99215 OFFICE O/P EST HI 40 MIN: CPT | Performed by: STUDENT IN AN ORGANIZED HEALTH CARE EDUCATION/TRAINING PROGRAM

## 2024-10-22 RX ORDER — PROPRANOLOL HCL 20 MG
20 TABLET ORAL 2 TIMES DAILY PRN
Qty: 60 TABLET | Refills: 5 | Status: SHIPPED | OUTPATIENT
Start: 2024-10-22

## 2024-10-22 RX ORDER — MEMANTINE HYDROCHLORIDE 10 MG/1
10 TABLET ORAL 2 TIMES DAILY
Qty: 180 TABLET | Refills: 1 | Status: SHIPPED | OUTPATIENT
Start: 2024-10-22

## 2024-10-22 RX ORDER — DONEPEZIL HYDROCHLORIDE 10 MG/1
10 TABLET, FILM COATED ORAL AT BEDTIME
Qty: 90 TABLET | Refills: 3 | Status: SHIPPED | OUTPATIENT
Start: 2024-10-22

## 2024-10-22 NOTE — PATIENT INSTRUCTIONS
Continue zoloft 50 mg  Aricept 10 mg at bedtime  Namenda 10 mg twice a day    Add propranolol up to twice a day as needed    OT for memory/fine motor skills    PT for balance/fall prevention but I think you need to be more cognizant of your limitations and change some of your approaches, less bending over.

## 2024-10-22 NOTE — NURSING NOTE
"Yessenia Martines is a 74 year old female who presents for:  Chief Complaint   Patient presents with    RECHECK     Balance problems  MCI        Initial Vitals:  /81   Pulse 71   SpO2 98%  Estimated body mass index is 21.84 kg/m  as calculated from the following:    Height as of 9/17/24: 1.595 m (5' 2.8\").    Weight as of 9/17/24: 55.6 kg (122 lb 8 oz).. There is no height or weight on file to calculate BSA. BP completed using cuff size: ese Hahn'  "
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
## 2024-10-25 ENCOUNTER — THERAPY VISIT (OUTPATIENT)
Dept: PHYSICAL THERAPY | Facility: CLINIC | Age: 74
End: 2024-10-25
Attending: STUDENT IN AN ORGANIZED HEALTH CARE EDUCATION/TRAINING PROGRAM
Payer: COMMERCIAL

## 2024-10-25 ENCOUNTER — THERAPY VISIT (OUTPATIENT)
Dept: OCCUPATIONAL THERAPY | Facility: CLINIC | Age: 74
End: 2024-10-25
Attending: STUDENT IN AN ORGANIZED HEALTH CARE EDUCATION/TRAINING PROGRAM
Payer: COMMERCIAL

## 2024-10-25 DIAGNOSIS — R29.818 FINE MOTOR SKILL LOSS: ICD-10-CM

## 2024-10-25 DIAGNOSIS — R26.81 UNSTEADINESS: Primary | ICD-10-CM

## 2024-10-25 DIAGNOSIS — G31.84 MCI (MILD COGNITIVE IMPAIRMENT): ICD-10-CM

## 2024-10-25 DIAGNOSIS — R29.6 FALLS FREQUENTLY: ICD-10-CM

## 2024-10-25 DIAGNOSIS — R29.898 FINE MOTOR SKILL LOSS: ICD-10-CM

## 2024-10-25 DIAGNOSIS — R26.89 BALANCE PROBLEMS: ICD-10-CM

## 2024-10-25 PROCEDURE — 97530 THERAPEUTIC ACTIVITIES: CPT | Mod: GO

## 2024-10-25 PROCEDURE — 97166 OT EVAL MOD COMPLEX 45 MIN: CPT | Mod: GO

## 2024-10-25 PROCEDURE — 97162 PT EVAL MOD COMPLEX 30 MIN: CPT | Mod: GP | Performed by: PHYSICAL THERAPIST

## 2024-10-25 PROCEDURE — 97535 SELF CARE MNGMENT TRAINING: CPT | Mod: GO

## 2024-10-25 PROCEDURE — 97110 THERAPEUTIC EXERCISES: CPT | Mod: GP | Performed by: PHYSICAL THERAPIST

## 2024-10-25 NOTE — PROGRESS NOTES
PHYSICAL THERAPY EVALUATION  Type of Visit: Evaluation              Subjective       Presenting condition or subjective complaint: (Patient-Rptd) Weakness in hands.  Have cognitive impairment affecting motor difiiculties.  She feels like her balance is worsening.  She has fallen 4x int he alst 6 months.  She lost her balanfce getting on a kitchen stool.  She fell backwards putting the leash on the dog.  Feels dizzy/lightheaded when she turns her body quickly.    Date of onset: 10/22/24 (Date of referral used)    Relevant medical history:     Past Medical History:   Diagnosis Date    Allergic rhinitis due to pollen     Congestive heart failure (H)     Decreased libido     Depressive disorder     Diabetes (H)     Esophageal motility disorder     H pylori ulcer     History of blood transfusion 1980's    Hoarseness     Hypertension     Lupus (systemic lupus erythematosus) (H)     diagnosed 12 yrs ago    MCI (mild cognitive impairment)     Menopausal osteoporosis 02/17/2015    Movement disorder     Mumps     as a child    Neuropathy     feet and hands    S/P cholecystectomy     S/P TIESHA-BSO     Thyroid disease     Uncomplicated asthma        Dates & types of surgery:    Past Surgical History:   Procedure Laterality Date    APPENDECTOMY      ARTHRODESIS FINGER(S) Left 5/18/2018    Procedure: ARTHRODESIS FINGER(S);;  Surgeon: Shahida Carlton MD;  Location: [facility]    ARTHROPLASTY CARPOMETACARPAL (THUMB JOINT) Left 5/18/2018    Procedure: ARTHROPLASTY CARPOMETACARPAL (THUMB JOINT);  LEFT THUMB CARPALMETACARPAL WITH MINI TIGHTROPE, LEFT METAPHALANGEAL FUSION WITH RADIAL BONE GRAFT AND EASY CLIP STAPLE.;  Surgeon: Shahida Carlton MD;  Location: [facility]    ARTHROPLASTY CARPOMETACARPAL (THUMB JOINT), ARTHRODESIS, COMBINED Left 1/24/2018    Procedure: COMBINED ARTHROPLASTY CARPOMETACARPAL (THUMB JOINT), ARTHRODESIS;  LEFT THUMB CARPOMETACARPAL EXCISIONAL ARTHROPLASTY WITH FACSIA TANIA GRAFT, PARTIAL TRAPEZOID  EXCISION;  Surgeon: Briana Carlton MD;  Location: [facility]    BACK SURGERY      L2-L4 or L5 fusion    BLEPHAROPLASTY BILATERAL       SECTION      x3    CHOLECYSTECTOMY      feels better. No pathology on gallbladder    COLONOSCOPY      EYE SURGERY      Raised eyelids.    GENITOURINARY SURGERY      GRAFT BONE TO FINGER Left 2018    Procedure: GRAFT BONE TO FINGER;;  Surgeon: Briana Carlton MD;  Location: [facility]    HYSTERECTOMY      KNEE SURGERY      bilateral arthroscopy    ORTHOPEDIC SURGERY      right thumb tendon surgery    REPAIR TENDON FINGER(S)  2011    Procedure:REPAIR TENDON FINGER(S); RIGHT INDEX FINGER FLEXOR DIGITORUM PROFUNDUS REPAIR, RADIAL DIGITAL NERVE REPAIR ; Surgeon:BRIANA CARLTON; Location:[facility]    SALPINGO-OOPHORECTOMY BILATERAL      SHOULDER SURGERY Right     arthroscopy    SOFT TISSUE SURGERY      Tendon repair on finger    TONSILLECTOMY      Child    [facility] UGI ENDOSCOPY, SIMPLE EXAM  14         Prior diagnostic imaging/testing results:       Prior therapy history for the same diagnosis, illness or injury: (Patient-Rptd) Yes (Patient-Rptd) ?    Prior Level of Function  Transfers: Independent  Ambulation: Independent  ADL: Independent    Living Environment  Social support: (Patient-Rptd) Alone   Type of home: (Patient-Rptd) House; 1 level   Stairs to enter the home: (Patient-Rptd) No       Ramp: (Patient-Rptd) No   Stairs inside the home: (Patient-Rptd) No       Help at home: (Patient-Rptd) Other  Equipment owned: (Patient-Rptd) Four-point cane; Grab bars; Raised toilet seat; Bath bench     Employment: (Patient-Rptd) Not Applicable    Hobbies/Interests: (Patient-Rptd) Piano. Reading.  Art.  Walking dog. 4 kids and 13 grandkids    Patient goals for therapy: (Patient-Rptd) Not drop things so often.  Better use of hands to play piano and do fine motor activities if possible.    Pain assessment: Pain denied     Objective    Cognitive Status Examination  Orientation: Oriented to person, place and time   Level of Consciousness: Alert  Follows Commands and Answers Questions: 100% of the time  Personal Safety and Judgement:     POSTURE: Standing Posture: Rounded shoulders, Forward head     RANGE OF MOTION: LE ROM WFL  STRENGTH:   Pain: - none + mild ++ moderate +++ severe  Strength Scale: 0-5/5 Left Right   Hip Flexion 4- 4-   Ankle DF 4+ 4+   Hip Abduction (seated) 4+ 4+   Knee Flexion 5- 5-   Knee Extension 5- 5-       TRANSFERS: WFL    GAIT:   Gait Description: decreased foot clearance B.  Pt demonstrated poor coordination when gait with changing speeds versus just imbalance    BALANCE:     SPECIAL TESTS  Functional Gait Assessment (FGA) TOTAL SCORE: (MAXIMUM SCORE 30): 11  (<22/30 indicates fall risk)   10 Meter Walk Test (Comfortable)  0.69 m/s   5 Times Sit-to-Stand (5TSTS)  17.09     Modified CTSIB Conditions (sec) Cond 1:  30  Cond 2: 8, 9  Cond 4:  30  Cond 5 : 1, 6       COORDINATION: WNL      Assessment & Plan   CLINICAL IMPRESSIONS  Medical Diagnosis: Falls frequently  Balance problems    Treatment Diagnosis: unsteadiness   Impression/Assessment: Patient is a 74 year old female with imbalance complaints.  The following significant findings have been identified: Decreased strength, Impaired balance, Impaired gait, Impaired muscle performance, Decreased activity tolerance, Impaired posture, and Dizziness. These impairments interfere with their ability to perform self care tasks, recreational activities, household chores, driving , household mobility, and community mobility as compared to previous level of function.     Clinical Decision Making (Complexity):  Clinical Presentation: Evolving/Changing  Clinical Presentation Rationale: based on medical and personal factors listed in PT evaluation  Clinical Decision Making (Complexity): Moderate complexity    PLAN OF CARE  Treatment Interventions:  Interventions: Gait Training,  Manual Therapy, Neuromuscular Re-education, Therapeutic Activity, Therapeutic Exercise, Self-Care/Home Management    Long Term Goals     PT Goal 1  Goal Identifier: FGA  Goal Description: Pt will improve FGA score to 23 or greater  Rationale: to maximize safety and independence with transportation  PT Goal 2  Goal Identifier: gait speed  Goal Description: Pt will achieve gait speed of 1.0 m/s or greater  Rationale: to maximize safety and independence within the community  Target Date: 01/23/25  PT Goal 3  Goal Identifier: cond 2 on mCSTIB  Goal Description: Pt will be able to maintain balance on condition 2 of mCTSIB for 30 seconds in order to safely perform activities like showering  Target Date: 01/23/25  PT Goal 4  Goal Identifier: 5xSTS  Goal Description: Pt will complete 5xSTS in less than 15 seconds  Rationale: to maximize safety and independence with performance of ADLs and functional tasks  Target Date: 01/23/25      Frequency of Treatment: 2x/wk reduce frequency as appropriate  Duration of Treatment: 12 weeks    Recommended Referrals to Other Professionals:   Education Assessment:   Learner/Method: Patient;Listening;Reading;Pictures/Video;Demonstration;No Barriers to Learning  Education Comments: Education on testing results and POC    Risks and benefits of evaluation/treatment have been explained.   Patient/Family/caregiver agrees with Plan of Care.     Evaluation Time:     PT Eval, Moderate Complexity Minutes (59748): 29       Signing Clinician: Tg Garrison PT        [facility]                                                                                   OUTPATIENT PHYSICAL THERAPY      PLAN OF TREATMENT FOR OUTPATIENT REHABILITATION   Patient's Last Name, First Name, PATIYessenia George YOB: 1950   Provider's Name   [facility]   Medical Record No.  [id number]     Onset Date: 10/22/24 (Date of referral used)  Start of  Care Date: 10/25/24     Medical Diagnosis:  Falls frequently  Balance problems      PT Treatment Diagnosis:  unsteadiness Plan of Treatment  Frequency/Duration: 2x/wk reduce frequency as appropriate/ 12 weeks    Certification date from 10/25/24 to 01/23/25         See note for plan of treatment details and functional goals     Tg Garrison, PT                         I CERTIFY THE NEED FOR THESE SERVICES FURNISHED UNDER        THIS PLAN OF TREATMENT AND WHILE UNDER MY CARE     (Physician attestation of this document indicates review and certification of the therapy plan).              Referring Provider:  Brian Patel    Initial Assessment  See Epic Evaluation- Start of Care Date: 10/25/24

## 2024-10-25 NOTE — PROGRESS NOTES
OCCUPATIONAL THERAPY EVALUATION  Type of Visit: Evaluation              Subjective      Presenting condition or subjective complaint: (Patient-Rptd) Weakness in hands.  Have cognitive impairment affecting motor difiiculties.  Date of onset: 10/22/24 (order date)    Relevant medical history:     Dates & types of surgery:      Prior diagnostic imaging/testing results:       Prior therapy history for the same diagnosis, illness or injury: (Patient-Rptd) Yes (Patient-Rptd) 2022?    Occupational Profile: Patient is a pleasant 74 year old female who was referred to outpatient occupational therapy due to challenges with fine motor coordination. She has a diagnosis of mild cognitive impairment and tremor. She was most recently seen by Dr. Patel in neurology in 10/22/24 for frequent falls, MCI, and fine motor control. Referrals were placed for physical therapy and occupational therapy.  She reports tremor is not constant and it comes and goes. Per neurology visit, tremor appears to be that of enhanced physiological tremor vs. Essential tremor. She was prescribed propranolol prn. She endorses fine motor limitations drops things, difficulty with buttoning, knitting, piano, drawing writing. She lives alone and she continues to be independent in IADL's (driving, medication, finances, etc)       Prior Level of Function  Transfers: Independent  Ambulation: Independent  ADL: Independent  IADL: Driving, Finances, Housekeeping, Laundry, Meal preparation, Medication management    Living Environment  Social support: (Patient-Rptd) Alone   Type of home: (Patient-Rptd) House; 1 level   Stairs to enter the home: (Patient-Rptd) No       Ramp: (Patient-Rptd) No   Stairs inside the home: (Patient-Rptd) No       Help at home: (Patient-Rptd) Other  Equipment owned: (Patient-Rptd) Four-point cane; Grab bars; Raised toilet seat; Bath bench     Employment: (Patient-Rptd) Not Applicable    Hobbies/Interests: (Patient-Rptd) Piano. Reading.  Art.  " Walking dog. 4 kids and 13 grandkids    Patient goals for therapy: (Patient-Rptd) Not drop things so often.  Better use of hands to play piano and do fine motor activities if possible.    Pain assessment: Pain denied     Objective   Cognitive Status Examination  Orientation: Oriented to person, place and time   Level of Consciousness: Alert  Follows Commands and Answers Questions: 100% of the time  Personal Safety and Judgement: Intact  Memory:  diagnosed with MCI, issues with short-term memory and required to write things down  Patient underwent a neuropsychological evaluation which provides an extensive background on patients medical history and current level of functioning (Note dated 10/19/23).     MoCA 12/4/23: 25/30  She uses a calendar, writes things down. She reports he tends to leave things undone and walks away, loses things       VISUAL SKILLS  Visual Acuity: Wears glasses    SENSATION:  neuropathy in hands and feet; not bothersome and takes gabepentin     POSTURE: WNL  RANGE OF MOTION: UE AROM WNL  STRENGTH:    Strength   L: 31   R: 25 lb   MUSCLE TONE: WNL  COORDINATION: Gross Motor Coordination: WNL  Fine Motor Coordination: She endorses fine motor limitations including drops things, difficulty with buttoning, knitting, piano, drawing writing.   Left Hand, Nine Hole Peg Test (sec): 30   Right Hand, Nine Hole Peg Test (sec): 31  Pt states tremors fluccate; she has not tried propranolol   Per neurology visit 10/22/24: \"Tremor:  appears to be that of enhanced physiological tremor vs essential tremor.  No clear Parkinonism to exam.  Tremor is with action only affecting handwriting,  eating etc.  Discussed low dose propranolol BIDPRN\"    BALANCE:  reports balance issues. He ordered     FUNCTIONAL MOBILITY  Assistive Device(s): None  Ambulation: Independent - seeing PT today    BED MOBILITY: Independent    TRANSFERS: Independent    BATHING: Independent    UPPER BODY DRESSING: Independent; difficulty " with buttons but bought a button hook - does not know how to use     LOWER BODY DRESSING: Independent    TOILETING: Independent    GROOMING:  difficulty with bracelets, uses larger nail clipper  Equipment:  adapted nail clipper    EATING/SELF FEEDING:  Independent; no issues cutting   Equipment:  No AE utilized    ACTIVITY TOLERANCE: Fatigue isn't an issues; walks the dog 1 mile/day    INSTRUMENTAL ACTIVITIES OF DAILY LIVING (IADL):  Meal Planning/Prep: independent in meal prep; denies issues with chopping, cutting, etc   Home/Financial Management: Manges household chores independently. She uses mostly autopay   Communication/Computer Use: Handwriting has changed with decreased legibility  Community Mobility: Patient continues to drive; bumped into post at gas station/car wash and garage. She is able to use navigation. She is not getting lost  Care for others: 13 grandchildren   Sleep: she tends to wake at 3 am then more difficulty falling sleep      Assessment & Plan   CLINICAL IMPRESSIONS  Medical Diagnosis: MCI (mild cognitive impairment) (G31.84)    Fine motor skill loss (R29.818, R29.898)    Treatment Diagnosis: incoordination    Impression/Assessment: Patient is a pleasant 74 year old female who was referred to outpatient occupational therapy due to challenges with fine motor coordination. The following significant findings have been identified: Impaired cognition and Impaired coordination.  These identified deficits interfere with their ability to perform self care tasks and leisure activities  as compared to previous level of function.     Clinical Decision Making (Complexity):  Assessment of Occupational Performance: 3-5 Performance Deficits  Occupational Performance Limitations: dressing, writing, leisure activities  Clinical Decision Making (Complexity): Moderate complexity    PLAN OF CARE  Treatment Interventions:  Interventions: Self-Care/Home Management, Therapeutic Activity, Therapeutic  Exercise    Long Term Goals   OT Goal 1  Goal Identifier: HEP  Goal Description: Patient will verbalize and demonstrate understanding of UE HEP for hand strength and fine motor coordination to promote improved dexterity required for ADL/IADL's  Target Date: 11/15/24  OT Goal 2  Goal Identifier: Adaptive Equipment/Techniques  Goal Description: Patient will verbalize and/or demonstrate understanding of available adaptive equipment and/or techniques to improve IND in ADL/IADL's (writing, dressing, etc)  Target Date: 11/15/24      Frequency of Treatment: 1x/week  Duration of Treatment: 3 weeks     Recommended Referrals to Other Professionals:  none  Education Assessment: Learner/Method: Patient;Listening;Demonstration     Risks and benefits of evaluation/treatment have been explained.   Patient/Family/caregiver agrees with Plan of Care.     Evaluation Time:      Signing Clinician: KATIE Conde, CNS, ANITHA        [facility]                                                                                   OUTPATIENT OCCUPATIONAL THERAPY      PLAN OF TREATMENT FOR OUTPATIENT REHABILITATION   Patient's Last Name, First Name, PATITayKIMMIETay  ShilpijensYessenia  A YOB: 1950   Provider's Name   [facility]   Medical Record No.  [id number]     Onset Date: 10/22/24 (order date) Start of Care Date: 10/25/24     Medical Diagnosis:  MCI (mild cognitive impairment) (G31.84)    Fine motor skill loss (R29.818, R29.898)      OT Treatment Diagnosis:  incoordination Plan of Treatment  Frequency/Duration:1x/week/3 weeks    Certification date from 10/25/24   To 11/15/24        See note for plan of treatment details and functional goals     KATIE Conde, DANIELLE, CSRS                         I CERTIFY THE NEED FOR THESE SERVICES FURNISHED UNDER        THIS PLAN OF TREATMENT AND WHILE UNDER MY CARE     (Physician attestation of this document indicates review and  certification of the therapy plan).              Referring Provider:  Brian Patel    Initial Assessment  See Epic Evaluation- 10/25/24

## 2024-11-06 ENCOUNTER — THERAPY VISIT (OUTPATIENT)
Dept: PHYSICAL THERAPY | Facility: CLINIC | Age: 74
End: 2024-11-06
Payer: COMMERCIAL

## 2024-11-06 DIAGNOSIS — R29.6 FALLS FREQUENTLY: Primary | ICD-10-CM

## 2024-11-06 DIAGNOSIS — R26.81 UNSTEADINESS: ICD-10-CM

## 2024-11-06 PROCEDURE — 97110 THERAPEUTIC EXERCISES: CPT | Mod: GP

## 2024-11-06 PROCEDURE — 97112 NEUROMUSCULAR REEDUCATION: CPT | Mod: GP

## 2024-11-18 ENCOUNTER — PRE VISIT (OUTPATIENT)
Dept: ONCOLOGY | Facility: CLINIC | Age: 74
End: 2024-11-18
Payer: COMMERCIAL

## 2024-11-22 ENCOUNTER — TRANSFERRED RECORDS (OUTPATIENT)
Dept: HEALTH INFORMATION MANAGEMENT | Facility: CLINIC | Age: 74
End: 2024-11-22

## 2024-11-24 ENCOUNTER — MYC REFILL (OUTPATIENT)
Dept: FAMILY MEDICINE | Facility: CLINIC | Age: 74
End: 2024-11-24
Payer: COMMERCIAL

## 2024-11-24 DIAGNOSIS — F51.01 PRIMARY INSOMNIA: ICD-10-CM

## 2024-11-24 DIAGNOSIS — M54.59 MECHANICAL LOW BACK PAIN: ICD-10-CM

## 2024-11-24 DIAGNOSIS — G25.81 RESTLESS LEGS SYNDROME: ICD-10-CM

## 2024-11-25 RX ORDER — GABAPENTIN 300 MG/1
600 CAPSULE ORAL 2 TIMES DAILY
Qty: 360 CAPSULE | Refills: 3 | Status: SHIPPED | OUTPATIENT
Start: 2024-11-25

## 2024-11-25 RX ORDER — TRAZODONE HYDROCHLORIDE 100 MG/1
200 TABLET ORAL AT BEDTIME
Qty: 180 TABLET | Refills: 3 | Status: SHIPPED | OUTPATIENT
Start: 2024-11-25

## 2024-12-03 ENCOUNTER — TRANSFERRED RECORDS (OUTPATIENT)
Dept: HEALTH INFORMATION MANAGEMENT | Facility: CLINIC | Age: 74
End: 2024-12-03

## 2024-12-05 ENCOUNTER — OFFICE VISIT (OUTPATIENT)
Dept: URGENT CARE | Facility: URGENT CARE | Age: 74
End: 2024-12-05
Payer: COMMERCIAL

## 2024-12-05 VITALS
HEART RATE: 62 BPM | WEIGHT: 126 LBS | TEMPERATURE: 97.3 F | OXYGEN SATURATION: 97 % | BODY MASS INDEX: 22.47 KG/M2 | RESPIRATION RATE: 16 BRPM | DIASTOLIC BLOOD PRESSURE: 72 MMHG | SYSTOLIC BLOOD PRESSURE: 137 MMHG

## 2024-12-05 DIAGNOSIS — J32.9 VIRAL SINUSITIS: Primary | ICD-10-CM

## 2024-12-05 DIAGNOSIS — B97.89 VIRAL SINUSITIS: Primary | ICD-10-CM

## 2024-12-05 DIAGNOSIS — H92.02 LEFT EAR PAIN: ICD-10-CM

## 2024-12-05 RX ORDER — FLUTICASONE PROPIONATE 50 MCG
2 SPRAY, SUSPENSION (ML) NASAL DAILY
Qty: 16 G | Refills: 0 | Status: SHIPPED | OUTPATIENT
Start: 2024-12-05

## 2024-12-05 NOTE — PROGRESS NOTES
Assessment & Plan      Diagnosis Comments   1. Viral sinusitis  fluticasone (FLONASE) 50 MCG/ACT nasal spray   Should discontinue use of Afrin nasal spray. May also use a netti pot or nasal lavage.      2. Left ear pain  May use tylenol or ibuprofen for for discomfort         Pat's symptoms appears to be viral in nature. Should continue with home care measures.  Home care reviewed. Patient verbalized understanding; will monitor symptoms closely. Reviewed s/e to medications.   Follow up with primary care in 1 week if symptoms not improving.     Rhonda Mercedes [facility]    Subjective     Pat is a 74 year old female who presents to clinic today for the following health issues:  Chief Complaint   Patient presents with    Urgent Care     Clogged left ear x a week        HPI    Pat presents to urgent care  for a clogged left ear for one week. Water from a shower may have entered her ear canal and since then she feels fullness and pressure. Will pop at times.   Denies fever, sinus pressure headache.        Review of Systems  Constitutional, HEENT, cardiovascular, pulmonary, gi and gu systems are negative, except as otherwise noted.      Objective    /72   Pulse 62   Temp 97.3  F (36.3  C) (Tympanic)   Wt 57.2 kg (126 lb)   SpO2 97%   BMI 22.47 kg/m    Physical Exam   GENERAL: alert and no distress  HENT: ear canals and TM's normal, nose and mouth without ulcers or lesions  NECK: no adenopathy, no asymmetry, masses, or scars  RESP: lungs clear to auscultation - no rales, rhonchi or wheezes  CV: regular rate and rhythm, normal S1 S2, no S3 or S4, no murmur, click or rub, no peripheral edema  MS: no gross musculoskeletal defects noted, no edema

## 2024-12-05 NOTE — PATIENT INSTRUCTIONS
Home care for sinusitis   Humidifier in room if available. May use flonase daily to help decrease swelling and dry up drainage. Recommend saline lavage to help remove mucous and moisturize sinuses.     Please follow up in clinic, with your primary care provider if symptoms not improving with treatment.

## 2024-12-12 ENCOUNTER — MYC MEDICAL ADVICE (OUTPATIENT)
Dept: FAMILY MEDICINE | Facility: CLINIC | Age: 74
End: 2024-12-12
Payer: COMMERCIAL

## 2024-12-12 ENCOUNTER — PATIENT OUTREACH (OUTPATIENT)
Dept: CARE COORDINATION | Facility: CLINIC | Age: 74
End: 2024-12-12
Payer: COMMERCIAL

## 2024-12-12 NOTE — TELEPHONE ENCOUNTER
Good day Dr. Mejia,      Please review patient's MyChart message and advise. Form placed by your desk.      Yessenia PEREZ MA on 12/12/2024 at 2:20 PM

## 2024-12-17 ENCOUNTER — NURSE TRIAGE (OUTPATIENT)
Dept: FAMILY MEDICINE | Facility: CLINIC | Age: 74
End: 2024-12-17
Payer: COMMERCIAL

## 2024-12-17 DIAGNOSIS — H10.32 ACUTE BACTERIAL CONJUNCTIVITIS OF LEFT EYE: Primary | ICD-10-CM

## 2024-12-18 NOTE — TELEPHONE ENCOUNTER
"To PCP:    Nurse Triage SBAR    Is this a 2nd Level Triage? NO    Situation: Patient believes she has a clogged eye duct. She has had this happen in both eyes before within the past year. She is not showing symptoms bad enough that in protocol she needs to be seen, however Patient would like to know if she can get a prescription from Dr. Mejia.    Background: Patient has a history of clogged eye ducts.    Assessment: Right upper eyelid swelling, mild redness, sore to touch, no fever, no itching, no discharge, no sinus pain.    Protocol Recommended Disposition:   Home Care    Recommendation: Patient would like PCP to review and see if prescription can be sent for this.     Routed to provider    Does the patient meet one of the following criteria for ADS visit consideration? No    1. ONSET: \"When did the swelling start?\" (e.g., minutes, hours, days)      Late tuesday  2. LOCATION: \"What part of the eyelid is swollen?\"      Upper eyelid of the right  3. SEVERITY: \"How swollen is it?\" (e.g., describe; mild, moderate, or severe)      mild  4. ITCHING: \"Is there any itching?\" If Yes, ask: \"How much?\"   (Scale 1-10; mild, moderate or severe)      denies  5. PAIN: \"Is the swelling painful to touch?\" If Yes, ask: \"How painful is it?\"   (Scale 1-10; mild, moderate or severe)      Sore if touches  6. FEVER: \"Do you have a fever?\" If Yes, ask: \"What is it, how was it measured, and when did it start?\"       denies  7. CAUSE: \"What do you think is causing the swelling?\"      Plugged eyelid duct/stye  8. RECURRENT SYMPTOM: \"Have you had eyelid swelling before?\" If Yes, ask: \"When was the last time?\" \"What happened that time?\"      Has had this happen before, under a year ago  9. OTHER SYMPTOMS: \"Do you have any other symptoms?\" (e.g., blurred vision, eye discharge, rash, runny nose)      denies    Reason for Disposition   Eyelid swelling from suspected mild irritant    Additional Information   Negative: Unresponsive, passed out " "or very weak   Negative: Difficulty breathing or wheezing   Negative: Difficulty swallowing or slurred speech and sudden onset   Negative: Sounds like a life-threatening emergency to the triager   Negative: Chemical got in the eye   Negative: Followed an eye injury   Negative: Entire face is swollen   Negative: Sacs of clear fluid (blisters) on whites of eyes (allergic cysts)   Negative: Allergic reaction to antibiotic eyedrops suspected   Negative: Bee sting and within last 24 hours   Negative: Insect bite suspected   Negative: Sty suspected (i.e., small, painful red lump present on lid margin)   Negative: White, yellow, or green discharge (pus) in the eye   Negative: Redness of white area (sclera) of eye(s) is main symptom   Negative: SEVERE eyelid swelling (e.g., eyelids swollen shut or almost shut) and fever   Negative: Eyelid (outer) is very red and fever   Negative: Pregnant 20 or more weeks and sudden weight gain (e.g., more than 3 lbs or 1.4 kg in one week)   Negative: Postpartum (from 0 to 6 weeks after delivery) and sudden weight gain (e.g., more than 3 lbs or 1.4 kg in one week)   Negative: SEVERE eyelid swelling (e.g., eyelids swollen shut or almost shut) and involves both eyes   Negative: SEVERE eyelid swelling and taking an ACE Inhibitor medicine (e.g., benazepril/LOTENSIN, captopril/CAPOTEN, enalapril/VASOTEC, lisinopril/ZESTRIL)   Negative: SEVERE eyelid swelling on one side that is red and painful (or tender to touch)   Negative: SEVERE eyelid swelling on one side and sinus pain or pressure   Negative: Fever   Negative: Painful rash and multiple small blisters grouped together (i.e., dermatomal distribution or \"band\" or \"stripe\")   Negative: MODERATE to SEVERE eyelid swelling on one side  (Exception: Due to a mosquito bite.)   Negative: Eyelid is red and painful (or tender to touch)   Negative: Swelling of both lower legs (i.e., bilateral pedal edema)   Negative: MILD eyelid swelling (puffiness) and " sinus pain or pressure   Negative: Patient wants to be seen   Negative: MILD eyelid swelling (puffiness) and persists > 3 days   Negative: Eyelid swelling is a chronic problem (recurrent or ongoing AND present > 4 weeks)   Negative: Eyelid swelling began after use of a new facial cosmetic, hair care product or nail polish    Protocols used: Eyelid Swelling-A-OH    Marry Baltazar RN [facility]

## 2024-12-18 NOTE — CONFIDENTIAL NOTE
I called and spoke with Pat and did recommend warm washcloth, massage and Ok to trial lid washing with shakir's baby shampoo    Josue Mejia MD,

## 2024-12-29 ENCOUNTER — HEALTH MAINTENANCE LETTER (OUTPATIENT)
Age: 74
End: 2024-12-29

## 2025-01-28 ENCOUNTER — PATIENT OUTREACH (OUTPATIENT)
Dept: CARE COORDINATION | Facility: CLINIC | Age: 75
End: 2025-01-28
Payer: COMMERCIAL

## 2025-02-20 ENCOUNTER — TRANSFERRED RECORDS (OUTPATIENT)
Dept: HEALTH INFORMATION MANAGEMENT | Facility: CLINIC | Age: 75
End: 2025-02-20
Payer: COMMERCIAL

## 2025-02-26 ENCOUNTER — TRANSFERRED RECORDS (OUTPATIENT)
Dept: HEALTH INFORMATION MANAGEMENT | Facility: CLINIC | Age: 75
End: 2025-02-26
Payer: COMMERCIAL

## 2025-02-26 LAB
ALT SERPL-CCNC: 18 IU/L (ref 0–32)
AST SERPL-CCNC: 17 IU/L (ref 0–40)

## 2025-03-29 ENCOUNTER — HEALTH MAINTENANCE LETTER (OUTPATIENT)
Age: 75
End: 2025-03-29

## 2025-04-07 ENCOUNTER — TRANSFERRED RECORDS (OUTPATIENT)
Dept: ADMINISTRATIVE | Facility: CLINIC | Age: 75
End: 2025-04-07
Payer: COMMERCIAL

## 2025-04-09 NOTE — PROCEDURES
[facility]   [address] [place] [address], [address], [place] [address]     Patient Name: Yessenia Martines  Gender:  Female  Exam Date: 04/07/2025 Visit Number:  [id number]  Age: 75 Years YOB: 1950  Attending MD: Alvarado Sargent MD Medical Record#:  [id number]  -----------------------------------------------------------------------------------------------------------------------------   Procedure:    Upper GI Endoscopy   Indications:    Dysphagia   Heartburn   Cough/Hoarseness/Sore Throat  Provider:        Alvarado Sargent MD   Referring MD: Referral Self   Primary MD:      Josue Mejia MD  Medications:   Admitting Medication:   0.9% Normal Saline at [facility]   Intra Procedure Medications:   Patient received monitored anesthesia care.     Complications: No immediate complications  ___________________________________________________________________________________________  Procedure:   An examination of the heart and lungs was performed within acceptable limits.  . The patient was therefore deemed a reasonable candidate for sedation.   The risks and benefits were explained to the patient, who appeared to understand. After obtaining informed consent, the scope was passed under direct vision. Throughout the procedure the patient's blood pressure, pulse and oxygen saturations were monitored.  The scope was introduced through the mouth and advanced to the third portion of duodenum.         Findings:   Esophagus:    Normal esophagus.   The z-line is 35 centimeters from the incisors.  Top of the gastric folds is 35 centimeters from the incisors.  Dilatation of the Esophagus:  Instrument:  Savary, size:  45 [place].  Impression: The dilation of the esophagus was successful, there was mild resistance with no bleeding. There was no chest or abdominal pain after dilatation.  *Esophagus Comments:  Mid and distal esophagus were biopsied with cold forceps.  Stomach:  The diaphragm hiatus is at 40  centimeters from the incisors.     Small Hiatal Hernia.  Duodenum:    Normal duodenum.  Impression:   Hiatal hernia  Gastroesophageal reflux disease without esophagitis  Dysphagia, unspecified type    Preliminary Plan:  Recommendation Comments:  Await pathology.   Continue with PPi twice a day.   If dysphagia improved with dilation may repeat as needed.  Pathology Results:  A: ESOPHAGUS, DISTAL, BIOPSY:           1. Normal squamous mucosa           2. Negative for reflux changes and eosinophilic esophagitis           3. Negative for columnar mucosa      B: ESOPHAGUS, MID, BIOPSY:           1. Normal squamous mucosa           2. Negative for reflux changes and eosinophilic esophagitis           3. Negative for columnar mucosa      MICROSCOPIC  A: Performed   B: Performed     Electronically signed by: KARLIE Astorga MD    Interpreted at [facility], [address] [place] [id number]    Orders    Instruction(s)/Education:  Instruction/Education Timeframe Assessment   Hiatal Hernia  K44.9       Final Plan:  If symptoms persist or you have questions regarding your results, please call our office.      _Electronically signed by:___________________  Alvarado Sargent MD                 04/07/2025    cc: Josue Mejia MD
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
## 2025-04-22 ENCOUNTER — OFFICE VISIT (OUTPATIENT)
Dept: NEUROLOGY | Facility: CLINIC | Age: 75
End: 2025-04-22
Payer: COMMERCIAL

## 2025-04-22 ENCOUNTER — MEDICAL CORRESPONDENCE (OUTPATIENT)
Dept: HEALTH INFORMATION MANAGEMENT | Facility: CLINIC | Age: 75
End: 2025-04-22

## 2025-04-22 ENCOUNTER — TELEPHONE (OUTPATIENT)
Dept: PHARMACY | Facility: OTHER | Age: 75
End: 2025-04-22

## 2025-04-22 ENCOUNTER — DOCUMENTATION ONLY (OUTPATIENT)
Dept: NEUROLOGY | Facility: CLINIC | Age: 75
End: 2025-04-22

## 2025-04-22 VITALS
DIASTOLIC BLOOD PRESSURE: 75 MMHG | OXYGEN SATURATION: 97 % | HEART RATE: 67 BPM | BODY MASS INDEX: 22.64 KG/M2 | WEIGHT: 127 LBS | SYSTOLIC BLOOD PRESSURE: 120 MMHG

## 2025-04-22 DIAGNOSIS — G31.84 MCI (MILD COGNITIVE IMPAIRMENT): ICD-10-CM

## 2025-04-22 DIAGNOSIS — F34.1 DYSTHYMIA: Primary | ICD-10-CM

## 2025-04-22 PROCEDURE — 3074F SYST BP LT 130 MM HG: CPT | Performed by: STUDENT IN AN ORGANIZED HEALTH CARE EDUCATION/TRAINING PROGRAM

## 2025-04-22 PROCEDURE — 3078F DIAST BP <80 MM HG: CPT | Performed by: STUDENT IN AN ORGANIZED HEALTH CARE EDUCATION/TRAINING PROGRAM

## 2025-04-22 PROCEDURE — G2211 COMPLEX E/M VISIT ADD ON: HCPCS | Performed by: STUDENT IN AN ORGANIZED HEALTH CARE EDUCATION/TRAINING PROGRAM

## 2025-04-22 PROCEDURE — 99215 OFFICE O/P EST HI 40 MIN: CPT | Performed by: STUDENT IN AN ORGANIZED HEALTH CARE EDUCATION/TRAINING PROGRAM

## 2025-04-22 RX ORDER — SERTRALINE HYDROCHLORIDE 100 MG/1
100 TABLET, FILM COATED ORAL DAILY
Qty: 90 TABLET | Refills: 1 | Status: SHIPPED | OUTPATIENT
Start: 2025-04-22

## 2025-04-22 NOTE — PATIENT INSTRUCTIONS
Stay physically and mentally active  Increase zoloft to 100 mg daily    No changes to aricept/namenda    Can continue to discuss living/living alone

## 2025-04-22 NOTE — PROGRESS NOTES
Faxed Form April 22, 2025 to fax number [phone], [facility].    Right Fax confirmed at 8:39 AM    Ashok Hahn

## 2025-04-22 NOTE — TELEPHONE ENCOUNTER
MTM Recruitment: [facility] insurance     Referral outreach attempt #1 on April 22, 2025      Outcome: patient opted out    Doris Coyne CPhT  MTM
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
## 2025-04-22 NOTE — NURSING NOTE
"Yessenia Martines is a 75 year old female who presents for:  Chief Complaint   Patient presents with    Balance, MCI        Initial Vitals:  /75   Pulse 67   Wt 57.6 kg (127 lb)   SpO2 97%   BMI 22.64 kg/m   Estimated body mass index is 22.64 kg/m  as calculated from the following:    Height as of 9/17/24: 1.595 m (5' 2.8\").    Weight as of this encounter: 57.6 kg (127 lb).. Body surface area is 1.6 meters squared. BP completed using cuff size: ese Hahn      "

## 2025-05-05 ENCOUNTER — OFFICE VISIT (OUTPATIENT)
Dept: FAMILY MEDICINE | Facility: CLINIC | Age: 75
End: 2025-05-05
Payer: COMMERCIAL

## 2025-05-05 VITALS
HEART RATE: 62 BPM | RESPIRATION RATE: 16 BRPM | SYSTOLIC BLOOD PRESSURE: 118 MMHG | OXYGEN SATURATION: 97 % | HEIGHT: 63 IN | TEMPERATURE: 98 F | BODY MASS INDEX: 22.68 KG/M2 | DIASTOLIC BLOOD PRESSURE: 70 MMHG | WEIGHT: 128 LBS

## 2025-05-05 DIAGNOSIS — I47.10 SVT (SUPRAVENTRICULAR TACHYCARDIA): ICD-10-CM

## 2025-05-05 DIAGNOSIS — Z01.818 PRE-OPERATIVE GENERAL PHYSICAL EXAMINATION: Primary | ICD-10-CM

## 2025-05-05 DIAGNOSIS — G89.4 CHRONIC PAIN SYNDROME: ICD-10-CM

## 2025-05-05 DIAGNOSIS — E87.1 HYPONATREMIA: Primary | ICD-10-CM

## 2025-05-05 DIAGNOSIS — K31.84 GASTROPARESIS: ICD-10-CM

## 2025-05-05 DIAGNOSIS — G60.9 PERIPHERAL NEUROPATHY, IDIOPATHIC: ICD-10-CM

## 2025-05-05 DIAGNOSIS — M81.0 OSTEOPOROSIS, UNSPECIFIED OSTEOPOROSIS TYPE, UNSPECIFIED PATHOLOGICAL FRACTURE PRESENCE: ICD-10-CM

## 2025-05-05 DIAGNOSIS — M54.59 MECHANICAL LOW BACK PAIN: ICD-10-CM

## 2025-05-05 LAB
ALBUMIN SERPL BCG-MCNC: 4.6 G/DL (ref 3.5–5.2)
ALP SERPL-CCNC: 68 U/L (ref 40–150)
ALT SERPL W P-5'-P-CCNC: 25 U/L (ref 0–50)
ANION GAP SERPL CALCULATED.3IONS-SCNC: 11 MMOL/L (ref 7–15)
AST SERPL W P-5'-P-CCNC: 24 U/L (ref 0–45)
BILIRUB SERPL-MCNC: 0.3 MG/DL
BUN SERPL-MCNC: 13.2 MG/DL (ref 8–23)
CALCIUM SERPL-MCNC: 9.5 MG/DL (ref 8.8–10.4)
CHLORIDE SERPL-SCNC: 96 MMOL/L (ref 98–107)
CREAT SERPL-MCNC: 0.68 MG/DL (ref 0.51–0.95)
EGFRCR SERPLBLD CKD-EPI 2021: >90 ML/MIN/1.73M2
ERYTHROCYTE [DISTWIDTH] IN BLOOD BY AUTOMATED COUNT: 12.5 % (ref 10–15)
GLUCOSE SERPL-MCNC: 108 MG/DL (ref 70–99)
HCO3 SERPL-SCNC: 26 MMOL/L (ref 22–29)
HCT VFR BLD AUTO: 41.7 % (ref 35–47)
HGB BLD-MCNC: 13.9 G/DL (ref 11.7–15.7)
MCH RBC QN AUTO: 33.3 PG (ref 26.5–33)
MCHC RBC AUTO-ENTMCNC: 33.3 G/DL (ref 31.5–36.5)
MCV RBC AUTO: 100 FL (ref 78–100)
PLATELET # BLD AUTO: 245 10E3/UL (ref 150–450)
POTASSIUM SERPL-SCNC: 4.4 MMOL/L (ref 3.4–5.3)
PROT SERPL-MCNC: 6.8 G/DL (ref 6.4–8.3)
RBC # BLD AUTO: 4.18 10E6/UL (ref 3.8–5.2)
SODIUM SERPL-SCNC: 133 MMOL/L (ref 135–145)
WBC # BLD AUTO: 8 10E3/UL (ref 4–11)

## 2025-05-05 PROCEDURE — 1125F AMNT PAIN NOTED PAIN PRSNT: CPT | Performed by: INTERNAL MEDICINE

## 2025-05-05 PROCEDURE — 36415 COLL VENOUS BLD VENIPUNCTURE: CPT | Performed by: INTERNAL MEDICINE

## 2025-05-05 PROCEDURE — 3078F DIAST BP <80 MM HG: CPT | Performed by: INTERNAL MEDICINE

## 2025-05-05 PROCEDURE — 93000 ELECTROCARDIOGRAM COMPLETE: CPT | Performed by: INTERNAL MEDICINE

## 2025-05-05 PROCEDURE — 80053 COMPREHEN METABOLIC PANEL: CPT | Performed by: INTERNAL MEDICINE

## 2025-05-05 PROCEDURE — 85027 COMPLETE CBC AUTOMATED: CPT | Performed by: INTERNAL MEDICINE

## 2025-05-05 PROCEDURE — 3074F SYST BP LT 130 MM HG: CPT | Performed by: INTERNAL MEDICINE

## 2025-05-05 PROCEDURE — 99214 OFFICE O/P EST MOD 30 MIN: CPT | Performed by: INTERNAL MEDICINE

## 2025-05-05 ASSESSMENT — PAIN SCALES - GENERAL: PAINLEVEL_OUTOF10: SEVERE PAIN (8)

## 2025-05-05 ASSESSMENT — PATIENT HEALTH QUESTIONNAIRE - PHQ9
SUM OF ALL RESPONSES TO PHQ QUESTIONS 1-9: 0
SUM OF ALL RESPONSES TO PHQ QUESTIONS 1-9: 0
10. IF YOU CHECKED OFF ANY PROBLEMS, HOW DIFFICULT HAVE THESE PROBLEMS MADE IT FOR YOU TO DO YOUR WORK, TAKE CARE OF THINGS AT HOME, OR GET ALONG WITH OTHER PEOPLE: NOT DIFFICULT AT ALL

## 2025-05-05 NOTE — PROGRESS NOTES
118Preoperative Evaluation  [facility]  [address] [place] [address], [address]  [facility] [address]  Phone: [phone]  Primary Provider: Josue Mejia MD, MD  Pre-op Performing Provider: Milagro Quispe MD  May 5, 2025             5/5/2025   Surgical Information   What procedure is being done? Removal of spinal hardware   Facility or Hospital where procedure/surgery will be performed: [facility]   Who is doing the procedure / surgery? Viraj Claros MD   Date of surgery / procedure: 05/21/2025   Time of surgery / procedure: 1:30 pm   Where do you plan to recover after surgery? at home alone     Fax number for surgical facility: [phone]    Assessment & Plan     The proposed surgical procedure is considered INTERMEDIATE risk.    Problem List Items Addressed This Visit       Peripheral neuropathy, idiopathic    Osteoporosis    Mechanical low back pain    Chronic pain syndrome    Gastroparesis    SVT (supraventricular tachycardia)     Other Visit Diagnoses       Pre-operative general physical examination    -  Primary    Relevant Orders    EKG 12-lead complete w/read - Clinics (Completed)    Comprehensive metabolic panel (BMP + Alb, Alk Phos, ALT, AST, Total. Bili, TP)    CBC with platelets                    - No identified additional risk factors other than previously addressed    Antiplatelet or Anticoagulation Medication Instructions   - We reviewed the medication list and the patient is not on an antiplatelet or anticoagulation medications.    Additional Medication Instructions  Take all scheduled medications on the day of surgery    Recommendation  Approval given to proceed with proposed procedure, without further diagnostic evaluation.  Cannot assess her functional capacity METS level as she has chronic back pain uses a cane for ambulation.  Not known to have any significant cardiac disease other than SVTs and denies any current palpitations.  Hemodynamics are stable.  No  From: Sherri Elkins  To: Malik Reddy MD  Sent: 2022 11:43 AM EDT  Subject: Refill on my diazepam     I need a refill an [facility] says they can't refill them I would have to call my doctor I don't know the reason unless because it had no refills on them .I thought we had refills on all my meds except my pain medicine at my last appt. Thanks Edith Elkins  - 1960 .   known chronic lung disease.  Will repeat basic labs.  Patient will be undergoing intermediate risk surgery.  She will take medications on day of surgery as she usually does including trazodone sertraline gabapentin.  She is also on Namenda and Cogentin.  Advised to take medication with sips of water in the morning of surgery.  She will be n.p.o. from midnight.  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  She uses a cane for ambulation.  Back in November she was more ambulatory.  Denies any breathing issues.  No known cardiac disease.  She had history of palpitation was diagnosed with this SVT denies any current palpitations.  No leg swelling.  No known blood clots from her sleep apnea or bleeding disorder.        5/5/2025   Pre-Op Questionnaire   Have you ever had a heart attack or stroke? No   Have you ever had surgery on your heart or blood vessels, such as a stent placement, a coronary artery bypass, or surgery on an artery in your head, neck, heart, or legs? No   Do you have chest pain with activity? No   Do you have a history of heart failure? No   Do you currently have a cold, bronchitis or symptoms of other infection? No   Do you have a cough, shortness of breath, or wheezing? No   Do you or anyone in your family have previous history of blood clots? No   Do you or does anyone in your family have a serious bleeding problem such as prolonged bleeding following surgeries or cuts? No   Have you ever had problems with anemia or been told to take iron pills? (!) YES    Have you had any abnormal blood loss such as black, tarry or bloody stools, or abnormal vaginal bleeding? No   Have you ever had a blood transfusion? (!) YES   Have you ever  had a transfusion reaction? No   Are you willing to have a blood transfusion if it is medically needed before, during, or after your surgery? Yes   Have you or any of your relatives ever had problems with anesthesia? No   Do you have sleep apnea, excessive snoring or daytime drowsiness? No   Do you have any artifical heart valves or other implanted medical devices like a pacemaker, defibrillator, or continuous glucose monitor? No   Do you have artificial joints? No   Are you allergic to latex? No     Advance Care Planning    Health Care Directive received at today's visit.  Forwarded to [email or web address].    Preoperative Review of   As needed oxycodone intake.  Rarely uses      Status of Chronic Conditions:  See problem list for active medical problems.  Problems all longstanding and stable, except as noted/documented.  See ROS for pertinent symptoms related to these conditions.    Patient Active Problem List    Diagnosis Date Noted    SVT (supraventricular tachycardia) 03/31/2022     Priority: Medium    Immunosuppressed status 01/25/2022     Priority: Medium    Gastroparesis 09/30/2019     Priority: Medium    Primary insomnia 09/30/2019     Priority: Medium    Chronic pain syndrome 09/17/2018     Priority: Medium     Patient is followed by Josue Mejia MD, MD for ongoing prescription of pain medication.  All refills should only be approved by this provider, or covering partner.    Medication(s): .oxyCODONE-acetaminophen (PERCOCET) 5-325 MG per tablet   Maximum quantity per month: 30  Clinic visit frequency required: Q 3 months     Controlled substance agreement:  Encounter-Level CSA:       There are no encounter-level csa.            DIRE Total Score(s):  No flowsheet data found.    Last [facility] website verification:  done on 9-17-18   [email or web address]        History of Clostridium difficile colitis 01/15/2018     Priority: Medium    Microscopic hematuria 12/28/2017     Priority: Medium     Advance care planning 01/19/2017     Priority: Medium     Advance Care Planning 1/19/2017: Receipt of ACP document:  Received: Health Care Directive which was witnessed or notarized on 11/8/16.  Document previously scanned on 11/23/16.  Validation form completed and sent to be scanned.  Code Status needs to be updated to reflect choices in most recent ACP document.   Confirmed/documented designated decision maker(s).  Added by Joanie Quiroz   Advance Care Planning Liaison            Somatic dysfunction of lumbar region 12/22/2016     Priority: Medium    Bilateral low back pain with sciatica, sciatica laterality unspecified, unspecified chronicity 12/22/2016     Priority: Medium    Somatic dysfunction of sacral region 12/22/2016     Priority: Medium    Sacral pain 12/22/2016     Priority: Medium    Thoracic segment dysfunction 12/22/2016     Priority: Medium    Abnormal CT scan, lung 04/22/2016     Priority: Medium    Dysphonia 04/14/2016     Priority: Medium    Globus sensation 04/14/2016     Priority: Medium    Lupus (systemic lupus erythematosus) (H)      Priority: Medium     diagnosed 12 yrs ago      MCI (mild cognitive impairment)      Priority: Medium    Hemorrhage of cyst of native kidney 12/02/2015     Priority: Medium    Mechanical low back pain 04/14/2015     Priority: Medium    Menopausal osteoporosis 02/17/2015     Priority: Medium    Systemic lupus erythematosus (H) 03/03/2014     Priority: Medium     Diagnosis 1997  Dr. Hickman  - no renal complications      CARDIOVASCULAR SCREENING; LDL GOAL LESS THAN 130 03/03/2014     Priority: Medium    Restless legs syndrome 03/03/2014     Priority: Medium    Peripheral neuropathy, idiopathic 03/03/2014     Priority: Medium     Emanuel Idelkope      Osteoporosis 03/03/2014     Priority: Medium     Had been treated with bisphosphonates for 5 years - [id number]  Problem list name updated by automated process. Provider to review        Past Medical History:    Diagnosis Date    Allergic rhinitis due to pollen     Arthritis     Congestive heart failure (H)     Decreased libido     Depressive disorder     Diabetes (H)     Esophageal motility disorder     H pylori ulcer     History of blood transfusion     Hoarseness     Hypertension     Lupus (systemic lupus erythematosus) (H)     diagnosed 12 yrs ago    MCI (mild cognitive impairment)     Menopausal osteoporosis 2015    Movement disorder     Mumps     as a child    Neuropathy     feet and hands    S/P cholecystectomy     S/P [id number]     Thyroid disease     Uncomplicated asthma      Past Surgical History:   Procedure Laterality Date    APPENDECTOMY      ARTHRODESIS FINGER(S) Left 2018    Procedure: ARTHRODESIS FINGER(S);;  Surgeon: Shahida Carlton MD;  Location: [facility]    ARTHROPLASTY CARPOMETACARPAL (THUMB JOINT) Left 2018    Procedure: ARTHROPLASTY CARPOMETACARPAL (THUMB JOINT);  LEFT THUMB CARPALMETACARPAL WITH MINI TIGHTROPE, LEFT METAPHALANGEAL FUSION WITH RADIAL BONE GRAFT AND EASY CLIP STAPLE.;  Surgeon: Shahida Carlton MD;  Location: [facility]    ARTHROPLASTY CARPOMETACARPAL (THUMB JOINT), ARTHRODESIS, COMBINED Left 2018    Procedure: COMBINED ARTHROPLASTY CARPOMETACARPAL (THUMB JOINT), ARTHRODESIS;  LEFT THUMB CARPOMETACARPAL EXCISIONAL ARTHROPLASTY WITH FACSIA TANIA GRAFT, PARTIAL TRAPEZOID EXCISION;  Surgeon: Shahida Carlton MD;  Location: [facility]    BACK SURGERY      L2-L4 or L5 fusion    BLEPHAROPLASTY BILATERAL       SECTION      x3    CHOLECYSTECTOMY      feels better. No pathology on gallbladder    COLONOSCOPY      EYE SURGERY      Raised eyelids.    GENITOURINARY SURGERY      GRAFT BONE TO FINGER Left 2018    Procedure: GRAFT BONE TO FINGER;;  Surgeon: Shahida Carlton MD;  Location: [facility]    HYSTERECTOMY      KNEE SURGERY      bilateral arthroscopy    ORTHOPEDIC SURGERY      right thumb tendon surgery    REPAIR  TENDON FINGER(S)  11/9/2011    Procedure:REPAIR TENDON FINGER(S); RIGHT INDEX FINGER FLEXOR DIGITORUM PROFUNDUS REPAIR, RADIAL DIGITAL NERVE REPAIR ; Surgeon:BRIANA ANTONY; Location:[facility]    SALPINGO-OOPHORECTOMY BILATERAL      SHOULDER SURGERY Right     arthroscopy    SOFT TISSUE SURGERY      Tendon repair on finger    TONSILLECTOMY      Child    [facility]HC UGI ENDOSCOPY, SIMPLE EXAM  04/02/14     Current Outpatient Medications   Medication Sig Dispense Refill    calcium carbonate 600 mg-vitamin D 400 units (CALTRATE) 600-400 MG-UNIT per tablet Take 1 tablet by mouth 2 times daily      diclofenac (VOLTAREN) 1 % topical gel Place 2 g onto the skin 4 times daily      donepezil (ARICEPT) 10 MG tablet Take 1 tablet (10 mg) by mouth at bedtime. 90 tablet 3    famotidine (PEPCID) 40 MG tablet Take 1 tablet by mouth every 12 hours      gabapentin (NEURONTIN) 300 MG capsule Take 2 capsules (600 mg) by mouth 2 times daily. 360 capsule 3    leucovorin (WELLCOVORIN) 5 MG tablet Take 1 tablet (5 mg) by mouth daily      memantine (NAMENDA) 10 MG tablet Take 1 tablet (10 mg) by mouth 2 times daily. 180 tablet 1    methotrexate 2.5 MG tablet Take 3 tablets by mouth once a week      omeprazole (PRILOSEC) 40 MG DR capsule Take 1 capsule (40 mg) by mouth daily. (Patient taking differently: Take 40 mg by mouth 2 times daily.) 90 capsule 3    predniSONE (DELTASONE) 5 MG tablet Taking 5 mg and 1 mg tablet x2 to get a total 7 mg daily       sertraline (ZOLOFT) 100 MG tablet Take 1 tablet (100 mg) by mouth daily. 90 tablet 1    traZODone (DESYREL) 100 MG tablet Take 2 tablets (200 mg) by mouth at bedtime. 180 tablet 3    fluticasone (FLONASE) 50 MCG/ACT nasal spray Spray 2 sprays into both nostrils daily. 16 g 0       Allergies   Allergen Reactions    Amoxicillin-Pot Clavulanate Other (See Comments)     C Diff colitis    Cefdinir Other (See Comments)     Many years ago, does not recall what reaction was    Sudafed  "[Pseudoephedrine]      Jittery, pacing    Tizanidine Other (See Comments)     Many years ago, does not recall what reaction was    Latex Rash        Social History     Tobacco Use    Smoking status: Never    Smokeless tobacco: Never   Substance Use Topics    Alcohol use: Yes     Comment: 1 wine/day     Family History   Problem Relation Age of Onset    C.A.D. Father         cabg, carotid stenosis    Hyperlipidemia Father     Hypertension Father     Coronary Artery Disease Father     Neurologic Disorder Mother         ALS? Parkinsons?    Osteoporosis Mother     Depression Mother     Anxiety Disorder Mother     Hypertension Brother     Hyperlipidemia Brother     Depression Brother     Colon Cancer Maternal Grandmother     Colon Cancer Maternal Aunt     Hyperlipidemia Sister     Hypertension Sister     Osteoporosis Sister     Hyperlipidemia Brother     Osteoporosis Sister     Osteoporosis Maternal Aunt     Hypertension Sister     Diabetes Son     Genetic Disorder Son     Thyroid Disease Son     Diabetes Other     Genetic Disorder Other     Genetic Disorder Daughter     Thyroid Disease Niece     Mental Illness No family hx of     Anesthesia Reaction No family hx of      History   Drug Use No             Review of Systems  Constitutional, neuro, ENT, endocrine, pulmonary, cardiac, gastrointestinal, genitourinary, musculoskeletal, integument and psychiatric systems are negative, except as otherwise noted.    Objective    /70 (BP Location: Left arm, Patient Position: Sitting, Cuff Size: Adult Regular)   Pulse 62   Temp 98  F (36.7  C)   Resp 16   Ht 1.595 m (5' 2.8\")   Wt 58.1 kg (128 lb)   SpO2 97%   BMI 22.82 kg/m     Estimated body mass index is 22.82 kg/m  as calculated from the following:    Height as of this encounter: 1.595 m (5' 2.8\").    Weight as of this encounter: 58.1 kg (128 lb).  Physical Exam  GENERAL: alert and no distress  EYES: Eyes grossly normal to inspection, PERRL and conjunctivae and " sclerae normal  HENT: ear canals and TM's normal, nose and mouth without ulcers or lesions  NECK: no adenopathy, no asymmetry, masses, or scars  RESP: lungs clear to auscultation - no rales, rhonchi or wheezes  CV: regular rate and rhythm, normal S1 S2, no S3 or S4, no murmur, click or rub, no peripheral edema  ABDOMEN: soft, nontender, no hepatosplenomegaly, no masses and bowel sounds normal  MS: no gross musculoskeletal defects noted, no edema  SKIN: no suspicious lesions or rashes  NEURO: Normal strength and tone, mentation intact and speech normal  PSYCH: mentation appears normal, affect normal/bright    Recent Labs   Lab Test 09/17/24  1131 07/15/24  0857   HGB 14.4 14.9    240    136   POTASSIUM 4.8 4.5   CR 0.67 0.67        Diagnostics  Labs pending at this time.  Results will be reviewed when available.   EKG required for preop and not completed in the last 90 days.     Revised Cardiac Risk Index (RCRI)  The patient has the following serious cardiovascular risks for perioperative complications:   - No serious cardiac risks = 0 points     RCRI Interpretation: 0 points: Class I (very low risk - 0.4% complication rate)         Signed Electronically by: Milagro Quispe MD  A copy of this evaluation report is provided to the requesting physician.         Answers submitted by the patient for this visit:  Patient Health Questionnaire (Submitted on 5/5/2025)  If you checked off any problems, how difficult have these problems made it for you to do your work, take care of things at home, or get along with other people?: Not difficult at all  PHQ9 TOTAL SCORE: 0

## 2025-05-14 ENCOUNTER — VIRTUAL VISIT (OUTPATIENT)
Dept: FAMILY MEDICINE | Facility: CLINIC | Age: 75
End: 2025-05-14
Payer: COMMERCIAL

## 2025-05-14 DIAGNOSIS — J30.2 SEASONAL ALLERGIC RHINITIS, UNSPECIFIED TRIGGER: ICD-10-CM

## 2025-05-14 DIAGNOSIS — H69.91 DYSFUNCTION OF RIGHT EUSTACHIAN TUBE: Primary | ICD-10-CM

## 2025-05-14 PROCEDURE — 98005 SYNCH AUDIO-VIDEO EST LOW 20: CPT | Performed by: PHYSICIAN ASSISTANT

## 2025-05-14 NOTE — PROGRESS NOTES
Jonelle is a 75 year old who is being evaluated via a billable video visit.    How would you like to obtain your AVS? MyChart/DOX  If the video visit is dropped, the invitation should be resent by: Text to cell phone: [phone]  Will anyone else be joining your video visit? No      Assessment and Plan:     (H69.91) Dysfunction of right eustachian tube  (primary encounter diagnosis)  Comment: has had popping and fullness of right ear x oneweek, denies pain, drainage, fever/chills, hearing loss or dizziness, she does have seasonal allergy symptoms, tried flonase today w/out relief  Plan: recommend continue flonase 1-2 squirts in each nostrial daily, add non-sedating antihistamine like allegra  Let me know if does not improve over next week    (J30.2) Seasonal allergic rhinitis, unspecified trigger  Comment: has spring and fall allergies, currently having a lot of sneezing, itchy eyes, some congestion  Plan: see above    RAVINDER Robins Same Day Provider     Subjective   Jonelle is a 75 year old, presenting for the following health issues:  Ear Problem      Video Start Time: 12:00 PM    HPI      Jonelle is seeing me for ear symptoms  She has had popping and feeling of fullness of right ear x one week  She denies pain, no drainage, no change in hearing  She denies fever/chills   She has seasonal allergies currently--sneezing, itchy eyes, congestion         Objective           Vitals:  No vitals were obtained today due to virtual visit.    Physical Exam   GENERAL: alert and no distress  EYES: Eyes grossly normal to inspection.  No discharge or erythema, or obvious scleral/conjunctival abnormalities.  RESP: No audible wheeze, cough, or visible cyanosis.    SKIN: Visible skin clear. No significant rash, abnormal pigmentation or lesions.  NEURO: Cranial nerves grossly intact.  Mentation and speech appropriate for age.  PSYCH: Appropriate affect, tone, and pace of words          Video-Visit Details    Type of  service:  Video Visit   Video End Time:12:04 PM  Originating Location (pt. Location): Home    Distant Location (provider location):  On-site  Platform used for Video Visit: Lynda  Signed Electronically by: Nat Velazquez PA-C

## 2025-05-20 DIAGNOSIS — G31.84 MCI (MILD COGNITIVE IMPAIRMENT): ICD-10-CM

## 2025-05-20 RX ORDER — MEMANTINE HYDROCHLORIDE 10 MG/1
10 TABLET ORAL 2 TIMES DAILY
Qty: 180 TABLET | Refills: 1 | Status: SHIPPED | OUTPATIENT
Start: 2025-05-20

## 2025-05-20 NOTE — TELEPHONE ENCOUNTER
Pending Prescriptions:                       Disp   Refills    memantine (NAMENDA) 10 MG tablet          180 ta*1            Sig: Take 1 tablet (10 mg) by mouth 2 times daily.     Last OV: 4/22/25  Next OV: 10/21/25

## 2025-05-20 NOTE — TELEPHONE ENCOUNTER
Prescription approved per [facility] Refill Protocol.    Chloé RN, BSN  [facility]

## 2025-05-27 ENCOUNTER — OFFICE VISIT (OUTPATIENT)
Dept: FAMILY MEDICINE | Facility: CLINIC | Age: 75
End: 2025-05-27
Payer: COMMERCIAL

## 2025-05-27 VITALS
WEIGHT: 128 LBS | RESPIRATION RATE: 12 BRPM | BODY MASS INDEX: 22.68 KG/M2 | SYSTOLIC BLOOD PRESSURE: 108 MMHG | TEMPERATURE: 96.9 F | DIASTOLIC BLOOD PRESSURE: 74 MMHG | HEIGHT: 63 IN | HEART RATE: 66 BPM | OXYGEN SATURATION: 99 %

## 2025-05-27 DIAGNOSIS — Z98.1 HISTORY OF LUMBAR FUSION: ICD-10-CM

## 2025-05-27 DIAGNOSIS — Z09 HOSPITAL DISCHARGE FOLLOW-UP: Primary | ICD-10-CM

## 2025-05-27 PROCEDURE — 3074F SYST BP LT 130 MM HG: CPT | Performed by: PHYSICIAN ASSISTANT

## 2025-05-27 PROCEDURE — 99213 OFFICE O/P EST LOW 20 MIN: CPT | Performed by: PHYSICIAN ASSISTANT

## 2025-05-27 PROCEDURE — 1125F AMNT PAIN NOTED PAIN PRSNT: CPT | Performed by: PHYSICIAN ASSISTANT

## 2025-05-27 PROCEDURE — 3078F DIAST BP <80 MM HG: CPT | Performed by: PHYSICIAN ASSISTANT

## 2025-05-27 RX ORDER — OXYCODONE HYDROCHLORIDE 5 MG/1
5 TABLET ORAL
Refills: 0 | Status: CANCELLED | OUTPATIENT
Start: 2025-05-27

## 2025-05-27 RX ORDER — OXYCODONE HYDROCHLORIDE 5 MG/1
5 TABLET ORAL
COMMUNITY
Start: 2025-05-22 | End: 2025-05-28

## 2025-05-27 ASSESSMENT — PAIN SCALES - GENERAL: PAINLEVEL_OUTOF10: SEVERE PAIN (8)

## 2025-05-27 NOTE — PROGRESS NOTES
Assessment and Plan:     (Z09) Hospital discharge follow-up  (primary encounter diagnosis)  Comment: see below admitted overnight 5/21/25 for back surgery, had hardware removed from lumbar spine, overall doing well at home, incisions c/d/I, requesting more oxycodone, received 35 on 5/22/25 which should be at least 6d supply, sees surgeon in June  Plan: recommend she reduce oxycodone to TID or BID then reduce further as tolerated, call pharmacy for refill when she runs out which should be in day or two  Tylenol  Keep follow-up with surgeon as scheduled     (Z98.1) History of lumbar fusion  Comment: see above, recent hardware removal wit Dr. Bergeron  Plan: see above    RAVINDER Robins Same Day Provider     Subjective   Pat is a 75 year old, presenting for the following health issues:  No chief complaint on file.    [facility]      STAFF SURGEON:  Viraj Mcneil MD     ASSISTANTS:  1.  Dennys Perez PA-C  2.  Edgardo Bergeron,      PREOPERATIVE DIAGNOSIS:  Retained painful instrumentation, segmental, L2 through 5.     POSTOPERATIVE DIAGNOSIS:  Retained painful instrumentation, segmental, L2 through 5.     PROCEDURE:  Bilateral posterior pedicle screw removal, L2 through 5 with fusion inspection.     ANESTHESIA:  General endotracheal.     INDICATIONS:  Ms. Martines is a 75-year-old female who has previously undergone multiple fusions.  She is left with painful instrumentation, L2 through 5.  Preoperative CT scan does confirm fusion solid radiographically. However, based on refractory nonoperative care, she does wish to proceed on with surgical management as I described below. Risks of surgical procedure were clearly identified including and not limited to infection, nerve injury, identification of pseudoarthrosis, need for additional surgical management, nerve injury, any and all additional neurologic sequelae as well as general anesthetic risks.  Informed consent was signed.     OPERATIVE PROCEDURE:  The  patient was taken to the operating room and a general anesthetic was administered.  Appropriate preoperative antibiotics were administered.  Pneumatic compression stockings were also applied to both lower extremities.  She was then positioned prone on the [place] frame with all bony prominences protected.  Her back was then prepped and draped in a normal sterile fashion.  Timeout was performed.  This was followed by paramedian incisions with fluoroscopy guidance. These incisions were approximately 4 cm vertically and approximately 5 cm off midline.  The incisions were continued down through the subcutaneous tissue and the fascia was incised. METRx dilation was carried out and finally the quadrant retractor system was applied and confirmed radiographically with intraoperative fluoroscopy as well as direct visualization of the instrumentation.  There was no obvious seroma, infection or loosening of the implants.  Set screws were removed followed by the rods.  The screws were then segmentally removed from L2 through 5 bilaterally.  All screws had excellent exertional torque.  Fusion did appear solid L2-3, L3-4, L4-5.  We did address bleeding with Surgiflo in the pedicle screw holes.  Hemostasis was excellent.  Copious amounts of normal saline irrigation was then utilized.  This was followed by wound closure bilaterally with 0 Vicryl to close the fascia, 2-0 Vicryl was used to reapproximate the subcutaneous tissue and finally the skin was closed with Dermabond.  The patient tolerated the surgical procedure well without any intraoperative complications.  Estimated blood loss for the procedure was approximately 10 mL.  All sponge counts were correct and there were no specimens sent.     Jonelle is here for post-hospital visit.  She was admitted one night 5/21/25-5/22/25 after above procedure.  She has been home 6 days  She has a friend staying with her currently while she recovers  She has had some constipation which she  "feels is due to the oxycodone, she is taking one tablet every 4 hours  She denies fever/chills, abdominal pain, dyspnea, chest pain  No issues with the incision    She would like more oxycodone    She sees her surgeon in June         Objective      /74 (BP Location: Right arm, Patient Position: Sitting, Cuff Size: Adult Regular)   Pulse 66   Temp 96.9  F (36.1  C) (Temporal)   Resp 12   Ht 1.595 m (5' 2.8\")   Wt 58.1 kg (128 lb)   SpO2 99%   BMI 22.82 kg/m        Physical Exam     GENERAL: healthy, alert and no distress  RESP: lungs clear to auscultation - no rales, no rhonchi, no wheezes  CV: regular rates and rhythm, normal S1 S2, no S3 or S4 and no murmur, no click or rub   MS: extremities- no gross deformities noted, no edema  SKIN: back incision c/d/I, no erythema, swelling or drainage           Signed Electronically by: Nat Velazquez PA-C    "

## 2025-05-27 NOTE — PATIENT INSTRUCTIONS
Decrease oxycodone to one tablet three times daily for 3-4 days then decrease to one tablet twice daily x 3-4 days then decrease to one at bedtime as needed    Use miralax daily 17gm for constipation, get plenty of fluids with it    Okay to refill your oxycodone tomorrow    Take tylenol as needed for pain up to 1000mg three times daily, do not exceed 3000mg in 24 hour period    Keep follow-up with your surgeon as scheduled in June

## 2025-05-28 ENCOUNTER — MYC REFILL (OUTPATIENT)
Dept: FAMILY MEDICINE | Facility: CLINIC | Age: 75
End: 2025-05-28
Payer: COMMERCIAL

## 2025-05-28 DIAGNOSIS — Z98.1 HISTORY OF LUMBAR FUSION: Primary | ICD-10-CM

## 2025-05-28 RX ORDER — OXYCODONE HYDROCHLORIDE 5 MG/1
5 TABLET ORAL
Refills: 0 | Status: CANCELLED | OUTPATIENT
Start: 2025-05-28

## 2025-05-28 RX ORDER — OXYCODONE HYDROCHLORIDE 5 MG/1
5 TABLET ORAL 3 TIMES DAILY PRN
Qty: 20 TABLET | Refills: 0 | Status: SHIPPED | OUTPATIENT
Start: 2025-05-28

## 2025-05-28 NOTE — TELEPHONE ENCOUNTER
Medication Question or Refill    Contacts       Contact Date/Time Type Contact Phone/Fax    05/28/2025 08:34 AM CDT Phone (Incoming) Jonelle Martines (Self) [phone] (M)            What medication are you calling about (include dose and sig)?: oxycodone 5 mg     Preferred Pharmacy:     [id number] [place], [place] - [address] AT [place] [address]  [address] [facility] [address]  Phone: [phone] Fax: [phone]      Controlled Substance Agreement on file:   CSA -- Patient Level:    CSA: None found at the patient level.       Who prescribed the medication?: From provider at the hospital.     Do you need a refill? Yes    When did you use the medication last? None     Patient offered an appointment? No    Do you have any questions or concerns?  No      Could we send this information to you in [facility] or would you prefer to receive a phone call?:   Patient would prefer a phone call   Okay to leave a detailed message?: Yes at Cell number on file:    Telephone Information:   Mobile [phone]

## 2025-05-28 NOTE — TELEPHONE ENCOUNTER
Patient asking again for oxycodone refill. Has none left. Took last tablet this morning at 8 AM. Reports pain now moderate and laying down but ok with laying down but in more pain when standing up and trying to walk around.     Refill pended for new    Can we leave a detailed message on this number? YES    Phone number patient can be reached at: Home number on file [phone] (home)    Mariel Ha RN  [facility] Triage

## 2025-05-29 NOTE — TELEPHONE ENCOUNTER
Called and spoke with pt. Relayed providers message from below. Pt stated someone filled it because she picked it up yesterday. Per chart review- Lindsay filled it yesterday. Routing to PCP as an FYI.

## 2025-06-02 ENCOUNTER — VIRTUAL VISIT (OUTPATIENT)
Dept: FAMILY MEDICINE | Facility: CLINIC | Age: 75
End: 2025-06-02
Payer: COMMERCIAL

## 2025-06-02 DIAGNOSIS — M54.50 CHRONIC BILATERAL LOW BACK PAIN WITHOUT SCIATICA: ICD-10-CM

## 2025-06-02 DIAGNOSIS — M54.59 MECHANICAL LOW BACK PAIN: ICD-10-CM

## 2025-06-02 DIAGNOSIS — G60.9 PERIPHERAL NEUROPATHY, IDIOPATHIC: Primary | ICD-10-CM

## 2025-06-02 DIAGNOSIS — G89.18 ACUTE POST-OPERATIVE PAIN: ICD-10-CM

## 2025-06-02 DIAGNOSIS — G31.84 MCI (MILD COGNITIVE IMPAIRMENT): ICD-10-CM

## 2025-06-02 DIAGNOSIS — E87.1 HYPONATREMIA: ICD-10-CM

## 2025-06-02 DIAGNOSIS — Z91.89 AT RISK FOR POLYPHARMACY: ICD-10-CM

## 2025-06-02 DIAGNOSIS — G89.29 CHRONIC BILATERAL LOW BACK PAIN WITHOUT SCIATICA: ICD-10-CM

## 2025-06-02 DIAGNOSIS — E87.20 METABOLIC ACIDOSIS: ICD-10-CM

## 2025-06-02 DIAGNOSIS — G89.4 CHRONIC PAIN SYNDROME: ICD-10-CM

## 2025-06-02 PROCEDURE — 98006 SYNCH AUDIO-VIDEO EST MOD 30: CPT | Performed by: INTERNAL MEDICINE

## 2025-06-02 RX ORDER — METHOCARBAMOL 500 MG/1
500 TABLET, FILM COATED ORAL 3 TIMES DAILY PRN
Qty: 21 TABLET | Refills: 0 | Status: SHIPPED | OUTPATIENT
Start: 2025-06-02

## 2025-06-02 RX ORDER — OXYCODONE HYDROCHLORIDE 5 MG/1
5 TABLET ORAL EVERY 8 HOURS PRN
Qty: 21 TABLET | Refills: 0 | Status: SHIPPED | OUTPATIENT
Start: 2025-06-02 | End: 2025-06-09

## 2025-06-02 NOTE — PATIENT INSTRUCTIONS
Based on our discussion, I have outlined the following instructions for you:      - You will be referred to a pain clinic and a spine pain clinic for more help with managing your pain.  - Keep taking oxycodone and methocarbamol, but there will be a plan to slowly reduce them.  - Start taking gabapentin three times a day.  - Pay attention to how you feel, especially if you get dizzy or lightheaded.  - You will meet with pharmacist Elana Crews to help manage your medications and check for any interactions.  - Keep taking oxycodone, but there will be a plan to slowly reduce it.  - You will have some blood tests to check your sodium levels.  - You will have some blood tests to check your acid levels.    Thank you again for your visit, and we look forward to supporting you in your journey to better health.

## 2025-06-02 NOTE — PROGRESS NOTES
Jonelle is a 75 year old who is being evaluated via a billable video visit.    How would you like to obtain your AVS? MyChart  If the video visit is dropped, the invitation should be resent by: Text to cell phone: [phone]  Will anyone else be joining your video visit? No      Assessment & Plan   Problem List Items Addressed This Visit       Peripheral neuropathy, idiopathic - Primary    Relevant Medications    methocarbamol (ROBAXIN) 500 MG tablet    Other Relevant Orders    Pain Management  Referral    Mechanical low back pain    Relevant Medications    methocarbamol (ROBAXIN) 500 MG tablet    oxyCODONE (ROXICODONE) 5 MG tablet    MCI (mild cognitive impairment)    Chronic pain syndrome    Relevant Medications    methocarbamol (ROBAXIN) 500 MG tablet    oxyCODONE (ROXICODONE) 5 MG tablet    Other Relevant Orders    Pain Management  Referral    Spine  Referral     Other Visit Diagnoses         At risk for polypharmacy        Relevant Orders    Med Therapy Management Referral      Acute post-operative pain        Relevant Orders    Pain Management  Referral      Chronic bilateral low back pain without sciatica        Relevant Medications    methocarbamol (ROBAXIN) 500 MG tablet    oxyCODONE (ROXICODONE) 5 MG tablet    Other Relevant Orders    Pain Management  Referral    Spine  Referral      Hyponatremia        Relevant Orders    Comprehensive metabolic panel (BMP + Alb, Alk Phos, ALT, AST, Total. Bili, TP)      Metabolic acidosis        Relevant Orders    Comprehensive metabolic panel (BMP + Alb, Alk Phos, ALT, AST, Total. Bili, TP)             Assessment & Plan  Chronic pain syndrome:  - Chronic pain management ; Referral to pain clinic and spine pain clinic for comprehensive management.  - Referral to pain clinic and spine pain clinic. Continue oxycodone and methocarbamol with a plan to wean off. Increase gabapentin to 3 times a day.  Increase gradually as  tolerated to 600 mg 3 times daily.  Monitor for dizziness and lightheadedness.  - Risks and side effects: Potential for dizziness and lightheadedness, especially when standing up.    At risk for polypharmacy:  - Concern for polypharmacy due to multiple medications.  - Referral to pharmacist Elana Tate for medication management and to monitor drug interactions.  - Risks and side effects: Potential impairment of cognitive function and increased risk of falls.    Acute post-operative pain:  - Post-operative pain management required following surgery on May 21, 2025.  - Continue oxycodone with a plan to wean off. Consider long-term pain management strategies.  - Risks and side effects: Risk of dependence on oxycodone.    Chronic bilateral low back pain without sciatica:  - Chronic low back pain persists post-surgery.  - Referral to pain clinic and spine pain clinic for management. Increase gabapentin to 3 times a day; gradually increase as tolerated..    Hyponatremia:  - Low sodium levels noted post-surgery.  - Repeat labs to monitor sodium levels.    Metabolic acidosis:  - Acidosis noted post-surgery.  - Repeat labs to monitor acid levels.      Assessment & Plan  Chronic pain syndrome:  Chronic pain management is beyond primary care expertise and requires involvement of a pain clinic or spine pain clinic for comprehensive management.  Referral to pain clinic and spine pain clinic for chronic pain management. Continue current medications with adjustments: increase gabapentin to three times a day, continue oxycodone and methocarbamol with a plan to wean off [wean on frequency- do not take methocarbamol every 4 hours]. Monitor for dizziness and lightheadedness due to medication side effects.  Risks and side effects: Potential for dizziness and lightheadedness from medications, especially when standing up.    At risk for polypharmacy:  Concern about polypharmacy due to multiple medications, which can impair cognitive  function and increase risk of falls.  Referral to pharmacist Elana Lindsey for medication review and management of drug interactions and side effects.  Risks and side effects: Potential cognitive impairment and increased risk of falls due to polypharmacy.    Acute post-operative pain:  Post-operative pain management requires careful weaning off oxycodone to prevent dependence.  Prescribe oxycodone with instructions to wean from three times a day to two times a day, and eventually once a day as tolerated. Monitor pain levels and adjust as necessary.  Risks and side effects: Risk of dependence on oxycodone if not weaned appropriately.    Chronic bilateral low back pain without sciatica:  Chronic low back pain requires long-term management and involvement of a pain clinic.  Referral to pain clinic and spine clinic for comprehensive management. Continue current medications with adjustments as discussed.    Hyponatremia:  Low sodium levels noted post-surgery, requires monitoring.  Repeat labs to check sodium levels whenever possible.    Metabolic acidosis:  Acidosis noted post-surgery, requires monitoring.  Repeat labs to check acid levels whenever possible.    Mechanical low back pain:  Mechanical low back pain requires comprehensive management.  Referral to pain clinic and spine clinic for management. Continue current medications with adjustments as discussed.     Subjective   Pat is a 75 year old, presenting for the following health issues:  No chief complaint on file.      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 She reports inadequate pain relief from oxycodone, which was reduced by a physician assistant. Previously, oxycodone was tapered gradually during past usage. She experiences pain that is not sufficiently managed with the current dosing schedule  of every 8 hours. She has been prescribed methocarbamol, which she takes at 750 mg every 4 hours, and Tylenol for pain management. She cannot take ibuprofen due to contraindications. Jonelle has a history of using gabapentin twice daily for pain management but has not used Lyrica or pregabalin.    Medication history  Jonelle takes gabapentin twice daily and reports no dizziness associated with its use. She is also on Namenda and Aricept for cognitive function, as well as trazodone and sertraline for anxiety.    Mobility and recovery  Jonelle uses a walker and reports limited mobility, unable to walk far distances. The initial two weeks post-surgery were expected to be difficult, with an anticipated recovery period of at least two months before she can perform activities such as reaching, twisting, or bending.    Previous pain management  In the past, Jonelle visited a pain clinic at [facility] where she received injections for pain management.    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 She reports inadequate pain relief from oxycodone, which was reduced by a physician assistant. Previously, oxycodone was tapered gradually during past usage. She experiences pain that is not sufficiently managed with the current dosing schedule of every 8 hours. She has been prescribed methocarbamol, which she takes at 750 mg every 4 hours, and Tylenol for pain management. She cannot take ibuprofen due to contraindications. Jonelle has a history of using gabapentin twice daily for pain management but has not used Lyrica or pregabalin.    Medication history  Jonelle takes gabapentin twice daily and reports no dizziness associated with its use. She is also on Namenda and Aricept for cognitive function, as well as trazodone and sertraline for anxiety.    Mobility and  recovery  Pat uses a walker and reports limited mobility, unable to walk far distances. The initial two weeks post-surgery were expected to be difficult, with an anticipated recovery period of at least two months before she can perform activities such as reaching, twisting, or bending.    Previous pain management  In the past, Jonelle visited a pain clinic at [facility] where she received injections for pain management.       Review of Systems  Constitutional, neuro, ENT, endocrine, pulmonary, cardiac, gastrointestinal, genitourinary, musculoskeletal, integument and psychiatric systems are negative, except as otherwise noted.      Objective           Vitals:  No vitals were obtained today due to virtual visit.    Physical Exam   GENERAL: alert and no distress  EYES: Eyes grossly normal to inspection.  No discharge or erythema, or obvious scleral/conjunctival abnormalities.  RESP: No audible wheeze, cough, or visible cyanosis.    SKIN: Visible skin clear. No significant rash, abnormal pigmentation or lesions.  NEURO: Cranial nerves grossly intact.  Mentation and speech appropriate for age.  PSYCH: Appropriate affect, tone, and pace of words          Results- Acidosis noted on May 22, 2025, with low sodium levels.     Office Visit on 05/05/2025   Component Date Value Ref Range Status    Sodium 05/05/2025 133 (L)  135 - 145 mmol/L Final    Potassium 05/05/2025 4.4  3.4 - 5.3 mmol/L Final    Carbon Dioxide (CO2) 05/05/2025 26  22 - 29 mmol/L Final    Anion Gap 05/05/2025 11  7 - 15 mmol/L Final    Urea Nitrogen 05/05/2025 13.2  8.0 - 23.0 mg/dL Final    Creatinine 05/05/2025 0.68  0.51 - 0.95 mg/dL Final    GFR Estimate 05/05/2025 >90  >60 mL/min/1.73m2 Final    eGFR calculated using 2021 CKD-EPI equation.    Calcium 05/05/2025 9.5  8.8 - 10.4 mg/dL Final    Chloride 05/05/2025 96 (L)  98 - 107 mmol/L Final    Glucose 05/05/2025 108 (H)  70 - 99 mg/dL Final    Alkaline Phosphatase 05/05/2025 68  40 - 150 U/L Final    AST  05/05/2025 24  0 - 45 U/L Final    ALT 05/05/2025 25  0 - 50 U/L Final    Protein Total 05/05/2025 6.8  6.4 - 8.3 g/dL Final    Albumin 05/05/2025 4.6  3.5 - 5.2 g/dL Final    Bilirubin Total 05/05/2025 0.3  <=1.2 mg/dL Final    WBC Count 05/05/2025 8.0  4.0 - 11.0 10e3/uL Final    RBC Count 05/05/2025 4.18  3.80 - 5.20 10e6/uL Final    Hemoglobin 05/05/2025 13.9  11.7 - 15.7 g/dL Final    Hematocrit 05/05/2025 41.7  35.0 - 47.0 % Final    MCV 05/05/2025 100  78 - 100 fL Final    MCH 05/05/2025 33.3 (H)  26.5 - 33.0 pg Final    MCHC 05/05/2025 33.3  31.5 - 36.5 g/dL Final    RDW 05/05/2025 12.5  10.0 - 15.0 % Final    Platelet Count 05/05/2025 245  150 - 450 10e3/uL Final         Video-Visit Details    Type of service:  Video Visit   Video End Time:8:32 AM  Originating Location (pt. Location): Home    Distant Location (provider location):  On-site  Platform used for Video Visit: Gadiel  Signed Electronically by: Milagro Quispe MD

## 2025-06-22 DIAGNOSIS — G89.29 CHRONIC BILATERAL LOW BACK PAIN WITHOUT SCIATICA: ICD-10-CM

## 2025-06-22 DIAGNOSIS — M54.50 CHRONIC BILATERAL LOW BACK PAIN WITHOUT SCIATICA: ICD-10-CM

## 2025-06-22 DIAGNOSIS — G89.4 CHRONIC PAIN SYNDROME: ICD-10-CM

## 2025-06-23 RX ORDER — METHOCARBAMOL 500 MG/1
TABLET, FILM COATED ORAL
Qty: 21 TABLET | Refills: 0 | Status: SHIPPED | OUTPATIENT
Start: 2025-06-23

## 2025-07-14 ENCOUNTER — THERAPY VISIT (OUTPATIENT)
Dept: OCCUPATIONAL THERAPY | Facility: CLINIC | Age: 75
End: 2025-07-14
Attending: INTERNAL MEDICINE
Payer: COMMERCIAL

## 2025-07-14 DIAGNOSIS — G31.84 MCI (MILD COGNITIVE IMPAIRMENT): ICD-10-CM

## 2025-07-14 DIAGNOSIS — R41.89 COGNITIVE CHANGE: Primary | ICD-10-CM

## 2025-07-14 PROCEDURE — 97165 OT EVAL LOW COMPLEX 30 MIN: CPT | Mod: GO

## 2025-07-14 PROCEDURE — 97535 SELF CARE MNGMENT TRAINING: CPT | Mod: GO
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
## 2025-08-04 ENCOUNTER — TRANSFERRED RECORDS (OUTPATIENT)
Dept: HEALTH INFORMATION MANAGEMENT | Facility: CLINIC | Age: 75
End: 2025-08-04
Payer: COMMERCIAL

## 2025-08-11 DIAGNOSIS — F34.1 DYSTHYMIA: Primary | ICD-10-CM

## 2025-08-11 DIAGNOSIS — G31.84 MCI (MILD COGNITIVE IMPAIRMENT): ICD-10-CM

## 2025-08-11 DIAGNOSIS — F41.9 ANXIETY: ICD-10-CM

## 2025-08-21 DIAGNOSIS — G31.84 MCI (MILD COGNITIVE IMPAIRMENT): ICD-10-CM

## 2025-08-21 RX ORDER — MEMANTINE HYDROCHLORIDE 10 MG/1
10 TABLET ORAL 2 TIMES DAILY
Qty: 180 TABLET | Refills: 1 | OUTPATIENT
Start: 2025-08-21

## (undated) DEVICE — GLOVE GAMMEX NEOPRENE ULTRA SZ 7.5 LF 8515

## (undated) DEVICE — CAST PLASTER SPLINT 4X15" 7394

## (undated) DEVICE — DRSG STERI STRIP 1/2X4" R1547

## (undated) DEVICE — DRSG KERLIX 4 1/2"X4YDS ROLL 6715

## (undated) DEVICE — SU ETHIBOND 3-0 V-5 30" X916H

## (undated) DEVICE — SOL ADH LIQUID BENZOIN SWAB 0.6ML C1544

## (undated) DEVICE — DRSG TEGADERM 4X4 3/4" 1626

## (undated) DEVICE — GLOVE PROTEXIS MICRO 6.5  2D73PM65

## (undated) DEVICE — SPONGE SURGIFOAM 12 1972

## (undated) DEVICE — SUCTION CANISTER MEDIVAC LINER 3000ML W/LID 65651-530

## (undated) DEVICE — BLADE KNIFE SURG 15 371115

## (undated) DEVICE — SOL NACL 0.9% IRRIG 1000ML BOTTLE 07138-09

## (undated) DEVICE — LINEN TOWEL PACK X5 5464

## (undated) DEVICE — PACK HAND WRIST SOP15HWFSP

## (undated) DEVICE — BNDG ELASTIC 4"X5YDS UNSTERILE 6611-40

## (undated) DEVICE — PACK EXTREMITY SOP15EXFSD

## (undated) DEVICE — SU VICRYL 3-0 RB-1 27" UND J215H

## (undated) DEVICE — GLOVE PROTEXIS BLUE W/NEU-THERA 6.5  2D73EB65

## (undated) DEVICE — DRAPE MINI C-ARM 4003

## (undated) DEVICE — GLOVE LINER COTTON MED 32-2076

## (undated) DEVICE — DRSG ADAPTIC 3X8" 6113

## (undated) DEVICE — SU MONOCRYL 4-0 PC-3 18" UND Y845G

## (undated) DEVICE — DRAPE SHEET REV FOLD 3/4 9349

## (undated) DEVICE — BONE WAX 2.5GM W31G

## (undated) DEVICE — SOL WATER IRRIG 1000ML BOTTLE 2F7114

## (undated) DEVICE — SYR 01ML 27GA 0.5" NDL TBC 309623

## (undated) DEVICE — PREP SKIN SCRUB TRAY 4461A

## (undated) DEVICE — SU ETHIBOND 3-0 RB-1 30" X872H

## (undated) RX ORDER — PROPOFOL 10 MG/ML
INJECTION, EMULSION INTRAVENOUS
Status: DISPENSED
Start: 2018-05-18

## (undated) RX ORDER — PROPOFOL 10 MG/ML
INJECTION, EMULSION INTRAVENOUS
Status: DISPENSED
Start: 2018-01-24

## (undated) RX ORDER — CLINDAMYCIN PHOSPHATE 900 MG/50ML
INJECTION, SOLUTION INTRAVENOUS
Status: DISPENSED
Start: 2018-05-18

## (undated) RX ORDER — CLINDAMYCIN PHOSPHATE 900 MG/50ML
INJECTION, SOLUTION INTRAVENOUS
Status: DISPENSED
Start: 2018-01-24

## (undated) RX ORDER — OXYCODONE AND ACETAMINOPHEN 5; 325 MG/1; MG/1
TABLET ORAL
Status: DISPENSED
Start: 2018-01-24

## (undated) RX ORDER — FENTANYL CITRATE 50 UG/ML
INJECTION, SOLUTION INTRAMUSCULAR; INTRAVENOUS
Status: DISPENSED
Start: 2018-05-18

## (undated) RX ORDER — ONDANSETRON 2 MG/ML
INJECTION INTRAMUSCULAR; INTRAVENOUS
Status: DISPENSED
Start: 2018-05-18

## (undated) RX ORDER — FENTANYL CITRATE 50 UG/ML
INJECTION, SOLUTION INTRAMUSCULAR; INTRAVENOUS
Status: DISPENSED
Start: 2018-01-24

## (undated) RX ORDER — DEXAMETHASONE SODIUM PHOSPHATE 4 MG/ML
INJECTION, SOLUTION INTRA-ARTICULAR; INTRALESIONAL; INTRAMUSCULAR; INTRAVENOUS; SOFT TISSUE
Status: DISPENSED
Start: 2018-05-18